# Patient Record
Sex: FEMALE | Race: WHITE | NOT HISPANIC OR LATINO | Employment: UNEMPLOYED | RURAL
[De-identification: names, ages, dates, MRNs, and addresses within clinical notes are randomized per-mention and may not be internally consistent; named-entity substitution may affect disease eponyms.]

---

## 2018-06-24 ENCOUNTER — HISTORICAL (OUTPATIENT)
Dept: ADMINISTRATIVE | Facility: HOSPITAL | Age: 58
End: 2018-06-24

## 2018-06-26 LAB
LAB AP CLINICAL INFORMATION: NORMAL
LAB AP DIAGNOSIS - HISTORICAL: NORMAL
LAB AP GROSS PATHOLOGY - HISTORICAL: NORMAL
LAB AP SPECIMEN SUBMITTED - HISTORICAL: NORMAL

## 2020-05-12 ENCOUNTER — HISTORICAL (OUTPATIENT)
Dept: ADMINISTRATIVE | Facility: HOSPITAL | Age: 60
End: 2020-05-12

## 2020-12-30 ENCOUNTER — HISTORICAL (OUTPATIENT)
Dept: ADMINISTRATIVE | Facility: HOSPITAL | Age: 60
End: 2020-12-30

## 2020-12-30 LAB
BILIRUB UR QL STRIP: NEGATIVE MG/DL
CLARITY UR: CLEAR
COLOR UR: ABNORMAL
GLUCOSE UR STRIP-MCNC: NEGATIVE MG/DL
KETONES UR STRIP-SCNC: NEGATIVE MG/DL
LEUKOCYTE ESTERASE UR QL STRIP: NEGATIVE LEU/UL
NITRITE UR QL STRIP: NEGATIVE
PH UR STRIP: 5.5 PH UNITS (ref 5–8)
PROT UR QL STRIP: NEGATIVE MG/DL
RBC # UR STRIP: ABNORMAL ERY/UL
SP GR UR STRIP: <=1.005 (ref 1–1.03)
UROBILINOGEN UR STRIP-ACNC: 0.2 EU/DL

## 2021-01-02 ENCOUNTER — HISTORICAL (OUTPATIENT)
Dept: ADMINISTRATIVE | Facility: HOSPITAL | Age: 61
End: 2021-01-02

## 2021-01-02 LAB
ALBUMIN SERPL BCP-MCNC: 3.5 G/DL (ref 3.5–5)
ALBUMIN/GLOB SERPL: 0.9 {RATIO}
ALP SERPL-CCNC: 93 U/L (ref 46–118)
ALT SERPL W P-5'-P-CCNC: 41 U/L (ref 13–56)
AMPHET UR QL SCN: NEGATIVE NG/ML
ANION GAP SERPL CALCULATED.3IONS-SCNC: 12 MMOL/L
ANISOCYTOSIS BLD QL SMEAR: ABNORMAL
AST SERPL W P-5'-P-CCNC: 135 U/L (ref 15–37)
BARBITURATES UR QL SCN: NEGATIVE NG/ML
BASOPHILS # BLD AUTO: 0.08 X10E3/UL (ref 0–0.2)
BASOPHILS NFR BLD AUTO: 0.8 % (ref 0–1)
BENZODIAZ METAB UR QL SCN: NEGATIVE NG/ML
BILIRUB SERPL-MCNC: 0.4 MG/DL (ref 0–1.2)
BILIRUB UR QL STRIP: NEGATIVE MG/DL
BUN SERPL-MCNC: 24 MG/DL (ref 7–18)
BUN/CREAT SERPL: 25
CALCIUM SERPL-MCNC: 8.9 MG/DL (ref 8.5–10.1)
CANNABINOIDS UR QL SCN: NEGATIVE NG/ML
CHLORIDE SERPL-SCNC: 97 MMOL/L (ref 98–107)
CLARITY UR: CLEAR
CO2 SERPL-SCNC: 30 MMOL/L (ref 21–32)
COCAINE UR QL SCN: NEGATIVE NG/ML
COLOR UR: ABNORMAL
CREAT SERPL-MCNC: 0.96 MG/DL (ref 0.55–1.02)
EOSINOPHIL # BLD AUTO: 0.16 X10E3/UL (ref 0–0.5)
EOSINOPHIL NFR BLD AUTO: 1.7 % (ref 1–4)
EOSINOPHIL NFR BLD MANUAL: 1 % (ref 1–4)
ERYTHROCYTE [DISTWIDTH] IN BLOOD BY AUTOMATED COUNT: 18.5 % (ref 11.5–14.5)
GLOBULIN SER-MCNC: 4.1 G/DL (ref 2–4)
GLUCOSE SERPL-MCNC: 99 MG/DL (ref 74–106)
GLUCOSE UR STRIP-MCNC: NEGATIVE MG/DL
HCT VFR BLD AUTO: 30.8 % (ref 38–47)
HGB BLD-MCNC: 9.7 G/DL (ref 12–16)
IMM GRANULOCYTES # BLD AUTO: 0.06 X10E3/UL (ref 0–0.04)
IMM GRANULOCYTES NFR BLD: 0.6 % (ref 0–0.4)
KETONES UR STRIP-SCNC: NEGATIVE MG/DL
LEUKOCYTE ESTERASE UR QL STRIP: NEGATIVE LEU/UL
LYMPHOCYTES # BLD AUTO: 0.69 X10E3/UL (ref 1–4.8)
LYMPHOCYTES NFR BLD AUTO: 7.3 % (ref 27–41)
LYMPHOCYTES NFR BLD MANUAL: 10 % (ref 27–41)
MCH RBC QN AUTO: 31.5 PG (ref 27–31)
MCHC RBC AUTO-ENTMCNC: 31.5 G/DL (ref 32–36)
MCV RBC AUTO: 100 FL (ref 80–96)
MONOCYTES # BLD AUTO: 1.06 X10E3/UL (ref 0–0.8)
MONOCYTES NFR BLD AUTO: 11.2 % (ref 2–6)
MONOCYTES NFR BLD MANUAL: 11 % (ref 2–6)
MPC BLD CALC-MCNC: 11 FL (ref 9.4–12.4)
NEUTROPHILS # BLD AUTO: 7.44 X10E3/UL (ref 1.8–7.7)
NEUTROPHILS NFR BLD AUTO: 78.4 % (ref 53–65)
NEUTS BAND NFR BLD MANUAL: 2 % (ref 1–5)
NEUTS SEG NFR BLD MANUAL: 76 % (ref 50–62)
NITRITE UR QL STRIP: NEGATIVE
NRBC # BLD AUTO: 0 X10E3/UL (ref 0–0)
NRBC, AUTO (.00): 0 /100 (ref 0–0)
OPIATES UR QL SCN: POSITIVE NG/ML
PCP UR QL SCN: NEGATIVE NG/ML
PH UR STRIP: 5 PH UNITS (ref 5–8)
PLATELET # BLD AUTO: 177 X10E3/UL (ref 150–400)
PLATELET MORPHOLOGY: NORMAL
POLYCHROMASIA BLD QL SMEAR: ABNORMAL
POTASSIUM SERPL-SCNC: 3 MMOL/L (ref 3.5–5.1)
PROT SERPL-MCNC: 7.6 G/DL (ref 6.4–8.2)
PROT UR QL STRIP: NEGATIVE MG/DL
RBC # BLD AUTO: 3.08 X10E6/UL (ref 4.2–5.4)
RBC # UR STRIP: ABNORMAL ERY/UL
SODIUM SERPL-SCNC: 136 MMOL/L (ref 136–145)
SP GR UR STRIP: 1.01 (ref 1–1.03)
UROBILINOGEN UR STRIP-ACNC: 0.2 EU/DL
WBC # BLD AUTO: 9.49 X10E3/UL (ref 4.5–11)

## 2021-03-16 DIAGNOSIS — R60.0 LOCALIZED EDEMA: Primary | ICD-10-CM

## 2021-03-16 RX ORDER — FLUTICASONE PROPIONATE 50 MCG
2 SPRAY, SUSPENSION (ML) NASAL DAILY
COMMUNITY
End: 2024-03-18

## 2021-03-16 RX ORDER — MONTELUKAST SODIUM 10 MG/1
1 TABLET ORAL NIGHTLY
COMMUNITY
Start: 2021-03-12 | End: 2021-03-24

## 2021-03-16 RX ORDER — ERGOCALCIFEROL 1.25 MG/1
50000 CAPSULE ORAL
COMMUNITY
End: 2021-03-24

## 2021-03-16 RX ORDER — PANTOPRAZOLE SODIUM 40 MG/1
1 TABLET, DELAYED RELEASE ORAL DAILY
COMMUNITY
Start: 2021-03-08 | End: 2021-03-24

## 2021-03-16 RX ORDER — GABAPENTIN 100 MG/1
1 CAPSULE ORAL NIGHTLY
COMMUNITY
Start: 2021-03-12 | End: 2021-03-24

## 2021-03-16 RX ORDER — FUROSEMIDE 20 MG/1
20 TABLET ORAL DAILY PRN
Qty: 30 TABLET | Refills: 1 | Status: SHIPPED | OUTPATIENT
Start: 2021-03-16 | End: 2021-03-24

## 2021-03-16 RX ORDER — FUROSEMIDE 20 MG/1
1 TABLET ORAL DAILY PRN
COMMUNITY
Start: 2021-03-15 | End: 2021-03-16 | Stop reason: SDUPTHER

## 2021-03-16 RX ORDER — ONDANSETRON 4 MG/1
1 TABLET, FILM COATED ORAL 3 TIMES DAILY PRN
COMMUNITY
Start: 2021-03-12 | End: 2021-03-24

## 2021-03-16 RX ORDER — ESCITALOPRAM OXALATE 10 MG/1
1 TABLET ORAL DAILY
COMMUNITY
Start: 2021-03-08 | End: 2021-03-24

## 2021-03-16 RX ORDER — CLONAZEPAM 2 MG/1
2 TABLET ORAL 2 TIMES DAILY
COMMUNITY
End: 2021-03-24

## 2021-03-16 RX ORDER — TIZANIDINE 4 MG/1
4 TABLET ORAL 3 TIMES DAILY PRN
COMMUNITY
End: 2021-03-24

## 2021-03-16 RX ORDER — SUCRALFATE 1 G/1
1 TABLET ORAL 3 TIMES DAILY PRN
COMMUNITY
End: 2021-03-24

## 2021-03-24 ENCOUNTER — HOSPITAL ENCOUNTER (INPATIENT)
Facility: HOSPITAL | Age: 61
LOS: 7 days | Discharge: HOSPICE | DRG: 177 | End: 2021-03-31
Attending: EMERGENCY MEDICINE | Admitting: INTERNAL MEDICINE
Payer: MEDICARE

## 2021-03-24 DIAGNOSIS — D64.9 NORMOCYTIC ANEMIA: ICD-10-CM

## 2021-03-24 DIAGNOSIS — F41.1 GAD (GENERALIZED ANXIETY DISORDER): ICD-10-CM

## 2021-03-24 DIAGNOSIS — S42.213A HUMERAL SURGICAL NECK FRACTURE: ICD-10-CM

## 2021-03-24 DIAGNOSIS — R79.89 ELEVATED TROPONIN I LEVEL: ICD-10-CM

## 2021-03-24 DIAGNOSIS — E87.6 HYPOKALEMIA: ICD-10-CM

## 2021-03-24 DIAGNOSIS — D50.0 IRON DEFICIENCY ANEMIA DUE TO CHRONIC BLOOD LOSS: ICD-10-CM

## 2021-03-24 DIAGNOSIS — K20.90 ESOPHAGITIS: ICD-10-CM

## 2021-03-24 DIAGNOSIS — J18.9 PNEUMONIA OF RIGHT LOWER LOBE DUE TO INFECTIOUS ORGANISM: Primary | ICD-10-CM

## 2021-03-24 DIAGNOSIS — F05 ACUTE CONFUSIONAL STATE: ICD-10-CM

## 2021-03-24 DIAGNOSIS — A41.9 SEPSIS, DUE TO UNSPECIFIED ORGANISM, UNSPECIFIED WHETHER ACUTE ORGAN DYSFUNCTION PRESENT: ICD-10-CM

## 2021-03-24 DIAGNOSIS — E87.1 HYPONATREMIA: ICD-10-CM

## 2021-03-24 DIAGNOSIS — J44.1 COPD EXACERBATION: ICD-10-CM

## 2021-03-24 DIAGNOSIS — R06.00 DYSPNEA: ICD-10-CM

## 2021-03-24 LAB
ALBUMIN SERPL BCP-MCNC: 3 G/DL (ref 3.5–5)
ALBUMIN/GLOB SERPL: 0.9 {RATIO}
ALP SERPL-CCNC: 103 U/L (ref 46–118)
ALT SERPL W P-5'-P-CCNC: 19 U/L (ref 13–56)
AMPHETAMINE: NEGATIVE
ANION GAP SERPL CALCULATED.3IONS-SCNC: 8.4 MMOL/L (ref 7–16)
ANISOCYTOSIS BLD QL SMEAR: ABNORMAL
APTT PPP: 34.7 SECONDS (ref 25.2–37.3)
AST SERPL W P-5'-P-CCNC: 33 U/L (ref 15–37)
BARBITURATES: NEGATIVE
BASOPHILS # BLD AUTO: 0.03 X10E3/UL (ref 0–0.2)
BASOPHILS NFR BLD AUTO: 0.5 % (ref 0–1)
BENZODIAZEPINES: NEGATIVE
BILIRUB SERPL-MCNC: 0.3 MG/DL (ref 0–1.2)
BILIRUB UR QL STRIP: NEGATIVE MG/DL
BUN SERPL-MCNC: 19 MG/DL (ref 7–18)
BUN/CREAT SERPL: 21
CALCIUM SERPL-MCNC: 8.2 MG/DL (ref 8.5–10.1)
CANNABINOID: NEGATIVE
CHLORIDE SERPL-SCNC: 102 MMOL/L (ref 98–107)
CK SERPL-CCNC: 605 U/L (ref 26–192)
CLARITY UR: CLEAR
CO2 SERPL-SCNC: 29 MMOL/L (ref 21–32)
COCAINE: NEGATIVE
COLOR UR: YELLOW
CREAT SERPL-MCNC: 0.91 MG/DL (ref 0.5–1.02)
EOSINOPHIL # BLD AUTO: 0 X10E3/UL (ref 0–0.5)
EOSINOPHIL NFR BLD AUTO: 0 % (ref 1–4)
ERYTHROCYTE [DISTWIDTH] IN BLOOD BY AUTOMATED COUNT: 14.4 % (ref 11.5–14.5)
FLUAV AG UPPER RESP QL IA.RAPID: NEGATIVE
FLUBV AG UPPER RESP QL IA.RAPID: NEGATIVE
GLOBULIN SER-MCNC: 3.5 G/DL (ref 2–4)
GLUCOSE SERPL-MCNC: 90 MG/DL (ref 74–106)
GLUCOSE UR STRIP-MCNC: NEGATIVE MG/DL
HCO3 UR-SCNC: 28.6 MMOL/L (ref 18–23)
HCT VFR BLD AUTO: 30.5 % (ref 38–47)
HCT VFR BLD CALC: 29 % (ref 35–51)
HGB BLD-MCNC: 9.5 G/DL (ref 12–16)
HYPOCHROMIA BLD QL SMEAR: ABNORMAL
IMM GRANULOCYTES # BLD AUTO: 0.02 X10E3/UL (ref 0–0.04)
IMM GRANULOCYTES NFR BLD: 0.3 % (ref 0–0.4)
INR BLD: 1.33 (ref 0–3.3)
KETONES UR STRIP-SCNC: NEGATIVE MG/DL
LACTATE SERPL-SCNC: 1.5 MMOL/L (ref 0.4–2)
LDH SERPL L TO P-CCNC: 0.6 MMOL/L
LEUKOCYTE ESTERASE UR QL STRIP: NEGATIVE LEU/UL
LYMPHOCYTES # BLD AUTO: 0.17 X10E3/UL (ref 1–4.8)
LYMPHOCYTES NFR BLD AUTO: 2.6 % (ref 27–41)
LYMPHOCYTES NFR BLD MANUAL: 6 % (ref 27–41)
MAGNESIUM SERPL-MCNC: 1.9 MG/DL (ref 1.7–2.3)
MCH RBC QN AUTO: 29.5 PG (ref 27–31)
MCHC RBC AUTO-ENTMCNC: 31.1 G/DL (ref 32–36)
MCV RBC AUTO: 94.7 FL (ref 80–96)
METAMYELOCYTES NFR BLD MANUAL: 2 %
MONOCYTES # BLD AUTO: 0.41 X10E3/UL (ref 0–0.8)
MONOCYTES NFR BLD AUTO: 6.2 % (ref 2–6)
MONOCYTES NFR BLD MANUAL: 2 % (ref 2–6)
MPC BLD CALC-MCNC: 9.1 FL (ref 9.4–12.4)
NEUTROPHILS # BLD AUTO: 5.99 X10E3/UL (ref 1.8–7.7)
NEUTROPHILS NFR BLD AUTO: 90.4 % (ref 53–65)
NEUTS BAND NFR BLD MANUAL: 28 % (ref 1–5)
NEUTS SEG NFR BLD MANUAL: 62 % (ref 50–62)
NITRITE UR QL STRIP: NEGATIVE
NRBC # BLD AUTO: 0 X10E3/UL (ref 0–0)
NRBC, AUTO (.00): 0 /100 (ref 0–0)
NT-PROBNP SERPL-MCNC: ABNORMAL PG/ML (ref 1–125)
OPIATES: NEGATIVE
PCO2 BLDA: 53 MMHG (ref 35–48)
PH SMN: 7.34 [PH] (ref 7.35–7.45)
PH UR STRIP: 5 PH UNITS (ref 5–8)
PHENCYCLIDINE: NEGATIVE
PLATELET # BLD AUTO: 198 X10E3/UL (ref 150–400)
PLATELET MORPHOLOGY: NORMAL
PO2 BLDA: 63 MMHG (ref 83–108)
POC BASE EXCESS: 2.2 MMOL/L
POC CO2: 30.2 MMOL/L
POC IONIZED CALCIUM: 1.14 MMOL/L (ref 1.15–1.35)
POC SATURATED O2: 90 % (ref 95–98)
POCT GLUCOSE: 83 MG/DL
POTASSIUM BLD-SCNC: 3.1 MMOL/L (ref 3.4–4.5)
POTASSIUM SERPL-SCNC: 3.4 MMOL/L (ref 3.5–5.1)
PROT SERPL-MCNC: 6.5 G/DL (ref 6.4–8.2)
PROT UR QL STRIP: ABNORMAL MG/DL
PROTHROMBIN TIME: 15.8 SECONDS (ref 11.7–14.7)
RBC # BLD AUTO: 3.22 X10E6/UL (ref 4.2–5.4)
RBC # UR STRIP: ABNORMAL ERY/UL
SARS-COV+SARS-COV-2 AG RESP QL IA.RAPID: NEGATIVE
SODIUM BLD-SCNC: 132 MMOL/L (ref 136–145)
SODIUM SERPL-SCNC: 136 MMOL/L (ref 136–145)
SP GR UR STRIP: 1.02 (ref 1–1.03)
TROPONIN I SERPL-MCNC: 0.11 NG/ML (ref 0–0.06)
UROBILINOGEN UR STRIP-ACNC: 0.2 EU/DL
WBC # BLD AUTO: 6.62 X10E3/UL (ref 4.5–11)

## 2021-03-24 PROCEDURE — 80061 LIPID PANEL: CPT

## 2021-03-24 PROCEDURE — 85025 COMPLETE CBC W/AUTO DIFF WBC: CPT

## 2021-03-24 PROCEDURE — 25000003 PHARM REV CODE 250: Performed by: EMERGENCY MEDICINE

## 2021-03-24 PROCEDURE — 20000000 HC ICU ROOM

## 2021-03-24 PROCEDURE — 84132 ASSAY OF SERUM POTASSIUM: CPT

## 2021-03-24 PROCEDURE — 82550 ASSAY OF CK (CPK): CPT

## 2021-03-24 PROCEDURE — 99223 1ST HOSP IP/OBS HIGH 75: CPT | Mod: GW,,, | Performed by: INTERNAL MEDICINE

## 2021-03-24 PROCEDURE — 93010 EKG 12-LEAD: ICD-10-PCS | Mod: GW,,, | Performed by: HOSPITALIST

## 2021-03-24 PROCEDURE — 99284 EMERGENCY DEPT VISIT MOD MDM: CPT | Mod: GW,CS,, | Performed by: EMERGENCY MEDICINE

## 2021-03-24 PROCEDURE — 84295 ASSAY OF SERUM SODIUM: CPT

## 2021-03-24 PROCEDURE — 80053 COMPREHEN METABOLIC PANEL: CPT

## 2021-03-24 PROCEDURE — 82803 BLOOD GASES ANY COMBINATION: CPT

## 2021-03-24 PROCEDURE — 99284 PR EMERGENCY DEPT VISIT,LEVEL IV: ICD-10-PCS | Mod: GW,CS,, | Performed by: EMERGENCY MEDICINE

## 2021-03-24 PROCEDURE — 36415 COLL VENOUS BLD VENIPUNCTURE: CPT

## 2021-03-24 PROCEDURE — 93005 ELECTROCARDIOGRAM TRACING: CPT

## 2021-03-24 PROCEDURE — 93010 ELECTROCARDIOGRAM REPORT: CPT | Mod: GW,,, | Performed by: HOSPITALIST

## 2021-03-24 PROCEDURE — 84484 ASSAY OF TROPONIN QUANT: CPT

## 2021-03-24 PROCEDURE — 83605 ASSAY OF LACTIC ACID: CPT

## 2021-03-24 PROCEDURE — 87428 SARSCOV & INF VIR A&B AG IA: CPT

## 2021-03-24 PROCEDURE — 96365 THER/PROPH/DIAG IV INF INIT: CPT

## 2021-03-24 PROCEDURE — 85014 HEMATOCRIT: CPT

## 2021-03-24 PROCEDURE — 82947 ASSAY GLUCOSE BLOOD QUANT: CPT

## 2021-03-24 PROCEDURE — 63600175 PHARM REV CODE 636 W HCPCS: Performed by: EMERGENCY MEDICINE

## 2021-03-24 PROCEDURE — 87040 BLOOD CULTURE FOR BACTERIA: CPT

## 2021-03-24 PROCEDURE — 99223 PR INITIAL HOSPITAL CARE,LEVL III: ICD-10-PCS | Mod: GW,,, | Performed by: INTERNAL MEDICINE

## 2021-03-24 PROCEDURE — 83735 ASSAY OF MAGNESIUM: CPT

## 2021-03-24 PROCEDURE — 99285 EMERGENCY DEPT VISIT HI MDM: CPT | Mod: 25

## 2021-03-24 PROCEDURE — 83880 ASSAY OF NATRIURETIC PEPTIDE: CPT

## 2021-03-24 PROCEDURE — 82330 ASSAY OF CALCIUM: CPT

## 2021-03-24 PROCEDURE — 36600 WITHDRAWAL OF ARTERIAL BLOOD: CPT

## 2021-03-24 PROCEDURE — 25000003 PHARM REV CODE 250

## 2021-03-24 PROCEDURE — 96372 THER/PROPH/DIAG INJ SC/IM: CPT

## 2021-03-24 PROCEDURE — 85730 THROMBOPLASTIN TIME PARTIAL: CPT

## 2021-03-24 PROCEDURE — 85610 PROTHROMBIN TIME: CPT

## 2021-03-24 RX ORDER — ACETAMINOPHEN 500 MG
TABLET ORAL
Status: COMPLETED
Start: 2021-03-24 | End: 2021-03-24

## 2021-03-24 RX ORDER — FLUTICASONE PROPIONATE 50 MCG
2 SPRAY, SUSPENSION (ML) NASAL DAILY
Status: DISCONTINUED | OUTPATIENT
Start: 2021-03-25 | End: 2021-03-31 | Stop reason: HOSPADM

## 2021-03-24 RX ORDER — IPRATROPIUM BROMIDE AND ALBUTEROL SULFATE 2.5; .5 MG/3ML; MG/3ML
3 SOLUTION RESPIRATORY (INHALATION) EVERY 4 HOURS
Status: DISCONTINUED | OUTPATIENT
Start: 2021-03-24 | End: 2021-03-31 | Stop reason: HOSPADM

## 2021-03-24 RX ORDER — ENOXAPARIN SODIUM 100 MG/ML
40 INJECTION SUBCUTANEOUS EVERY 24 HOURS
Status: DISCONTINUED | OUTPATIENT
Start: 2021-03-24 | End: 2021-03-28

## 2021-03-24 RX ORDER — IPRATROPIUM BROMIDE AND ALBUTEROL SULFATE 2.5; .5 MG/3ML; MG/3ML
SOLUTION RESPIRATORY (INHALATION)
Status: DISPENSED
Start: 2021-03-24 | End: 2021-03-25

## 2021-03-24 RX ORDER — TRAZODONE HYDROCHLORIDE 50 MG/1
TABLET ORAL
COMMUNITY
Start: 2021-03-12 | End: 2021-04-15 | Stop reason: SDUPTHER

## 2021-03-24 RX ORDER — BUDESONIDE 0.5 MG/2ML
0.5 INHALANT ORAL EVERY 12 HOURS
Status: DISCONTINUED | OUTPATIENT
Start: 2021-03-25 | End: 2021-03-31 | Stop reason: HOSPADM

## 2021-03-24 RX ORDER — SODIUM CHLORIDE, SODIUM LACTATE, POTASSIUM CHLORIDE, CALCIUM CHLORIDE 600; 310; 30; 20 MG/100ML; MG/100ML; MG/100ML; MG/100ML
1000 INJECTION, SOLUTION INTRAVENOUS
Status: COMPLETED | OUTPATIENT
Start: 2021-03-24 | End: 2021-03-24

## 2021-03-24 RX ORDER — ONDANSETRON 2 MG/ML
4 INJECTION INTRAMUSCULAR; INTRAVENOUS EVERY 8 HOURS PRN
Status: DISCONTINUED | OUTPATIENT
Start: 2021-03-24 | End: 2021-03-25

## 2021-03-24 RX ORDER — TALC
6 POWDER (GRAM) TOPICAL NIGHTLY PRN
Status: DISCONTINUED | OUTPATIENT
Start: 2021-03-24 | End: 2021-03-24

## 2021-03-24 RX ORDER — SODIUM CHLORIDE 0.9 % (FLUSH) 0.9 %
10 SYRINGE (ML) INJECTION
Status: DISCONTINUED | OUTPATIENT
Start: 2021-03-24 | End: 2021-03-31 | Stop reason: HOSPADM

## 2021-03-24 RX ORDER — SODIUM CHLORIDE 0.9 % (FLUSH) 0.9 %
10 SYRINGE (ML) INJECTION
Status: DISCONTINUED | OUTPATIENT
Start: 2021-03-24 | End: 2021-03-24

## 2021-03-24 RX ORDER — HYDROCODONE BITARTRATE AND ACETAMINOPHEN 10; 325 MG/1; MG/1
TABLET ORAL
COMMUNITY
Start: 2020-12-29 | End: 2024-03-18 | Stop reason: ALTCHOICE

## 2021-03-24 RX ADMIN — ENOXAPARIN SODIUM 40 MG: 40 INJECTION SUBCUTANEOUS at 08:03

## 2021-03-24 RX ADMIN — ACETAMINOPHEN 500 MG TABLET 500 MG: at 06:03

## 2021-03-24 RX ADMIN — SODIUM CHLORIDE, POTASSIUM CHLORIDE, SODIUM LACTATE AND CALCIUM CHLORIDE 1000 ML: 600; 310; 30; 20 INJECTION, SOLUTION INTRAVENOUS at 09:03

## 2021-03-24 RX ADMIN — PIPERACILLIN SODIUM AND TAZOBACTAM SODIUM 4.5 G: 4; .5 INJECTION, POWDER, LYOPHILIZED, FOR SOLUTION INTRAVENOUS at 08:03

## 2021-03-25 PROBLEM — E87.6 HYPOKALEMIA: Status: ACTIVE | Noted: 2021-03-25

## 2021-03-25 PROBLEM — F05 ACUTE CONFUSIONAL STATE: Status: ACTIVE | Noted: 2021-03-25

## 2021-03-25 LAB
ANION GAP SERPL CALCULATED.3IONS-SCNC: 8.4 MMOL/L (ref 7–16)
ANISOCYTOSIS BLD QL SMEAR: ABNORMAL
AORTIC ROOT ANNULUS: 2.6 CM
AORTIC VALVE CUSP SEPERATION: 1.93 CM
AV INDEX (PROSTH): 0.87
AV MEAN GRADIENT: 5 MMHG
AV PEAK GRADIENT: 9 MMHG
AV VALVE AREA: 2.21 CM2
BASOPHILS # BLD AUTO: 0.03 X10E3/UL (ref 0–0.2)
BASOPHILS NFR BLD AUTO: 0.2 % (ref 0–1)
BSA FOR ECHO PROCEDURE: 1.44 M2
BUN SERPL-MCNC: 20 MG/DL (ref 7–18)
CALCIUM SERPL-MCNC: 7.8 MG/DL (ref 8.5–10.1)
CHLORIDE SERPL-SCNC: 101 MMOL/L (ref 98–107)
CO2 SERPL-SCNC: 28 MMOL/L (ref 21–32)
CREAT SERPL-MCNC: 0.85 MG/DL (ref 0.5–1.02)
CV ECHO LV RWT: 0.42 CM
DOP CALC AO PEAK VEL: 1.5 M/S
DOP CALC AO VTI: 2.3 CM
DOP CALC LVOT AREA: 2.5 CM2
DOP CALC LVOT DIAMETER: 1.8 CM
DOP CALC LVOT STROKE VOLUME: 5.09 CM3
DOP CALCLVOT PEAK VEL VTI: 2 CM
E WAVE DECELERATION TIME: 233 MSEC
ECHO EF ESTIMATED: 50 %
ECHO LV POSTERIOR WALL: 0.95 CM (ref 0.6–1.1)
EOSINOPHIL # BLD AUTO: 0 X10E3/UL (ref 0–0.5)
EOSINOPHIL NFR BLD AUTO: 0 % (ref 1–4)
ERYTHROCYTE [DISTWIDTH] IN BLOOD BY AUTOMATED COUNT: 14.6 % (ref 11.5–14.5)
FRACTIONAL SHORTENING: 24 % (ref 28–44)
GLUCOSE SERPL-MCNC: 99 MG/DL (ref 74–106)
HCT VFR BLD AUTO: 26.6 % (ref 38–47)
HGB BLD-MCNC: 8.5 G/DL (ref 12–16)
HYPOCHROMIA BLD QL SMEAR: SLIGHT
IMM GRANULOCYTES # BLD AUTO: 0.11 X10E3/UL (ref 0–0.04)
IMM GRANULOCYTES NFR BLD: 0.8 % (ref 0–0.4)
INTERVENTRICULAR SEPTUM: 0.79 CM (ref 0.6–1.1)
LA MAJOR: 2.4 CM
LEFT INTERNAL DIMENSION IN SYSTOLE: 3.45 CM (ref 2.1–4)
LEFT VENTRICLE MASS INDEX: 88 G/M2
LEFT VENTRICULAR INTERNAL DIMENSION IN DIASTOLE: 4.52 CM (ref 3.5–6)
LEFT VENTRICULAR MASS: 127.87 G
LVOT MG: 3 MMHG
LYMPHOCYTES # BLD AUTO: 0.17 X10E3/UL (ref 1–4.8)
LYMPHOCYTES NFR BLD AUTO: 1.3 % (ref 27–41)
LYMPHOCYTES NFR BLD MANUAL: 2 % (ref 27–41)
MAGNESIUM SERPL-MCNC: 2.1 MG/DL (ref 1.7–2.3)
MCH RBC QN AUTO: 29.7 PG (ref 27–31)
MCHC RBC AUTO-ENTMCNC: 32 G/DL (ref 32–36)
MCV RBC AUTO: 93 FL (ref 80–96)
MONOCYTES # BLD AUTO: 0.45 X10E3/UL (ref 0–0.8)
MONOCYTES NFR BLD AUTO: 3.4 % (ref 2–6)
MONOCYTES NFR BLD MANUAL: 2 % (ref 2–6)
MPC BLD CALC-MCNC: 9.7 FL (ref 9.4–12.4)
MV PEAK E VEL: 0.8 M/S
NEUTROPHILS # BLD AUTO: 12.53 X10E3/UL (ref 1.8–7.7)
NEUTROPHILS NFR BLD AUTO: 94.3 % (ref 53–65)
NEUTS BAND NFR BLD MANUAL: 54 % (ref 1–5)
NEUTS SEG NFR BLD MANUAL: 42 % (ref 50–62)
NRBC # BLD AUTO: 0 X10E3/UL (ref 0–0)
NRBC, AUTO (.00): 0 /100 (ref 0–0)
PLATELET # BLD AUTO: 165 X10E3/UL (ref 150–400)
PLATELET MORPHOLOGY: ABNORMAL
POLYCHROMASIA BLD QL SMEAR: ABNORMAL
POTASSIUM SERPL-SCNC: 3.4 MMOL/L (ref 3.5–5.1)
RA MAJOR: 2.5 CM
RA PRESSURE: 3 MMHG
RBC # BLD AUTO: 2.86 X10E6/UL (ref 4.2–5.4)
RIGHT VENTRICULAR END-DIASTOLIC DIMENSION: 2.6 CM
SODIUM SERPL-SCNC: 134 MMOL/L (ref 136–145)
TRICUSPID ANNULAR PLANE SYSTOLIC EXCURSION: 1.8 CM
TROPONIN I SERPL-MCNC: 0.08 NG/ML (ref 0–0.06)
TROPONIN I SERPL-MCNC: 0.1 NG/ML (ref 0–0.06)
TROPONIN I SERPL-MCNC: 0.16 NG/ML (ref 0–0.06)
WBC # BLD AUTO: 13.29 X10E3/UL (ref 4.5–11)

## 2021-03-25 PROCEDURE — 27000221 HC OXYGEN, UP TO 24 HOURS

## 2021-03-25 PROCEDURE — 80048 BASIC METABOLIC PNL TOTAL CA: CPT

## 2021-03-25 PROCEDURE — 99233 SBSQ HOSP IP/OBS HIGH 50: CPT | Mod: ,,, | Performed by: INTERNAL MEDICINE

## 2021-03-25 PROCEDURE — 84484 ASSAY OF TROPONIN QUANT: CPT

## 2021-03-25 PROCEDURE — 25000003 PHARM REV CODE 250: Performed by: INTERNAL MEDICINE

## 2021-03-25 PROCEDURE — 94640 AIRWAY INHALATION TREATMENT: CPT

## 2021-03-25 PROCEDURE — 25000003 PHARM REV CODE 250: Performed by: NURSE PRACTITIONER

## 2021-03-25 PROCEDURE — 85025 COMPLETE CBC W/AUTO DIFF WBC: CPT

## 2021-03-25 PROCEDURE — 36415 COLL VENOUS BLD VENIPUNCTURE: CPT

## 2021-03-25 PROCEDURE — 83735 ASSAY OF MAGNESIUM: CPT

## 2021-03-25 PROCEDURE — 99900031 HC PATIENT EDUCATION (STAT)

## 2021-03-25 PROCEDURE — 99233 PR SUBSEQUENT HOSPITAL CARE,LEVL III: ICD-10-PCS | Mod: ,,, | Performed by: INTERNAL MEDICINE

## 2021-03-25 PROCEDURE — 20000000 HC ICU ROOM

## 2021-03-25 PROCEDURE — 63600175 PHARM REV CODE 636 W HCPCS: Performed by: INTERNAL MEDICINE

## 2021-03-25 PROCEDURE — 94761 N-INVAS EAR/PLS OXIMETRY MLT: CPT

## 2021-03-25 PROCEDURE — 92610 EVALUATE SWALLOWING FUNCTION: CPT

## 2021-03-25 PROCEDURE — 25000242 PHARM REV CODE 250 ALT 637 W/ HCPCS: Performed by: INTERNAL MEDICINE

## 2021-03-25 RX ORDER — ACETAMINOPHEN 500 MG
1000 TABLET ORAL EVERY 6 HOURS PRN
Status: DISCONTINUED | OUTPATIENT
Start: 2021-03-25 | End: 2021-03-31 | Stop reason: HOSPADM

## 2021-03-25 RX ORDER — HYDROCODONE BITARTRATE AND ACETAMINOPHEN 5; 325 MG/1; MG/1
1 TABLET ORAL EVERY 6 HOURS PRN
Status: DISCONTINUED | OUTPATIENT
Start: 2021-03-25 | End: 2021-03-31 | Stop reason: HOSPADM

## 2021-03-25 RX ADMIN — PIPERACILLIN SODIUM AND TAZOBACTAM SODIUM 4.5 G: 4; .5 INJECTION, POWDER, LYOPHILIZED, FOR SOLUTION INTRAVENOUS at 09:03

## 2021-03-25 RX ADMIN — IPRATROPIUM BROMIDE AND ALBUTEROL SULFATE 3 ML: 2.5; .5 SOLUTION RESPIRATORY (INHALATION) at 09:03

## 2021-03-25 RX ADMIN — FLUTICASONE PROPIONATE 100 MCG: 50 SPRAY, METERED NASAL at 12:03

## 2021-03-25 RX ADMIN — BUDESONIDE 0.5 MG: 0.5 INHALANT ORAL at 09:03

## 2021-03-25 RX ADMIN — PIPERACILLIN SODIUM AND TAZOBACTAM SODIUM 4.5 G: 4; .5 INJECTION, POWDER, LYOPHILIZED, FOR SOLUTION INTRAVENOUS at 01:03

## 2021-03-25 RX ADMIN — IPRATROPIUM BROMIDE AND ALBUTEROL SULFATE 3 ML: 2.5; .5 SOLUTION RESPIRATORY (INHALATION) at 11:03

## 2021-03-25 RX ADMIN — HYDROCODONE BITARTRATE AND ACETAMINOPHEN 1 TABLET: 5; 325 TABLET ORAL at 06:03

## 2021-03-25 RX ADMIN — ENOXAPARIN SODIUM 40 MG: 40 INJECTION SUBCUTANEOUS at 04:03

## 2021-03-25 RX ADMIN — METHYLPREDNISOLONE SODIUM SUCCINATE 40 MG: 40 INJECTION, POWDER, FOR SOLUTION INTRAMUSCULAR; INTRAVENOUS at 06:03

## 2021-03-25 RX ADMIN — IPRATROPIUM BROMIDE AND ALBUTEROL SULFATE 3 ML: 2.5; .5 SOLUTION RESPIRATORY (INHALATION) at 08:03

## 2021-03-25 RX ADMIN — VANCOMYCIN HYDROCHLORIDE 1000 MG: 1 INJECTION, POWDER, LYOPHILIZED, FOR SOLUTION INTRAVENOUS at 12:03

## 2021-03-25 RX ADMIN — VANCOMYCIN HYDROCHLORIDE 1000 MG: 1 INJECTION, POWDER, LYOPHILIZED, FOR SOLUTION INTRAVENOUS at 05:03

## 2021-03-25 RX ADMIN — IPRATROPIUM BROMIDE AND ALBUTEROL SULFATE 3 ML: 2.5; .5 SOLUTION RESPIRATORY (INHALATION) at 03:03

## 2021-03-25 RX ADMIN — METHYLPREDNISOLONE SODIUM SUCCINATE 40 MG: 40 INJECTION, POWDER, FOR SOLUTION INTRAMUSCULAR; INTRAVENOUS at 12:03

## 2021-03-25 RX ADMIN — PIPERACILLIN SODIUM AND TAZOBACTAM SODIUM 4.5 G: 4; .5 INJECTION, POWDER, LYOPHILIZED, FOR SOLUTION INTRAVENOUS at 04:03

## 2021-03-26 PROBLEM — F05 ACUTE CONFUSIONAL STATE: Status: RESOLVED | Noted: 2021-03-25 | Resolved: 2021-03-26

## 2021-03-26 PROBLEM — S42.213A HUMERAL SURGICAL NECK FRACTURE: Status: ACTIVE | Noted: 2021-03-26

## 2021-03-26 LAB
ANION GAP SERPL CALCULATED.3IONS-SCNC: 9 MMOL/L (ref 7–16)
BASOPHILS # BLD AUTO: 0.02 X10E3/UL (ref 0–0.2)
BASOPHILS NFR BLD AUTO: 0.1 % (ref 0–1)
BUN SERPL-MCNC: 21 MG/DL (ref 7–18)
CALCIUM SERPL-MCNC: 8.7 MG/DL (ref 8.5–10.1)
CHLORIDE SERPL-SCNC: 97 MMOL/L (ref 98–107)
CO2 SERPL-SCNC: 28 MMOL/L (ref 21–32)
CREAT SERPL-MCNC: 0.71 MG/DL (ref 0.5–1.02)
EOSINOPHIL # BLD AUTO: 0 X10E3/UL (ref 0–0.5)
EOSINOPHIL NFR BLD AUTO: 0 % (ref 1–4)
ERYTHROCYTE [DISTWIDTH] IN BLOOD BY AUTOMATED COUNT: 15 % (ref 11.5–14.5)
GLUCOSE SERPL-MCNC: 152 MG/DL (ref 74–106)
HCT VFR BLD AUTO: 23.7 % (ref 38–47)
HGB BLD-MCNC: 7.4 G/DL (ref 12–16)
HYPOCHROMIA BLD QL SMEAR: SLIGHT
IMM GRANULOCYTES # BLD AUTO: 1.21 X10E3/UL (ref 0–0.04)
IMM GRANULOCYTES NFR BLD: 7.7 % (ref 0–0.4)
LYMPHOCYTES # BLD AUTO: 0.21 X10E3/UL (ref 1–4.8)
LYMPHOCYTES NFR BLD AUTO: 1.3 % (ref 27–41)
LYMPHOCYTES NFR BLD MANUAL: 3 % (ref 27–41)
MAGNESIUM SERPL-MCNC: 2.1 MG/DL (ref 1.7–2.3)
MCH RBC QN AUTO: 28.9 PG (ref 27–31)
MCHC RBC AUTO-ENTMCNC: 31.2 G/DL (ref 32–36)
MCV RBC AUTO: 92.6 FL (ref 80–96)
MONOCYTES # BLD AUTO: 0.56 X10E3/UL (ref 0–0.8)
MONOCYTES NFR BLD AUTO: 3.6 % (ref 2–6)
MONOCYTES NFR BLD MANUAL: 4 % (ref 2–6)
MPC BLD CALC-MCNC: 9.8 FL (ref 9.4–12.4)
NEUTROPHILS # BLD AUTO: 13.68 X10E3/UL (ref 1.8–7.7)
NEUTROPHILS NFR BLD AUTO: 87.3 % (ref 53–65)
NEUTS BAND NFR BLD MANUAL: 37 % (ref 1–5)
NEUTS SEG NFR BLD MANUAL: 56 % (ref 50–62)
NRBC # BLD AUTO: 0 X10E3/UL (ref 0–0)
NRBC, AUTO (.00): 0 /100 (ref 0–0)
PLATELET # BLD AUTO: 122 X10E3/UL (ref 150–400)
PLATELET MORPHOLOGY: ABNORMAL
POLYCHROMASIA BLD QL SMEAR: ABNORMAL
POTASSIUM SERPL-SCNC: 3 MMOL/L (ref 3.5–5.1)
RBC # BLD AUTO: 2.56 X10E6/UL (ref 4.2–5.4)
SODIUM SERPL-SCNC: 131 MMOL/L (ref 136–145)
WBC # BLD AUTO: 15.68 X10E3/UL (ref 4.5–11)

## 2021-03-26 PROCEDURE — 36415 COLL VENOUS BLD VENIPUNCTURE: CPT

## 2021-03-26 PROCEDURE — 25000003 PHARM REV CODE 250: Performed by: INTERNAL MEDICINE

## 2021-03-26 PROCEDURE — 99232 SBSQ HOSP IP/OBS MODERATE 35: CPT | Mod: GW,,, | Performed by: NURSE PRACTITIONER

## 2021-03-26 PROCEDURE — 94761 N-INVAS EAR/PLS OXIMETRY MLT: CPT

## 2021-03-26 PROCEDURE — 99232 PR SUBSEQUENT HOSPITAL CARE,LEVL II: ICD-10-PCS | Mod: GW,,, | Performed by: NURSE PRACTITIONER

## 2021-03-26 PROCEDURE — 80048 BASIC METABOLIC PNL TOTAL CA: CPT

## 2021-03-26 PROCEDURE — 51702 INSERT TEMP BLADDER CATH: CPT

## 2021-03-26 PROCEDURE — 63600175 PHARM REV CODE 636 W HCPCS: Performed by: INTERNAL MEDICINE

## 2021-03-26 PROCEDURE — 85025 COMPLETE CBC W/AUTO DIFF WBC: CPT

## 2021-03-26 PROCEDURE — 25000242 PHARM REV CODE 250 ALT 637 W/ HCPCS: Performed by: INTERNAL MEDICINE

## 2021-03-26 PROCEDURE — 27000221 HC OXYGEN, UP TO 24 HOURS

## 2021-03-26 PROCEDURE — 25000003 PHARM REV CODE 250: Performed by: NURSE PRACTITIONER

## 2021-03-26 PROCEDURE — 94640 AIRWAY INHALATION TREATMENT: CPT

## 2021-03-26 PROCEDURE — 11000001 HC ACUTE MED/SURG PRIVATE ROOM

## 2021-03-26 PROCEDURE — 83735 ASSAY OF MAGNESIUM: CPT

## 2021-03-26 RX ORDER — POTASSIUM CHLORIDE 20 MEQ/1
40 TABLET, EXTENDED RELEASE ORAL 2 TIMES DAILY
Status: COMPLETED | OUTPATIENT
Start: 2021-03-26 | End: 2021-03-26

## 2021-03-26 RX ADMIN — BUDESONIDE 0.5 MG: 0.5 INHALANT ORAL at 07:03

## 2021-03-26 RX ADMIN — HYDROCODONE BITARTRATE AND ACETAMINOPHEN 1 TABLET: 5; 325 TABLET ORAL at 08:03

## 2021-03-26 RX ADMIN — METHYLPREDNISOLONE SODIUM SUCCINATE 40 MG: 40 INJECTION, POWDER, FOR SOLUTION INTRAMUSCULAR; INTRAVENOUS at 06:03

## 2021-03-26 RX ADMIN — PIPERACILLIN SODIUM AND TAZOBACTAM SODIUM 4.5 G: 4; .5 INJECTION, POWDER, LYOPHILIZED, FOR SOLUTION INTRAVENOUS at 08:03

## 2021-03-26 RX ADMIN — PIPERACILLIN SODIUM AND TAZOBACTAM SODIUM 4.5 G: 4; .5 INJECTION, POWDER, LYOPHILIZED, FOR SOLUTION INTRAVENOUS at 05:03

## 2021-03-26 RX ADMIN — HYDROCODONE BITARTRATE AND ACETAMINOPHEN 1 TABLET: 5; 325 TABLET ORAL at 12:03

## 2021-03-26 RX ADMIN — IPRATROPIUM BROMIDE AND ALBUTEROL SULFATE 3 ML: 2.5; .5 SOLUTION RESPIRATORY (INHALATION) at 11:03

## 2021-03-26 RX ADMIN — POTASSIUM CHLORIDE 40 MEQ: 20 TABLET, EXTENDED RELEASE ORAL at 08:03

## 2021-03-26 RX ADMIN — PIPERACILLIN SODIUM AND TAZOBACTAM SODIUM 4.5 G: 4; .5 INJECTION, POWDER, LYOPHILIZED, FOR SOLUTION INTRAVENOUS at 12:03

## 2021-03-26 RX ADMIN — FLUTICASONE PROPIONATE 100 MCG: 50 SPRAY, METERED NASAL at 08:03

## 2021-03-26 RX ADMIN — IPRATROPIUM BROMIDE AND ALBUTEROL SULFATE 3 ML: 2.5; .5 SOLUTION RESPIRATORY (INHALATION) at 03:03

## 2021-03-26 RX ADMIN — VANCOMYCIN HYDROCHLORIDE 1000 MG: 1 INJECTION, POWDER, LYOPHILIZED, FOR SOLUTION INTRAVENOUS at 12:03

## 2021-03-26 RX ADMIN — METHYLPREDNISOLONE SODIUM SUCCINATE 40 MG: 40 INJECTION, POWDER, FOR SOLUTION INTRAMUSCULAR; INTRAVENOUS at 12:03

## 2021-03-26 RX ADMIN — HYDROCODONE BITARTRATE AND ACETAMINOPHEN 1 TABLET: 5; 325 TABLET ORAL at 01:03

## 2021-03-26 RX ADMIN — ENOXAPARIN SODIUM 40 MG: 40 INJECTION SUBCUTANEOUS at 05:03

## 2021-03-26 RX ADMIN — IPRATROPIUM BROMIDE AND ALBUTEROL SULFATE 3 ML: 2.5; .5 SOLUTION RESPIRATORY (INHALATION) at 07:03

## 2021-03-26 RX ADMIN — METHYLPREDNISOLONE SODIUM SUCCINATE 40 MG: 40 INJECTION, POWDER, FOR SOLUTION INTRAMUSCULAR; INTRAVENOUS at 11:03

## 2021-03-26 RX ADMIN — POTASSIUM CHLORIDE 40 MEQ: 20 TABLET, EXTENDED RELEASE ORAL at 12:03

## 2021-03-26 RX ADMIN — IPRATROPIUM BROMIDE AND ALBUTEROL SULFATE 3 ML: 2.5; .5 SOLUTION RESPIRATORY (INHALATION) at 08:03

## 2021-03-26 RX ADMIN — METHYLPREDNISOLONE SODIUM SUCCINATE 40 MG: 40 INJECTION, POWDER, FOR SOLUTION INTRAMUSCULAR; INTRAVENOUS at 05:03

## 2021-03-27 PROBLEM — D64.9 NORMOCYTIC ANEMIA: Status: ACTIVE | Noted: 2021-03-27

## 2021-03-27 PROBLEM — E87.1 HYPONATREMIA: Status: ACTIVE | Noted: 2021-03-27

## 2021-03-27 LAB
ANION GAP SERPL CALCULATED.3IONS-SCNC: 9 MMOL/L
ANISOCYTOSIS BLD QL SMEAR: ABNORMAL
BASOPHILS # BLD AUTO: 0.03 X10E3/UL (ref 0–0.2)
BASOPHILS NFR BLD AUTO: 0.2 % (ref 0–1)
BUN SERPL-MCNC: 16 MG/DL (ref 7–18)
CALCIUM SERPL-MCNC: 8.8 MG/DL (ref 8.5–10.1)
CHLORIDE SERPL-SCNC: 95 MMOL/L (ref 98–107)
CO2 SERPL-SCNC: 29 MMOL/L (ref 21–32)
CREAT SERPL-MCNC: 0.66 MG/DL (ref 0.55–1.02)
EOSINOPHIL # BLD AUTO: 0 X10E3/UL (ref 0–0.5)
EOSINOPHIL NFR BLD AUTO: 0 % (ref 1–4)
ERYTHROCYTE [DISTWIDTH] IN BLOOD BY AUTOMATED COUNT: 15 % (ref 11.5–14.5)
GLUCOSE SERPL-MCNC: 119 MG/DL (ref 74–106)
HCT VFR BLD AUTO: 22.5 % (ref 38–47)
HGB BLD-MCNC: 7.2 G/DL (ref 12–16)
HYPOCHROMIA BLD QL SMEAR: SLIGHT
IMM GRANULOCYTES # BLD AUTO: 0.83 X10E3/UL (ref 0–0.04)
IMM GRANULOCYTES NFR BLD: 5 % (ref 0–0.4)
LYMPHOCYTES # BLD AUTO: 0.27 X10E3/UL (ref 1–4.8)
LYMPHOCYTES NFR BLD AUTO: 1.6 % (ref 27–41)
LYMPHOCYTES NFR BLD MANUAL: 2 % (ref 27–41)
MAGNESIUM SERPL-MCNC: 2 MG/DL (ref 1.7–2.3)
MCH RBC QN AUTO: 29.6 PG (ref 27–31)
MCHC RBC AUTO-ENTMCNC: 32 G/DL (ref 32–36)
MCV RBC AUTO: 92.6 FL (ref 80–96)
MONOCYTES # BLD AUTO: 0.6 X10E3/UL (ref 0–0.8)
MONOCYTES NFR BLD AUTO: 3.6 % (ref 2–6)
MONOCYTES NFR BLD MANUAL: 2 % (ref 2–6)
MPC BLD CALC-MCNC: 10.2 FL (ref 9.4–12.4)
NEUTROPHILS # BLD AUTO: 14.86 X10E3/UL (ref 1.8–7.7)
NEUTROPHILS NFR BLD AUTO: 89.6 % (ref 53–65)
NEUTS BAND NFR BLD MANUAL: 5 % (ref 1–5)
NEUTS SEG NFR BLD MANUAL: 91 % (ref 50–62)
NRBC # BLD AUTO: 0 X10E3/UL (ref 0–0)
NRBC, AUTO (.00): 0.2 /100 (ref 0–0)
OVALOCYTES BLD QL SMEAR: ABNORMAL
PLATELET # BLD AUTO: 123 X10E3/UL (ref 150–400)
PLATELET MORPHOLOGY: ABNORMAL
POLYCHROMASIA BLD QL SMEAR: ABNORMAL
POTASSIUM SERPL-SCNC: 4.6 MMOL/L (ref 3.5–5.1)
RBC # BLD AUTO: 2.43 X10E6/UL (ref 4.2–5.4)
SODIUM SERPL-SCNC: 128 MMOL/L (ref 136–145)
VANCOMYCIN TROUGH SERPL-MCNC: 13 UG/ML (ref 10–20)
WBC # BLD AUTO: 16.59 X10E3/UL (ref 4.5–11)

## 2021-03-27 PROCEDURE — 80048 BASIC METABOLIC PNL TOTAL CA: CPT

## 2021-03-27 PROCEDURE — 63600175 PHARM REV CODE 636 W HCPCS: Performed by: NURSE PRACTITIONER

## 2021-03-27 PROCEDURE — 27000221 HC OXYGEN, UP TO 24 HOURS

## 2021-03-27 PROCEDURE — 83735 ASSAY OF MAGNESIUM: CPT

## 2021-03-27 PROCEDURE — 94761 N-INVAS EAR/PLS OXIMETRY MLT: CPT

## 2021-03-27 PROCEDURE — 63600175 PHARM REV CODE 636 W HCPCS: Performed by: INTERNAL MEDICINE

## 2021-03-27 PROCEDURE — 63600175 PHARM REV CODE 636 W HCPCS: Performed by: HOSPITALIST

## 2021-03-27 PROCEDURE — 25000242 PHARM REV CODE 250 ALT 637 W/ HCPCS: Performed by: NURSE PRACTITIONER

## 2021-03-27 PROCEDURE — 25000003 PHARM REV CODE 250: Performed by: INTERNAL MEDICINE

## 2021-03-27 PROCEDURE — 99233 PR SUBSEQUENT HOSPITAL CARE,LEVL III: ICD-10-PCS | Mod: GW,,, | Performed by: HOSPITALIST

## 2021-03-27 PROCEDURE — 25000003 PHARM REV CODE 250: Performed by: NURSE PRACTITIONER

## 2021-03-27 PROCEDURE — 25000003 PHARM REV CODE 250: Performed by: HOSPITALIST

## 2021-03-27 PROCEDURE — 99233 SBSQ HOSP IP/OBS HIGH 50: CPT | Mod: GW,,, | Performed by: HOSPITALIST

## 2021-03-27 PROCEDURE — 36415 COLL VENOUS BLD VENIPUNCTURE: CPT

## 2021-03-27 PROCEDURE — 11000001 HC ACUTE MED/SURG PRIVATE ROOM

## 2021-03-27 PROCEDURE — 94640 AIRWAY INHALATION TREATMENT: CPT

## 2021-03-27 PROCEDURE — 85025 COMPLETE CBC W/AUTO DIFF WBC: CPT

## 2021-03-27 PROCEDURE — 25000242 PHARM REV CODE 250 ALT 637 W/ HCPCS: Performed by: INTERNAL MEDICINE

## 2021-03-27 PROCEDURE — 80202 ASSAY OF VANCOMYCIN: CPT

## 2021-03-27 RX ADMIN — VANCOMYCIN HYDROCHLORIDE 1000 MG: 1 INJECTION, POWDER, LYOPHILIZED, FOR SOLUTION INTRAVENOUS at 09:03

## 2021-03-27 RX ADMIN — PIPERACILLIN SODIUM AND TAZOBACTAM SODIUM 4.5 G: 4; .5 INJECTION, POWDER, LYOPHILIZED, FOR SOLUTION INTRAVENOUS at 12:03

## 2021-03-27 RX ADMIN — BUDESONIDE 0.5 MG: 0.5 INHALANT ORAL at 07:03

## 2021-03-27 RX ADMIN — HYDROCODONE BITARTRATE AND ACETAMINOPHEN 1 TABLET: 5; 325 TABLET ORAL at 10:03

## 2021-03-27 RX ADMIN — FLUTICASONE PROPIONATE 100 MCG: 50 SPRAY, METERED NASAL at 09:03

## 2021-03-27 RX ADMIN — ENOXAPARIN SODIUM 40 MG: 40 INJECTION SUBCUTANEOUS at 04:03

## 2021-03-27 RX ADMIN — IPRATROPIUM BROMIDE AND ALBUTEROL SULFATE 3 ML: 2.5; .5 SOLUTION RESPIRATORY (INHALATION) at 12:03

## 2021-03-27 RX ADMIN — METHYLPREDNISOLONE SODIUM SUCCINATE 40 MG: 40 INJECTION, POWDER, FOR SOLUTION INTRAMUSCULAR; INTRAVENOUS at 09:03

## 2021-03-27 RX ADMIN — ACETAMINOPHEN 1000 MG: 500 TABLET ORAL at 02:03

## 2021-03-27 RX ADMIN — IPRATROPIUM BROMIDE AND ALBUTEROL SULFATE 3 ML: 2.5; .5 SOLUTION RESPIRATORY (INHALATION) at 11:03

## 2021-03-27 RX ADMIN — IPRATROPIUM BROMIDE AND ALBUTEROL SULFATE 3 ML: 2.5; .5 SOLUTION RESPIRATORY (INHALATION) at 07:03

## 2021-03-27 RX ADMIN — PIPERACILLIN SODIUM AND TAZOBACTAM SODIUM 4.5 G: 4; .5 INJECTION, POWDER, LYOPHILIZED, FOR SOLUTION INTRAVENOUS at 04:03

## 2021-03-27 RX ADMIN — PIPERACILLIN SODIUM AND TAZOBACTAM SODIUM 4.5 G: 4; .5 INJECTION, POWDER, LYOPHILIZED, FOR SOLUTION INTRAVENOUS at 09:03

## 2021-03-27 RX ADMIN — HYDROCODONE BITARTRATE AND ACETAMINOPHEN 1 TABLET: 5; 325 TABLET ORAL at 02:03

## 2021-03-27 RX ADMIN — HYDROCODONE BITARTRATE AND ACETAMINOPHEN 1 TABLET: 5; 325 TABLET ORAL at 04:03

## 2021-03-27 RX ADMIN — HYDROCODONE BITARTRATE AND ACETAMINOPHEN 1 TABLET: 5; 325 TABLET ORAL at 09:03

## 2021-03-27 RX ADMIN — IPRATROPIUM BROMIDE AND ALBUTEROL SULFATE 3 ML: 2.5; .5 SOLUTION RESPIRATORY (INHALATION) at 03:03

## 2021-03-27 RX ADMIN — BUDESONIDE 0.5 MG: 0.5 INHALANT ORAL at 08:03

## 2021-03-28 PROBLEM — E87.6 HYPOKALEMIA: Status: RESOLVED | Noted: 2021-03-25 | Resolved: 2021-03-28

## 2021-03-28 LAB
ABO: NORMAL
ALBUMIN SERPL BCP-MCNC: 2.4 G/DL (ref 3.5–5)
ALP SERPL-CCNC: 80 U/L (ref 46–118)
ALT SERPL W P-5'-P-CCNC: 19 U/L (ref 13–56)
ANION GAP SERPL CALCULATED.3IONS-SCNC: 8 MMOL/L
ANTIBODY SCREEN: NORMAL
APTT PPP: 31.8 SECONDS (ref 25.2–37.3)
AST SERPL W P-5'-P-CCNC: 17 U/L (ref 15–37)
BILIRUB DIRECT SERPL-MCNC: 0.1 MG/DL (ref 0–0.2)
BILIRUB SERPL-MCNC: 0.3 MG/DL (ref 0–1.2)
BLD PROD TYP BPU: NORMAL
BUN SERPL-MCNC: 13 MG/DL (ref 7–18)
CALCIUM SERPL-MCNC: 8.6 MG/DL (ref 8.5–10.1)
CHLORIDE SERPL-SCNC: 98 MMOL/L (ref 98–107)
CHOLEST SERPL-MCNC: 158 MG/DL
CO2 SERPL-SCNC: 30 MMOL/L (ref 21–32)
CREAT SERPL-MCNC: 0.72 MG/DL (ref 0.55–1.02)
CRP SERPL-MCNC: 9.4 UG/ML (ref 0–0.8)
ERYTHROCYTE [SEDIMENTATION RATE] IN BLOOD BY WESTERGREN METHOD: 80 MM/HR (ref 0–30)
FOLATE SERPL-MCNC: 14.9 NG/ML (ref 3.1–17.5)
GLUCOSE SERPL-MCNC: 104 MG/DL (ref 74–106)
HCT VFR BLD AUTO: 21.6 % (ref 38–47)
HCT VFR BLD AUTO: 25.8 % (ref 38–47)
HDLC SERPL-MCNC: 53 MG/DL
HGB BLD-MCNC: 6.8 G/DL (ref 12–16)
HGB BLD-MCNC: 8.3 G/DL (ref 12–16)
INR BLD: 0.98 (ref 0–3.3)
IRON SATN MFR SERPL: 4 % (ref 14–50)
IRON SERPL-MCNC: 13 UG/DL (ref 50–170)
LDLC SERPL CALC-MCNC: 83 MG/DL
NONHDLC SERPL-MCNC: 105 MG/DL
POTASSIUM SERPL-SCNC: 4.1 MMOL/L (ref 3.5–5.1)
PROT SERPL-MCNC: 6 G/DL (ref 6.4–8.2)
PROTHROMBIN TIME: 12.5 SECONDS (ref 11.7–14.7)
RH TYPE: NORMAL
SODIUM SERPL-SCNC: 132 MMOL/L (ref 136–145)
T4 SERPL-MCNC: 4.4 UG/DL (ref 4.8–13.9)
TIBC SERPL-MCNC: 357 UG/DL (ref 250–450)
TRIGL SERPL-MCNC: 109 MG/DL
TSH SERPL DL<=0.005 MIU/L-ACNC: 0.66 UIU/ML (ref 0.36–3.74)
UNIT ABO: NORMAL
UNIT NUMBER: NORMAL
UNIT RH: NORMAL
UNIT STATUS: NORMAL
VANCOMYCIN TROUGH SERPL-MCNC: 22 UG/ML (ref 10–20)
VIT B12 SERPL-MCNC: 546 PG/ML (ref 193–986)
VLDLC SERPL-MCNC: 22 MG/DL

## 2021-03-28 PROCEDURE — 86900 BLOOD TYPING SEROLOGIC ABO: CPT

## 2021-03-28 PROCEDURE — 86140 C-REACTIVE PROTEIN: CPT

## 2021-03-28 PROCEDURE — P9016 RBC LEUKOCYTES REDUCED: HCPCS

## 2021-03-28 PROCEDURE — C9113 INJ PANTOPRAZOLE SODIUM, VIA: HCPCS | Performed by: STUDENT IN AN ORGANIZED HEALTH CARE EDUCATION/TRAINING PROGRAM

## 2021-03-28 PROCEDURE — 86850 RBC ANTIBODY SCREEN: CPT

## 2021-03-28 PROCEDURE — 25000003 PHARM REV CODE 250: Performed by: NURSE PRACTITIONER

## 2021-03-28 PROCEDURE — 63600175 PHARM REV CODE 636 W HCPCS: Performed by: HOSPITALIST

## 2021-03-28 PROCEDURE — 82746 ASSAY OF FOLIC ACID SERUM: CPT

## 2021-03-28 PROCEDURE — 80048 BASIC METABOLIC PNL TOTAL CA: CPT

## 2021-03-28 PROCEDURE — 82607 VITAMIN B-12: CPT

## 2021-03-28 PROCEDURE — 25000242 PHARM REV CODE 250 ALT 637 W/ HCPCS: Performed by: NURSE PRACTITIONER

## 2021-03-28 PROCEDURE — 85651 RBC SED RATE NONAUTOMATED: CPT

## 2021-03-28 PROCEDURE — 94761 N-INVAS EAR/PLS OXIMETRY MLT: CPT

## 2021-03-28 PROCEDURE — 83550 IRON BINDING TEST: CPT

## 2021-03-28 PROCEDURE — 85018 HEMOGLOBIN: CPT

## 2021-03-28 PROCEDURE — 94640 AIRWAY INHALATION TREATMENT: CPT

## 2021-03-28 PROCEDURE — 63600175 PHARM REV CODE 636 W HCPCS: Performed by: STUDENT IN AN ORGANIZED HEALTH CARE EDUCATION/TRAINING PROGRAM

## 2021-03-28 PROCEDURE — 25000003 PHARM REV CODE 250: Performed by: HOSPITALIST

## 2021-03-28 PROCEDURE — 63600175 PHARM REV CODE 636 W HCPCS: Performed by: NURSE PRACTITIONER

## 2021-03-28 PROCEDURE — 87493 C DIFF AMPLIFIED PROBE: CPT

## 2021-03-28 PROCEDURE — 84436 ASSAY OF TOTAL THYROXINE: CPT

## 2021-03-28 PROCEDURE — 85014 HEMATOCRIT: CPT

## 2021-03-28 PROCEDURE — 36430 TRANSFUSION BLD/BLD COMPNT: CPT

## 2021-03-28 PROCEDURE — 85610 PROTHROMBIN TIME: CPT

## 2021-03-28 PROCEDURE — 85730 THROMBOPLASTIN TIME PARTIAL: CPT

## 2021-03-28 PROCEDURE — 80076 HEPATIC FUNCTION PANEL: CPT

## 2021-03-28 PROCEDURE — 80061 LIPID PANEL: CPT

## 2021-03-28 PROCEDURE — 27000221 HC OXYGEN, UP TO 24 HOURS

## 2021-03-28 PROCEDURE — 86901 BLOOD TYPING SEROLOGIC RH(D): CPT

## 2021-03-28 PROCEDURE — 84443 ASSAY THYROID STIM HORMONE: CPT

## 2021-03-28 PROCEDURE — 25000003 PHARM REV CODE 250: Performed by: STUDENT IN AN ORGANIZED HEALTH CARE EDUCATION/TRAINING PROGRAM

## 2021-03-28 PROCEDURE — 11000001 HC ACUTE MED/SURG PRIVATE ROOM

## 2021-03-28 PROCEDURE — 86923 COMPATIBILITY TEST ELECTRIC: CPT

## 2021-03-28 PROCEDURE — 84165 PROTEIN E-PHORESIS SERUM: CPT

## 2021-03-28 PROCEDURE — 36415 COLL VENOUS BLD VENIPUNCTURE: CPT

## 2021-03-28 PROCEDURE — 80202 ASSAY OF VANCOMYCIN: CPT

## 2021-03-28 RX ORDER — POTASSIUM CHLORIDE 20 MEQ/1
20 TABLET, EXTENDED RELEASE ORAL 2 TIMES DAILY
Status: COMPLETED | OUTPATIENT
Start: 2021-03-28 | End: 2021-03-29

## 2021-03-28 RX ORDER — HYDROCODONE BITARTRATE AND ACETAMINOPHEN 500; 5 MG/1; MG/1
TABLET ORAL
Status: DISCONTINUED | OUTPATIENT
Start: 2021-03-28 | End: 2021-03-31 | Stop reason: HOSPADM

## 2021-03-28 RX ORDER — ZINC SULFATE 50(220)MG
220 CAPSULE ORAL DAILY
Status: DISCONTINUED | OUTPATIENT
Start: 2021-03-28 | End: 2021-03-31 | Stop reason: HOSPADM

## 2021-03-28 RX ORDER — PANTOPRAZOLE SODIUM 40 MG/1
40 TABLET, DELAYED RELEASE ORAL DAILY
Status: DISCONTINUED | OUTPATIENT
Start: 2021-03-28 | End: 2021-03-28

## 2021-03-28 RX ORDER — PANTOPRAZOLE SODIUM 40 MG/10ML
40 INJECTION, POWDER, LYOPHILIZED, FOR SOLUTION INTRAVENOUS 2 TIMES DAILY
Status: DISCONTINUED | OUTPATIENT
Start: 2021-03-28 | End: 2021-03-31 | Stop reason: HOSPADM

## 2021-03-28 RX ORDER — CHOLECALCIFEROL (VITAMIN D3) 25 MCG
1000 TABLET ORAL DAILY
Status: DISCONTINUED | OUTPATIENT
Start: 2021-03-28 | End: 2021-03-31 | Stop reason: HOSPADM

## 2021-03-28 RX ORDER — GUAIFENESIN 600 MG/1
600 TABLET, EXTENDED RELEASE ORAL 2 TIMES DAILY
Status: DISCONTINUED | OUTPATIENT
Start: 2021-03-28 | End: 2021-03-31 | Stop reason: HOSPADM

## 2021-03-28 RX ADMIN — SODIUM CHLORIDE: 9 INJECTION, SOLUTION INTRAVENOUS at 11:03

## 2021-03-28 RX ADMIN — IPRATROPIUM BROMIDE AND ALBUTEROL SULFATE 3 ML: 2.5; .5 SOLUTION RESPIRATORY (INHALATION) at 11:03

## 2021-03-28 RX ADMIN — GUAIFENESIN 600 MG: 600 TABLET, EXTENDED RELEASE ORAL at 08:03

## 2021-03-28 RX ADMIN — IPRATROPIUM BROMIDE AND ALBUTEROL SULFATE 3 ML: 2.5; .5 SOLUTION RESPIRATORY (INHALATION) at 03:03

## 2021-03-28 RX ADMIN — IPRATROPIUM BROMIDE AND ALBUTEROL SULFATE 3 ML: 2.5; .5 SOLUTION RESPIRATORY (INHALATION) at 12:03

## 2021-03-28 RX ADMIN — GUAIFENESIN 600 MG: 600 TABLET, EXTENDED RELEASE ORAL at 12:03

## 2021-03-28 RX ADMIN — IPRATROPIUM BROMIDE AND ALBUTEROL SULFATE 3 ML: 2.5; .5 SOLUTION RESPIRATORY (INHALATION) at 07:03

## 2021-03-28 RX ADMIN — IPRATROPIUM BROMIDE AND ALBUTEROL SULFATE 3 ML: 2.5; .5 SOLUTION RESPIRATORY (INHALATION) at 04:03

## 2021-03-28 RX ADMIN — HYDROCODONE BITARTRATE AND ACETAMINOPHEN 1 TABLET: 5; 325 TABLET ORAL at 03:03

## 2021-03-28 RX ADMIN — ZINC SULFATE 220 MG (50 MG) CAPSULE 220 MG: CAPSULE at 08:03

## 2021-03-28 RX ADMIN — PIPERACILLIN SODIUM AND TAZOBACTAM SODIUM 4.5 G: 4; .5 INJECTION, POWDER, LYOPHILIZED, FOR SOLUTION INTRAVENOUS at 05:03

## 2021-03-28 RX ADMIN — Medication 1 TABLET: at 08:03

## 2021-03-28 RX ADMIN — PANTOPRAZOLE SODIUM 40 MG: 40 INJECTION, POWDER, FOR SOLUTION INTRAVENOUS at 08:03

## 2021-03-28 RX ADMIN — HYDROCODONE BITARTRATE AND ACETAMINOPHEN 1 TABLET: 5; 325 TABLET ORAL at 09:03

## 2021-03-28 RX ADMIN — VANCOMYCIN HYDROCHLORIDE 1000 MG: 1 INJECTION, POWDER, LYOPHILIZED, FOR SOLUTION INTRAVENOUS at 08:03

## 2021-03-28 RX ADMIN — POTASSIUM CHLORIDE 20 MEQ: 1500 TABLET, EXTENDED RELEASE ORAL at 08:03

## 2021-03-28 RX ADMIN — Medication 1000 UNITS: at 08:03

## 2021-03-28 RX ADMIN — BUDESONIDE 0.5 MG: 0.5 INHALANT ORAL at 07:03

## 2021-03-28 RX ADMIN — POTASSIUM CHLORIDE 20 MEQ: 1500 TABLET, EXTENDED RELEASE ORAL at 12:03

## 2021-03-28 RX ADMIN — POTASSIUM CHLORIDE 20 MEQ: 1500 TABLET, EXTENDED RELEASE ORAL at 09:03

## 2021-03-28 RX ADMIN — FLUTICASONE PROPIONATE 100 MCG: 50 SPRAY, METERED NASAL at 08:03

## 2021-03-28 RX ADMIN — PIPERACILLIN SODIUM AND TAZOBACTAM SODIUM 4.5 G: 4; .5 INJECTION, POWDER, LYOPHILIZED, FOR SOLUTION INTRAVENOUS at 08:03

## 2021-03-28 RX ADMIN — METHYLPREDNISOLONE SODIUM SUCCINATE 40 MG: 40 INJECTION, POWDER, FOR SOLUTION INTRAMUSCULAR; INTRAVENOUS at 08:03

## 2021-03-28 RX ADMIN — HYDROCODONE BITARTRATE AND ACETAMINOPHEN 1 TABLET: 5; 325 TABLET ORAL at 08:03

## 2021-03-28 RX ADMIN — PANTOPRAZOLE SODIUM 40 MG: 40 TABLET, DELAYED RELEASE ORAL at 08:03

## 2021-03-28 RX ADMIN — PIPERACILLIN SODIUM AND TAZOBACTAM SODIUM 4.5 G: 4; .5 INJECTION, POWDER, LYOPHILIZED, FOR SOLUTION INTRAVENOUS at 12:03

## 2021-03-29 LAB
ALBUMIN %, URINE ELECTROPHORESIS: 3.2 G/DL (ref 3.5–5.2)
ALBUMIN/GLOB SERPL: 1 {RATIO}
ALPHA1 GLOB SERPL ELPH-MCNC: 0.3 G/DL (ref 0.1–0.4)
ALPHA2 GLOB SERPL ELPH-MCNC: 0.9 G/DL (ref 0.4–1.3)
ANION GAP SERPL CALCULATED.3IONS-SCNC: 8 MMOL/L
B-GLOBULIN SERPL ELPH-MCNC: 0.7 G/DL (ref 0.5–1.5)
BASOPHILS # BLD AUTO: 0.02 X10E3/UL (ref 0–0.2)
BASOPHILS NFR BLD AUTO: 0.2 % (ref 0–1)
BUN SERPL-MCNC: 13 MG/DL (ref 7–18)
CALCIUM SERPL-MCNC: 8.8 MG/DL (ref 8.5–10.1)
CHLORIDE SERPL-SCNC: 100 MMOL/L (ref 98–107)
CO2 SERPL-SCNC: 30 MMOL/L (ref 21–32)
CREAT SERPL-MCNC: 0.71 MG/DL (ref 0.55–1.02)
EOSINOPHIL # BLD AUTO: 0 X10E3/UL (ref 0–0.5)
EOSINOPHIL NFR BLD AUTO: 0 % (ref 1–4)
ERYTHROCYTE [DISTWIDTH] IN BLOOD BY AUTOMATED COUNT: 16 % (ref 11.5–14.5)
GAMMA GLOB SERPL ELPH-MCNC: 0.4 G/DL (ref 0.5–1.8)
GLUCOSE SERPL-MCNC: 96 MG/DL (ref 74–106)
HCT VFR BLD AUTO: 26.1 % (ref 38–47)
HGB BLD-MCNC: 8.4 G/DL (ref 12–16)
IMM GRANULOCYTES # BLD AUTO: 0.23 X10E3/UL (ref 0–0.04)
IMM GRANULOCYTES NFR BLD: 2.1 % (ref 0–0.4)
LYMPHOCYTES # BLD AUTO: 0.8 X10E3/UL (ref 1–4.8)
LYMPHOCYTES NFR BLD AUTO: 7.2 % (ref 27–41)
MCH RBC QN AUTO: 29.6 PG (ref 27–31)
MCHC RBC AUTO-ENTMCNC: 32.2 G/DL (ref 32–36)
MCV RBC AUTO: 91.9 FL (ref 80–96)
MONOCYTES # BLD AUTO: 0.86 X10E3/UL (ref 0–0.8)
MONOCYTES NFR BLD AUTO: 7.8 % (ref 2–6)
MPC BLD CALC-MCNC: 11 FL (ref 9.4–12.4)
NEUTROPHILS # BLD AUTO: 9.16 X10E3/UL (ref 1.8–7.7)
NEUTROPHILS NFR BLD AUTO: 82.7 % (ref 53–65)
NRBC # BLD AUTO: 0 X10E3/UL (ref 0–0)
NRBC, AUTO (.00): 0.2 /100 (ref 0–0)
PATH INTERP BLD-IMP: ABNORMAL
PATH INTERP BLD-IMP: ABNORMAL
PATH INTERP CSF-IMP: ABNORMAL
PLATELET # BLD AUTO: 128 X10E3/UL (ref 150–400)
POTASSIUM SERPL-SCNC: 4.2 MMOL/L (ref 3.5–5.1)
PROT SERPL-MCNC: 5.5 G/DL (ref 6.4–8.2)
RBC # BLD AUTO: 2.84 X10E6/UL (ref 4.2–5.4)
SODIUM SERPL-SCNC: 134 MMOL/L (ref 136–145)
WBC # BLD AUTO: 11.07 X10E3/UL (ref 4.5–11)

## 2021-03-29 PROCEDURE — 94640 AIRWAY INHALATION TREATMENT: CPT

## 2021-03-29 PROCEDURE — 27000221 HC OXYGEN, UP TO 24 HOURS

## 2021-03-29 PROCEDURE — 63600175 PHARM REV CODE 636 W HCPCS: Performed by: STUDENT IN AN ORGANIZED HEALTH CARE EDUCATION/TRAINING PROGRAM

## 2021-03-29 PROCEDURE — 36415 COLL VENOUS BLD VENIPUNCTURE: CPT

## 2021-03-29 PROCEDURE — 85025 COMPLETE CBC W/AUTO DIFF WBC: CPT

## 2021-03-29 PROCEDURE — 80048 BASIC METABOLIC PNL TOTAL CA: CPT

## 2021-03-29 PROCEDURE — 99233 SBSQ HOSP IP/OBS HIGH 50: CPT | Mod: GW,,, | Performed by: HOSPITALIST

## 2021-03-29 PROCEDURE — 94761 N-INVAS EAR/PLS OXIMETRY MLT: CPT

## 2021-03-29 PROCEDURE — 25000003 PHARM REV CODE 250: Performed by: NURSE PRACTITIONER

## 2021-03-29 PROCEDURE — C9113 INJ PANTOPRAZOLE SODIUM, VIA: HCPCS | Performed by: STUDENT IN AN ORGANIZED HEALTH CARE EDUCATION/TRAINING PROGRAM

## 2021-03-29 PROCEDURE — 99233 PR SUBSEQUENT HOSPITAL CARE,LEVL III: ICD-10-PCS | Mod: GW,,, | Performed by: HOSPITALIST

## 2021-03-29 PROCEDURE — 11000001 HC ACUTE MED/SURG PRIVATE ROOM

## 2021-03-29 PROCEDURE — 25000003 PHARM REV CODE 250: Performed by: HOSPITALIST

## 2021-03-29 PROCEDURE — 25000242 PHARM REV CODE 250 ALT 637 W/ HCPCS: Performed by: NURSE PRACTITIONER

## 2021-03-29 PROCEDURE — 63600175 PHARM REV CODE 636 W HCPCS: Performed by: NURSE PRACTITIONER

## 2021-03-29 RX ADMIN — FLUTICASONE PROPIONATE 100 MCG: 50 SPRAY, METERED NASAL at 09:03

## 2021-03-29 RX ADMIN — IPRATROPIUM BROMIDE AND ALBUTEROL SULFATE 3 ML: 2.5; .5 SOLUTION RESPIRATORY (INHALATION) at 04:03

## 2021-03-29 RX ADMIN — POTASSIUM CHLORIDE 20 MEQ: 1500 TABLET, EXTENDED RELEASE ORAL at 08:03

## 2021-03-29 RX ADMIN — GUAIFENESIN 600 MG: 600 TABLET, EXTENDED RELEASE ORAL at 09:03

## 2021-03-29 RX ADMIN — GUAIFENESIN 600 MG: 600 TABLET, EXTENDED RELEASE ORAL at 08:03

## 2021-03-29 RX ADMIN — IPRATROPIUM BROMIDE AND ALBUTEROL SULFATE 3 ML: 2.5; .5 SOLUTION RESPIRATORY (INHALATION) at 11:03

## 2021-03-29 RX ADMIN — HYDROCODONE BITARTRATE AND ACETAMINOPHEN 1 TABLET: 5; 325 TABLET ORAL at 10:03

## 2021-03-29 RX ADMIN — BUDESONIDE 0.5 MG: 0.5 INHALANT ORAL at 07:03

## 2021-03-29 RX ADMIN — PANTOPRAZOLE SODIUM 40 MG: 40 INJECTION, POWDER, FOR SOLUTION INTRAVENOUS at 08:03

## 2021-03-29 RX ADMIN — BUDESONIDE 0.5 MG: 0.5 INHALANT ORAL at 08:03

## 2021-03-29 RX ADMIN — PIPERACILLIN SODIUM AND TAZOBACTAM SODIUM 4.5 G: 4; .5 INJECTION, POWDER, LYOPHILIZED, FOR SOLUTION INTRAVENOUS at 08:03

## 2021-03-29 RX ADMIN — IPRATROPIUM BROMIDE AND ALBUTEROL SULFATE 3 ML: 2.5; .5 SOLUTION RESPIRATORY (INHALATION) at 08:03

## 2021-03-29 RX ADMIN — IPRATROPIUM BROMIDE AND ALBUTEROL SULFATE 3 ML: 2.5; .5 SOLUTION RESPIRATORY (INHALATION) at 12:03

## 2021-03-29 RX ADMIN — ZINC SULFATE 220 MG (50 MG) CAPSULE 220 MG: CAPSULE at 08:03

## 2021-03-29 RX ADMIN — POTASSIUM CHLORIDE 20 MEQ: 1500 TABLET, EXTENDED RELEASE ORAL at 09:03

## 2021-03-29 RX ADMIN — Medication 1 TABLET: at 08:03

## 2021-03-29 RX ADMIN — HYDROCODONE BITARTRATE AND ACETAMINOPHEN 1 TABLET: 5; 325 TABLET ORAL at 05:03

## 2021-03-29 RX ADMIN — PIPERACILLIN SODIUM AND TAZOBACTAM SODIUM 4.5 G: 4; .5 INJECTION, POWDER, LYOPHILIZED, FOR SOLUTION INTRAVENOUS at 01:03

## 2021-03-29 RX ADMIN — HYDROCODONE BITARTRATE AND ACETAMINOPHEN 1 TABLET: 5; 325 TABLET ORAL at 04:03

## 2021-03-29 RX ADMIN — PIPERACILLIN SODIUM AND TAZOBACTAM SODIUM 4.5 G: 4; .5 INJECTION, POWDER, LYOPHILIZED, FOR SOLUTION INTRAVENOUS at 05:03

## 2021-03-29 RX ADMIN — Medication 1000 UNITS: at 08:03

## 2021-03-29 RX ADMIN — PANTOPRAZOLE SODIUM 40 MG: 40 INJECTION, POWDER, FOR SOLUTION INTRAVENOUS at 09:03

## 2021-03-29 RX ADMIN — HYDROCODONE BITARTRATE AND ACETAMINOPHEN 1 TABLET: 5; 325 TABLET ORAL at 11:03

## 2021-03-29 RX ADMIN — IPRATROPIUM BROMIDE AND ALBUTEROL SULFATE 3 ML: 2.5; .5 SOLUTION RESPIRATORY (INHALATION) at 07:03

## 2021-03-30 ENCOUNTER — HISTORICAL (OUTPATIENT)
Dept: ADMINISTRATIVE | Facility: HOSPITAL | Age: 61
End: 2021-03-30

## 2021-03-30 ENCOUNTER — ANESTHESIA (OUTPATIENT)
Dept: GASTROENTEROLOGY | Facility: HOSPITAL | Age: 61
DRG: 177 | End: 2021-03-30
Payer: MEDICARE

## 2021-03-30 ENCOUNTER — ANESTHESIA EVENT (OUTPATIENT)
Dept: GASTROENTEROLOGY | Facility: HOSPITAL | Age: 61
DRG: 177 | End: 2021-03-30
Payer: MEDICARE

## 2021-03-30 PROBLEM — E87.1 HYPONATREMIA: Status: RESOLVED | Noted: 2021-03-27 | Resolved: 2021-03-30

## 2021-03-30 LAB
BASOPHILS # BLD AUTO: 0.02 X10E3/UL (ref 0–0.2)
BASOPHILS NFR BLD AUTO: 0.3 % (ref 0–1)
C DIFF TOX A+B STL IA-ACNC: NEGATIVE
EOSINOPHIL # BLD AUTO: 0.21 X10E3/UL (ref 0–0.5)
EOSINOPHIL NFR BLD AUTO: 2.8 % (ref 1–4)
ERYTHROCYTE [DISTWIDTH] IN BLOOD BY AUTOMATED COUNT: 15.4 % (ref 11.5–14.5)
HCT VFR BLD AUTO: 29.3 % (ref 38–47)
HGB BLD-MCNC: 9.4 G/DL (ref 12–16)
IMM GRANULOCYTES # BLD AUTO: 0.28 X10E3/UL (ref 0–0.04)
IMM GRANULOCYTES NFR BLD: 3.7 % (ref 0–0.4)
LYMPHOCYTES # BLD AUTO: 0.87 X10E3/UL (ref 1–4.8)
LYMPHOCYTES NFR BLD AUTO: 11.4 % (ref 27–41)
MCH RBC QN AUTO: 28.9 PG (ref 27–31)
MCHC RBC AUTO-ENTMCNC: 32.1 G/DL (ref 32–36)
MCV RBC AUTO: 90.2 FL (ref 80–96)
MONOCYTES # BLD AUTO: 0.78 X10E3/UL (ref 0–0.8)
MONOCYTES NFR BLD AUTO: 10.2 % (ref 2–6)
MPC BLD CALC-MCNC: 11.1 FL (ref 9.4–12.4)
NEUTROPHILS # BLD AUTO: 5.45 X10E3/UL (ref 1.8–7.7)
NEUTROPHILS NFR BLD AUTO: 71.6 % (ref 53–65)
NRBC # BLD AUTO: 0 X10E3/UL (ref 0–0)
NRBC, AUTO (.00): 0.3 /100 (ref 0–0)
PLATELET # BLD AUTO: 165 X10E3/UL (ref 150–400)
RBC # BLD AUTO: 3.25 X10E6/UL (ref 4.2–5.4)
REPORT: NORMAL
REPORT: NORMAL
VANCOMYCIN TROUGH SERPL-MCNC: 11.5 UG/ML (ref 10–20)
WBC # BLD AUTO: 7.61 X10E3/UL (ref 4.5–11)

## 2021-03-30 PROCEDURE — 63600175 PHARM REV CODE 636 W HCPCS: Performed by: STUDENT IN AN ORGANIZED HEALTH CARE EDUCATION/TRAINING PROGRAM

## 2021-03-30 PROCEDURE — 94640 AIRWAY INHALATION TREATMENT: CPT

## 2021-03-30 PROCEDURE — 63700000 PHARM REV CODE 250 ALT 637 W/O HCPCS: Performed by: INTERNAL MEDICINE

## 2021-03-30 PROCEDURE — 99239 HOSP IP/OBS DSCHRG MGMT >30: CPT | Mod: GW,,, | Performed by: HOSPITALIST

## 2021-03-30 PROCEDURE — 25000242 PHARM REV CODE 250 ALT 637 W/ HCPCS: Performed by: NURSE PRACTITIONER

## 2021-03-30 PROCEDURE — 88305 TISSUE EXAM BY PATHOLOGIST: CPT | Mod: 26,,, | Performed by: PATHOLOGY

## 2021-03-30 PROCEDURE — 85025 COMPLETE CBC W/AUTO DIFF WBC: CPT

## 2021-03-30 PROCEDURE — D9220A PRA ANESTHESIA: Mod: GW,,, | Performed by: NURSE ANESTHETIST, CERTIFIED REGISTERED

## 2021-03-30 PROCEDURE — 25000003 PHARM REV CODE 250: Performed by: NURSE PRACTITIONER

## 2021-03-30 PROCEDURE — 88312 PR  SPECIAL STAINS,GROUP I: ICD-10-PCS | Mod: 26,,, | Performed by: PATHOLOGY

## 2021-03-30 PROCEDURE — 88312 SPECIAL STAINS GROUP 1: CPT | Mod: 26,,, | Performed by: PATHOLOGY

## 2021-03-30 PROCEDURE — 88342 CHG IMMUNOCYTOCHEMISTRY: ICD-10-PCS | Mod: 26,,, | Performed by: PATHOLOGY

## 2021-03-30 PROCEDURE — 36415 COLL VENOUS BLD VENIPUNCTURE: CPT

## 2021-03-30 PROCEDURE — 80202 ASSAY OF VANCOMYCIN: CPT

## 2021-03-30 PROCEDURE — 88305 TISSUE EXAM BY PATHOLOGIST: ICD-10-PCS | Mod: 26,,, | Performed by: PATHOLOGY

## 2021-03-30 PROCEDURE — 27000221 HC OXYGEN, UP TO 24 HOURS

## 2021-03-30 PROCEDURE — 88305 TISSUE EXAM BY PATHOLOGIST: CPT | Performed by: PATHOLOGY

## 2021-03-30 PROCEDURE — 94761 N-INVAS EAR/PLS OXIMETRY MLT: CPT

## 2021-03-30 PROCEDURE — 88342 IMHCHEM/IMCYTCHM 1ST ANTB: CPT | Performed by: PATHOLOGY

## 2021-03-30 PROCEDURE — 99239 PR HOSPITAL DISCHARGE DAY,>30 MIN: ICD-10-PCS | Mod: GW,,, | Performed by: HOSPITALIST

## 2021-03-30 PROCEDURE — C9113 INJ PANTOPRAZOLE SODIUM, VIA: HCPCS | Performed by: STUDENT IN AN ORGANIZED HEALTH CARE EDUCATION/TRAINING PROGRAM

## 2021-03-30 PROCEDURE — 63600175 PHARM REV CODE 636 W HCPCS: Performed by: NURSE PRACTITIONER

## 2021-03-30 PROCEDURE — 43239 EGD BIOPSY SINGLE/MULTIPLE: CPT

## 2021-03-30 PROCEDURE — D9220A PRA ANESTHESIA: ICD-10-PCS | Mod: GW,,, | Performed by: NURSE ANESTHETIST, CERTIFIED REGISTERED

## 2021-03-30 PROCEDURE — 11000001 HC ACUTE MED/SURG PRIVATE ROOM

## 2021-03-30 PROCEDURE — 25000003 PHARM REV CODE 250: Performed by: HOSPITALIST

## 2021-03-30 PROCEDURE — 88342 IMHCHEM/IMCYTCHM 1ST ANTB: CPT | Mod: 26,,, | Performed by: PATHOLOGY

## 2021-03-30 PROCEDURE — 63600175 PHARM REV CODE 636 W HCPCS: Performed by: NURSE ANESTHETIST, CERTIFIED REGISTERED

## 2021-03-30 PROCEDURE — 63600175 PHARM REV CODE 636 W HCPCS: Performed by: HOSPITALIST

## 2021-03-30 PROCEDURE — 88312 SPECIAL STAINS GROUP 1: CPT | Performed by: PATHOLOGY

## 2021-03-30 RX ORDER — MONTELUKAST SODIUM 10 MG/1
10 TABLET ORAL NIGHTLY
Qty: 30 TABLET | Refills: 1 | Status: SHIPPED | OUTPATIENT
Start: 2021-03-30 | End: 2021-04-15 | Stop reason: SDUPTHER

## 2021-03-30 RX ORDER — SODIUM CHLORIDE 9 MG/ML
INJECTION, SOLUTION INTRAVENOUS CONTINUOUS
Status: DISPENSED | OUTPATIENT
Start: 2021-03-30 | End: 2021-03-30

## 2021-03-30 RX ORDER — ESCITALOPRAM OXALATE 10 MG/1
10 TABLET ORAL DAILY
Qty: 30 TABLET | Refills: 1 | Status: SHIPPED | OUTPATIENT
Start: 2021-03-30 | End: 2024-03-18 | Stop reason: DRUGHIGH

## 2021-03-30 RX ORDER — GUAIFENESIN 600 MG/1
600 TABLET, EXTENDED RELEASE ORAL 2 TIMES DAILY
Qty: 20 TABLET | Refills: 0 | Status: SHIPPED | OUTPATIENT
Start: 2021-03-30 | End: 2021-04-09

## 2021-03-30 RX ORDER — GABAPENTIN 100 MG/1
100 CAPSULE ORAL NIGHTLY
Qty: 30 CAPSULE | Refills: 1 | Status: SHIPPED | OUTPATIENT
Start: 2021-03-30 | End: 2021-04-15 | Stop reason: SDUPTHER

## 2021-03-30 RX ORDER — CEFUROXIME AXETIL 250 MG/1
250 TABLET ORAL EVERY 12 HOURS
Qty: 14 TABLET | Refills: 0 | Status: SHIPPED | OUTPATIENT
Start: 2021-03-30 | End: 2021-04-06

## 2021-03-30 RX ORDER — FERROUS SULFATE 325(65) MG
325 TABLET ORAL 2 TIMES DAILY
Qty: 60 TABLET | Refills: 4 | Status: SHIPPED | OUTPATIENT
Start: 2021-03-30 | End: 2024-03-18

## 2021-03-30 RX ORDER — FLUCONAZOLE 100 MG/1
100 TABLET ORAL DAILY
Status: DISCONTINUED | OUTPATIENT
Start: 2021-03-30 | End: 2021-03-31 | Stop reason: HOSPADM

## 2021-03-30 RX ORDER — PROPOFOL 10 MG/ML
INJECTION, EMULSION INTRAVENOUS
Status: DISCONTINUED | OUTPATIENT
Start: 2021-03-30 | End: 2021-03-30

## 2021-03-30 RX ORDER — LIDOCAINE HCL/PF 100 MG/5ML
SYRINGE (ML) INTRAVENOUS
Status: DISCONTINUED | OUTPATIENT
Start: 2021-03-30 | End: 2021-03-30

## 2021-03-30 RX ORDER — PANTOPRAZOLE SODIUM 40 MG/1
40 TABLET, DELAYED RELEASE ORAL DAILY
Qty: 30 TABLET | Refills: 1 | Status: SHIPPED | OUTPATIENT
Start: 2021-03-30 | End: 2021-04-15 | Stop reason: SDUPTHER

## 2021-03-30 RX ADMIN — GUAIFENESIN 600 MG: 600 TABLET, EXTENDED RELEASE ORAL at 09:03

## 2021-03-30 RX ADMIN — IPRATROPIUM BROMIDE AND ALBUTEROL SULFATE 3 ML: 2.5; .5 SOLUTION RESPIRATORY (INHALATION) at 07:03

## 2021-03-30 RX ADMIN — SODIUM CHLORIDE: 9 INJECTION, SOLUTION INTRAVENOUS at 01:03

## 2021-03-30 RX ADMIN — PROPOFOL 40 MG: 10 INJECTION, EMULSION INTRAVENOUS at 02:03

## 2021-03-30 RX ADMIN — PANTOPRAZOLE SODIUM 40 MG: 40 INJECTION, POWDER, FOR SOLUTION INTRAVENOUS at 09:03

## 2021-03-30 RX ADMIN — PANTOPRAZOLE SODIUM 40 MG: 40 INJECTION, POWDER, FOR SOLUTION INTRAVENOUS at 10:03

## 2021-03-30 RX ADMIN — IPRATROPIUM BROMIDE AND ALBUTEROL SULFATE 3 ML: 2.5; .5 SOLUTION RESPIRATORY (INHALATION) at 11:03

## 2021-03-30 RX ADMIN — Medication 1000 UNITS: at 09:03

## 2021-03-30 RX ADMIN — HYDROCODONE BITARTRATE AND ACETAMINOPHEN 1 TABLET: 5; 325 TABLET ORAL at 05:03

## 2021-03-30 RX ADMIN — FLUTICASONE PROPIONATE 100 MCG: 50 SPRAY, METERED NASAL at 09:03

## 2021-03-30 RX ADMIN — FLUCONAZOLE 100 MG: 100 TABLET ORAL at 05:03

## 2021-03-30 RX ADMIN — ZINC SULFATE 220 MG (50 MG) CAPSULE 220 MG: CAPSULE at 09:03

## 2021-03-30 RX ADMIN — IPRATROPIUM BROMIDE AND ALBUTEROL SULFATE 3 ML: 2.5; .5 SOLUTION RESPIRATORY (INHALATION) at 03:03

## 2021-03-30 RX ADMIN — BUDESONIDE 0.5 MG: 0.5 INHALANT ORAL at 07:03

## 2021-03-30 RX ADMIN — Medication 40 MG: at 02:03

## 2021-03-30 RX ADMIN — IPRATROPIUM BROMIDE AND ALBUTEROL SULFATE 3 ML: 2.5; .5 SOLUTION RESPIRATORY (INHALATION) at 08:03

## 2021-03-30 RX ADMIN — VANCOMYCIN HYDROCHLORIDE 1000 MG: 1 INJECTION, POWDER, LYOPHILIZED, FOR SOLUTION INTRAVENOUS at 03:03

## 2021-03-30 RX ADMIN — VANCOMYCIN HYDROCHLORIDE 1000 MG: 1 INJECTION, POWDER, LYOPHILIZED, FOR SOLUTION INTRAVENOUS at 09:03

## 2021-03-30 RX ADMIN — HYDROCODONE BITARTRATE AND ACETAMINOPHEN 1 TABLET: 5; 325 TABLET ORAL at 09:03

## 2021-03-30 RX ADMIN — PIPERACILLIN SODIUM AND TAZOBACTAM SODIUM 4.5 G: 4; .5 INJECTION, POWDER, LYOPHILIZED, FOR SOLUTION INTRAVENOUS at 01:03

## 2021-03-30 RX ADMIN — BUDESONIDE 0.5 MG: 0.5 INHALANT ORAL at 08:03

## 2021-03-30 RX ADMIN — PIPERACILLIN SODIUM AND TAZOBACTAM SODIUM 4.5 G: 4; .5 INJECTION, POWDER, LYOPHILIZED, FOR SOLUTION INTRAVENOUS at 09:03

## 2021-03-30 RX ADMIN — IPRATROPIUM BROMIDE AND ALBUTEROL SULFATE 3 ML: 2.5; .5 SOLUTION RESPIRATORY (INHALATION) at 12:03

## 2021-03-30 RX ADMIN — PROPOFOL 30 MG: 10 INJECTION, EMULSION INTRAVENOUS at 02:03

## 2021-03-30 RX ADMIN — Medication 1 TABLET: at 09:03

## 2021-03-31 VITALS
DIASTOLIC BLOOD PRESSURE: 86 MMHG | SYSTOLIC BLOOD PRESSURE: 147 MMHG | TEMPERATURE: 99 F | OXYGEN SATURATION: 92 % | BODY MASS INDEX: 17.61 KG/M2 | WEIGHT: 99.38 LBS | HEIGHT: 63 IN | RESPIRATION RATE: 20 BRPM | HEART RATE: 79 BPM

## 2021-03-31 LAB
BASOPHILS # BLD AUTO: 0.02 X10E3/UL (ref 0–0.2)
BASOPHILS NFR BLD AUTO: 0.2 % (ref 0–1)
EOSINOPHIL # BLD AUTO: 0.22 X10E3/UL (ref 0–0.5)
EOSINOPHIL NFR BLD AUTO: 2.4 % (ref 1–4)
ERYTHROCYTE [DISTWIDTH] IN BLOOD BY AUTOMATED COUNT: 15.3 % (ref 11.5–14.5)
HCT VFR BLD AUTO: 29.8 % (ref 38–47)
HGB BLD-MCNC: 9.7 G/DL (ref 12–16)
IMM GRANULOCYTES # BLD AUTO: 0.31 X10E3/UL (ref 0–0.04)
IMM GRANULOCYTES NFR BLD: 3.4 % (ref 0–0.4)
INSULIN SERPL-ACNC: NORMAL U[IU]/ML
LAB AP CLINICAL INFORMATION: NORMAL
LAB AP DIAGNOSIS - HISTORICAL: NORMAL
LAB AP GROSS PATHOLOGY - HISTORICAL: NORMAL
LAB AP SPECIMEN SUBMITTED - HISTORICAL: NORMAL
LYMPHOCYTES # BLD AUTO: 0.74 X10E3/UL (ref 1–4.8)
LYMPHOCYTES NFR BLD AUTO: 8.2 % (ref 27–41)
MCH RBC QN AUTO: 29 PG (ref 27–31)
MCHC RBC AUTO-ENTMCNC: 32.6 G/DL (ref 32–36)
MCV RBC AUTO: 89.2 FL (ref 80–96)
MONOCYTES # BLD AUTO: 0.68 X10E3/UL (ref 0–0.8)
MONOCYTES NFR BLD AUTO: 7.5 % (ref 2–6)
MPC BLD CALC-MCNC: 10.4 FL (ref 9.4–12.4)
NEUTROPHILS # BLD AUTO: 7.09 X10E3/UL (ref 1.8–7.7)
NEUTROPHILS NFR BLD AUTO: 78.3 % (ref 53–65)
NRBC # BLD AUTO: 0 X10E3/UL (ref 0–0)
NRBC, AUTO (.00): 0 /100 (ref 0–0)
PLATELET # BLD AUTO: 182 X10E3/UL (ref 150–400)
RBC # BLD AUTO: 3.34 X10E6/UL (ref 4.2–5.4)
WBC # BLD AUTO: 9.06 X10E3/UL (ref 4.5–11)

## 2021-03-31 PROCEDURE — 27000221 HC OXYGEN, UP TO 24 HOURS

## 2021-03-31 PROCEDURE — 25000003 PHARM REV CODE 250: Performed by: NURSE PRACTITIONER

## 2021-03-31 PROCEDURE — 94640 AIRWAY INHALATION TREATMENT: CPT

## 2021-03-31 PROCEDURE — 85025 COMPLETE CBC W/AUTO DIFF WBC: CPT

## 2021-03-31 PROCEDURE — 36415 COLL VENOUS BLD VENIPUNCTURE: CPT

## 2021-03-31 PROCEDURE — 94761 N-INVAS EAR/PLS OXIMETRY MLT: CPT

## 2021-03-31 PROCEDURE — 25000242 PHARM REV CODE 250 ALT 637 W/ HCPCS: Performed by: NURSE PRACTITIONER

## 2021-03-31 RX ADMIN — BUDESONIDE 0.5 MG: 0.5 INHALANT ORAL at 07:03

## 2021-03-31 RX ADMIN — IPRATROPIUM BROMIDE AND ALBUTEROL SULFATE 3 ML: 2.5; .5 SOLUTION RESPIRATORY (INHALATION) at 03:03

## 2021-03-31 RX ADMIN — HYDROCODONE BITARTRATE AND ACETAMINOPHEN 1 TABLET: 5; 325 TABLET ORAL at 12:03

## 2021-03-31 RX ADMIN — IPRATROPIUM BROMIDE AND ALBUTEROL SULFATE 3 ML: 2.5; .5 SOLUTION RESPIRATORY (INHALATION) at 07:03

## 2021-04-01 DIAGNOSIS — K20.80 FUNGAL ESOPHAGITIS: Primary | ICD-10-CM

## 2021-04-01 DIAGNOSIS — B49 FUNGAL ESOPHAGITIS: Primary | ICD-10-CM

## 2021-04-01 RX ORDER — TIZANIDINE 4 MG/1
4 TABLET ORAL 3 TIMES DAILY PRN
Qty: 30 TABLET | Refills: 1 | Status: SHIPPED | OUTPATIENT
Start: 2021-04-01

## 2021-04-01 RX ORDER — TIZANIDINE 4 MG/1
4 TABLET ORAL 3 TIMES DAILY PRN
COMMUNITY
End: 2021-04-01 | Stop reason: SDUPTHER

## 2021-04-01 RX ORDER — FLUCONAZOLE 100 MG/1
100 TABLET ORAL DAILY
Qty: 14 TABLET | Refills: 0 | Status: SHIPPED | OUTPATIENT
Start: 2021-04-01 | End: 2021-04-15

## 2021-04-02 ENCOUNTER — TELEPHONE (OUTPATIENT)
Dept: GASTROENTEROLOGY | Facility: CLINIC | Age: 61
End: 2021-04-02

## 2021-04-05 ENCOUNTER — TELEPHONE (OUTPATIENT)
Dept: GASTROENTEROLOGY | Facility: CLINIC | Age: 61
End: 2021-04-05

## 2021-04-15 ENCOUNTER — OFFICE VISIT (OUTPATIENT)
Dept: FAMILY MEDICINE | Facility: CLINIC | Age: 61
End: 2021-04-15
Payer: MEDICARE

## 2021-04-15 VITALS
WEIGHT: 99.63 LBS | HEIGHT: 64 IN | HEART RATE: 94 BPM | BODY MASS INDEX: 17.01 KG/M2 | RESPIRATION RATE: 20 BRPM | DIASTOLIC BLOOD PRESSURE: 80 MMHG | OXYGEN SATURATION: 95 % | SYSTOLIC BLOOD PRESSURE: 128 MMHG

## 2021-04-15 DIAGNOSIS — K21.9 GASTROESOPHAGEAL REFLUX DISEASE, UNSPECIFIED WHETHER ESOPHAGITIS PRESENT: ICD-10-CM

## 2021-04-15 DIAGNOSIS — J30.9 ALLERGIC RHINITIS, UNSPECIFIED SEASONALITY, UNSPECIFIED TRIGGER: ICD-10-CM

## 2021-04-15 DIAGNOSIS — F41.9 ANXIETY: ICD-10-CM

## 2021-04-15 DIAGNOSIS — J44.9 CHRONIC OBSTRUCTIVE PULMONARY DISEASE, UNSPECIFIED COPD TYPE: Primary | ICD-10-CM

## 2021-04-15 DIAGNOSIS — G47.00 INSOMNIA, UNSPECIFIED TYPE: ICD-10-CM

## 2021-04-15 PROCEDURE — 1111F DSCHRG MED/CURRENT MED MERGE: CPT | Mod: CPTII,,, | Performed by: FAMILY MEDICINE

## 2021-04-15 PROCEDURE — 1111F PR DISCHARGE MEDS RECONCILED W/ CURRENT OUTPATIENT MED LIST: ICD-10-PCS | Mod: CPTII,,, | Performed by: FAMILY MEDICINE

## 2021-04-15 PROCEDURE — 3008F BODY MASS INDEX DOCD: CPT | Mod: CPTII,,, | Performed by: FAMILY MEDICINE

## 2021-04-15 PROCEDURE — 99213 OFFICE O/P EST LOW 20 MIN: CPT | Mod: ,,, | Performed by: FAMILY MEDICINE

## 2021-04-15 PROCEDURE — 99213 PR OFFICE/OUTPT VISIT, EST, LEVL III, 20-29 MIN: ICD-10-PCS | Mod: ,,, | Performed by: FAMILY MEDICINE

## 2021-04-15 PROCEDURE — 3008F PR BODY MASS INDEX (BMI) DOCUMENTED: ICD-10-PCS | Mod: CPTII,,, | Performed by: FAMILY MEDICINE

## 2021-04-15 RX ORDER — GABAPENTIN 100 MG/1
100 CAPSULE ORAL NIGHTLY
Qty: 30 CAPSULE | Refills: 1 | Status: SHIPPED | OUTPATIENT
Start: 2021-04-15 | End: 2024-03-18 | Stop reason: DRUGHIGH

## 2021-04-15 RX ORDER — MONTELUKAST SODIUM 10 MG/1
10 TABLET ORAL NIGHTLY
Qty: 30 TABLET | Refills: 1 | Status: SHIPPED | OUTPATIENT
Start: 2021-04-15 | End: 2024-03-18

## 2021-04-15 RX ORDER — TIZANIDINE 4 MG/1
4 TABLET ORAL 3 TIMES DAILY PRN
Status: CANCELLED | OUTPATIENT
Start: 2021-04-15

## 2021-04-15 RX ORDER — PANTOPRAZOLE SODIUM 40 MG/1
40 TABLET, DELAYED RELEASE ORAL DAILY
Qty: 30 TABLET | Refills: 1 | Status: SHIPPED | OUTPATIENT
Start: 2021-04-15 | End: 2024-03-18

## 2021-04-15 RX ORDER — CLONAZEPAM 2 MG/1
2 TABLET ORAL 2 TIMES DAILY
Qty: 60 TABLET | Refills: 2 | Status: CANCELLED | OUTPATIENT
Start: 2021-04-15

## 2021-04-15 RX ORDER — TRAZODONE HYDROCHLORIDE 100 MG/1
100 TABLET ORAL NIGHTLY
Qty: 90 TABLET | Refills: 1 | Status: SHIPPED | OUTPATIENT
Start: 2021-04-15 | End: 2024-03-18 | Stop reason: DRUGHIGH

## 2021-04-15 RX ORDER — CLONAZEPAM 2 MG/1
2 TABLET ORAL 2 TIMES DAILY
Status: ON HOLD | COMMUNITY
Start: 2021-04-05 | End: 2024-04-02

## 2021-05-14 ENCOUNTER — TELEPHONE (OUTPATIENT)
Dept: FAMILY MEDICINE | Facility: CLINIC | Age: 61
End: 2021-05-14

## 2021-07-30 ENCOUNTER — HOSPITAL ENCOUNTER (EMERGENCY)
Facility: HOSPITAL | Age: 61
Discharge: HOME | End: 2021-07-30
Payer: MEDICARE

## 2021-07-30 VITALS
SYSTOLIC BLOOD PRESSURE: 80 MMHG | HEART RATE: 66 BPM | RESPIRATION RATE: 18 BRPM | DIASTOLIC BLOOD PRESSURE: 46 MMHG | WEIGHT: 96 LBS | OXYGEN SATURATION: 99 % | TEMPERATURE: 99 F | HEIGHT: 64 IN | BODY MASS INDEX: 16.39 KG/M2

## 2021-07-30 DIAGNOSIS — J44.9 CHRONIC OBSTRUCTIVE PULMONARY DISEASE: ICD-10-CM

## 2021-07-30 DIAGNOSIS — F41.1 GAD (GENERALIZED ANXIETY DISORDER): ICD-10-CM

## 2021-07-30 DIAGNOSIS — M25.579 ANKLE PAIN: ICD-10-CM

## 2021-07-30 DIAGNOSIS — K20.90 ESOPHAGITIS: ICD-10-CM

## 2021-07-30 DIAGNOSIS — M79.606 LEG PAIN: ICD-10-CM

## 2021-07-30 DIAGNOSIS — G47.00 INSOMNIA: ICD-10-CM

## 2021-07-30 DIAGNOSIS — F41.9 ANXIETY: ICD-10-CM

## 2021-07-30 DIAGNOSIS — D50.0 IRON DEFICIENCY ANEMIA DUE TO CHRONIC BLOOD LOSS: ICD-10-CM

## 2021-07-30 DIAGNOSIS — S42.213A HUMERAL SURGICAL NECK FRACTURE: ICD-10-CM

## 2021-07-30 DIAGNOSIS — M25.562 ACUTE PAIN OF LEFT KNEE: ICD-10-CM

## 2021-07-30 DIAGNOSIS — J30.9 ALLERGIC RHINITIS: ICD-10-CM
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
## 2021-07-30 DIAGNOSIS — M25.569 KNEE PAIN: ICD-10-CM

## 2021-07-30 DIAGNOSIS — J18.9 PNEUMONIA OF RIGHT LOWER LOBE DUE TO INFECTIOUS ORGANISM: ICD-10-CM

## 2021-07-30 DIAGNOSIS — D64.9 NORMOCYTIC ANEMIA: ICD-10-CM

## 2021-07-30 DIAGNOSIS — K21.9 GASTROESOPHAGEAL REFLUX DISEASE: ICD-10-CM

## 2021-07-30 PROCEDURE — 99284 EMERGENCY DEPT VISIT MOD MDM: CPT

## 2021-07-30 PROCEDURE — 99282 EMERGENCY DEPT VISIT SF MDM: CPT | Mod: ,,, | Performed by: NURSE PRACTITIONER

## 2021-07-30 PROCEDURE — 99282 PR EMERGENCY DEPT VISIT,LEVEL II: ICD-10-PCS | Mod: ,,, | Performed by: NURSE PRACTITIONER

## 2021-10-20 ENCOUNTER — HOSPITAL ENCOUNTER (EMERGENCY)
Facility: HOSPITAL | Age: 61
Discharge: HOME | End: 2021-10-20
Attending: FAMILY MEDICINE
Payer: MEDICARE

## 2021-10-20 VITALS
HEART RATE: 86 BPM | SYSTOLIC BLOOD PRESSURE: 184 MMHG | DIASTOLIC BLOOD PRESSURE: 113 MMHG | OXYGEN SATURATION: 97 % | RESPIRATION RATE: 16 BRPM

## 2021-10-20 DIAGNOSIS — J18.9 PNEUMONIA OF RIGHT LOWER LOBE DUE TO INFECTIOUS ORGANISM: ICD-10-CM

## 2021-10-20 DIAGNOSIS — K20.90 ESOPHAGITIS: ICD-10-CM

## 2021-10-20 DIAGNOSIS — J44.9 CHRONIC OBSTRUCTIVE PULMONARY DISEASE: ICD-10-CM

## 2021-10-20 DIAGNOSIS — I10 HYPERTENSION: ICD-10-CM

## 2021-10-20 DIAGNOSIS — D50.0 IRON DEFICIENCY ANEMIA DUE TO CHRONIC BLOOD LOSS: ICD-10-CM

## 2021-10-20 DIAGNOSIS — F41.1 GAD (GENERALIZED ANXIETY DISORDER): ICD-10-CM

## 2021-10-20 DIAGNOSIS — F13.930 BENZODIAZEPINE WITHDRAWAL WITHOUT COMPLICATION: Primary | ICD-10-CM

## 2021-10-20 DIAGNOSIS — S42.213A HUMERAL SURGICAL NECK FRACTURE: ICD-10-CM

## 2021-10-20 DIAGNOSIS — K21.9 GASTROESOPHAGEAL REFLUX DISEASE: ICD-10-CM

## 2021-10-20 DIAGNOSIS — D64.9 NORMOCYTIC ANEMIA: ICD-10-CM

## 2021-10-20 DIAGNOSIS — F41.9 ANXIETY: ICD-10-CM

## 2021-10-20 DIAGNOSIS — G47.00 INSOMNIA: ICD-10-CM

## 2021-10-20 DIAGNOSIS — J30.9 ALLERGIC RHINITIS: ICD-10-CM

## 2021-10-20 LAB
ALBUMIN SERPL BCP-MCNC: 4.1 G/DL (ref 3.5–5)
ALBUMIN/GLOB SERPL: 1.1 {RATIO}
ALP SERPL-CCNC: 104 U/L (ref 46–118)
ALT SERPL W P-5'-P-CCNC: 19 U/L (ref 13–56)
AMPHET UR QL SCN: NEGATIVE
ANION GAP SERPL CALCULATED.3IONS-SCNC: 15 MMOL/L (ref 7–16)
AST SERPL W P-5'-P-CCNC: 21 U/L (ref 15–37)
BARBITURATES UR QL SCN: NEGATIVE
BASOPHILS # BLD AUTO: 0.07 K/UL (ref 0–0.2)
BASOPHILS NFR BLD AUTO: 1.2 % (ref 0–1)
BENZODIAZ METAB UR QL SCN: NEGATIVE
BILIRUB SERPL-MCNC: 0.4 MG/DL (ref 0–1.2)
BUN SERPL-MCNC: 7 MG/DL (ref 7–18)
BUN/CREAT SERPL: 11 (ref 6–20)
CALCIUM SERPL-MCNC: 9.1 MG/DL (ref 8.5–10.1)
CANNABINOIDS UR QL SCN: POSITIVE
CHLORIDE SERPL-SCNC: 95 MMOL/L (ref 98–107)
CO2 SERPL-SCNC: 27 MMOL/L (ref 21–32)
COCAINE UR QL SCN: NEGATIVE
CREAT SERPL-MCNC: 0.64 MG/DL (ref 0.55–1.02)
DIFFERENTIAL METHOD BLD: ABNORMAL
EOSINOPHIL # BLD AUTO: 0.08 K/UL (ref 0–0.5)
EOSINOPHIL NFR BLD AUTO: 1.3 % (ref 1–4)
ERYTHROCYTE [DISTWIDTH] IN BLOOD BY AUTOMATED COUNT: 15.6 % (ref 11.5–14.5)
GLOBULIN SER-MCNC: 3.6 G/DL (ref 2–4)
GLUCOSE SERPL-MCNC: 119 MG/DL (ref 74–106)
HCT VFR BLD AUTO: 39.6 % (ref 38–47)
HGB BLD-MCNC: 13.6 G/DL (ref 12–16)
IMM GRANULOCYTES # BLD AUTO: 0.01 K/UL (ref 0–0.04)
IMM GRANULOCYTES NFR BLD: 0.2 % (ref 0–0.4)
LYMPHOCYTES # BLD AUTO: 0.69 K/UL (ref 1–4.8)
LYMPHOCYTES NFR BLD AUTO: 11.6 % (ref 27–41)
LYMPHOCYTES NFR BLD MANUAL: 19 % (ref 27–41)
MCH RBC QN AUTO: 31.9 PG (ref 27–31)
MCHC RBC AUTO-ENTMCNC: 34.3 G/DL (ref 32–36)
MCV RBC AUTO: 93 FL (ref 80–96)
MONOCYTES # BLD AUTO: 0.53 K/UL (ref 0–0.8)
MONOCYTES NFR BLD AUTO: 8.9 % (ref 2–6)
MONOCYTES NFR BLD MANUAL: 6 % (ref 2–6)
MPC BLD CALC-MCNC: 10.1 FL (ref 9.4–12.4)
NEUTROPHILS # BLD AUTO: 4.57 K/UL (ref 1.8–7.7)
NEUTROPHILS NFR BLD AUTO: 76.8 % (ref 53–65)
NEUTS BAND NFR BLD MANUAL: 2 % (ref 1–5)
NEUTS SEG NFR BLD MANUAL: 73 % (ref 50–62)
NRBC # BLD AUTO: 0 X10E3/UL
NRBC, AUTO (.00): 0 %
OPIATES UR QL SCN: POSITIVE
PCP UR QL SCN: NEGATIVE
PLATELET # BLD AUTO: 212 K/UL (ref 150–400)
PLATELET MORPHOLOGY: ABNORMAL
POTASSIUM SERPL-SCNC: 3.6 MMOL/L (ref 3.5–5.1)
PROT SERPL-MCNC: 7.7 G/DL (ref 6.4–8.2)
RBC # BLD AUTO: 4.26 M/UL (ref 4.2–5.4)
RBC MORPH BLD: NORMAL
SODIUM SERPL-SCNC: 133 MMOL/L (ref 136–145)
WBC # BLD AUTO: 5.95 K/UL (ref 4.5–11)

## 2021-10-20 PROCEDURE — 96374 THER/PROPH/DIAG INJ IV PUSH: CPT

## 2021-10-20 PROCEDURE — 80053 COMPREHEN METABOLIC PANEL: CPT | Performed by: FAMILY MEDICINE

## 2021-10-20 PROCEDURE — 99283 EMERGENCY DEPT VISIT LOW MDM: CPT | Mod: ,,, | Performed by: FAMILY MEDICINE

## 2021-10-20 PROCEDURE — 99284 EMERGENCY DEPT VISIT MOD MDM: CPT | Mod: 25

## 2021-10-20 PROCEDURE — 63600175 PHARM REV CODE 636 W HCPCS: Performed by: FAMILY MEDICINE

## 2021-10-20 PROCEDURE — 36415 COLL VENOUS BLD VENIPUNCTURE: CPT | Performed by: FAMILY MEDICINE

## 2021-10-20 PROCEDURE — 80307 DRUG TEST PRSMV CHEM ANLYZR: CPT | Performed by: FAMILY MEDICINE

## 2021-10-20 PROCEDURE — 93010 ELECTROCARDIOGRAM REPORT: CPT | Mod: ,,, | Performed by: HOSPITALIST

## 2021-10-20 PROCEDURE — 93010 EKG 12-LEAD: ICD-10-PCS | Mod: ,,, | Performed by: HOSPITALIST

## 2021-10-20 PROCEDURE — 99283 PR EMERGENCY DEPT VISIT,LEVEL III: ICD-10-PCS | Mod: ,,, | Performed by: FAMILY MEDICINE

## 2021-10-20 PROCEDURE — 85025 COMPLETE CBC W/AUTO DIFF WBC: CPT | Performed by: FAMILY MEDICINE

## 2021-10-20 PROCEDURE — 93005 ELECTROCARDIOGRAM TRACING: CPT

## 2021-10-20 RX ORDER — LORAZEPAM 2 MG/ML
1 INJECTION INTRAMUSCULAR
Status: DISCONTINUED | OUTPATIENT
Start: 2021-10-20 | End: 2021-10-20

## 2021-10-20 RX ORDER — LORAZEPAM 2 MG/ML
1 INJECTION INTRAMUSCULAR
Status: COMPLETED | OUTPATIENT
Start: 2021-10-20 | End: 2021-10-20

## 2021-10-20 RX ADMIN — LORAZEPAM 1 MG: 2 INJECTION, SOLUTION INTRAMUSCULAR; INTRAVENOUS at 12:10

## 2022-04-27 ENCOUNTER — HOSPITAL ENCOUNTER (EMERGENCY)
Facility: HOSPITAL | Age: 62
Discharge: HOME | End: 2022-04-27
Payer: MEDICAID

## 2022-04-27 VITALS
RESPIRATION RATE: 16 BRPM | DIASTOLIC BLOOD PRESSURE: 65 MMHG | SYSTOLIC BLOOD PRESSURE: 129 MMHG | WEIGHT: 101 LBS | TEMPERATURE: 98 F | HEIGHT: 64 IN | HEART RATE: 61 BPM | BODY MASS INDEX: 17.24 KG/M2 | OXYGEN SATURATION: 95 %

## 2022-04-27 DIAGNOSIS — D50.0 IRON DEFICIENCY ANEMIA DUE TO CHRONIC BLOOD LOSS: ICD-10-CM

## 2022-04-27 DIAGNOSIS — S62.397A CLOSED NONDISPLACED FRACTURE OF OTHER PART OF FIFTH METACARPAL BONE OF LEFT HAND, INITIAL ENCOUNTER: Primary | ICD-10-CM

## 2022-04-27 DIAGNOSIS — D64.9 NORMOCYTIC ANEMIA: ICD-10-CM

## 2022-04-27 DIAGNOSIS — L03.90 CELLULITIS, UNSPECIFIED CELLULITIS SITE: ICD-10-CM

## 2022-04-27 DIAGNOSIS — S16.1XXA STRAIN OF NECK MUSCLE, INITIAL ENCOUNTER: ICD-10-CM

## 2022-04-27 DIAGNOSIS — I83.009 VENOUS ULCER: ICD-10-CM

## 2022-04-27 DIAGNOSIS — J18.9 PNEUMONIA OF RIGHT LOWER LOBE DUE TO INFECTIOUS ORGANISM: ICD-10-CM

## 2022-04-27 DIAGNOSIS — F41.9 ANXIETY: ICD-10-CM

## 2022-04-27 DIAGNOSIS — J30.9 ALLERGIC RHINITIS: ICD-10-CM

## 2022-04-27 DIAGNOSIS — L97.909 VENOUS ULCER: ICD-10-CM

## 2022-04-27 DIAGNOSIS — M79.89 LEG SWELLING: ICD-10-CM

## 2022-04-27 DIAGNOSIS — S39.012A STRAIN OF LUMBAR REGION, INITIAL ENCOUNTER: ICD-10-CM

## 2022-04-27 DIAGNOSIS — S42.213A HUMERAL SURGICAL NECK FRACTURE: ICD-10-CM

## 2022-04-27 DIAGNOSIS — S00.03XA HEMATOMA OF SCALP, INITIAL ENCOUNTER: ICD-10-CM

## 2022-04-27 DIAGNOSIS — K21.9 GASTROESOPHAGEAL REFLUX DISEASE: ICD-10-CM

## 2022-04-27 DIAGNOSIS — G47.00 INSOMNIA: ICD-10-CM

## 2022-04-27 DIAGNOSIS — F41.1 GAD (GENERALIZED ANXIETY DISORDER): ICD-10-CM

## 2022-04-27 DIAGNOSIS — J44.9 CHRONIC OBSTRUCTIVE PULMONARY DISEASE: ICD-10-CM

## 2022-04-27 DIAGNOSIS — K20.90 ESOPHAGITIS: ICD-10-CM
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
## 2022-04-27 LAB
ANION GAP SERPL CALCULATED.3IONS-SCNC: 11 MMOL/L (ref 7–16)
ANISOCYTOSIS BLD QL SMEAR: ABNORMAL
BASOPHILS # BLD AUTO: 0.13 K/UL (ref 0–0.2)
BASOPHILS NFR BLD AUTO: 2.2 % (ref 0–1)
BASOPHILS NFR BLD MANUAL: 2 % (ref 0–1)
BUN SERPL-MCNC: 14 MG/DL (ref 7–18)
BUN/CREAT SERPL: 18 (ref 6–20)
CALCIUM SERPL-MCNC: 8.3 MG/DL (ref 8.5–10.1)
CHLORIDE SERPL-SCNC: 106 MMOL/L (ref 98–107)
CO2 SERPL-SCNC: 30 MMOL/L (ref 21–32)
CREAT SERPL-MCNC: 0.8 MG/DL (ref 0.55–1.02)
DIFFERENTIAL METHOD BLD: ABNORMAL
EOSINOPHIL # BLD AUTO: 0.44 K/UL (ref 0–0.5)
EOSINOPHIL NFR BLD AUTO: 7.4 % (ref 1–4)
EOSINOPHIL NFR BLD MANUAL: 4 % (ref 1–4)
ERYTHROCYTE [DISTWIDTH] IN BLOOD BY AUTOMATED COUNT: 18.9 % (ref 11.5–14.5)
GLUCOSE SERPL-MCNC: 99 MG/DL (ref 74–106)
HCT VFR BLD AUTO: 36 % (ref 38–47)
HGB BLD-MCNC: 11 G/DL (ref 12–16)
IMM GRANULOCYTES # BLD AUTO: 0.03 K/UL (ref 0–0.04)
IMM GRANULOCYTES NFR BLD: 0.5 % (ref 0–0.4)
LYMPHOCYTES # BLD AUTO: 1.8 K/UL (ref 1–4.8)
LYMPHOCYTES NFR BLD AUTO: 30.2 % (ref 27–41)
LYMPHOCYTES NFR BLD MANUAL: 36 % (ref 27–41)
MCH RBC QN AUTO: 30.8 PG (ref 27–31)
MCHC RBC AUTO-ENTMCNC: 30.6 G/DL (ref 32–36)
MCV RBC AUTO: 100.8 FL (ref 80–96)
MONOCYTES # BLD AUTO: 0.78 K/UL (ref 0–0.8)
MONOCYTES NFR BLD AUTO: 13.1 % (ref 2–6)
MONOCYTES NFR BLD MANUAL: 11 % (ref 2–6)
MPC BLD CALC-MCNC: 10 FL (ref 9.4–12.4)
NEUTROPHILS # BLD AUTO: 2.79 K/UL (ref 1.8–7.7)
NEUTROPHILS NFR BLD AUTO: 46.6 % (ref 53–65)
NEUTS BAND NFR BLD MANUAL: 1 % (ref 1–5)
NEUTS SEG NFR BLD MANUAL: 46 % (ref 50–62)
NRBC # BLD AUTO: 0 X10E3/UL
NRBC, AUTO (.00): 0 %
PLATELET # BLD AUTO: 349 K/UL (ref 150–400)
PLATELET MORPHOLOGY: ABNORMAL
POTASSIUM SERPL-SCNC: 5.1 MMOL/L (ref 3.5–5.1)
RBC # BLD AUTO: 3.57 M/UL (ref 4.2–5.4)
SODIUM SERPL-SCNC: 142 MMOL/L (ref 136–145)
WBC # BLD AUTO: 5.97 K/UL (ref 4.5–11)

## 2022-04-27 PROCEDURE — 99283 PR EMERGENCY DEPT VISIT,LEVEL III: ICD-10-PCS | Mod: ,,, | Performed by: NURSE PRACTITIONER

## 2022-04-27 PROCEDURE — 63600175 PHARM REV CODE 636 W HCPCS: Performed by: NURSE PRACTITIONER

## 2022-04-27 PROCEDURE — 82310 ASSAY OF CALCIUM: CPT | Performed by: NURSE PRACTITIONER

## 2022-04-27 PROCEDURE — 96372 THER/PROPH/DIAG INJ SC/IM: CPT

## 2022-04-27 PROCEDURE — 99283 EMERGENCY DEPT VISIT LOW MDM: CPT | Mod: ,,, | Performed by: NURSE PRACTITIONER

## 2022-04-27 PROCEDURE — 99285 EMERGENCY DEPT VISIT HI MDM: CPT | Mod: 25

## 2022-04-27 PROCEDURE — 85025 COMPLETE CBC W/AUTO DIFF WBC: CPT | Performed by: NURSE PRACTITIONER

## 2022-04-27 PROCEDURE — 80048 BASIC METABOLIC PNL TOTAL CA: CPT | Performed by: NURSE PRACTITIONER

## 2022-04-27 PROCEDURE — 36415 COLL VENOUS BLD VENIPUNCTURE: CPT | Performed by: NURSE PRACTITIONER

## 2022-04-27 PROCEDURE — 25000003 PHARM REV CODE 250: Performed by: NURSE PRACTITIONER

## 2022-04-27 RX ORDER — MORPHINE SULFATE 4 MG/ML
4 INJECTION, SOLUTION INTRAMUSCULAR; INTRAVENOUS
Status: DISCONTINUED | OUTPATIENT
Start: 2022-04-27 | End: 2022-04-27

## 2022-04-27 RX ORDER — CLINDAMYCIN PHOSPHATE 150 MG/ML
600 INJECTION, SOLUTION INTRAVENOUS
Status: COMPLETED | OUTPATIENT
Start: 2022-04-27 | End: 2022-04-27

## 2022-04-27 RX ORDER — MORPHINE SULFATE 4 MG/ML
4 INJECTION, SOLUTION INTRAMUSCULAR; INTRAVENOUS
Status: COMPLETED | OUTPATIENT
Start: 2022-04-27 | End: 2022-04-27

## 2022-04-27 RX ORDER — CLINDAMYCIN HYDROCHLORIDE 150 MG/1
300 CAPSULE ORAL EVERY 8 HOURS
Qty: 60 CAPSULE | Refills: 0 | Status: SHIPPED | OUTPATIENT
Start: 2022-04-27 | End: 2022-05-07

## 2022-04-27 RX ORDER — ONDANSETRON 2 MG/ML
4 INJECTION INTRAMUSCULAR; INTRAVENOUS
Status: COMPLETED | OUTPATIENT
Start: 2022-04-27 | End: 2022-04-27

## 2022-04-27 RX ADMIN — ONDANSETRON 4 MG: 2 INJECTION INTRAMUSCULAR; INTRAVENOUS at 04:04

## 2022-04-27 RX ADMIN — MORPHINE SULFATE 4 MG: 4 INJECTION INTRAVENOUS at 04:04

## 2022-04-27 RX ADMIN — CLINDAMYCIN PHOSPHATE 600 MG: 150 INJECTION, SOLUTION INTRAMUSCULAR; INTRAVENOUS at 04:04

## 2022-04-27 NOTE — PROVIDER PROGRESS NOTES - EMERGENCY DEPT.
Encounter Date: 4/27/2022    ED Physician Progress Notes        Anette Diaz RN notified to contact 's office for appointment

## 2022-04-27 NOTE — DISCHARGE INSTRUCTIONS
Wear splint as directed. Stop smoking. Take antibiotics as directed. Follow up with your orthopedist. Follow up with your primary care provider in 2 days. We are also referring to to the [facility]. Return to the emergency department for any increase in symptoms or for any other new or worrisome symptoms.

## 2022-05-02 ENCOUNTER — TELEPHONE (OUTPATIENT)
Dept: EMERGENCY MEDICINE | Facility: HOSPITAL | Age: 62
End: 2022-05-02
Payer: MEDICAID

## 2023-11-25 ENCOUNTER — HOSPITAL ENCOUNTER (INPATIENT)
Facility: HOSPITAL | Age: 63
LOS: 3 days | Discharge: HOME | DRG: 281 | End: 2023-11-28
Attending: EMERGENCY MEDICINE | Admitting: STUDENT IN AN ORGANIZED HEALTH CARE EDUCATION/TRAINING PROGRAM
Payer: MEDICARE

## 2023-11-25 DIAGNOSIS — R07.9 CHEST PAIN: ICD-10-CM

## 2023-11-25 DIAGNOSIS — T79.6XXD TRAUMATIC RHABDOMYOLYSIS, SUBSEQUENT ENCOUNTER: ICD-10-CM

## 2023-11-25 DIAGNOSIS — I21.A1 TYPE 2 MI (MYOCARDIAL INFARCTION): ICD-10-CM

## 2023-11-25 DIAGNOSIS — I21.4 NSTEMI (NON-ST ELEVATED MYOCARDIAL INFARCTION): ICD-10-CM

## 2023-11-25 DIAGNOSIS — M62.82 NON-TRAUMATIC RHABDOMYOLYSIS: ICD-10-CM

## 2023-11-25 DIAGNOSIS — R06.02 SOB (SHORTNESS OF BREATH): ICD-10-CM
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
## 2023-11-25 PROBLEM — K75.9 HEPATITIS: Status: ACTIVE | Noted: 2023-11-25

## 2023-11-25 PROBLEM — E46 PROTEIN CALORIE MALNUTRITION: Status: ACTIVE | Noted: 2023-11-25

## 2023-11-25 PROBLEM — R65.10 SIRS (SYSTEMIC INFLAMMATORY RESPONSE SYNDROME): Status: ACTIVE | Noted: 2023-11-25

## 2023-11-25 LAB
ALBUMIN SERPL BCP-MCNC: 2.7 G/DL (ref 3.5–5)
ALBUMIN/GLOB SERPL: 0.8 {RATIO}
ALP SERPL-CCNC: 103 U/L (ref 50–130)
ALT SERPL W P-5'-P-CCNC: 105 U/L (ref 13–56)
ANION GAP SERPL CALCULATED.3IONS-SCNC: 8 MMOL/L (ref 7–16)
APTT PPP: 32.8 SECONDS (ref 25.2–37.3)
AST SERPL W P-5'-P-CCNC: 508 U/L (ref 15–37)
BASOPHILS # BLD AUTO: 0.04 K/UL (ref 0–0.2)
BASOPHILS NFR BLD AUTO: 0.3 % (ref 0–1)
BILIRUB SERPL-MCNC: 0.5 MG/DL (ref ?–1.2)
BILIRUB UR QL STRIP: NEGATIVE
BUN SERPL-MCNC: 20 MG/DL (ref 7–18)
BUN/CREAT SERPL: 18 (ref 6–20)
CALCIUM SERPL-MCNC: 8.2 MG/DL (ref 8.5–10.1)
CHLORIDE SERPL-SCNC: 98 MMOL/L (ref 98–107)
CK SERPL-CCNC: ABNORMAL U/L (ref 26–192)
CLARITY UR: CLEAR
CO2 SERPL-SCNC: 31 MMOL/L (ref 21–32)
COLOR UR: ABNORMAL
CREAT SERPL-MCNC: 1.09 MG/DL (ref 0.55–1.02)
DIFFERENTIAL METHOD BLD: ABNORMAL
EGFR (NO RACE VARIABLE) (RUSH/TITUS): 58 ML/MIN/1.73M2
EOSINOPHIL # BLD AUTO: 0.01 K/UL (ref 0–0.5)
EOSINOPHIL NFR BLD AUTO: 0.1 % (ref 1–4)
ERYTHROCYTE [DISTWIDTH] IN BLOOD BY AUTOMATED COUNT: 15.2 % (ref 11.5–14.5)
GLOBULIN SER-MCNC: 3.3 G/DL (ref 2–4)
GLUCOSE SERPL-MCNC: 134 MG/DL (ref 74–106)
GLUCOSE UR STRIP-MCNC: NORMAL MG/DL
HAV IGM SER QL: NORMAL
HBV CORE IGM SER QL: NORMAL
HBV SURFACE AG SERPL QL IA: NORMAL
HCT VFR BLD AUTO: 35.7 % (ref 38–47)
HCV AB SER QL: NORMAL
HGB BLD-MCNC: 12.2 G/DL (ref 12–16)
IMM GRANULOCYTES # BLD AUTO: 0.09 K/UL (ref 0–0.04)
IMM GRANULOCYTES NFR BLD: 0.7 % (ref 0–0.4)
INR BLD: 1.22
KETONES UR STRIP-SCNC: NEGATIVE MG/DL
LEUKOCYTE ESTERASE UR QL STRIP: NEGATIVE
LIPASE SERPL-CCNC: <19 U/L (ref 16–77)
LYMPHOCYTES # BLD AUTO: 0.27 K/UL (ref 1–4.8)
LYMPHOCYTES NFR BLD AUTO: 2 % (ref 27–41)
MAGNESIUM SERPL-MCNC: 2 MG/DL (ref 1.7–2.3)
MCH RBC QN AUTO: 31.2 PG (ref 27–31)
MCHC RBC AUTO-ENTMCNC: 34.2 G/DL (ref 32–36)
MCV RBC AUTO: 91.3 FL (ref 80–96)
MONOCYTES # BLD AUTO: 1.09 K/UL (ref 0–0.8)
MONOCYTES NFR BLD AUTO: 7.9 % (ref 2–6)
MPC BLD CALC-MCNC: 10.5 FL (ref 9.4–12.4)
NEUTROPHILS # BLD AUTO: 12.22 K/UL (ref 1.8–7.7)
NEUTROPHILS NFR BLD AUTO: 89 % (ref 53–65)
NITRITE UR QL STRIP: NEGATIVE
NRBC # BLD AUTO: 0 X10E3/UL
NRBC, AUTO (.00): 0 %
NT-PROBNP SERPL-MCNC: 6196 PG/ML (ref 1–125)
PH UR STRIP: 5.5 PH UNITS
PLATELET # BLD AUTO: 201 K/UL (ref 150–400)
POTASSIUM SERPL-SCNC: 3.5 MMOL/L (ref 3.5–5.1)
PROT SERPL-MCNC: 6 G/DL (ref 6.4–8.2)
PROT UR QL STRIP: 20
PROTHROMBIN TIME: 15.3 SECONDS (ref 11.7–14.7)
RBC # BLD AUTO: 3.91 M/UL (ref 4.2–5.4)
RBC # UR STRIP: ABNORMAL /UL
SARS-COV-2 RDRP RESP QL NAA+PROBE: NEGATIVE
SODIUM SERPL-SCNC: 133 MMOL/L (ref 136–145)
SP GR UR STRIP: 1.01
TROPONIN I SERPL DL<=0.01 NG/ML-MCNC: 1277.3 PG/ML
TROPONIN I SERPL DL<=0.01 NG/ML-MCNC: 1478.1 PG/ML
TROPONIN I SERPL DL<=0.01 NG/ML-MCNC: 851.7 PG/ML
TSH SERPL DL<=0.005 MIU/L-ACNC: 0.71 UIU/ML (ref 0.36–3.74)
UROBILINOGEN UR STRIP-ACNC: NORMAL MG/DL
WBC # BLD AUTO: 13.72 K/UL (ref 4.5–11)
WBC #/AREA URNS HPF: 2 /HPF

## 2023-11-25 PROCEDURE — 93010 EKG 12-LEAD: ICD-10-PCS | Mod: 77,GW,HPC, | Performed by: INTERNAL MEDICINE

## 2023-11-25 PROCEDURE — 63600175 PHARM REV CODE 636 W HCPCS: Performed by: STUDENT IN AN ORGANIZED HEALTH CARE EDUCATION/TRAINING PROGRAM

## 2023-11-25 PROCEDURE — 84443 ASSAY THYROID STIM HORMONE: CPT | Performed by: STUDENT IN AN ORGANIZED HEALTH CARE EDUCATION/TRAINING PROGRAM

## 2023-11-25 PROCEDURE — 25000003 PHARM REV CODE 250: Performed by: STUDENT IN AN ORGANIZED HEALTH CARE EDUCATION/TRAINING PROGRAM

## 2023-11-25 PROCEDURE — G0378 HOSPITAL OBSERVATION PER HR: HCPCS

## 2023-11-25 PROCEDURE — 11000001 HC ACUTE MED/SURG PRIVATE ROOM

## 2023-11-25 PROCEDURE — 83880 ASSAY OF NATRIURETIC PEPTIDE: CPT | Performed by: EMERGENCY MEDICINE

## 2023-11-25 PROCEDURE — 25000242 PHARM REV CODE 250 ALT 637 W/ HCPCS: Performed by: STUDENT IN AN ORGANIZED HEALTH CARE EDUCATION/TRAINING PROGRAM

## 2023-11-25 PROCEDURE — 80053 COMPREHEN METABOLIC PANEL: CPT | Performed by: EMERGENCY MEDICINE

## 2023-11-25 PROCEDURE — 93005 ELECTROCARDIOGRAM TRACING: CPT

## 2023-11-25 PROCEDURE — 99223 1ST HOSP IP/OBS HIGH 75: CPT | Mod: GW,HPC,, | Performed by: STUDENT IN AN ORGANIZED HEALTH CARE EDUCATION/TRAINING PROGRAM

## 2023-11-25 PROCEDURE — 83735 ASSAY OF MAGNESIUM: CPT | Performed by: EMERGENCY MEDICINE

## 2023-11-25 PROCEDURE — 80074 ACUTE HEPATITIS PANEL: CPT | Performed by: STUDENT IN AN ORGANIZED HEALTH CARE EDUCATION/TRAINING PROGRAM

## 2023-11-25 PROCEDURE — 93010 EKG 12-LEAD: ICD-10-PCS | Mod: GW,HPC,, | Performed by: STUDENT IN AN ORGANIZED HEALTH CARE EDUCATION/TRAINING PROGRAM

## 2023-11-25 PROCEDURE — 99285 EMERGENCY DEPT VISIT HI MDM: CPT | Mod: GW,,, | Performed by: EMERGENCY MEDICINE

## 2023-11-25 PROCEDURE — 81001 URINALYSIS AUTO W/SCOPE: CPT | Performed by: EMERGENCY MEDICINE

## 2023-11-25 PROCEDURE — 93010 ELECTROCARDIOGRAM REPORT: CPT | Mod: 77,GW,HPC, | Performed by: INTERNAL MEDICINE

## 2023-11-25 PROCEDURE — 82550 ASSAY OF CK (CPK): CPT | Performed by: STUDENT IN AN ORGANIZED HEALTH CARE EDUCATION/TRAINING PROGRAM

## 2023-11-25 PROCEDURE — 93010 ELECTROCARDIOGRAM REPORT: CPT | Mod: GW,HPC,, | Performed by: STUDENT IN AN ORGANIZED HEALTH CARE EDUCATION/TRAINING PROGRAM

## 2023-11-25 PROCEDURE — 85610 PROTHROMBIN TIME: CPT | Performed by: EMERGENCY MEDICINE

## 2023-11-25 PROCEDURE — 63600175 PHARM REV CODE 636 W HCPCS: Mod: JZ | Performed by: STUDENT IN AN ORGANIZED HEALTH CARE EDUCATION/TRAINING PROGRAM

## 2023-11-25 PROCEDURE — 85730 THROMBOPLASTIN TIME PARTIAL: CPT | Performed by: EMERGENCY MEDICINE

## 2023-11-25 PROCEDURE — 99285 PR EMERGENCY DEPT VISIT,LEVEL V: ICD-10-PCS | Mod: GW,,, | Performed by: EMERGENCY MEDICINE

## 2023-11-25 PROCEDURE — 99223 PR INITIAL HOSPITAL CARE,LEVL III: ICD-10-PCS | Mod: GW,HPC,, | Performed by: STUDENT IN AN ORGANIZED HEALTH CARE EDUCATION/TRAINING PROGRAM

## 2023-11-25 PROCEDURE — 83690 ASSAY OF LIPASE: CPT | Performed by: EMERGENCY MEDICINE

## 2023-11-25 PROCEDURE — 85025 COMPLETE CBC W/AUTO DIFF WBC: CPT | Performed by: EMERGENCY MEDICINE

## 2023-11-25 PROCEDURE — 84484 ASSAY OF TROPONIN QUANT: CPT | Performed by: STUDENT IN AN ORGANIZED HEALTH CARE EDUCATION/TRAINING PROGRAM

## 2023-11-25 PROCEDURE — 99285 EMERGENCY DEPT VISIT HI MDM: CPT | Mod: 25

## 2023-11-25 PROCEDURE — 87635 SARS-COV-2 COVID-19 AMP PRB: CPT | Performed by: STUDENT IN AN ORGANIZED HEALTH CARE EDUCATION/TRAINING PROGRAM

## 2023-11-25 PROCEDURE — 84484 ASSAY OF TROPONIN QUANT: CPT | Performed by: EMERGENCY MEDICINE

## 2023-11-25 RX ORDER — ACETAMINOPHEN 500 MG
1000 TABLET ORAL EVERY 8 HOURS PRN
Status: DISCONTINUED | OUTPATIENT
Start: 2023-11-25 | End: 2023-11-25

## 2023-11-25 RX ORDER — IPRATROPIUM BROMIDE 0.5 MG/2.5ML
0.5 SOLUTION RESPIRATORY (INHALATION) EVERY 6 HOURS
Status: DISCONTINUED | OUTPATIENT
Start: 2023-11-25 | End: 2023-11-28 | Stop reason: HOSPADM

## 2023-11-25 RX ORDER — CLOPIDOGREL 300 MG/1
600 TABLET, FILM COATED ORAL ONCE
Status: COMPLETED | OUTPATIENT
Start: 2023-11-25 | End: 2023-11-25

## 2023-11-25 RX ORDER — SODIUM CHLORIDE 9 MG/ML
INJECTION, SOLUTION INTRAVENOUS CONTINUOUS
Status: DISPENSED | OUTPATIENT
Start: 2023-11-25 | End: 2023-11-26

## 2023-11-25 RX ORDER — IBUPROFEN 200 MG
16 TABLET ORAL
Status: DISCONTINUED | OUTPATIENT
Start: 2023-11-25 | End: 2023-11-28 | Stop reason: HOSPADM

## 2023-11-25 RX ORDER — LABETALOL HYDROCHLORIDE 5 MG/ML
20 INJECTION, SOLUTION INTRAVENOUS EVERY 6 HOURS PRN
Status: DISCONTINUED | OUTPATIENT
Start: 2023-11-25 | End: 2023-11-28 | Stop reason: HOSPADM

## 2023-11-25 RX ORDER — ASPIRIN 325 MG
325 TABLET ORAL ONCE
Status: COMPLETED | OUTPATIENT
Start: 2023-11-25 | End: 2023-11-25

## 2023-11-25 RX ORDER — TIZANIDINE 2 MG/1
2 TABLET ORAL 3 TIMES DAILY PRN
Status: DISCONTINUED | OUTPATIENT
Start: 2023-11-25 | End: 2023-11-27

## 2023-11-25 RX ORDER — PANTOPRAZOLE SODIUM 40 MG/1
40 TABLET, DELAYED RELEASE ORAL DAILY
Status: DISCONTINUED | OUTPATIENT
Start: 2023-11-26 | End: 2023-11-28 | Stop reason: HOSPADM

## 2023-11-25 RX ORDER — LEVALBUTEROL INHALATION SOLUTION 1.25 MG/3ML
1.25 SOLUTION RESPIRATORY (INHALATION) EVERY 8 HOURS
Status: DISCONTINUED | OUTPATIENT
Start: 2023-11-26 | End: 2023-11-28 | Stop reason: HOSPADM

## 2023-11-25 RX ORDER — ENOXAPARIN SODIUM 100 MG/ML
40 INJECTION SUBCUTANEOUS EVERY 12 HOURS
Status: DISCONTINUED | OUTPATIENT
Start: 2023-11-25 | End: 2023-11-28 | Stop reason: HOSPADM

## 2023-11-25 RX ORDER — MORPHINE SULFATE 4 MG/ML
4 INJECTION, SOLUTION INTRAMUSCULAR; INTRAVENOUS EVERY 4 HOURS PRN
Status: DISCONTINUED | OUTPATIENT
Start: 2023-11-25 | End: 2023-11-28 | Stop reason: HOSPADM

## 2023-11-25 RX ORDER — TRAZODONE HYDROCHLORIDE 50 MG/1
50 TABLET ORAL NIGHTLY PRN
Status: DISCONTINUED | OUTPATIENT
Start: 2023-11-25 | End: 2023-11-28 | Stop reason: HOSPADM

## 2023-11-25 RX ORDER — IBUPROFEN 200 MG
24 TABLET ORAL
Status: DISCONTINUED | OUTPATIENT
Start: 2023-11-25 | End: 2023-11-28 | Stop reason: HOSPADM

## 2023-11-25 RX ORDER — NALOXONE HCL 0.4 MG/ML
0.02 VIAL (ML) INJECTION
Status: DISCONTINUED | OUTPATIENT
Start: 2023-11-25 | End: 2023-11-28 | Stop reason: HOSPADM

## 2023-11-25 RX ORDER — METOPROLOL TARTRATE 25 MG/1
25 TABLET, FILM COATED ORAL 2 TIMES DAILY
Status: DISCONTINUED | OUTPATIENT
Start: 2023-11-25 | End: 2023-11-28 | Stop reason: HOSPADM

## 2023-11-25 RX ORDER — PREDNISONE 10 MG/1
10 TABLET ORAL DAILY
Status: DISCONTINUED | OUTPATIENT
Start: 2023-11-25 | End: 2023-11-28 | Stop reason: HOSPADM

## 2023-11-25 RX ORDER — NITROGLYCERIN 0.4 MG/1
0.4 TABLET SUBLINGUAL EVERY 5 MIN PRN
Status: DISCONTINUED | OUTPATIENT
Start: 2023-11-25 | End: 2023-11-28 | Stop reason: HOSPADM

## 2023-11-25 RX ORDER — GLUCAGON 1 MG
1 KIT INJECTION
Status: DISCONTINUED | OUTPATIENT
Start: 2023-11-25 | End: 2023-11-28 | Stop reason: HOSPADM

## 2023-11-25 RX ORDER — CLONAZEPAM 0.5 MG/1
1 TABLET ORAL DAILY PRN
Status: DISCONTINUED | OUTPATIENT
Start: 2023-11-25 | End: 2023-11-27

## 2023-11-25 RX ORDER — ESCITALOPRAM OXALATE 10 MG/1
10 TABLET ORAL DAILY
Status: DISCONTINUED | OUTPATIENT
Start: 2023-11-26 | End: 2023-11-28 | Stop reason: HOSPADM

## 2023-11-25 RX ORDER — BUDESONIDE 0.25 MG/2ML
0.5 INHALANT ORAL EVERY 12 HOURS
Status: DISCONTINUED | OUTPATIENT
Start: 2023-11-25 | End: 2023-11-28 | Stop reason: HOSPADM

## 2023-11-25 RX ORDER — GABAPENTIN 100 MG/1
100 CAPSULE ORAL NIGHTLY
Status: DISCONTINUED | OUTPATIENT
Start: 2023-11-25 | End: 2023-11-28 | Stop reason: HOSPADM

## 2023-11-25 RX ORDER — CLOPIDOGREL BISULFATE 75 MG/1
75 TABLET ORAL DAILY
Status: DISCONTINUED | OUTPATIENT
Start: 2023-11-26 | End: 2023-11-28 | Stop reason: HOSPADM

## 2023-11-25 RX ORDER — SODIUM CHLORIDE 0.9 % (FLUSH) 0.9 %
10 SYRINGE (ML) INJECTION EVERY 12 HOURS PRN
Status: DISCONTINUED | OUTPATIENT
Start: 2023-11-25 | End: 2023-11-28 | Stop reason: HOSPADM

## 2023-11-25 RX ORDER — NAPROXEN SODIUM 220 MG/1
81 TABLET, FILM COATED ORAL DAILY
Status: DISCONTINUED | OUTPATIENT
Start: 2023-11-26 | End: 2023-11-28 | Stop reason: HOSPADM

## 2023-11-25 RX ADMIN — PREDNISONE 10 MG: 10 TABLET ORAL at 05:11

## 2023-11-25 RX ADMIN — MORPHINE SULFATE 4 MG: 4 INJECTION, SOLUTION INTRAMUSCULAR; INTRAVENOUS at 06:11

## 2023-11-25 RX ADMIN — CLOPIDOGREL BISULFATE 600 MG: 300 TABLET, FILM COATED ORAL at 05:11

## 2023-11-25 RX ADMIN — SODIUM CHLORIDE 1000 ML: 9 INJECTION, SOLUTION INTRAVENOUS at 05:11

## 2023-11-25 RX ADMIN — GABAPENTIN 100 MG: 100 CAPSULE ORAL at 10:11

## 2023-11-25 RX ADMIN — ENOXAPARIN SODIUM 40 MG: 100 INJECTION SUBCUTANEOUS at 05:11

## 2023-11-25 RX ADMIN — SODIUM CHLORIDE: 9 INJECTION, SOLUTION INTRAVENOUS at 07:11

## 2023-11-25 RX ADMIN — BUDESONIDE 0.5 MG: 0.25 SUSPENSION RESPIRATORY (INHALATION) at 08:11

## 2023-11-25 RX ADMIN — ASPIRIN 325 MG ORAL TABLET 325 MG: 325 PILL ORAL at 05:11

## 2023-11-25 RX ADMIN — IPRATROPIUM BROMIDE 0.5 MG: 0.5 SOLUTION RESPIRATORY (INHALATION) at 06:11

## 2023-11-25 RX ADMIN — METOPROLOL TARTRATE 25 MG: 25 TABLET, FILM COATED ORAL at 10:11

## 2023-11-25 NOTE — Clinical Note
The closure device was deployed in the right femoral artery. Device failure. Manual pressure applied

## 2023-11-25 NOTE — Clinical Note
Diagnosis: Fall [137066]   Future Attending Provider: LARRY ARAIZA [[id number]]   Admitting Provider:: LARRY ARAIZA [[id number]]

## 2023-11-25 NOTE — SUBJECTIVE & OBJECTIVE
Past Medical History:   Diagnosis Date    CHF (congestive heart failure)     COPD (chronic obstructive pulmonary disease)        Past Surgical History:   Procedure Laterality Date    ABDOMINAL SURGERY         Review of patient's allergies indicates:   Allergen Reactions    Ondansetron hcl Swelling     Edema of tongue per patient    Celexa [citalopram]     Darvon [propoxyphene]     Flexeril [cyclobenzaprine]     Hydroxyzine     Percocet [oxycodone-acetaminophen]     Robaxin [methocarbamol]     Thorazine [chlorpromazine]     Toradol [ketorolac] Itching       No current facility-administered medications on file prior to encounter.     Current Outpatient Medications on File Prior to Encounter   Medication Sig    clonazePAM (KLONOPIN) 2 MG Tab     EScitalopram oxalate (LEXAPRO) 10 MG tablet Take 1 tablet (10 mg total) by mouth once daily.    ferrous sulfate (FEOSOL) 325 mg (65 mg iron) Tab tablet Take 1 tablet (325 mg total) by mouth 2 (two) times daily.    fluticasone propionate (FLONASE) 50 mcg/actuation nasal spray 2 sprays by Each Nostril route once daily.    gabapentin (NEURONTIN) 100 MG capsule Take 1 capsule (100 mg total) by mouth nightly.    glycopyrrolate-formoteroL (BEVESPI AEROSPHERE) 9-4.8 mcg HFAA Inhale 2 puffs into the lungs 2 (two) times daily. Controller    HYDROcodone-acetaminophen (NORCO)  mg per tablet     montelukast (SINGULAIR) 10 mg tablet Take 1 tablet (10 mg total) by mouth nightly.    multivitamin Tab Take 1 tablet by mouth once daily.    pantoprazole (PROTONIX) 40 MG tablet Take 1 tablet (40 mg total) by mouth once daily.    tiZANidine (ZANAFLEX) 4 MG tablet Take 1 tablet (4 mg total) by mouth 3 (three) times daily as needed.    traZODone (DESYREL) 100 MG tablet Take 1 tablet (100 mg total) by mouth every evening. (Patient taking differently: Take 50 mg by mouth every evening. )     Family History    None       Tobacco Use    Smoking status: Every Day     Types: Cigarettes    Smokeless  tobacco: Never   Substance and Sexual Activity    Alcohol use: Yes    Drug use: Never    Sexual activity: Not on file     Review of Systems   Constitutional:  Positive for fever. Negative for chills, fatigue and unexpected weight change.   HENT:  Negative for congestion, mouth sores and sore throat.    Eyes:  Negative for photophobia and visual disturbance.   Respiratory:  Positive for cough, chest tightness, shortness of breath and wheezing.    Cardiovascular:  Positive for palpitations. Negative for chest pain and leg swelling.   Gastrointestinal:  Positive for constipation. Negative for abdominal pain, diarrhea, nausea and vomiting.   Endocrine: Negative for cold intolerance and heat intolerance.   Genitourinary:  Negative for difficulty urinating, dysuria, frequency and urgency.   Musculoskeletal:  Positive for back pain. Negative for arthralgias and myalgias.   Skin:  Negative for pallor and rash.   Neurological:  Positive for weakness. Negative for tremors, seizures, syncope, numbness and headaches.   Hematological:  Does not bruise/bleed easily.   Psychiatric/Behavioral:  Negative for agitation, confusion, hallucinations and suicidal ideas.      Objective:     Vital Signs (Most Recent):  Temp: 98.6 °F (37 °C) (11/25/23 1218)  Pulse: 102 (11/25/23 1616)  Resp: 15 (11/25/23 1616)  BP: (!) 174/112 (11/25/23 1616)  SpO2: (!) 92 % (11/25/23 1616) Vital Signs (24h Range):  Temp:  [98.6 °F (37 °C)] 98.6 °F (37 °C)  Pulse:  [100-116] 102  Resp:  [11-20] 15  SpO2:  [92 %-96 %] 92 %  BP: (165-174)/(101-112) 174/112     Weight: 44.5 kg (98 lb)  Body mass index is 17.36 kg/m².     Physical Exam  Vitals reviewed.   Constitutional:       Appearance: Normal appearance.   HENT:      Head: Normocephalic and atraumatic.      Nose: Nose normal.   Eyes:      Extraocular Movements: Extraocular movements intact.      Conjunctiva/sclera: Conjunctivae normal.   Neck:      Trachea: Trachea normal.   Cardiovascular:      Rate and  Rhythm: Regular rhythm. Tachycardia present.      Pulses: Normal pulses.      Heart sounds: Normal heart sounds.   Pulmonary:      Effort: Pulmonary effort is normal.      Breath sounds: Normal air entry. Wheezing present.   Abdominal:      General: Bowel sounds are normal.      Palpations: Abdomen is soft.   Musculoskeletal:         General: Tenderness (chest wall tenderness) present. Normal range of motion.      Right lower leg: Edema present.      Left lower leg: Edema present.   Skin:     General: Skin is warm and dry.      Findings: Lesion present.   Neurological:      General: No focal deficit present.      Mental Status: She is alert.      Cranial Nerves: Cranial nerves 2-12 are intact.      Comments: Grossly normal motor and sensory function without focal deficit appreciated.   Psychiatric:         Mood and Affect: Mood and affect normal.         Behavior: Behavior is cooperative.                Significant Labs: All pertinent labs within the past 24 hours have been reviewed.    Significant Imaging: I have reviewed all pertinent imaging results/findings within the past 24 hours.

## 2023-11-25 NOTE — ASSESSMENT & PLAN NOTE
LEN 3  Dapt load then daily  Lovenox 1mg/kg BID  BB  Hold statin for hepatitis and rhabdo  Echo  Nitrates  Morphine  Cardiology consult

## 2023-11-25 NOTE — H&P
"  [facility] - Emergency Department  Hospital Medicine  History & Physical    Patient Name: Darya Low  MRN: [id number]  Patient Class: IP- Inpatient  Admission Date: 11/25/2023  Attending Physician: Joe Guillen DO   Primary Care Provider: Alondra Bishop FNP         Patient information was obtained from patient, past medical records, and ER records.     Subjective:     Principal Problem:NSTEMI (non-ST elevated myocardial infarction)    Chief Complaint:   Chief Complaint   Patient presents with    Fall    Chest Pain        HPI: 63yowf with pmh of COPD on home oxygen on oral steroids, anxiety, pathologic fracture, tobacco use, GERD who presents after fall.  "I took my night time medicine and the next thing I remember I was on the floor".  Takes klonopin, tizanidine and trazadone at home.  Denies any narcotic use.  She does not know how long she was down.  She reports low back pain since her fall along with substernal chest tightness.  Pain rated as 5/10, no alleviating factors, worse with coughing. Denies radiation, has been constant since fall.  Reports palpitations for some time.  Is chronically sob but believes this is worse recently. Denies any increased sputum production or purulence.  +subjective fevers, no chills. No nvd.  Constipation for 6 days. ROS otherwise negative.     Past Medical History:   Diagnosis Date    CHF (congestive heart failure)     COPD (chronic obstructive pulmonary disease)        Past Surgical History:   Procedure Laterality Date    ABDOMINAL SURGERY         Review of patient's allergies indicates:   Allergen Reactions    Ondansetron hcl Swelling     Edema of tongue per patient    Celexa [citalopram]     Darvon [propoxyphene]     Flexeril [cyclobenzaprine]     Hydroxyzine     Percocet [oxycodone-acetaminophen]     Robaxin [methocarbamol]     Thorazine [chlorpromazine]     Toradol [ketorolac] Itching       No current facility-administered medications on file prior to " encounter.     Current Outpatient Medications on File Prior to Encounter   Medication Sig    clonazePAM (KLONOPIN) 2 MG Tab     EScitalopram oxalate (LEXAPRO) 10 MG tablet Take 1 tablet (10 mg total) by mouth once daily.    ferrous sulfate (FEOSOL) 325 mg (65 mg iron) Tab tablet Take 1 tablet (325 mg total) by mouth 2 (two) times daily.    fluticasone propionate (FLONASE) 50 mcg/actuation nasal spray 2 sprays by Each Nostril route once daily.    gabapentin (NEURONTIN) 100 MG capsule Take 1 capsule (100 mg total) by mouth nightly.    glycopyrrolate-formoteroL (BEVESPI AEROSPHERE) 9-4.8 mcg HFAA Inhale 2 puffs into the lungs 2 (two) times daily. Controller    HYDROcodone-acetaminophen (NORCO)  mg per tablet     montelukast (SINGULAIR) 10 mg tablet Take 1 tablet (10 mg total) by mouth nightly.    multivitamin Tab Take 1 tablet by mouth once daily.    pantoprazole (PROTONIX) 40 MG tablet Take 1 tablet (40 mg total) by mouth once daily.    tiZANidine (ZANAFLEX) 4 MG tablet Take 1 tablet (4 mg total) by mouth 3 (three) times daily as needed.    traZODone (DESYREL) 100 MG tablet Take 1 tablet (100 mg total) by mouth every evening. (Patient taking differently: Take 50 mg by mouth every evening. )     Family History    None       Tobacco Use    Smoking status: Every Day     Types: Cigarettes    Smokeless tobacco: Never   Substance and Sexual Activity    Alcohol use: Yes    Drug use: Never    Sexual activity: Not on file     Review of Systems   Constitutional:  Positive for fever. Negative for chills, fatigue and unexpected weight change.   HENT:  Negative for congestion, mouth sores and sore throat.    Eyes:  Negative for photophobia and visual disturbance.   Respiratory:  Positive for cough, chest tightness, shortness of breath and wheezing.    Cardiovascular:  Positive for palpitations. Negative for chest pain and leg swelling.   Gastrointestinal:  Positive for constipation. Negative for abdominal pain, diarrhea,  nausea and vomiting.   Endocrine: Negative for cold intolerance and heat intolerance.   Genitourinary:  Negative for difficulty urinating, dysuria, frequency and urgency.   Musculoskeletal:  Positive for back pain. Negative for arthralgias and myalgias.   Skin:  Negative for pallor and rash.   Neurological:  Positive for weakness. Negative for tremors, seizures, syncope, numbness and headaches.   Hematological:  Does not bruise/bleed easily.   Psychiatric/Behavioral:  Negative for agitation, confusion, hallucinations and suicidal ideas.      Objective:     Vital Signs (Most Recent):  Temp: 98.6 °F (37 °C) (11/25/23 1218)  Pulse: 102 (11/25/23 1616)  Resp: 15 (11/25/23 1616)  BP: (!) 174/112 (11/25/23 1616)  SpO2: (!) 92 % (11/25/23 1616) Vital Signs (24h Range):  Temp:  [98.6 °F (37 °C)] 98.6 °F (37 °C)  Pulse:  [100-116] 102  Resp:  [11-20] 15  SpO2:  [92 %-96 %] 92 %  BP: (165-174)/(101-112) 174/112     Weight: 44.5 kg (98 lb)  Body mass index is 17.36 kg/m².     Physical Exam  Vitals reviewed.   Constitutional:       Appearance: Normal appearance.   HENT:      Head: Normocephalic and atraumatic.      Nose: Nose normal.   Eyes:      Extraocular Movements: Extraocular movements intact.      Conjunctiva/sclera: Conjunctivae normal.   Neck:      Trachea: Trachea normal.   Cardiovascular:      Rate and Rhythm: Regular rhythm. Tachycardia present.      Pulses: Normal pulses.      Heart sounds: Normal heart sounds.   Pulmonary:      Effort: Pulmonary effort is normal.      Breath sounds: Normal air entry. Wheezing present.   Abdominal:      General: Bowel sounds are normal.      Palpations: Abdomen is soft.   Musculoskeletal:         General: Tenderness (chest wall tenderness) present. Normal range of motion.      Right lower leg: Edema present.      Left lower leg: Edema present.   Skin:     General: Skin is warm and dry.      Findings: Lesion present.   Neurological:      General: No focal deficit present.      Mental  Status: She is alert.      Cranial Nerves: Cranial nerves 2-12 are intact.      Comments: Grossly normal motor and sensory function without focal deficit appreciated.   Psychiatric:         Mood and Affect: Mood and affect normal.         Behavior: Behavior is cooperative.                Significant Labs: All pertinent labs within the past 24 hours have been reviewed.    Significant Imaging: I have reviewed all pertinent imaging results/findings within the past 24 hours.  Assessment/Plan:     * NSTEMI (non-ST elevated myocardial infarction)  LEN 3  Dapt load then daily  Lovenox 1mg/kg BID  BB  Hold statin for hepatitis and rhabdo  Echo  Nitrates  Morphine  Cardiology consult      Rhabdomyolysis  IVF  Renal function ok  Daily cr      Hepatitis  US normal  Hep panel pending  May be due to rhabdo      SIRS (systemic inflammatory response syndrome)  2/2 rhabo v NSTEMI      SOB (shortness of breath)  Chronic, worsening may be due to NSTEMI  See above  Continue maintenance inhalers      Fall  PT/OT      Gastroesophageal reflux disease  PPI      Insomnia  trazadone      DONALD (generalized anxiety disorder)  Continue home medications      Chronic obstructive pulmonary disease  Patient's COPD is controlled currently.  Patient is currently off COPD Pathway. Continue scheduled inhalers Steroids and Supplemental oxygen and monitor respiratory status closely.       VTE Risk Mitigation (From admission, onward)           Ordered     enoxaparin injection 40 mg  Every 12 hours         11/25/23 1636     IP VTE LOW RISK PATIENT  Once         11/25/23 1525     Place sequential compression device  Until discontinued         11/25/23 1525                                    Joe Guillen DO  Department of Hospital Medicine  [facility] - Emergency Department

## 2023-11-25 NOTE — HPI
"63yowf with pmh of COPD on home oxygen on oral steroids, anxiety, pathologic fracture, tobacco use, GERD who presents after fall.  "I took my night time medicine and the next thing I remember I was on the floor".  Takes klonopin, tizanidine and trazadone at home.  Denies any narcotic use.  She does not know how long she was down.  She reports low back pain since her fall along with substernal chest tightness.  Pain rated as 5/10, no alleviating factors, worse with coughing. Denies radiation, has been constant since fall.  Reports palpitations for some time.  Is chronically sob but believes this is worse recently. Denies any increased sputum production or purulence.  +subjective fevers, no chills. No nvd.  Constipation for 6 days. ROS otherwise negative.   "

## 2023-11-25 NOTE — ED PROVIDER NOTES
Encounter Date: 11/25/2023    SCRIBE #1 NOTE: I, Nara Cooney, am scribing for, and in the presence of,  Anoop Hamilton MD. I have scribed the entire note.       History     Chief Complaint   Patient presents with    Fall    Chest Pain     63 y.o. female presents to the ED via EMS with c/o fall, SOB. EMS states they found a puddle of blood on the floor when they arrived. They noticed bruising across the patient's abdomen. No other symptoms were reported.     The history is provided by the patient and the EMS personnel. No  was used.     Review of patient's allergies indicates:   Allergen Reactions    Ondansetron hcl Swelling     Edema of tongue per patient    Celexa [citalopram]     Darvon [propoxyphene]     Flexeril [cyclobenzaprine]     Hydroxyzine     Percocet [oxycodone-acetaminophen]     Robaxin [methocarbamol]     Thorazine [chlorpromazine]     Toradol [ketorolac] Itching     Past Medical History:   Diagnosis Date    COPD (chronic obstructive pulmonary disease)     DONALD (generalized anxiety disorder) 03/24/2021    GERD with esophagitis     Iron deficiency anemia due to chronic blood loss     Supplemental oxygen dependent 11/28/2023     Past Surgical History:   Procedure Laterality Date    ABDOMINAL SURGERY      LEFT HEART CATHETERIZATION N/A 11/27/2023    Procedure: Left heart cath;  Surgeon: Ulises Crespo DO;  Location: [facility] CATH LAB;  Service: Cardiology;  Laterality: N/A;     History reviewed. No pertinent family history.  Social History     Tobacco Use    Smoking status: Every Day     Types: Cigarettes    Smokeless tobacco: Never   Substance Use Topics    Alcohol use: Yes    Drug use: Never     Review of Systems   Constitutional: Negative.    HENT: Negative.     Eyes: Negative.    Respiratory:  Positive for shortness of breath.    Cardiovascular: Negative.    Gastrointestinal: Negative.    Endocrine: Negative.    Genitourinary: Negative.    Musculoskeletal: Negative.     Skin: Negative.    Allergic/Immunologic: Negative.    Neurological: Negative.    Hematological: Negative.    Psychiatric/Behavioral: Negative.     All other systems reviewed and are negative.      Physical Exam     Initial Vitals [11/25/23 1218]   BP Pulse Resp Temp SpO2   (!) 165/111 (!) 116 20 98.6 °F (37 °C) 96 %      MAP       --         Physical Exam    Vitals reviewed.  Constitutional: She appears well-developed and well-nourished. No distress.   HENT:   Head: Normocephalic.   Decreased hearing to conversational speech. Decreased speech.    Eyes: Conjunctivae are normal. Pupils are equal, round, and reactive to light.   Cardiovascular:  Normal rate, regular rhythm, normal heart sounds and intact distal pulses.           Pulmonary/Chest: Breath sounds normal.   Appears dyspneic. Subxiphoid hematoma bruising.    Abdominal: Abdomen is soft. Bowel sounds are normal.     Neurological: She is alert and oriented to person, place, and time.   Skin: Skin is warm and dry.   Psychiatric: She has a normal mood and affect.         ED Course   Procedures  Labs Reviewed   COMPREHENSIVE METABOLIC PANEL - Abnormal; Notable for the following components:       Result Value    Sodium 133 (*)     Glucose 134 (*)     BUN 20 (*)     Creatinine 1.09 (*)     Calcium 8.2 (*)     Total Protein 6.0 (*)     Albumin 2.7 (*)      (*)      (*)     eGFR 58 (*)     All other components within normal limits   NT-PRO NATRIURETIC PEPTIDE - Abnormal; Notable for the following components:    ProBNP 6,196 (*)     All other components within normal limits   TROPONIN I - Abnormal; Notable for the following components:    Troponin I High Sensitivity 1,478.1 (*)     All other components within normal limits   PROTIME-INR - Abnormal; Notable for the following components:    PT 15.3 (*)     All other components within normal limits   URINALYSIS, REFLEX TO URINE CULTURE - Abnormal; Notable for the following components:    Protein, UA 20 (*)      Blood, UA Large (*)     All other components within normal limits   CBC WITH DIFFERENTIAL - Abnormal; Notable for the following components:    WBC 13.72 (*)     RBC 3.91 (*)     Hematocrit 35.7 (*)     MCH 31.2 (*)     RDW 15.2 (*)     Neutrophils % 89.0 (*)     Lymphocytes % 2.0 (*)     Monocytes % 7.9 (*)     Eosinophils % 0.1 (*)     Immature Granulocytes % 0.7 (*)     Neutrophils, Abs 12.22 (*)     Lymphocytes, Absolute 0.27 (*)     Monocytes, Absolute 1.09 (*)     Immature Granulocytes, Absolute 0.09 (*)     All other components within normal limits   CK - Abnormal; Notable for the following components:    CK 10,451 (*)     All other components within normal limits   TROPONIN I - Abnormal; Notable for the following components:    Troponin I High Sensitivity 1,277.3 (*)     All other components within normal limits   TROPONIN I - Abnormal; Notable for the following components:    Troponin I High Sensitivity 851.7 (*)     All other components within normal limits   BASIC METABOLIC PANEL - Abnormal; Notable for the following components:    Anion Gap 5 (*)     Glucose 113 (*)     Calcium 8.0 (*)     All other components within normal limits   CK - Abnormal; Notable for the following components:    CK 6,290 (*)     All other components within normal limits   CBC WITH DIFFERENTIAL - Abnormal; Notable for the following components:    RBC 3.28 (*)     Hemoglobin 10.2 (*)     Hematocrit 31.5 (*)     MCH 31.1 (*)     RDW 15.6 (*)     Neutrophils % 78.0 (*)     Lymphocytes % 9.0 (*)     Monocytes % 12.2 (*)     Eosinophils % 0.1 (*)     Immature Granulocytes % 0.5 (*)     Lymphocytes, Absolute 0.73 (*)     Monocytes, Absolute 0.99 (*)     All other components within normal limits   HEPATIC FUNCTION PANEL - Abnormal; Notable for the following components:    Total Protein 4.9 (*)     Albumin 2.3 (*)      (*)     ALT 89 (*)     All other components within normal limits   D DIMER, QUANTITATIVE - Abnormal; Notable  for the following components:    D-Dimer 2.80 (*)     All other components within normal limits   LIPASE - Normal   MAGNESIUM - Normal   APTT - Normal   URINALYSIS, MICROSCOPIC - Normal   TSH - Normal   SARS-COV-2 RNA AMPLIFICATION, QUAL - Normal    Narrative:     Negative SARS-CoV results should not be used as the sole basis for treatment or patient management decisions; negative results should be considered in the context of a patient's recent exposures, history and the presene of clinical signs and symptoms consistent with COVID-19.  Negative results should be treated as presumptive and confirmed by molecular assay, if necessary for patient management.   HEPATITIS PANEL, ACUTE - Normal   MAGNESIUM - Normal   PHOSPHORUS - Normal   CBC W/ AUTO DIFFERENTIAL    Narrative:     The following orders were created for panel order CBC auto differential.  Procedure                               Abnormality         Status                     ---------                               -----------         ------                     CBC with Differential[[id number]]        Abnormal            Final result                 Please view results for these tests on the individual orders.   CBC W/ AUTO DIFFERENTIAL    Narrative:     The following orders were created for panel order CBC with Automated Differential.  Procedure                               Abnormality         Status                     ---------                               -----------         ------                     CBC with Differential[[id number]]       Abnormal            Final result                 Please view results for these tests on the individual orders.        ECG Results              EKG 12-lead (Final result)  Result time 12/08/23 14:49:33      Final result by Interface, Lab In [facility] (12/08/23 14:49:33)                   Narrative:    Test Reason : I21.4,    Vent. Rate : 102 BPM     Atrial Rate : 000 BPM     P-R Int : 138 ms          QRS Dur : 070 ms       QT Int : 324 ms       P-R-T Axes : 076 076 080 degrees     QTc Int : 398 ms    Sinus tachycardia  Q in V1/V2 may be due to lead placement error though septal infarct cannot  be excluded  Borderline ECG    Confirmed by Lucio Farris MD ([id number]) on 12/8/2023 2:49:26 PM    Referred By: NALINI   SELF           Confirmed By:Lucio Farris MD                                     EKG 12-lead (Shortness of Breath) Age > 50 (Final result)  Result time 12/05/23 22:28:36      Final result by Rola, Lab In [facility] (12/05/23 22:28:36)                   Narrative:    Test Reason : R06.02,    Vent. Rate : 112 BPM     Atrial Rate : 000 BPM     P-R Int : 134 ms          QRS Dur : 074 ms      QT Int : 302 ms       P-R-T Axes : 059 074 076 degrees     QTc Int : 393 ms    Sinus tachycardia  Q in V1/V2 may be due to lead placement error though septal infarct cannot  be excluded  Lateral T wave abnormality is nonspecific  Borderline ECG    Confirmed by Dc Cannon MD ([id number]) on 12/5/2023 10:28:31 PM    Referred By: NALINI   SELF           Confirmed By:Dc Cannon MD                                  Imaging Results              CTA Chest Non-Coronary (PE Studies) (Final result)  Result time 11/26/23 15:29:30      Final result by Luan Corley DO (11/26/23 15:29:30)                   Impression:      No convincing segmental or larger pulmonary embolism. Mild to moderate atelectasis/infiltrate of the dorsal bilateral lower lobes. Small bilateral pleural effusions. Emphysematous change of the lungs present.  Age indeterminate but likely chronic moderate compression deformity of L1.  Other/detailed findings as above.    The CT exam was performed using one or more of the following dose    reduction techniques- Automated exposure control, adjustment of the mA    and/or kV according to patient size, and/or use of iterative    reconstructed technique.    Point of Service: [facility]      Electronically signed by: Luan  Jory  Date:    11/26/2023  Time:    15:29               Narrative:    EXAMINATION:  CTA CHEST NON CORONARY (PE STUDIES)    CLINICAL HISTORY:  Pulmonary embolism (PE) suspected, unknown D-dimer;    COMPARISON:  None    TECHNIQUE:  Multiple axial tomographic images of the chest were obtained after the administration of 100 cc Isovue 370 intravenous contrast. PE protocol was followed. Coronal and sagittal maximum intensity projection images provided.    FINDINGS:  Heart size appears within normal limits.  Atherosclerotic calcifications of the coronary arteries and great vessels noted.  Aberrant right subclavian artery.  No convincing segmental or larger pulmonary embolism.  Mild to moderate atelectasis/infiltrate of the dorsal bilateral lower lobes.  Small bilateral pleural effusions.  Emphysematous change of the lungs present.  Scarring of the bilateral lung apices.  Visualized upper abdomen demonstrates no acute abnormality.  Age indeterminate but likely chronic moderate compression deformity of L1.Scattered skeletal degenerative change.  Old lateral left rib 10 fracture deformity.                                       CT Head Without Contrast (Final result)  Result time 11/25/23 17:15:54      Final result by Arya Morales MD (11/25/23 17:15:54)                   Impression:      No acute intracranial abnormality.      Electronically signed by: Arya Morales  Date:    11/25/2023  Time:    17:15               Narrative:    EXAMINATION:  CT HEAD WITHOUT CONTRAST    CLINICAL HISTORY:  Head trauma, moderate-severe;    TECHNIQUE:  CT of the head performed without the use of intravenous contrast.  The CT examination was performed using one or more of the following dose reduction techniques: Automated exposure control, adjustment of the mA and kV according to patient's size, use of acute or iterative reconstruction techniques.    COMPARISON:  04/27/2022    FINDINGS:  No acute intracranial hemorrhage.  No acute large vessel  infarct.  Mild chronic microvascular ischemic changes are again seen.  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  No midline shift or herniation.  Vascular calcifications.  Calvarium intact.  Mucosal thickening of the maxillary sinuses with air-fluid levels noted.  Degenerative changes of the temporomandibular joint similar..                                       X-Ray Lumbar Spine Ap And Lateral (Final result)  Result time 11/25/23 16:57:21      Final result by Arya Morales MD (11/25/23 16:57:21)                   Impression:      Multiple compression deformities in the lumbar spine as detailed new since 04/27/2022.  Correlate with patient symptoms.  CT could be of value.      Electronically signed by: Arya Morales  Date:    11/25/2023  Time:    16:57               Narrative:    EXAMINATION:  XR LUMBAR SPINE AP AND LATERAL    CLINICAL HISTORY:  history of pathologic fracture, fall. Concern for fracture;    TECHNIQUE:  AP, lateral and spot images were performed of the lumbar spine.    COMPARISON:  CT of the lumbar spine 04/27/2022    FINDINGS:  There are age indeterminate superior endplate compression deformities of L1 with minimal height loss, L3 with about 30% superior endplate height loss, L4 10-15% height loss, and L5 10% height loss.  No distinct acute fracture line is confidently seen by this radiograph.  Degenerative endplate and facet changes throughout.  Osteopenia.                                       US Abdomen Limited (Final result)  Result time 11/25/23 16:24:05      Final result by Arya Morales MD (11/25/23 16:24:05)                   Impression:      No significant sonographic abnormality.      Electronically signed by: Arya Morales  Date:    11/25/2023  Time:    16:24               Narrative:    EXAMINATION:  US ABDOMEN LIMITED    CLINICAL HISTORY:  Elevated AST/ALT;    TECHNIQUE:  Limited ultrasound of the right upper quadrant of the abdomen  was performed.  Ultrasound images captured  and stored.    COMPARISON:  None.    FINDINGS:  Liver: Normal in size, measuring 12.1 cm. Homogeneous echotexture. No focal hepatic lesions.    Gallbladder: No calculi, wall thickening, or pericholecystic fluid.  No sonographic Tran's sign.    Biliary system: The common duct is not dilated, measuring 6 mm.  No intrahepatic ductal dilatation.    Right kidney: Normal in size and echotexture, measuring 8 cm.    Miscellaneous: No upper abdominal ascites.  Pancreas obscured by bowel gas.  Hepatic and portal veins patent with normal direction of flow.                                       X-Ray Chest AP Portable (Final result)  Result time 11/25/23 12:35:19      Final result by Arya Morales MD (11/25/23 12:35:19)                   Impression:      No acute findings.      Electronically signed by: Arya Morales  Date:    11/25/2023  Time:    12:35               Narrative:    EXAMINATION:  XR CHEST AP PORTABLE    CLINICAL HISTORY:  Unspecified fall, initial encounter    TECHNIQUE:  Single frontal view of the chest was performed.    COMPARISON:  03/26/2021    FINDINGS:  Heart size normal.  Chronic interstitial markings the lungs.  No pneumothorax.  No pleural effusion.                                       X-Ray Pelvis Routine AP (Final result)  Result time 11/25/23 12:31:29      Final result by Arya Morales MD (11/25/23 12:31:29)                   Impression:      No acute findings      Electronically signed by: Arya Morales  Date:    11/25/2023  Time:    12:31               Narrative:    EXAMINATION:  XR PELVIS ROUTINE AP    CLINICAL HISTORY:  FALL;    TECHNIQUE:  AP view of the pelvis was performed.    COMPARISON:  None.    FINDINGS:  No fracture detected.  Degenerative changes seen of the right hip.  Previous left hip prosthetic.                                       Medications   0.9%  NaCl infusion (0 mL/hr Intravenous Stopped 11/26/23 2045)   0.45% NaCl infusion ( Intravenous Not Given 11/27/23 1600)   clopidogreL  tablet 600 mg (600 mg Oral Given 11/25/23 1724)   aspirin tablet 325 mg (325 mg Oral Given 11/25/23 1725)   sodium chloride 0.9% bolus 1,000 mL 1,000 mL (0 mLs Intravenous Stopped 11/25/23 1900)   iopamidoL (ISOVUE-370) injection 100 mL (100 mLs Intravenous Given 11/26/23 1510)   potassium chloride SA CR tablet 40 mEq (40 mEq Oral Given 11/28/23 0919)     Medical Decision Making  DYSPNEA, FALL, POSSIBLE GI BLEEDING    DDX:  METABOLIC VS INFECTIOUS VS OTHER    ADMIT    Amount and/or Complexity of Data Reviewed  Labs: ordered. Decision-making details documented in ED Course.  Radiology: ordered. Decision-making details documented in ED Course.  Discussion of management or test interpretation with external provider(s): DISCUSSED WITH ADMITTING SERVICE    Risk  Decision regarding hospitalization.              Attending Attestation:           Physician Attestation for Scribe:  Physician Attestation Statement for Scribe #1: I, Anoop Hamilton MD, reviewed documentation, as scribed by Nara Cooney in my presence, and it is both accurate and complete.                                 Clinical Impression:  Final diagnoses:  [W19.XXXA] Fall  [R06.02] SOB (shortness of breath)          ED Disposition Condition    Admit                 Anoop Hamilton MD  12/29/23 [id number]

## 2023-11-25 NOTE — ASSESSMENT & PLAN NOTE
Patient's COPD is controlled currently.  Patient is currently off COPD Pathway. Continue scheduled inhalers Steroids and Supplemental oxygen and monitor respiratory status closely.

## 2023-11-26 PROBLEM — N17.9 ARF (ACUTE RENAL FAILURE): Status: ACTIVE | Noted: 2023-11-26

## 2023-11-26 LAB
ALBUMIN SERPL BCP-MCNC: 2.3 G/DL (ref 3.5–5)
ALP SERPL-CCNC: 80 U/L (ref 50–130)
ALT SERPL W P-5'-P-CCNC: 89 U/L (ref 13–56)
ANION GAP SERPL CALCULATED.3IONS-SCNC: 5 MMOL/L (ref 7–16)
AORTIC ROOT ANNULUS: 2.95 CM
AORTIC VALVE CUSP SEPERATION: 2 CM
AST SERPL W P-5'-P-CCNC: 309 U/L (ref 15–37)
AV INDEX (PROSTH): 0.92
AV MEAN GRADIENT: 5 MMHG
AV PEAK GRADIENT: 9 MMHG
AV VALVE AREA BY VELOCITY RATIO: 3.02 CM²
AV VALVE AREA: 2.98 CM²
AV VELOCITY RATIO: 0.93
BASOPHILS # BLD AUTO: 0.02 K/UL (ref 0–0.2)
BASOPHILS NFR BLD AUTO: 0.2 % (ref 0–1)
BILIRUB DIRECT SERPL-MCNC: 0.1 MG/DL (ref 0–0.2)
BILIRUB SERPL-MCNC: 0.3 MG/DL (ref ?–1.2)
BSA FOR ECHO PROCEDURE: 1.41 M2
BUN SERPL-MCNC: 12 MG/DL (ref 7–18)
BUN/CREAT SERPL: 18 (ref 6–20)
CALCIUM SERPL-MCNC: 8 MG/DL (ref 8.5–10.1)
CHLORIDE SERPL-SCNC: 106 MMOL/L (ref 98–107)
CK SERPL-CCNC: 6290 U/L (ref 26–192)
CO2 SERPL-SCNC: 31 MMOL/L (ref 21–32)
CREAT SERPL-MCNC: 0.65 MG/DL (ref 0.55–1.02)
CV ECHO LV RWT: 0.59 CM
D DIMER PPP FEU-MCNC: 2.8 ΜG/ML (ref 0–0.47)
DIFFERENTIAL METHOD BLD: ABNORMAL
DOP CALC AO PEAK VEL: 1.49 M/S
DOP CALC AO VTI: 26.7 CM
DOP CALC LVOT AREA: 3.2 CM2
DOP CALC LVOT DIAMETER: 2.03 CM
DOP CALC LVOT PEAK VEL: 1.39 M/S
DOP CALC LVOT STROKE VOLUME: 79.58 CM3
DOP CALCLVOT PEAK VEL VTI: 24.6 CM
E WAVE DECELERATION TIME: 137.14 MSEC
E/A RATIO: 0.84
E/E' RATIO: 11.37 M/S
ECHO LV POSTERIOR WALL: 1 CM (ref 0.6–1.1)
EGFR (NO RACE VARIABLE) (RUSH/TITUS): 100 ML/MIN/1.73M2
EOSINOPHIL # BLD AUTO: 0.01 K/UL (ref 0–0.5)
EOSINOPHIL NFR BLD AUTO: 0.1 % (ref 1–4)
ERYTHROCYTE [DISTWIDTH] IN BLOOD BY AUTOMATED COUNT: 15.6 % (ref 11.5–14.5)
FRACTIONAL SHORTENING: 24 % (ref 28–44)
GLUCOSE SERPL-MCNC: 113 MG/DL (ref 74–106)
HCT VFR BLD AUTO: 31.5 % (ref 38–47)
HGB BLD-MCNC: 10.2 G/DL (ref 12–16)
IMM GRANULOCYTES # BLD AUTO: 0.04 K/UL (ref 0–0.04)
IMM GRANULOCYTES NFR BLD: 0.5 % (ref 0–0.4)
INTERVENTRICULAR SEPTUM: 1.41 CM (ref 0.6–1.1)
IVC DIAMETER: 1.42 CM
LEFT ATRIUM VOLUME INDEX MOD: 27.1 ML/M2
LEFT ATRIUM VOLUME MOD: 38.69 CM3
LEFT INTERNAL DIMENSION IN SYSTOLE: 2.59 CM (ref 2.1–4)
LEFT VENTRICLE DIASTOLIC VOLUME INDEX: 32.99 ML/M2
LEFT VENTRICLE DIASTOLIC VOLUME: 47.17 ML
LEFT VENTRICLE MASS INDEX: 91 G/M2
LEFT VENTRICLE SYSTOLIC VOLUME INDEX: 17 ML/M2
LEFT VENTRICLE SYSTOLIC VOLUME: 24.26 ML
LEFT VENTRICULAR INTERNAL DIMENSION IN DIASTOLE: 3.39 CM (ref 3.5–6)
LEFT VENTRICULAR MASS: 130.52 G
LV LATERAL E/E' RATIO: 9.82 M/S
LV SEPTAL E/E' RATIO: 13.5 M/S
LVOT MG: 3.49 MMHG
LVOT MV: 0.86 CM/S
LYMPHOCYTES # BLD AUTO: 0.73 K/UL (ref 1–4.8)
LYMPHOCYTES NFR BLD AUTO: 9 % (ref 27–41)
MAGNESIUM SERPL-MCNC: 2 MG/DL (ref 1.7–2.3)
MCH RBC QN AUTO: 31.1 PG (ref 27–31)
MCHC RBC AUTO-ENTMCNC: 32.4 G/DL (ref 32–36)
MCV RBC AUTO: 96 FL (ref 80–96)
MONOCYTES # BLD AUTO: 0.99 K/UL (ref 0–0.8)
MONOCYTES NFR BLD AUTO: 12.2 % (ref 2–6)
MPC BLD CALC-MCNC: 10.4 FL (ref 9.4–12.4)
MV PEAK A VEL: 1.29 M/S
MV PEAK E VEL: 1.08 M/S
MV STENOSIS PRESSURE HALF TIME: 39.77 MS
MV VALVE AREA P 1/2 METHOD: 5.53 CM2
NEUTROPHILS # BLD AUTO: 6.33 K/UL (ref 1.8–7.7)
NEUTROPHILS NFR BLD AUTO: 78 % (ref 53–65)
NRBC # BLD AUTO: 0 X10E3/UL
NRBC, AUTO (.00): 0 %
OHS LV EJECTION FRACTION SIMPSONS BIPLANE MOD: 60 %
PHOSPHATE SERPL-MCNC: 2.7 MG/DL (ref 2.5–4.5)
PISA MRMAX VEL: 3.92 M/S
PISA TR MAX VEL: 2.77 M/S
PLATELET # BLD AUTO: 172 K/UL (ref 150–400)
POTASSIUM SERPL-SCNC: 4 MMOL/L (ref 3.5–5.1)
PROT SERPL-MCNC: 4.9 G/DL (ref 6.4–8.2)
PV PEAK GRADIENT: 4 MMHG
PV PEAK VELOCITY: 0.94 M/S
RA PRESSURE ESTIMATED: 3 MMHG
RA VOL SYS: 23.07 ML
RBC # BLD AUTO: 3.28 M/UL (ref 4.2–5.4)
RIGHT ATRIAL AREA: 10.5 CM2
RIGHT VENTRICULAR LENGTH IN DIASTOLE (APICAL 4-CHAMBER VIEW): 5.75 CM
RV MID DIAMA: 2.52 CM
RV TB RVSP: 6 MMHG
SODIUM SERPL-SCNC: 138 MMOL/L (ref 136–145)
TDI LATERAL: 0.11 M/S
TDI SEPTAL: 0.08 M/S
TDI: 0.1 M/S
TR MAX PG: 31 MMHG
TRICUSPID ANNULAR PLANE SYSTOLIC EXCURSION: 2.68 CM
TV REST PULMONARY ARTERY PRESSURE: 34 MMHG
WBC # BLD AUTO: 8.12 K/UL (ref 4.5–11)
Z-SCORE OF LEFT VENTRICULAR DIMENSION IN END DIASTOLE: -2.54
Z-SCORE OF LEFT VENTRICULAR DIMENSION IN END SYSTOLE: -0.37

## 2023-11-26 PROCEDURE — 99232 PR SUBSEQUENT HOSPITAL CARE,LEVL II: ICD-10-PCS | Mod: GW,HPC,, | Performed by: STUDENT IN AN ORGANIZED HEALTH CARE EDUCATION/TRAINING PROGRAM

## 2023-11-26 PROCEDURE — 83735 ASSAY OF MAGNESIUM: CPT | Performed by: STUDENT IN AN ORGANIZED HEALTH CARE EDUCATION/TRAINING PROGRAM

## 2023-11-26 PROCEDURE — 99900035 HC TECH TIME PER 15 MIN (STAT)

## 2023-11-26 PROCEDURE — 25000242 PHARM REV CODE 250 ALT 637 W/ HCPCS: Performed by: STUDENT IN AN ORGANIZED HEALTH CARE EDUCATION/TRAINING PROGRAM

## 2023-11-26 PROCEDURE — 63600175 PHARM REV CODE 636 W HCPCS: Performed by: STUDENT IN AN ORGANIZED HEALTH CARE EDUCATION/TRAINING PROGRAM

## 2023-11-26 PROCEDURE — 63600175 PHARM REV CODE 636 W HCPCS: Mod: JZ | Performed by: STUDENT IN AN ORGANIZED HEALTH CARE EDUCATION/TRAINING PROGRAM

## 2023-11-26 PROCEDURE — 85025 COMPLETE CBC W/AUTO DIFF WBC: CPT | Performed by: STUDENT IN AN ORGANIZED HEALTH CARE EDUCATION/TRAINING PROGRAM

## 2023-11-26 PROCEDURE — 94640 AIRWAY INHALATION TREATMENT: CPT

## 2023-11-26 PROCEDURE — 84100 ASSAY OF PHOSPHORUS: CPT | Performed by: STUDENT IN AN ORGANIZED HEALTH CARE EDUCATION/TRAINING PROGRAM

## 2023-11-26 PROCEDURE — 25000003 PHARM REV CODE 250: Performed by: STUDENT IN AN ORGANIZED HEALTH CARE EDUCATION/TRAINING PROGRAM

## 2023-11-26 PROCEDURE — 11000001 HC ACUTE MED/SURG PRIVATE ROOM

## 2023-11-26 PROCEDURE — 85379 FIBRIN DEGRADATION QUANT: CPT | Performed by: STUDENT IN AN ORGANIZED HEALTH CARE EDUCATION/TRAINING PROGRAM

## 2023-11-26 PROCEDURE — 27000221 HC OXYGEN, UP TO 24 HOURS

## 2023-11-26 PROCEDURE — 25500020 PHARM REV CODE 255: Performed by: STUDENT IN AN ORGANIZED HEALTH CARE EDUCATION/TRAINING PROGRAM

## 2023-11-26 PROCEDURE — 94761 N-INVAS EAR/PLS OXIMETRY MLT: CPT

## 2023-11-26 PROCEDURE — 99223 1ST HOSP IP/OBS HIGH 75: CPT | Mod: GW,HPC,, | Performed by: STUDENT IN AN ORGANIZED HEALTH CARE EDUCATION/TRAINING PROGRAM

## 2023-11-26 PROCEDURE — 82550 ASSAY OF CK (CPK): CPT | Performed by: STUDENT IN AN ORGANIZED HEALTH CARE EDUCATION/TRAINING PROGRAM

## 2023-11-26 PROCEDURE — 99232 SBSQ HOSP IP/OBS MODERATE 35: CPT | Mod: GW,HPC,, | Performed by: STUDENT IN AN ORGANIZED HEALTH CARE EDUCATION/TRAINING PROGRAM

## 2023-11-26 PROCEDURE — 80076 HEPATIC FUNCTION PANEL: CPT | Performed by: STUDENT IN AN ORGANIZED HEALTH CARE EDUCATION/TRAINING PROGRAM

## 2023-11-26 PROCEDURE — 99223 PR INITIAL HOSPITAL CARE,LEVL III: ICD-10-PCS | Mod: GW,HPC,, | Performed by: STUDENT IN AN ORGANIZED HEALTH CARE EDUCATION/TRAINING PROGRAM

## 2023-11-26 PROCEDURE — 80048 BASIC METABOLIC PNL TOTAL CA: CPT | Performed by: STUDENT IN AN ORGANIZED HEALTH CARE EDUCATION/TRAINING PROGRAM

## 2023-11-26 RX ORDER — OXYCODONE AND ACETAMINOPHEN 10; 325 MG/1; MG/1
1 TABLET ORAL EVERY 8 HOURS PRN
Status: DISCONTINUED | OUTPATIENT
Start: 2023-11-26 | End: 2023-11-27

## 2023-11-26 RX ORDER — SODIUM CHLORIDE 0.9 % (FLUSH) 0.9 %
10 SYRINGE (ML) INJECTION
Status: DISCONTINUED | OUTPATIENT
Start: 2023-11-27 | End: 2023-11-28 | Stop reason: HOSPADM

## 2023-11-26 RX ADMIN — MORPHINE SULFATE 4 MG: 4 INJECTION, SOLUTION INTRAMUSCULAR; INTRAVENOUS at 12:11

## 2023-11-26 RX ADMIN — ESCITALOPRAM OXALATE 10 MG: 10 TABLET ORAL at 09:11

## 2023-11-26 RX ADMIN — METOPROLOL TARTRATE 25 MG: 25 TABLET, FILM COATED ORAL at 08:11

## 2023-11-26 RX ADMIN — PANTOPRAZOLE SODIUM 40 MG: 40 TABLET, DELAYED RELEASE ORAL at 09:11

## 2023-11-26 RX ADMIN — LEVALBUTEROL HYDROCHLORIDE 1.25 MG: 1.25 SOLUTION RESPIRATORY (INHALATION) at 05:11

## 2023-11-26 RX ADMIN — ENOXAPARIN SODIUM 40 MG: 100 INJECTION SUBCUTANEOUS at 04:11

## 2023-11-26 RX ADMIN — LEVALBUTEROL HYDROCHLORIDE 1.25 MG: 1.25 SOLUTION RESPIRATORY (INHALATION) at 11:11

## 2023-11-26 RX ADMIN — ENOXAPARIN SODIUM 40 MG: 100 INJECTION SUBCUTANEOUS at 05:11

## 2023-11-26 RX ADMIN — IOPAMIDOL 100 ML: 755 INJECTION, SOLUTION INTRAVENOUS at 03:11

## 2023-11-26 RX ADMIN — CLOPIDOGREL BISULFATE 75 MG: 75 TABLET ORAL at 09:11

## 2023-11-26 RX ADMIN — GABAPENTIN 100 MG: 100 CAPSULE ORAL at 08:11

## 2023-11-26 RX ADMIN — SODIUM CHLORIDE: 9 INJECTION, SOLUTION INTRAVENOUS at 04:11

## 2023-11-26 RX ADMIN — METOPROLOL TARTRATE 25 MG: 25 TABLET, FILM COATED ORAL at 09:11

## 2023-11-26 RX ADMIN — IPRATROPIUM BROMIDE 0.5 MG: 0.5 SOLUTION RESPIRATORY (INHALATION) at 01:11

## 2023-11-26 RX ADMIN — MORPHINE SULFATE 4 MG: 4 INJECTION, SOLUTION INTRAMUSCULAR; INTRAVENOUS at 04:11

## 2023-11-26 RX ADMIN — PREDNISONE 10 MG: 10 TABLET ORAL at 09:11

## 2023-11-26 RX ADMIN — ASPIRIN 81 MG CHEWABLE TABLET 81 MG: 81 TABLET CHEWABLE at 09:11

## 2023-11-26 RX ADMIN — MORPHINE SULFATE 4 MG: 4 INJECTION, SOLUTION INTRAMUSCULAR; INTRAVENOUS at 08:11

## 2023-11-26 RX ADMIN — LEVALBUTEROL HYDROCHLORIDE 1.25 MG: 1.25 SOLUTION RESPIRATORY (INHALATION) at 08:11

## 2023-11-26 RX ADMIN — IPRATROPIUM BROMIDE 0.5 MG: 0.5 SOLUTION RESPIRATORY (INHALATION) at 07:11

## 2023-11-26 RX ADMIN — TRAZODONE HYDROCHLORIDE 50 MG: 50 TABLET ORAL at 12:11

## 2023-11-26 RX ADMIN — BUDESONIDE 0.5 MG: 0.25 SUSPENSION RESPIRATORY (INHALATION) at 07:11

## 2023-11-26 RX ADMIN — BUDESONIDE 0.5 MG: 0.25 SUSPENSION RESPIRATORY (INHALATION) at 08:11

## 2023-11-26 RX ADMIN — IPRATROPIUM BROMIDE 0.5 MG: 0.5 SOLUTION RESPIRATORY (INHALATION) at 08:11

## 2023-11-26 RX ADMIN — SODIUM CHLORIDE: 9 INJECTION, SOLUTION INTRAVENOUS at 01:11

## 2023-11-26 RX ADMIN — MORPHINE SULFATE 4 MG: 4 INJECTION, SOLUTION INTRAMUSCULAR; INTRAVENOUS at 10:11

## 2023-11-26 RX ADMIN — IPRATROPIUM BROMIDE 0.5 MG: 0.5 SOLUTION RESPIRATORY (INHALATION) at 12:11

## 2023-11-26 RX ADMIN — LEVALBUTEROL HYDROCHLORIDE 1.25 MG: 1.25 SOLUTION RESPIRATORY (INHALATION) at 12:11

## 2023-11-26 NOTE — PT/OT/SLP PROGRESS
Physical Therapy      Patient Name:  Darya Low   MRN:  [id number]    Patient not seen today secondary to MD hold (Comment) (Dr. Cannon, cardiologist present and stated pt not appropriate today d/t preparing for heart cath.). Will follow-up 11/26/23.

## 2023-11-26 NOTE — PROGRESS NOTES
"[facility] - Emergency Department  [facility] Medicine  Progress Note    Patient Name: Darya Low  MRN: [id number]  Patient Class: IP- Inpatient   Admission Date: 11/25/2023  Length of Stay: 1 days  Attending Physician: Joe Guillen DO  Primary Care Provider: Alondra Bishop FNP        Subjective:     Principal Problem:NSTEMI (non-ST elevated myocardial infarction)        HPI:  63yowf with pmh of COPD on home oxygen on oral steroids, anxiety, pathologic fracture, tobacco use, GERD who presents after fall.  "I took my night time medicine and the next thing I remember I was on the floor".  Takes klonopin, tizanidine and trazadone at home.  Denies any narcotic use.  She does not know how long she was down.  She reports low back pain since her fall along with substernal chest tightness.  Pain rated as 5/10, no alleviating factors, worse with coughing. Denies radiation, has been constant since fall.  Reports palpitations for some time.  Is chronically sob but believes this is worse recently. Denies any increased sputum production or purulence.  +subjective fevers, no chills. No nvd.  Constipation for 6 days. ROS otherwise negative.     Overview/Hospital Course:  11/26-plan for [facility] tomorrow    Interval History: naeo    Review of Systems   Constitutional:  Positive for fever. Negative for chills, fatigue and unexpected weight change.   HENT:  Negative for congestion, mouth sores and sore throat.    Eyes:  Negative for photophobia and visual disturbance.   Respiratory:  Positive for cough, chest tightness, shortness of breath and wheezing.    Cardiovascular:  Positive for palpitations. Negative for chest pain and leg swelling.   Gastrointestinal:  Positive for constipation. Negative for abdominal pain, diarrhea, nausea and vomiting.   Endocrine: Negative for cold intolerance and heat intolerance.   Genitourinary:  Negative for difficulty urinating, dysuria, frequency and urgency.   Musculoskeletal:  Positive " for back pain. Negative for arthralgias and myalgias.   Skin:  Negative for pallor and rash.   Neurological:  Positive for weakness. Negative for tremors, seizures, syncope, numbness and headaches.   Hematological:  Does not bruise/bleed easily.   Psychiatric/Behavioral:  Negative for agitation, confusion, hallucinations and suicidal ideas.      Objective:     Vital Signs (Most Recent):  Temp: 98.4 °F (36.9 °C) (11/26/23 0111)  Pulse: 69 (11/26/23 1316)  Resp: 15 (11/26/23 1316)  BP: (!) 165/84 (11/26/23 1246)  SpO2: 98 % (11/26/23 1316) Vital Signs (24h Range):  Temp:  [98.4 °F (36.9 °C)] 98.4 °F (36.9 °C)  Pulse:  [] 69  Resp:  [9-20] 15  SpO2:  [92 %-99 %] 98 %  BP: (107-181)/() 165/84     Weight: 44.5 kg (98 lb)  Body mass index is 17.36 kg/m².    Intake/Output Summary (Last 24 hours) at 11/26/2023 1411  Last data filed at 11/25/2023 1900  Gross per 24 hour   Intake 1000 ml   Output --   Net 1000 ml         Physical Exam  Vitals reviewed.   Constitutional:       Appearance: Normal appearance.   HENT:      Head: Normocephalic and atraumatic.      Nose: Nose normal.   Eyes:      Extraocular Movements: Extraocular movements intact.      Conjunctiva/sclera: Conjunctivae normal.   Neck:      Trachea: Trachea normal.   Cardiovascular:      Rate and Rhythm: Regular rhythm. Tachycardia present.      Pulses: Normal pulses.      Heart sounds: Normal heart sounds.   Pulmonary:      Effort: Pulmonary effort is normal.      Breath sounds: Normal air entry. Wheezing present.   Abdominal:      General: Bowel sounds are normal.      Palpations: Abdomen is soft.   Musculoskeletal:         General: Tenderness (chest wall tenderness) present. Normal range of motion.      Right lower leg: Edema present.      Left lower leg: Edema present.   Skin:     General: Skin is warm and dry.      Findings: Lesion present.   Neurological:      General: No focal deficit present.      Mental Status: She is alert.      Cranial Nerves:  Cranial nerves 2-12 are intact.      Comments: Grossly normal motor and sensory function without focal deficit appreciated.   Psychiatric:         Mood and Affect: Mood and affect normal.         Behavior: Behavior is cooperative.             Significant Labs: All pertinent labs within the past 24 hours have been reviewed.    Significant Imaging: I have reviewed all pertinent imaging results/findings within the past 24 hours.    Assessment/Plan:      * NSTEMI (non-ST elevated myocardial infarction)  LEN 3  Dapt load then daily  Lovenox 1mg/kg BID  BB  Hold statin for hepatitis and rhabdo  Echo  Nitrates  Morphine  Cardiology consult      ARF (acute renal failure)  Patient with acute kidney injury/acute renal failure likely due to  rhabdo  KRISTIN is currently improving. Baseline creatinine  0.6  - Labs reviewed- Renal function/electrolytes with Estimated Creatinine Clearance: 63 mL/min (based on SCr of 0.65 mg/dL). according to latest data. Monitor urine output and serial BMP and adjust therapy as needed. Avoid nephrotoxins and renally dose meds for GFR listed above.    Rhabdomyolysis  IVF  Renal function ok  Daily cr      Hepatitis  US normal  Hep panel pending  May be due to rhabdo      SIRS (systemic inflammatory response syndrome)  2/2 rhabo v NSTEMI      SOB (shortness of breath)  Chronic, worsening may be due to NSTEMI  See above  Continue maintenance inhalers      Fall  PT/OT      Gastroesophageal reflux disease  PPI      Insomnia  trazadone      DONALD (generalized anxiety disorder)  Continue home medications      Chronic obstructive pulmonary disease  Patient's COPD is controlled currently.  Patient is currently off COPD Pathway. Continue scheduled inhalers Steroids and Supplemental oxygen and monitor respiratory status closely.       VTE Risk Mitigation (From admission, onward)           Ordered     enoxaparin injection 40 mg  Every 12 hours         11/25/23 1636     IP VTE LOW RISK PATIENT  Once          11/25/23 1525     Place sequential compression device  Until discontinued         11/25/23 1525                    Discharge Planning   VALENTIN:      Code Status: Full Code   Is the patient medically ready for discharge?:     Reason for patient still in hospital (select all that apply): Treatment                     Joe Guillen DO  Department of Hospital Medicine   [facility] - Emergency Department

## 2023-11-26 NOTE — PT/OT/SLP PROGRESS
Occupational Therapy      Patient Name:  Darya Low   MRN:  [id number]    Patient not seen today secondary to Therapist assessment and MD (Cardiologist) approved hold. Will follow-up 11/26/23.    11/26/2023

## 2023-11-26 NOTE — SUBJECTIVE & OBJECTIVE
Interval History: naeo    Review of Systems   Constitutional:  Positive for fever. Negative for chills, fatigue and unexpected weight change.   HENT:  Negative for congestion, mouth sores and sore throat.    Eyes:  Negative for photophobia and visual disturbance.   Respiratory:  Positive for cough, chest tightness, shortness of breath and wheezing.    Cardiovascular:  Positive for palpitations. Negative for chest pain and leg swelling.   Gastrointestinal:  Positive for constipation. Negative for abdominal pain, diarrhea, nausea and vomiting.   Endocrine: Negative for cold intolerance and heat intolerance.   Genitourinary:  Negative for difficulty urinating, dysuria, frequency and urgency.   Musculoskeletal:  Positive for back pain. Negative for arthralgias and myalgias.   Skin:  Negative for pallor and rash.   Neurological:  Positive for weakness. Negative for tremors, seizures, syncope, numbness and headaches.   Hematological:  Does not bruise/bleed easily.   Psychiatric/Behavioral:  Negative for agitation, confusion, hallucinations and suicidal ideas.      Objective:     Vital Signs (Most Recent):  Temp: 98.4 °F (36.9 °C) (11/26/23 0111)  Pulse: 69 (11/26/23 1316)  Resp: 15 (11/26/23 1316)  BP: (!) 165/84 (11/26/23 1246)  SpO2: 98 % (11/26/23 1316) Vital Signs (24h Range):  Temp:  [98.4 °F (36.9 °C)] 98.4 °F (36.9 °C)  Pulse:  [] 69  Resp:  [9-20] 15  SpO2:  [92 %-99 %] 98 %  BP: (107-181)/() 165/84     Weight: 44.5 kg (98 lb)  Body mass index is 17.36 kg/m².    Intake/Output Summary (Last 24 hours) at 11/26/2023 1411  Last data filed at 11/25/2023 1900  Gross per 24 hour   Intake 1000 ml   Output --   Net 1000 ml         Physical Exam  Vitals reviewed.   Constitutional:       Appearance: Normal appearance.   HENT:      Head: Normocephalic and atraumatic.      Nose: Nose normal.   Eyes:      Extraocular Movements: Extraocular movements intact.      Conjunctiva/sclera: Conjunctivae normal.   Neck:       Trachea: Trachea normal.   Cardiovascular:      Rate and Rhythm: Regular rhythm. Tachycardia present.      Pulses: Normal pulses.      Heart sounds: Normal heart sounds.   Pulmonary:      Effort: Pulmonary effort is normal.      Breath sounds: Normal air entry. Wheezing present.   Abdominal:      General: Bowel sounds are normal.      Palpations: Abdomen is soft.   Musculoskeletal:         General: Tenderness (chest wall tenderness) present. Normal range of motion.      Right lower leg: Edema present.      Left lower leg: Edema present.   Skin:     General: Skin is warm and dry.      Findings: Lesion present.   Neurological:      General: No focal deficit present.      Mental Status: She is alert.      Cranial Nerves: Cranial nerves 2-12 are intact.      Comments: Grossly normal motor and sensory function without focal deficit appreciated.   Psychiatric:         Mood and Affect: Mood and affect normal.         Behavior: Behavior is cooperative.             Significant Labs: All pertinent labs within the past 24 hours have been reviewed.    Significant Imaging: I have reviewed all pertinent imaging results/findings within the past 24 hours.

## 2023-11-26 NOTE — ASSESSMENT & PLAN NOTE
Patient with acute kidney injury/acute renal failure likely due to  rhabdo  KRISTIN is currently improving. Baseline creatinine  0.6  - Labs reviewed- Renal function/electrolytes with Estimated Creatinine Clearance: 63 mL/min (based on SCr of 0.65 mg/dL). according to latest data. Monitor urine output and serial BMP and adjust therapy as needed. Avoid nephrotoxins and renally dose meds for GFR listed above.

## 2023-11-26 NOTE — HOSPITAL COURSE
11/26-plan for [facility] tomorrow  11/27-[facility] today. Feels ok  11/28- [facility] with non-obs CAD, stable now. Discharge home today.

## 2023-11-26 NOTE — ED NOTES
Pt was unable to urinate.  Straight cath done 1,000ml urine drained from bladder.  Pt states she feels much better.

## 2023-11-27 LAB
ANION GAP SERPL CALCULATED.3IONS-SCNC: 5 MMOL/L (ref 7–16)
BASOPHILS # BLD AUTO: 0.03 K/UL (ref 0–0.2)
BASOPHILS NFR BLD AUTO: 0.4 % (ref 0–1)
BUN SERPL-MCNC: 5 MG/DL (ref 7–18)
BUN/CREAT SERPL: 10 (ref 6–20)
CALCIUM SERPL-MCNC: 8.2 MG/DL (ref 8.5–10.1)
CHLORIDE SERPL-SCNC: 109 MMOL/L (ref 98–107)
CO2 SERPL-SCNC: 30 MMOL/L (ref 21–32)
CREAT SERPL-MCNC: 0.49 MG/DL (ref 0.55–1.02)
DIFFERENTIAL METHOD BLD: ABNORMAL
EGFR (NO RACE VARIABLE) (RUSH/TITUS): 107 ML/MIN/1.73M2
EOSINOPHIL # BLD AUTO: 0.03 K/UL (ref 0–0.5)
EOSINOPHIL NFR BLD AUTO: 0.4 % (ref 1–4)
ERYTHROCYTE [DISTWIDTH] IN BLOOD BY AUTOMATED COUNT: 15.9 % (ref 11.5–14.5)
GLUCOSE SERPL-MCNC: 82 MG/DL (ref 74–106)
HCT VFR BLD AUTO: 30.7 % (ref 38–47)
HGB BLD-MCNC: 9.9 G/DL (ref 12–16)
IMM GRANULOCYTES # BLD AUTO: 0.04 K/UL (ref 0–0.04)
IMM GRANULOCYTES NFR BLD: 0.6 % (ref 0–0.4)
LYMPHOCYTES # BLD AUTO: 0.86 K/UL (ref 1–4.8)
LYMPHOCYTES NFR BLD AUTO: 12.3 % (ref 27–41)
MAGNESIUM SERPL-MCNC: 1.8 MG/DL (ref 1.7–2.3)
MCH RBC QN AUTO: 31 PG (ref 27–31)
MCHC RBC AUTO-ENTMCNC: 32.2 G/DL (ref 32–36)
MCV RBC AUTO: 96.2 FL (ref 80–96)
MONOCYTES # BLD AUTO: 0.81 K/UL (ref 0–0.8)
MONOCYTES NFR BLD AUTO: 11.6 % (ref 2–6)
MPC BLD CALC-MCNC: 10.7 FL (ref 9.4–12.4)
NEUTROPHILS # BLD AUTO: 5.22 K/UL (ref 1.8–7.7)
NEUTROPHILS NFR BLD AUTO: 74.7 % (ref 53–65)
NRBC # BLD AUTO: 0 X10E3/UL
NRBC, AUTO (.00): 0 %
PHOSPHATE SERPL-MCNC: 1.9 MG/DL (ref 2.5–4.5)
PLATELET # BLD AUTO: 164 K/UL (ref 150–400)
POTASSIUM SERPL-SCNC: 3.3 MMOL/L (ref 3.5–5.1)
RBC # BLD AUTO: 3.19 M/UL (ref 4.2–5.4)
SODIUM SERPL-SCNC: 141 MMOL/L (ref 136–145)
WBC # BLD AUTO: 6.99 K/UL (ref 4.5–11)

## 2023-11-27 PROCEDURE — 27000221 HC OXYGEN, UP TO 24 HOURS

## 2023-11-27 PROCEDURE — C1760 CLOSURE DEV, VASC: HCPCS | Performed by: INTERNAL MEDICINE

## 2023-11-27 PROCEDURE — 99900035 HC TECH TIME PER 15 MIN (STAT)

## 2023-11-27 PROCEDURE — 93458 L HRT ARTERY/VENTRICLE ANGIO: CPT | Mod: 26,GW,, | Performed by: INTERNAL MEDICINE

## 2023-11-27 PROCEDURE — C1894 INTRO/SHEATH, NON-LASER: HCPCS | Performed by: INTERNAL MEDICINE

## 2023-11-27 PROCEDURE — 25000242 PHARM REV CODE 250 ALT 637 W/ HCPCS: Performed by: STUDENT IN AN ORGANIZED HEALTH CARE EDUCATION/TRAINING PROGRAM

## 2023-11-27 PROCEDURE — 25500020 PHARM REV CODE 255: Performed by: INTERNAL MEDICINE

## 2023-11-27 PROCEDURE — C1769 GUIDE WIRE: HCPCS | Performed by: INTERNAL MEDICINE

## 2023-11-27 PROCEDURE — 94761 N-INVAS EAR/PLS OXIMETRY MLT: CPT

## 2023-11-27 PROCEDURE — 99153 MOD SED SAME PHYS/QHP EA: CPT | Performed by: INTERNAL MEDICINE

## 2023-11-27 PROCEDURE — 99152 MOD SED SAME PHYS/QHP 5/>YRS: CPT | Performed by: INTERNAL MEDICINE

## 2023-11-27 PROCEDURE — 83735 ASSAY OF MAGNESIUM: CPT | Performed by: STUDENT IN AN ORGANIZED HEALTH CARE EDUCATION/TRAINING PROGRAM

## 2023-11-27 PROCEDURE — 94640 AIRWAY INHALATION TREATMENT: CPT

## 2023-11-27 PROCEDURE — 85025 COMPLETE CBC W/AUTO DIFF WBC: CPT | Performed by: STUDENT IN AN ORGANIZED HEALTH CARE EDUCATION/TRAINING PROGRAM

## 2023-11-27 PROCEDURE — 25000003 PHARM REV CODE 250: Performed by: INTERNAL MEDICINE

## 2023-11-27 PROCEDURE — 99232 SBSQ HOSP IP/OBS MODERATE 35: CPT | Mod: GW,HPC,, | Performed by: STUDENT IN AN ORGANIZED HEALTH CARE EDUCATION/TRAINING PROGRAM

## 2023-11-27 PROCEDURE — 93458 PR CATH PLACE/CORON ANGIO, IMG SUPER/INTERP,W LEFT HEART VENTRICULOGRAPHY: ICD-10-PCS | Mod: 26,GW,, | Performed by: INTERNAL MEDICINE

## 2023-11-27 PROCEDURE — 63600175 PHARM REV CODE 636 W HCPCS: Performed by: INTERNAL MEDICINE

## 2023-11-27 PROCEDURE — 93458 L HRT ARTERY/VENTRICLE ANGIO: CPT | Performed by: INTERNAL MEDICINE

## 2023-11-27 PROCEDURE — 99232 PR SUBSEQUENT HOSPITAL CARE,LEVL II: ICD-10-PCS | Mod: GW,HPC,, | Performed by: STUDENT IN AN ORGANIZED HEALTH CARE EDUCATION/TRAINING PROGRAM

## 2023-11-27 PROCEDURE — 11000001 HC ACUTE MED/SURG PRIVATE ROOM

## 2023-11-27 PROCEDURE — 99152 PR MOD CONSCIOUS SEDATION, SAME PHYS, 5+ YRS, FIRST 15 MIN: ICD-10-PCS | Mod: GW,,, | Performed by: INTERNAL MEDICINE

## 2023-11-27 PROCEDURE — 99152 MOD SED SAME PHYS/QHP 5/>YRS: CPT | Mod: GW,,, | Performed by: INTERNAL MEDICINE

## 2023-11-27 PROCEDURE — 25000003 PHARM REV CODE 250: Performed by: STUDENT IN AN ORGANIZED HEALTH CARE EDUCATION/TRAINING PROGRAM

## 2023-11-27 PROCEDURE — 84100 ASSAY OF PHOSPHORUS: CPT | Performed by: STUDENT IN AN ORGANIZED HEALTH CARE EDUCATION/TRAINING PROGRAM

## 2023-11-27 PROCEDURE — 63600175 PHARM REV CODE 636 W HCPCS: Performed by: STUDENT IN AN ORGANIZED HEALTH CARE EDUCATION/TRAINING PROGRAM

## 2023-11-27 PROCEDURE — 80048 BASIC METABOLIC PNL TOTAL CA: CPT | Performed by: STUDENT IN AN ORGANIZED HEALTH CARE EDUCATION/TRAINING PROGRAM

## 2023-11-27 PROCEDURE — 27201423 OPTIME MED/SURG SUP & DEVICES STERILE SUPPLY: Performed by: INTERNAL MEDICINE

## 2023-11-27 DEVICE — ANGIO-SEAL VIP VASCULAR CLOSURE DEVICE
Type: IMPLANTABLE DEVICE | Site: GROIN | Status: FUNCTIONAL
Brand: ANGIO-SEAL

## 2023-11-27 RX ORDER — SODIUM CHLORIDE 9 MG/ML
INJECTION, SOLUTION INTRAVENOUS
Status: DISCONTINUED | OUTPATIENT
Start: 2023-11-27 | End: 2023-11-28 | Stop reason: HOSPADM

## 2023-11-27 RX ORDER — CLONAZEPAM 0.5 MG/1
0.5 TABLET ORAL DAILY PRN
Status: DISCONTINUED | OUTPATIENT
Start: 2023-11-27 | End: 2023-11-28 | Stop reason: HOSPADM

## 2023-11-27 RX ORDER — MIDAZOLAM HYDROCHLORIDE 1 MG/ML
INJECTION INTRAMUSCULAR; INTRAVENOUS
Status: DISCONTINUED | OUTPATIENT
Start: 2023-11-27 | End: 2023-11-27 | Stop reason: HOSPADM

## 2023-11-27 RX ORDER — METOPROLOL TARTRATE 1 MG/ML
INJECTION, SOLUTION INTRAVENOUS
Status: DISCONTINUED | OUTPATIENT
Start: 2023-11-27 | End: 2023-11-27 | Stop reason: HOSPADM

## 2023-11-27 RX ORDER — OXYCODONE AND ACETAMINOPHEN 7.5; 325 MG/1; MG/1
1 TABLET ORAL EVERY 4 HOURS PRN
Status: DISCONTINUED | OUTPATIENT
Start: 2023-11-27 | End: 2023-11-28 | Stop reason: HOSPADM

## 2023-11-27 RX ORDER — PROMETHAZINE HYDROCHLORIDE 25 MG/ML
INJECTION, SOLUTION INTRAMUSCULAR; INTRAVENOUS
Status: DISCONTINUED | OUTPATIENT
Start: 2023-11-27 | End: 2023-11-27 | Stop reason: HOSPADM

## 2023-11-27 RX ORDER — SODIUM CHLORIDE 450 MG/100ML
INJECTION, SOLUTION INTRAVENOUS CONTINUOUS
Status: ACTIVE | OUTPATIENT
Start: 2023-11-27 | End: 2023-11-27

## 2023-11-27 RX ORDER — FENTANYL CITRATE 50 UG/ML
INJECTION, SOLUTION INTRAMUSCULAR; INTRAVENOUS
Status: DISCONTINUED | OUTPATIENT
Start: 2023-11-27 | End: 2023-11-27 | Stop reason: HOSPADM

## 2023-11-27 RX ORDER — NIFEDIPINE 30 MG/1
60 TABLET, EXTENDED RELEASE ORAL DAILY
Status: DISCONTINUED | OUTPATIENT
Start: 2023-11-27 | End: 2023-11-28 | Stop reason: HOSPADM

## 2023-11-27 RX ORDER — LIDOCAINE HYDROCHLORIDE 10 MG/ML
INJECTION INFILTRATION; PERINEURAL
Status: DISCONTINUED | OUTPATIENT
Start: 2023-11-27 | End: 2023-11-27 | Stop reason: HOSPADM

## 2023-11-27 RX ADMIN — ASPIRIN 81 MG CHEWABLE TABLET 81 MG: 81 TABLET CHEWABLE at 08:11

## 2023-11-27 RX ADMIN — METOPROLOL TARTRATE 25 MG: 25 TABLET, FILM COATED ORAL at 10:11

## 2023-11-27 RX ADMIN — PREDNISONE 10 MG: 10 TABLET ORAL at 08:11

## 2023-11-27 RX ADMIN — ESCITALOPRAM OXALATE 10 MG: 10 TABLET ORAL at 08:11

## 2023-11-27 RX ADMIN — LABETALOL HYDROCHLORIDE 20 MG: 5 INJECTION INTRAVENOUS at 07:11

## 2023-11-27 RX ADMIN — GABAPENTIN 100 MG: 100 CAPSULE ORAL at 10:11

## 2023-11-27 RX ADMIN — PANTOPRAZOLE SODIUM 40 MG: 40 TABLET, DELAYED RELEASE ORAL at 08:11

## 2023-11-27 RX ADMIN — OXYCODONE HYDROCHLORIDE AND ACETAMINOPHEN 1 TABLET: 10; 325 TABLET ORAL at 07:11

## 2023-11-27 RX ADMIN — NIFEDIPINE 60 MG: 30 TABLET, FILM COATED, EXTENDED RELEASE ORAL at 08:11

## 2023-11-27 RX ADMIN — IPRATROPIUM BROMIDE 0.5 MG: 0.5 SOLUTION RESPIRATORY (INHALATION) at 08:11

## 2023-11-27 RX ADMIN — IPRATROPIUM BROMIDE 0.5 MG: 0.5 SOLUTION RESPIRATORY (INHALATION) at 04:11

## 2023-11-27 RX ADMIN — OXYCODONE AND ACETAMINOPHEN 1 TABLET: 7.5; 325 TABLET ORAL at 06:11

## 2023-11-27 RX ADMIN — BUDESONIDE 0.5 MG: 0.25 SUSPENSION RESPIRATORY (INHALATION) at 08:11

## 2023-11-27 RX ADMIN — TRAZODONE HYDROCHLORIDE 50 MG: 50 TABLET ORAL at 10:11

## 2023-11-27 RX ADMIN — LEVALBUTEROL HYDROCHLORIDE 1.25 MG: 1.25 SOLUTION RESPIRATORY (INHALATION) at 04:11

## 2023-11-27 RX ADMIN — LEVALBUTEROL HYDROCHLORIDE 1.25 MG: 1.25 SOLUTION RESPIRATORY (INHALATION) at 08:11

## 2023-11-27 RX ADMIN — IPRATROPIUM BROMIDE 0.5 MG: 0.5 SOLUTION RESPIRATORY (INHALATION) at 12:11

## 2023-11-27 RX ADMIN — METOPROLOL TARTRATE 25 MG: 25 TABLET, FILM COATED ORAL at 08:11

## 2023-11-27 RX ADMIN — CLOPIDOGREL BISULFATE 75 MG: 75 TABLET ORAL at 08:11

## 2023-11-27 NOTE — CONSULTS
[facility] - [address] [place] [address]  Adult Nutrition  Consult Note         Reason for Assessment  Reason For Assessment: consult (malnutrition)   Nutrition Risk Screen: no indicators present    Assessment and Plan  Consult received and appreciated. Consult for malnutrition. Patient was admitted 11/25 for NSTEMI.    Visited with patient this morning. She has visible wasting to her temple, buccal and orbital areas. She is 98 pounds with a BMI of 17.36 and is underweight. Per ASPEN guidelines, patient meets criteria for moderate protein-calorie malnutrition based on NFPE findings.     She is currently NPO pending possible LHC today. Recommend advance diet as medically appropriate. Patient is at risk for refeeding syndrome. Recommend Regular diet. Also recommend addition of Boost Breeze TID to improve kcal/protein intakes to better meet nutritional needs. If unable to advance diet x 24-48 hours, recommend consider alternate route of nutrition.     Last BM 11/25 per flowsheet.    Medications/labs reviewed. RD following.           Malnutrition  Is Patient Malnourished: Yes Malnutrition Assessment  Malnutrition Context: chronic illness  Malnutrition Level: moderate  Skin (Micronutrient): yellow  Hair/Scalp (Micronutrient): dry, dull       Energy Intake (Malnutrition): less than or equal to 50% for greater than or equal to 1 month   Orbital Region (Subcutaneous Fat Loss): moderate depletion   [place] Region (Muscle Loss): moderate depletion                 Skin Integrity  Jessee Risk Assessment  Sensory Perception: 4-->no impairment  Moisture: 4-->rarely moist  Activity: 3-->walks occasionally  Mobility: 3-->slightly limited  Nutrition: 3-->adequate  Friction and Shear: 3-->no apparent problem  Jessee Score: 20      Nutrition Diagnosis  Malnutrition (Moderate) related to Chronic illness as evidenced by COPD dx, NFPE findings      Nutrition Risk  Level of Risk/Frequency of Follow-up: moderate      Recent Labs    Lab 11/27/23  0308   GLU 82         Nutrition Prescription / Recommendations  Recommendation/Intervention: Recommend advance diet as medically appropriate. Recommend Regular diet. Also recommend addition of Boost Breeze TID to improve kcal/protein intakes to better meet nutritional needs. If unable to advance diet x 24-48 hours, recommend consider alternate route of nutrition.  Goals: Diet advancement, weight maintenance  Nutrition Goal Status: new  Current Diet Order: NPO  Chewing or Swallowing Difficulty?: No Chewing or swallowing difficulty  Recommended Diet: Regular  Recommended Oral Supplement: Boost Breeze [250kcals, 9g Protein, 54g Carbs] 3 times a day  Is Nutrition Support Recommended: [facility] Support: No  Is Nutrition Education Recommended: No    Monitor and Evaluation  % current Intake: NPO  % intake to meet estimated needs: P.O. + Supplements  Food and Nutrient Intake: food and beverage intake  Food and Nutrient Adminstration: diet order  Anthropometric Measurements: weight, weight change  Biochemical Data, Medical Tests and Procedures: electrolyte and renal panel, gastrointestinal profile, glucose/endocrine profile, inflammatory profile, lipid profile       Current Medical Diagnosis and Past Medical History  Diagnosis: cardiac disease  Past Medical History:   Diagnosis Date    CHF (congestive heart failure)     COPD (chronic obstructive pulmonary disease)        Nutrition/Diet History       Lab/Procedures/Meds  Recent Labs   Lab 11/26/23  1002 11/27/23  0308   NA  --  141   K  --  3.3*   BUN  --  5*   CREATININE  --  0.49*   CALCIUM  --  8.2*   ALBUMIN 2.3*  --    CL  --  109*   ALT 89*  --    *  --    PHOS  --  1.9*   Note: K+, Ph low. Patient at risk for refeeding syndrome. Recommend consider replete to WNL as appropriate. BUN/Cr low. Ca++ low. AST/ALT elevated, PMH hepatitis. Alb low, likely r/t protein-calorie malnutrition + hepatitis.     Last A1c:   Lab Results   Component  "Value Date    HGBA1C 5.2 04/23/2021     Lab Results   Component Value Date    RBC 3.19 (L) 11/27/2023    HGB 9.9 (L) 11/27/2023    HCT 30.7 (L) 11/27/2023    MCV 96.2 (H) 11/27/2023    MCH 31.0 11/27/2023    MCHC 32.2 11/27/2023    TIBC 357 03/28/2021   Note: H&H low    Pertinent Labs Reviewed: reviewed  Pertinent Medications Reviewed: reviewed  Scheduled Meds:   aspirin  81 mg Oral Daily    budesonide  0.5 mg Nebulization Q12H    clopidogreL  75 mg Oral Daily    enoxparin  40 mg Subcutaneous Q12H (treatment, non-standard time)    EScitalopram oxalate  10 mg Oral Daily    gabapentin  100 mg Oral Nightly    ipratropium  0.5 mg Nebulization Q6H    levalbuterol  1.25 mg Nebulization Q8H    metoprolol tartrate  25 mg Oral BID    NIFEdipine  60 mg Oral Daily    pantoprazole  40 mg Oral Daily    predniSONE  10 mg Oral Daily     Continuous Infusions:  PRN Meds:.clonazePAM, dextrose 10%, dextrose 10%, glucagon (human recombinant), glucose, glucose, labetalol, morphine, naloxone, nitroGLYCERIN, oxyCODONE-acetaminophen, sodium chloride 0.9%, sodium chloride 0.9%, tiZANidine, traZODone      Anthropometrics  Temp: 98.9 °F (37.2 °C)  Height Method: Stated  Height: 5' 3" (160 cm)  Height (inches): 63 in  Weight Method: Bed Scale  Weight: 44.5 kg (98 lb)  Weight (lb): 98 lb  Ideal Body Weight (IBW), Female: 115 lb  % Ideal Body Weight, Female (lb): 85.22 %  BMI (Calculated): 17.4       Estimated/Assessed Needs  RMR ([place]-St. Jeor Equation): 973.66     Temp: 98.9 °F (37.2 °C)Oral  Weight Used For Calorie Calculations: 44.5 kg (98 lb)     Energy Calorie Requirements (kcal): 1334-1556kcal (30-35kcal/kg)  Weight Used For Protein Calculations: 44.5 kg (98 lb)  Protein Requirements: 53-67g (1.2-1.5g/kg)       RDA Method (mL): 1334       Nutrition by Nursing              Last Bowel Movement: 11/25/23                Nutrition Follow-Up  RD Follow-up?: Yes      Ashley Espinosa MS, RD, LD  Available via Secure Chat  "

## 2023-11-27 NOTE — PLAN OF CARE
Problem: Adult Inpatient Plan of Care  Goal: Plan of Care Review  Outcome: Ongoing, Progressing  Flowsheets (Taken 11/27/2023 0050)  Plan of Care Reviewed With: patient  Goal: Optimal Comfort and Wellbeing  Outcome: Ongoing, Progressing  Intervention: Monitor Pain and Promote Comfort  Flowsheets (Taken 11/27/2023 0050)  Pain Management Interventions:   pillow support provided   position adjusted   quiet environment facilitated   relaxation techniques promoted  Intervention: Provide Person-Centered Care  Flowsheets (Taken 11/27/2023 0050)  Trust Relationship/Rapport:   care explained   choices provided   emotional support provided   empathic listening provided   questions answered   questions encouraged   thoughts/feelings acknowledged   reassurance provided     Problem: Gas Exchange Impaired  Goal: Optimal Gas Exchange  Outcome: Ongoing, Progressing  Intervention: Optimize Oxygenation and Ventilation  Flowsheets (Taken 11/27/2023 0050)  Airway/Ventilation Management:   airway patency maintained   calming measures promoted   humidification applied   pulmonary hygiene promoted  Head of Bed (HOB) Positioning: HOB elevated     Problem: Skin Injury Risk Increased  Goal: Skin Health and Integrity  Outcome: Ongoing, Progressing  Intervention: Optimize Skin Protection  Flowsheets (Taken 11/27/2023 0050)  Pressure Reduction Techniques:   frequent weight shift encouraged   heels elevated off bed   positioned off wounds   pressure points protected   rest period provided between sit times   weight shift assistance provided  Pressure Reduction Devices: positioning supports utilized  Head of Bed (HOB) Positioning: HOB elevated

## 2023-11-27 NOTE — CONSULTS
"[facility] - Emergency Department  Cardiology  Consult Note    Patient Name: Darya Low  MRN: [id number]  Admission Date: 11/25/2023  Hospital Length of Stay: 1 days  Code Status: Full Code   Attending Provider: Joe Guillen DO   Consulting Provider: DC CANNON MD  Primary Care Physician: Alondra Bishop FNP  Principal Problem:NSTEMI (non-ST elevated myocardial infarction)    Patient information was obtained from patient, relative(s), past medical records, ER records, and primary team.     Inpatient consult to Cardiology  Consult performed by: Dc Cannon MD  Consult ordered by: Joe Guillen DO        Subjective:     Chief Complaint:  Fall     HPI:   63-year-old female with a hx of COPD on home oxygen on oral steroids, anxiety, pathologic fracture, tobacco use, GERD who presented after an unwitnessed fall.  "I took my night time medicine and the next thing I remember I was on the floor".  Takes klonopin, tizanidine and trazadone at home.  Denies any narcotic use. She was found down and did not know how long she was down. Complains of chest soreness since her fall. She is chronically short of breath, notes no worsening. Labs significant for elevated total CK levels, elevated proBNP and elevated hs-trop 1478 (downtrending since). EKG with q waves in V1 and V2 (possible septal infarct), nonspecific ST changes. Cardiology consulted due to elevated troponin levels.     Past Medical History:   Diagnosis Date    CHF (congestive heart failure)     COPD (chronic obstructive pulmonary disease)        Past Surgical History:   Procedure Laterality Date    ABDOMINAL SURGERY         Review of patient's allergies indicates:   Allergen Reactions    Ondansetron hcl Swelling     Edema of tongue per patient    Celexa [citalopram]     Darvon [propoxyphene]     Flexeril [cyclobenzaprine]     Hydroxyzine     Percocet [oxycodone-acetaminophen]     Robaxin [methocarbamol]     Thorazine [chlorpromazine]     Toradol " [ketorolac] Itching       No current facility-administered medications on file prior to encounter.     Current Outpatient Medications on File Prior to Encounter   Medication Sig    clonazePAM (KLONOPIN) 2 MG Tab     EScitalopram oxalate (LEXAPRO) 10 MG tablet Take 1 tablet (10 mg total) by mouth once daily.    ferrous sulfate (FEOSOL) 325 mg (65 mg iron) Tab tablet Take 1 tablet (325 mg total) by mouth 2 (two) times daily.    fluticasone propionate (FLONASE) 50 mcg/actuation nasal spray 2 sprays by Each Nostril route once daily.    gabapentin (NEURONTIN) 100 MG capsule Take 1 capsule (100 mg total) by mouth nightly.    glycopyrrolate-formoteroL (BEVESPI AEROSPHERE) 9-4.8 mcg HFAA Inhale 2 puffs into the lungs 2 (two) times daily. Controller    HYDROcodone-acetaminophen (NORCO)  mg per tablet     montelukast (SINGULAIR) 10 mg tablet Take 1 tablet (10 mg total) by mouth nightly.    multivitamin Tab Take 1 tablet by mouth once daily.    pantoprazole (PROTONIX) 40 MG tablet Take 1 tablet (40 mg total) by mouth once daily.    tiZANidine (ZANAFLEX) 4 MG tablet Take 1 tablet (4 mg total) by mouth 3 (three) times daily as needed.    traZODone (DESYREL) 100 MG tablet Take 1 tablet (100 mg total) by mouth every evening. (Patient taking differently: Take 50 mg by mouth every evening. )     Family History    None       Tobacco Use    Smoking status: Every Day     Types: Cigarettes    Smokeless tobacco: Never   Substance and Sexual Activity    Alcohol use: Yes    Drug use: Never    Sexual activity: Not on file     Review of Systems   Constitutional: Negative for chills, fever, weight gain and weight loss.   Cardiovascular:  Positive for chest pain and dyspnea on exertion. Negative for irregular heartbeat, leg swelling, near-syncope, orthopnea, palpitations, paroxysmal nocturnal dyspnea and syncope.   Respiratory:  Negative for cough, shortness of breath and wheezing.    Gastrointestinal:  Negative for abdominal pain and  diarrhea.     Objective:     Vital Signs (Most Recent):  Temp: 98.4 °F (36.9 °C) (11/26/23 0111)  Pulse: 66 (11/26/23 2245)  Resp: 15 (11/26/23 2245)  BP: (!) 159/88 (11/26/23 2245)  SpO2: (!) 93 % (11/26/23 2245) Vital Signs (24h Range):  Temp:  [98.4 °F (36.9 °C)] 98.4 °F (36.9 °C)  Pulse:  [] 66  Resp:  [9-19] 15  SpO2:  [66 %-99 %] 93 %  BP: (107-170)/() 159/88     Weight: 44.5 kg (98 lb)  Body mass index is 17.36 kg/m².    SpO2: (!) 93 %       No intake or output data in the 24 hours ending 11/26/23 2303    Lines/Drains/Airways       Peripheral Intravenous Line  Duration                  Peripheral IV - Single Lumen 11/25/23 1239 20 G Anterior;Proximal;Right Forearm 1 day                     Physical Exam  Constitutional:       Appearance: She is ill-appearing.      Comments: Thin, appears older than stated age   HENT:      Head: Normocephalic and atraumatic.   Neck:      Comments: No JVD  Cardiovascular:      Rate and Rhythm: Normal rate and regular rhythm.      Pulses: Normal pulses.      Heart sounds: Normal heart sounds.   Pulmonary:      Breath sounds: Wheezing present. No rhonchi or rales.   Musculoskeletal:      Right lower leg: No edema.      Left lower leg: No edema.   Skin:     General: Skin is warm.   Neurological:      Mental Status: She is alert and oriented to person, place, and time.          Significant Labs: All pertinent lab results from the last 24 hours have been reviewed.    Significant Imaging: Echocardiogram: Transthoracic echo (TTE) complete (Cupid Only):   Results for orders placed during the hospital encounter of 11/25/23    Echo    Interpretation Summary    Left Ventricle: The left ventricle is normal in size. Septal thickening. There is concentric remodeling. Normal wall motion. There is normal systolic function. Biplane (2D) method of discs ejection fraction is 60%. There is normal diastolic function.    Right Ventricle: Normal right ventricular cavity size. Systolic  function is normal.    Right Atrium: Normal right atrial size. There is a prominent Eustachian valve. Differential includes RA thrombus.    Aortic Valve: The aortic valve is a trileaflet valve.    Mitral Valve: There is mild regurgitation.    Tricuspid Valve: There is moderate regurgitation.    Pulmonary Artery: The estimated pulmonary artery systolic pressure is 34 mmHg.    IVC/SVC: Normal venous pressure at 3 mmHg.    Assessment and Plan:     Troponin elevation  Noted after fall and patient being found down. She complains of atypical chest pain.  ACS vs rhabdomyolysis   Echo w/ normal LVEF and no RWMAs noted  CAD risk factors include active smoking  CTA negative for PE    Plan:  Continue ASA, plavix, lovenox 1 mg/kg BID   No statin due to elevated transaminases   Risks vs benefits of LHC discussed with patient and son (over the phone). Consent signed and placed in chart. Plan for LHC tomorrow    VTE Risk Mitigation (From admission, onward)           Ordered     enoxaparin injection 40 mg  Every 12 hours         11/25/23 1636     IP VTE LOW RISK PATIENT  Once         11/25/23 1525     Place sequential compression device  Until discontinued         11/25/23 1525                    Thank you for your consult. I will follow-up with patient. Please contact us if you have any additional questions.    JEAN RONDON MD  Cardiology   [facility] - Emergency Department

## 2023-11-27 NOTE — HPI
"63-year-old female with a hx of COPD on home oxygen on oral steroids, anxiety, pathologic fracture, tobacco use, GERD who presented after an unwitnessed fall.  "I took my night time medicine and the next thing I remember I was on the floor".  Takes klonopin, tizanidine and trazadone at home.  Denies any narcotic use. She was found down and did not know how long she was down. Complains of chest soreness since her fall. She is chronically short of breath, notes no worsening. Labs significant for elevated total CK levels, elevated proBNP and elevated hs-trop 1478 (downtrending since). EKG with q waves in V1 and V2 (possible septal infarct), nonspecific ST changes. Cardiology consulted due to elevated troponin levels.   "

## 2023-11-27 NOTE — SUBJECTIVE & OBJECTIVE
Interval History: naeo    Review of Systems   Constitutional:  Positive for fever. Negative for chills, fatigue and unexpected weight change.   HENT:  Negative for congestion, mouth sores and sore throat.    Eyes:  Negative for photophobia and visual disturbance.   Respiratory:  Positive for cough, chest tightness, shortness of breath and wheezing.    Cardiovascular:  Positive for palpitations. Negative for chest pain and leg swelling.   Gastrointestinal:  Positive for constipation. Negative for abdominal pain, diarrhea, nausea and vomiting.   Endocrine: Negative for cold intolerance and heat intolerance.   Genitourinary:  Negative for difficulty urinating, dysuria, frequency and urgency.   Musculoskeletal:  Positive for back pain. Negative for arthralgias and myalgias.   Skin:  Negative for pallor and rash.   Neurological:  Positive for weakness. Negative for tremors, seizures, syncope, numbness and headaches.   Hematological:  Does not bruise/bleed easily.   Psychiatric/Behavioral:  Negative for agitation, confusion, hallucinations and suicidal ideas.      Objective:     Vital Signs (Most Recent):  Temp: 98 °F (36.7 °C) (11/27/23 1000)  Pulse: 67 (11/27/23 1000)  Resp: 18 (11/27/23 1000)  BP: (!) 167/90 (11/27/23 1000)  SpO2: 97 % (11/27/23 1000) Vital Signs (24h Range):  Temp:  [97.3 °F (36.3 °C)-98.9 °F (37.2 °C)] 98 °F (36.7 °C)  Pulse:  [] 67  Resp:  [9-20] 18  SpO2:  [66 %-99 %] 97 %  BP: (136-217)/() 167/90     Weight: 44.5 kg (98 lb)  Body mass index is 17.36 kg/m².  No intake or output data in the 24 hours ending 11/27/23 1121        Physical Exam  Vitals reviewed.   Constitutional:       Appearance: Normal appearance.   HENT:      Head: Normocephalic and atraumatic.      Nose: Nose normal.   Eyes:      Extraocular Movements: Extraocular movements intact.      Conjunctiva/sclera: Conjunctivae normal.   Neck:      Trachea: Trachea normal.   Cardiovascular:      Rate and Rhythm: Regular rhythm.  Tachycardia present.      Pulses: Normal pulses.      Heart sounds: Normal heart sounds.   Pulmonary:      Effort: Pulmonary effort is normal.      Breath sounds: Normal air entry. Wheezing present.   Abdominal:      General: Bowel sounds are normal.      Palpations: Abdomen is soft.   Musculoskeletal:         General: Tenderness (chest wall tenderness) present. Normal range of motion.      Right lower leg: Edema present.      Left lower leg: Edema present.   Skin:     General: Skin is warm and dry.      Findings: Lesion present.   Neurological:      General: No focal deficit present.      Mental Status: She is alert.      Cranial Nerves: Cranial nerves 2-12 are intact.      Comments: Grossly normal motor and sensory function without focal deficit appreciated.   Psychiatric:         Mood and Affect: Mood and affect normal.         Behavior: Behavior is cooperative.             Significant Labs: All pertinent labs within the past 24 hours have been reviewed.    Significant Imaging: I have reviewed all pertinent imaging results/findings within the past 24 hours.

## 2023-11-27 NOTE — PT/OT/SLP PROGRESS
Physical Therapy      Patient Name:  Darya Low   MRN:  [id number]    Patient not seen today secondary to Testing/imaging (xray/CT/MRI) (gone for heart cath). Will follow-up 11/28/23.

## 2023-11-27 NOTE — PROGRESS NOTES
"[facility] - [address] [place] [address] Medicine  Progress Note    Patient Name: Darya Low  MRN: [id number]  Patient Class: IP- Inpatient   Admission Date: 11/25/2023  Length of Stay: 2 days  Attending Physician: Joe Guillen DO  Primary Care Provider: Alondra Bishop FNP        Subjective:     Principal Problem:NSTEMI (non-ST elevated myocardial infarction)        HPI:  63yowf with pmh of COPD on home oxygen on oral steroids, anxiety, pathologic fracture, tobacco use, GERD who presents after fall.  "I took my night time medicine and the next thing I remember I was on the floor".  Takes klonopin, tizanidine and trazadone at home.  Denies any narcotic use.  She does not know how long she was down.  She reports low back pain since her fall along with substernal chest tightness.  Pain rated as 5/10, no alleviating factors, worse with coughing. Denies radiation, has been constant since fall.  Reports palpitations for some time.  Is chronically sob but believes this is worse recently. Denies any increased sputum production or purulence.  +subjective fevers, no chills. No nvd.  Constipation for 6 days. ROS otherwise negative.     Overview/Hospital Course:  11/26-plan for [facility] tomorrow  11/27-[facility] today. Feels ok    Interval History: naeo    Review of Systems   Constitutional:  Positive for fever. Negative for chills, fatigue and unexpected weight change.   HENT:  Negative for congestion, mouth sores and sore throat.    Eyes:  Negative for photophobia and visual disturbance.   Respiratory:  Positive for cough, chest tightness, shortness of breath and wheezing.    Cardiovascular:  Positive for palpitations. Negative for chest pain and leg swelling.   Gastrointestinal:  Positive for constipation. Negative for abdominal pain, diarrhea, nausea and vomiting.   Endocrine: Negative for cold intolerance and heat intolerance.   Genitourinary:  Negative for difficulty urinating, dysuria, frequency and " urgency.   Musculoskeletal:  Positive for back pain. Negative for arthralgias and myalgias.   Skin:  Negative for pallor and rash.   Neurological:  Positive for weakness. Negative for tremors, seizures, syncope, numbness and headaches.   Hematological:  Does not bruise/bleed easily.   Psychiatric/Behavioral:  Negative for agitation, confusion, hallucinations and suicidal ideas.      Objective:     Vital Signs (Most Recent):  Temp: 98 °F (36.7 °C) (11/27/23 1000)  Pulse: 67 (11/27/23 1000)  Resp: 18 (11/27/23 1000)  BP: (!) 167/90 (11/27/23 1000)  SpO2: 97 % (11/27/23 1000) Vital Signs (24h Range):  Temp:  [97.3 °F (36.3 °C)-98.9 °F (37.2 °C)] 98 °F (36.7 °C)  Pulse:  [] 67  Resp:  [9-20] 18  SpO2:  [66 %-99 %] 97 %  BP: (136-217)/() 167/90     Weight: 44.5 kg (98 lb)  Body mass index is 17.36 kg/m².  No intake or output data in the 24 hours ending 11/27/23 1121        Physical Exam  Vitals reviewed.   Constitutional:       Appearance: Normal appearance.   HENT:      Head: Normocephalic and atraumatic.      Nose: Nose normal.   Eyes:      Extraocular Movements: Extraocular movements intact.      Conjunctiva/sclera: Conjunctivae normal.   Neck:      Trachea: Trachea normal.   Cardiovascular:      Rate and Rhythm: Regular rhythm. Tachycardia present.      Pulses: Normal pulses.      Heart sounds: Normal heart sounds.   Pulmonary:      Effort: Pulmonary effort is normal.      Breath sounds: Normal air entry. Wheezing present.   Abdominal:      General: Bowel sounds are normal.      Palpations: Abdomen is soft.   Musculoskeletal:         General: Tenderness (chest wall tenderness) present. Normal range of motion.      Right lower leg: Edema present.      Left lower leg: Edema present.   Skin:     General: Skin is warm and dry.      Findings: Lesion present.   Neurological:      General: No focal deficit present.      Mental Status: She is alert.      Cranial Nerves: Cranial nerves 2-12 are intact.       Comments: Grossly normal motor and sensory function without focal deficit appreciated.   Psychiatric:         Mood and Affect: Mood and affect normal.         Behavior: Behavior is cooperative.             Significant Labs: All pertinent labs within the past 24 hours have been reviewed.    Significant Imaging: I have reviewed all pertinent imaging results/findings within the past 24 hours.    Assessment/Plan:      * NSTEMI (non-ST elevated myocardial infarction)  LEN 3  Dapt load then daily  Lovenox 1mg/kg BID  BB  Hold statin for hepatitis and rhabdo  Echo  Nitrates  Morphine  Cardiology consult    [facility] today  Echo looks good. Will ask Cardiology to comment on prominent Eustachian valve given differential     ARF (acute renal failure)  Patient with acute kidney injury/acute renal failure likely due to  rhabdo  KRISTIN is currently improving. Baseline creatinine  0.6  - Labs reviewed- Renal function/electrolytes with Estimated Creatinine Clearance: 63 mL/min (based on SCr of 0.65 mg/dL). according to latest data. Monitor urine output and serial BMP and adjust therapy as needed. Avoid nephrotoxins and renally dose meds for GFR listed above.    Rhabdomyolysis  IVF  Renal function ok  Daily cr      Hepatitis  US normal  Hep panel pending  May be due to rhabdo      SIRS (systemic inflammatory response syndrome)  2/2 rhabo v NSTEMI      SOB (shortness of breath)  Chronic, worsening may be due to NSTEMI  See above  Continue maintenance inhalers      Fall  PT/OT      Gastroesophageal reflux disease  PPI      Insomnia  trazadone      DONALD (generalized anxiety disorder)  Continue home medications      Chronic obstructive pulmonary disease  Patient's COPD is controlled currently.  Patient is currently off COPD Pathway. Continue scheduled inhalers Steroids and Supplemental oxygen and monitor respiratory status closely.       VTE Risk Mitigation (From admission, onward)           Ordered     enoxaparin injection 40 mg  Every 12  hours         11/25/23 1636     IP VTE LOW RISK PATIENT  Once         11/25/23 1525     Place sequential compression device  Until discontinued         11/25/23 1525                    Discharge Planning   VALENTIN:      Code Status: Full Code   Is the patient medically ready for discharge?:     Reason for patient still in hospital (select all that apply): Treatment                     Joe Guillen DO  Department of Hospital Medicine   [facility] Telemetry

## 2023-11-27 NOTE — NURSING
Notified HCP of pt bp 217/112 (147) recheck 205/107 (135) . Labetalol 20mg given by RN per protocol. Bp rechecked in 30min 185/98 (126) HR 94 notified HCP and new order for Nifedipine was obtained. Will continue to monitor for change.

## 2023-11-27 NOTE — PLAN OF CARE
[facility] - Cath Lab  Initial Discharge Assessment       Primary Care Provider: Alondra Bishop FNP    Admission Diagnosis: SOB (shortness of breath) [R06.02]  Fall [W19.XXXA]  NSTEMI (non-ST elevated myocardial infarction) [I21.4]  Chest pain [R07.9]  Fall, subsequent encounter [W19.XXXD]  Traumatic rhabdomyolysis, subsequent encounter [T79.6XXD]    Admission Date: 11/25/2023  Expected Discharge Date:     Transition of Care Barriers: None    Payor: [facility] MCARE / Plan: [facility] DUAL COMPLETE HMO SNP / Product Type: Medicare Advantage /     Extended Emergency Contact Information  Primary Emergency Contact: Damir Low  Home Phone: [phone]  Relation: Other  Preferred language: English   needed? No  Secondary Emergency Contact: Francesco Low  Mobile Phone: [phone]  Relation: Son    Discharge Plan A: Home, Home Health  Discharge Plan B: Home      [facility] [address] [address]  [address] [address]  Phone: [phone] Fax: [phone]    [facility] [place] - [address]  [address] [place] [address]  Phone: [phone] Fax: [phone]      Initial Assessment (most recent)       Adult Discharge Assessment - 11/27/23 1429          Discharge Assessment    Assessment Type Discharge Planning Assessment     Source of Information patient     People in Home alone     Do you expect to return to your current living situation? Yes     Do you have help at home or someone to help you manage your care at home? Yes     Who are your caregiver(s) and their phone number(s)? Francesco Low- Son [phone]     Prior to hospitilization cognitive status: Unable to Assess     Current cognitive status: Alert/Oriented     Walking or Climbing Stairs ambulation difficulty, requires equipment     Mobility Management rollator     Home Accessibility stairs to enter home     Number of Stairs, Main Entrance none     Equipment  Currently Used at Home rollator;oxygen;shower chair     Readmission within 30 days? No     Patient currently being followed by outpatient case management? No     Do you currently have service(s) that help you manage your care at home? Yes     Name and Contact number of agency Pt cannot remember the name of the hh     Is the pt/caregiver preference to resume services with current agency Yes     Do you take prescription medications? Yes     Do you have prescription coverage? Yes     Coverage [facility] Managed Medicare     Do you have any problems affording any of your prescribed medications? No     Is the patient taking medications as prescribed? yes     Who is going to help you get home at discharge? Son     How do you get to doctors appointments? family or friend will provide     Are you on dialysis? No     Do you take coumadin? No     DME Needed Upon Discharge  none     Discharge Plan discussed with: Patient     Transition of Care Barriers None     Discharge Plan A Home;Home Health     Discharge Plan B Home        Physical Activity    On average, how many days per week do you engage in moderate to strenuous exercise (like a brisk walk)? 0 days     On average, how many minutes do you engage in exercise at this level? 0 min        Financial Resource Strain    How hard is it for you to pay for the very basics like food, housing, medical care, and heating? Not hard at all        Housing Stability    In the last 12 months, was there a time when you were not able to pay the mortgage or rent on time? No     In the last 12 months, was there a time when you did not have a steady place to sleep or slept in a shelter (including now)? No        Transportation Needs    In the past 12 months, has lack of transportation kept you from medical appointments or from getting medications? No     In the past 12 months, has lack of transportation kept you from meetings, work, or from getting things needed for daily living? No        Food  Insecurity    Within the past 12 months, you worried that your food would run out before you got the money to buy more. Never true     Within the past 12 months, the food you bought just didn't last and you didn't have money to get more. Never true        Stress    Do you feel stress - tense, restless, nervous, or anxious, or unable to sleep at night because your mind is troubled all the time - these days? Only a little        Social Connections    In a typical week, how many times do you talk on the phone with family, friends, or neighbors? More than three times a week     How often do you get together with friends or relatives? More than three times a week     How often do you attend Evangelical or Sikh services? 1 to 4 times per year     Do you belong to any clubs or organizations such as Evangelical groups, unions, fraternal or athletic groups, or school groups? No     How often do you attend meetings of the clubs or organizations you belong to? Never     Are you , , , , never , or living with a partner?         Alcohol Use    Q1: How often do you have a drink containing alcohol? Never     Q2: How many drinks containing alcohol do you have on a typical day when you are drinking? Patient does not drink     Q3: How often do you have six or more drinks on one occasion? Never                   Pt lives at home alone. Pt states she has hh. FELIPA spoke with [employer], [employer], [employer] and [employer] and pt was not current with any hh agency. Pt uses oxygen, rollator and shower chair. Pt plans to dc back home when medically ready for dc. I.M. obtained and SDOH questions completed. SW will cont to follow for dc needs.

## 2023-11-27 NOTE — PT/OT/SLP PROGRESS
Occupational Therapy      Patient Name:  Darya Low   MRN:  [id number]    Patient not seen today secondary to Off the floor for procedure/surgery. Patient off the floor for heart cath. Will follow-up 11/28/23.    11/27/2023

## 2023-11-27 NOTE — SUBJECTIVE & OBJECTIVE
Past Medical History:   Diagnosis Date    CHF (congestive heart failure)     COPD (chronic obstructive pulmonary disease)        Past Surgical History:   Procedure Laterality Date    ABDOMINAL SURGERY         Review of patient's allergies indicates:   Allergen Reactions    Ondansetron hcl Swelling     Edema of tongue per patient    Celexa [citalopram]     Darvon [propoxyphene]     Flexeril [cyclobenzaprine]     Hydroxyzine     Percocet [oxycodone-acetaminophen]     Robaxin [methocarbamol]     Thorazine [chlorpromazine]     Toradol [ketorolac] Itching       No current facility-administered medications on file prior to encounter.     Current Outpatient Medications on File Prior to Encounter   Medication Sig    clonazePAM (KLONOPIN) 2 MG Tab     EScitalopram oxalate (LEXAPRO) 10 MG tablet Take 1 tablet (10 mg total) by mouth once daily.    ferrous sulfate (FEOSOL) 325 mg (65 mg iron) Tab tablet Take 1 tablet (325 mg total) by mouth 2 (two) times daily.    fluticasone propionate (FLONASE) 50 mcg/actuation nasal spray 2 sprays by Each Nostril route once daily.    gabapentin (NEURONTIN) 100 MG capsule Take 1 capsule (100 mg total) by mouth nightly.    glycopyrrolate-formoteroL (BEVESPI AEROSPHERE) 9-4.8 mcg HFAA Inhale 2 puffs into the lungs 2 (two) times daily. Controller    HYDROcodone-acetaminophen (NORCO)  mg per tablet     montelukast (SINGULAIR) 10 mg tablet Take 1 tablet (10 mg total) by mouth nightly.    multivitamin Tab Take 1 tablet by mouth once daily.    pantoprazole (PROTONIX) 40 MG tablet Take 1 tablet (40 mg total) by mouth once daily.    tiZANidine (ZANAFLEX) 4 MG tablet Take 1 tablet (4 mg total) by mouth 3 (three) times daily as needed.    traZODone (DESYREL) 100 MG tablet Take 1 tablet (100 mg total) by mouth every evening. (Patient taking differently: Take 50 mg by mouth every evening. )     Family History    None       Tobacco Use    Smoking status: Every Day     Types: Cigarettes    Smokeless  tobacco: Never   Substance and Sexual Activity    Alcohol use: Yes    Drug use: Never    Sexual activity: Not on file     Review of Systems   Constitutional: Negative for chills, fever, weight gain and weight loss.   Cardiovascular:  Positive for chest pain and dyspnea on exertion. Negative for irregular heartbeat, leg swelling, near-syncope, orthopnea, palpitations, paroxysmal nocturnal dyspnea and syncope.   Respiratory:  Negative for cough, shortness of breath and wheezing.    Gastrointestinal:  Negative for abdominal pain and diarrhea.     Objective:     Vital Signs (Most Recent):  Temp: 98.4 °F (36.9 °C) (11/26/23 0111)  Pulse: 66 (11/26/23 2245)  Resp: 15 (11/26/23 2245)  BP: (!) 159/88 (11/26/23 2245)  SpO2: (!) 93 % (11/26/23 2245) Vital Signs (24h Range):  Temp:  [98.4 °F (36.9 °C)] 98.4 °F (36.9 °C)  Pulse:  [] 66  Resp:  [9-19] 15  SpO2:  [66 %-99 %] 93 %  BP: (107-170)/() 159/88     Weight: 44.5 kg (98 lb)  Body mass index is 17.36 kg/m².    SpO2: (!) 93 %       No intake or output data in the 24 hours ending 11/26/23 2303    Lines/Drains/Airways       Peripheral Intravenous Line  Duration                  Peripheral IV - Single Lumen 11/25/23 1239 20 G Anterior;Proximal;Right Forearm 1 day                     Physical Exam  Constitutional:       Appearance: She is ill-appearing.      Comments: Thin, appears older than stated age   HENT:      Head: Normocephalic and atraumatic.   Neck:      Comments: No JVD  Cardiovascular:      Rate and Rhythm: Normal rate and regular rhythm.      Pulses: Normal pulses.      Heart sounds: Normal heart sounds.   Pulmonary:      Breath sounds: Wheezing present. No rhonchi or rales.   Musculoskeletal:      Right lower leg: No edema.      Left lower leg: No edema.   Skin:     General: Skin is warm.   Neurological:      Mental Status: She is alert and oriented to person, place, and time.          Significant Labs: All pertinent lab results from the last 24 hours  have been reviewed.    Significant Imaging: Echocardiogram: Transthoracic echo (TTE) complete (Cupid Only):   Results for orders placed during the hospital encounter of 11/25/23    Echo    Interpretation Summary    Left Ventricle: The left ventricle is normal in size. Septal thickening. There is concentric remodeling. Normal wall motion. There is normal systolic function. Biplane (2D) method of discs ejection fraction is 60%. There is normal diastolic function.    Right Ventricle: Normal right ventricular cavity size. Systolic function is normal.    Right Atrium: Normal right atrial size. There is a prominent Eustachian valve. Differential includes RA thrombus.    Aortic Valve: The aortic valve is a trileaflet valve.    Mitral Valve: There is mild regurgitation.    Tricuspid Valve: There is moderate regurgitation.    Pulmonary Artery: The estimated pulmonary artery systolic pressure is 34 mmHg.    IVC/SVC: Normal venous pressure at 3 mmHg.

## 2023-11-27 NOTE — ASSESSMENT & PLAN NOTE
LEN 3  Dapt load then daily  Lovenox 1mg/kg BID  BB  Hold statin for hepatitis and rhabdo  Echo  Nitrates  Morphine  Cardiology consult    C today  Echo looks good. Will ask Cardiology to comment on prominent Eustachian valve given differential

## 2023-11-28 VITALS
WEIGHT: 101.63 LBS | RESPIRATION RATE: 18 BRPM | OXYGEN SATURATION: 92 % | TEMPERATURE: 99 F | HEIGHT: 63 IN | BODY MASS INDEX: 18.01 KG/M2 | DIASTOLIC BLOOD PRESSURE: 47 MMHG | HEART RATE: 88 BPM | SYSTOLIC BLOOD PRESSURE: 105 MMHG

## 2023-11-28 DIAGNOSIS — S32.050A COMPRESSION FRACTURE OF L5 VERTEBRA, INITIAL ENCOUNTER: Primary | ICD-10-CM

## 2023-11-28 PROBLEM — I21.A1 TYPE 2 MI (MYOCARDIAL INFARCTION): Status: ACTIVE | Noted: 2023-11-25

## 2023-11-28 PROBLEM — Z99.81 SUPPLEMENTAL OXYGEN DEPENDENT: Status: ACTIVE | Noted: 2023-11-28

## 2023-11-28 LAB
ALBUMIN SERPL BCP-MCNC: 2.2 G/DL (ref 3.5–5)
ALBUMIN/GLOB SERPL: 0.9 {RATIO}
ALP SERPL-CCNC: 71 U/L (ref 50–130)
ALT SERPL W P-5'-P-CCNC: 76 U/L (ref 13–56)
ANION GAP SERPL CALCULATED.3IONS-SCNC: 6 MMOL/L (ref 7–16)
AST SERPL W P-5'-P-CCNC: 131 U/L (ref 15–37)
BASOPHILS # BLD AUTO: 0.05 K/UL (ref 0–0.2)
BASOPHILS NFR BLD AUTO: 0.7 % (ref 0–1)
BILIRUB SERPL-MCNC: 0.4 MG/DL (ref ?–1.2)
BUN SERPL-MCNC: 4 MG/DL (ref 7–18)
BUN/CREAT SERPL: 9 (ref 6–20)
CALCIUM SERPL-MCNC: 8 MG/DL (ref 8.5–10.1)
CATH EF QUANTITATIVE: 65 %
CHLORIDE SERPL-SCNC: 106 MMOL/L (ref 98–107)
CO2 SERPL-SCNC: 33 MMOL/L (ref 21–32)
CREAT SERPL-MCNC: 0.43 MG/DL (ref 0.55–1.02)
DIFFERENTIAL METHOD BLD: ABNORMAL
EGFR (NO RACE VARIABLE) (RUSH/TITUS): 110 ML/MIN/1.73M2
EOSINOPHIL # BLD AUTO: 0.09 K/UL (ref 0–0.5)
EOSINOPHIL NFR BLD AUTO: 1.3 % (ref 1–4)
ERYTHROCYTE [DISTWIDTH] IN BLOOD BY AUTOMATED COUNT: 15.8 % (ref 11.5–14.5)
GLOBULIN SER-MCNC: 2.5 G/DL (ref 2–4)
GLUCOSE SERPL-MCNC: 74 MG/DL (ref 74–106)
HCT VFR BLD AUTO: 32.2 % (ref 38–47)
HGB BLD-MCNC: 10.6 G/DL (ref 12–16)
IMM GRANULOCYTES # BLD AUTO: 0.03 K/UL (ref 0–0.04)
IMM GRANULOCYTES NFR BLD: 0.4 % (ref 0–0.4)
LYMPHOCYTES # BLD AUTO: 1.2 K/UL (ref 1–4.8)
LYMPHOCYTES NFR BLD AUTO: 17.1 % (ref 27–41)
MAGNESIUM SERPL-MCNC: 2 MG/DL (ref 1.7–2.3)
MCH RBC QN AUTO: 31 PG (ref 27–31)
MCHC RBC AUTO-ENTMCNC: 32.9 G/DL (ref 32–36)
MCV RBC AUTO: 94.2 FL (ref 80–96)
MONOCYTES # BLD AUTO: 0.81 K/UL (ref 0–0.8)
MONOCYTES NFR BLD AUTO: 11.6 % (ref 2–6)
MPC BLD CALC-MCNC: 10.5 FL (ref 9.4–12.4)
NEUTROPHILS # BLD AUTO: 4.82 K/UL (ref 1.8–7.7)
NEUTROPHILS NFR BLD AUTO: 68.9 % (ref 53–65)
NRBC # BLD AUTO: 0 X10E3/UL
NRBC, AUTO (.00): 0 %
PHOSPHATE SERPL-MCNC: 2.3 MG/DL (ref 2.5–4.5)
PLATELET # BLD AUTO: 202 K/UL (ref 150–400)
POTASSIUM SERPL-SCNC: 3.2 MMOL/L (ref 3.5–5.1)
PROT SERPL-MCNC: 4.7 G/DL (ref 6.4–8.2)
RBC # BLD AUTO: 3.42 M/UL (ref 4.2–5.4)
SODIUM SERPL-SCNC: 142 MMOL/L (ref 136–145)
WBC # BLD AUTO: 7 K/UL (ref 4.5–11)

## 2023-11-28 PROCEDURE — 27000221 HC OXYGEN, UP TO 24 HOURS

## 2023-11-28 PROCEDURE — 80053 COMPREHEN METABOLIC PANEL: CPT | Performed by: INTERNAL MEDICINE

## 2023-11-28 PROCEDURE — 85025 COMPLETE CBC W/AUTO DIFF WBC: CPT | Performed by: INTERNAL MEDICINE

## 2023-11-28 PROCEDURE — 25000003 PHARM REV CODE 250: Performed by: STUDENT IN AN ORGANIZED HEALTH CARE EDUCATION/TRAINING PROGRAM

## 2023-11-28 PROCEDURE — 25000242 PHARM REV CODE 250 ALT 637 W/ HCPCS: Performed by: STUDENT IN AN ORGANIZED HEALTH CARE EDUCATION/TRAINING PROGRAM

## 2023-11-28 PROCEDURE — 97165 OT EVAL LOW COMPLEX 30 MIN: CPT

## 2023-11-28 PROCEDURE — 25000003 PHARM REV CODE 250: Performed by: INTERNAL MEDICINE

## 2023-11-28 PROCEDURE — 99239 HOSP IP/OBS DSCHRG MGMT >30: CPT | Mod: GW,HPC,, | Performed by: INTERNAL MEDICINE

## 2023-11-28 PROCEDURE — 97162 PT EVAL MOD COMPLEX 30 MIN: CPT

## 2023-11-28 PROCEDURE — 99239 PR HOSPITAL DISCHARGE DAY,>30 MIN: ICD-10-PCS | Mod: GW,HPC,, | Performed by: INTERNAL MEDICINE

## 2023-11-28 PROCEDURE — 63600175 PHARM REV CODE 636 W HCPCS: Performed by: STUDENT IN AN ORGANIZED HEALTH CARE EDUCATION/TRAINING PROGRAM

## 2023-11-28 PROCEDURE — 99900035 HC TECH TIME PER 15 MIN (STAT)

## 2023-11-28 PROCEDURE — 83735 ASSAY OF MAGNESIUM: CPT | Performed by: STUDENT IN AN ORGANIZED HEALTH CARE EDUCATION/TRAINING PROGRAM

## 2023-11-28 PROCEDURE — 94761 N-INVAS EAR/PLS OXIMETRY MLT: CPT

## 2023-11-28 PROCEDURE — 94640 AIRWAY INHALATION TREATMENT: CPT

## 2023-11-28 PROCEDURE — 84100 ASSAY OF PHOSPHORUS: CPT | Performed by: STUDENT IN AN ORGANIZED HEALTH CARE EDUCATION/TRAINING PROGRAM

## 2023-11-28 RX ORDER — NAPROXEN SODIUM 220 MG/1
81 TABLET, FILM COATED ORAL DAILY
Qty: 360 TABLET | Refills: 0 | Status: SHIPPED | OUTPATIENT
Start: 2023-11-29 | End: 2024-03-18

## 2023-11-28 RX ORDER — NIFEDIPINE 60 MG/1
60 TABLET, EXTENDED RELEASE ORAL DAILY
Qty: 30 TABLET | Refills: 11 | Status: ON HOLD | OUTPATIENT
Start: 2023-11-29 | End: 2024-04-02 | Stop reason: HOSPADM

## 2023-11-28 RX ORDER — POLYETHYLENE GLYCOL 3350 17 G/17G
17 POWDER, FOR SOLUTION ORAL DAILY
Status: DISCONTINUED | OUTPATIENT
Start: 2023-11-28 | End: 2023-11-28 | Stop reason: HOSPADM

## 2023-11-28 RX ORDER — PREDNISONE 10 MG/1
10 TABLET ORAL DAILY
Qty: 5 TABLET | Refills: 0 | Status: SHIPPED | OUTPATIENT
Start: 2023-11-29 | End: 2023-12-04

## 2023-11-28 RX ORDER — POTASSIUM CHLORIDE 20 MEQ/1
40 TABLET, EXTENDED RELEASE ORAL ONCE
Status: COMPLETED | OUTPATIENT
Start: 2023-11-28 | End: 2023-11-28

## 2023-11-28 RX ADMIN — PANTOPRAZOLE SODIUM 40 MG: 40 TABLET, DELAYED RELEASE ORAL at 09:11

## 2023-11-28 RX ADMIN — PREDNISONE 10 MG: 10 TABLET ORAL at 09:11

## 2023-11-28 RX ADMIN — NIFEDIPINE 60 MG: 30 TABLET, FILM COATED, EXTENDED RELEASE ORAL at 09:11

## 2023-11-28 RX ADMIN — ESCITALOPRAM OXALATE 10 MG: 10 TABLET ORAL at 09:11

## 2023-11-28 RX ADMIN — LEVALBUTEROL HYDROCHLORIDE 1.25 MG: 1.25 SOLUTION RESPIRATORY (INHALATION) at 12:11

## 2023-11-28 RX ADMIN — OXYCODONE AND ACETAMINOPHEN 1 TABLET: 7.5; 325 TABLET ORAL at 06:11

## 2023-11-28 RX ADMIN — BUDESONIDE 0.5 MG: 0.25 SUSPENSION RESPIRATORY (INHALATION) at 07:11

## 2023-11-28 RX ADMIN — POTASSIUM CHLORIDE 40 MEQ: 1500 TABLET, EXTENDED RELEASE ORAL at 09:11

## 2023-11-28 RX ADMIN — IPRATROPIUM BROMIDE 0.5 MG: 0.5 SOLUTION RESPIRATORY (INHALATION) at 07:11

## 2023-11-28 RX ADMIN — ENOXAPARIN SODIUM 40 MG: 100 INJECTION SUBCUTANEOUS at 06:11

## 2023-11-28 RX ADMIN — LEVALBUTEROL HYDROCHLORIDE 1.25 MG: 1.25 SOLUTION RESPIRATORY (INHALATION) at 07:11

## 2023-11-28 RX ADMIN — IPRATROPIUM BROMIDE 0.5 MG: 0.5 SOLUTION RESPIRATORY (INHALATION) at 12:11

## 2023-11-28 RX ADMIN — METOPROLOL TARTRATE 25 MG: 25 TABLET, FILM COATED ORAL at 09:11

## 2023-11-28 RX ADMIN — ASPIRIN 81 MG CHEWABLE TABLET 81 MG: 81 TABLET CHEWABLE at 09:11

## 2023-11-28 RX ADMIN — CLOPIDOGREL BISULFATE 75 MG: 75 TABLET ORAL at 09:11

## 2023-11-28 NOTE — CONSULTS
Office: [phone]    Orthopedic Spine Surgery Consult/H&P    ASSESSMENT:  63 y.o. female with multiple compression fractures (L1, L3, L4 and L5)    PLAN:  TLSO brace   No heavy Lifting over 10 lb or strenuous activity  Nicotine dependence discussed smoking cessation  Would have liked to have gotten a CT of her spine the patient is refusing anymore testing wants to go home - plans for discharge today  Follow-up with ortho spine 2 weeks    HPI:  63 y.o. female with lower back pain.  Recent admission 11/25 after a fall.  Patient reports she was taking her medications and then was on the floor.  Reports a long history of lower back pain and has some radiculopathy into the right leg but has never had a fracture to her knowledge in the past.  Upon this admission patient dx with NSTEMI and chest pain.  Hx of COPD and on home oxygen and oral Steroids.      Past Medical History:   Diagnosis Date    CHF (congestive heart failure)     COPD (chronic obstructive pulmonary disease)       Past Surgical History:   Procedure Laterality Date    ABDOMINAL SURGERY      LEFT HEART CATHETERIZATION N/A 11/27/2023    Procedure: Left heart cath;  Surgeon: Uliess Crespo DO;  Location: [facility] CATH LAB;  Service: Cardiology;  Laterality: N/A;      Review of patient's allergies indicates:   Allergen Reactions    Ondansetron hcl Swelling     Edema of tongue per patient    Celexa [citalopram]     Darvon [propoxyphene]     Flexeril [cyclobenzaprine]     Hydroxyzine     Percocet [oxycodone-acetaminophen]     Robaxin [methocarbamol]     Thorazine [chlorpromazine]     Toradol [ketorolac] Itching        Current Facility-Administered Medications:     0.9%  NaCl infusion, , , Continuous PRN, Ulises Crespo DO, Stopped at 11/27/23 1450    aspirin chewable tablet 81 mg, 81 mg, Oral, Daily, Joe Guillen DO, 81 mg at 11/28/23 0919    budesonide nebulizer solution 0.5 mg, 0.5 mg, Nebulization, Q12H, Joe Guillen DO, 0.5 mg at 11/28/23  0743    clonazePAM tablet 0.5 mg, 0.5 mg, Oral, Daily PRN, Joe Guillen DO    clopidogreL tablet 75 mg, 75 mg, Oral, Daily, Joe Guillen DO, 75 mg at 11/28/23 0919    dextrose 10% bolus 125 mL 125 mL, 12.5 g, Intravenous, PRN, Joe Guillen DO    dextrose 10% bolus 250 mL 250 mL, 25 g, Intravenous, PRN, Joe Guillen DO    enoxaparin injection 40 mg, 40 mg, Subcutaneous, Q12H (treatment, non-standard time), Joe Guillen DO, 40 mg at 11/28/23 0600    EScitalopram oxalate tablet 10 mg, 10 mg, Oral, Daily, Joe Guillen DO, 10 mg at 11/28/23 0919    gabapentin capsule 100 mg, 100 mg, Oral, Nightly, Joe Guillen DO, 100 mg at 11/27/23 2200    glucagon (human recombinant) injection 1 mg, 1 mg, Intramuscular, PRN, Joe Guillen DO    glucose chewable tablet 16 g, 16 g, Oral, PRNWilmer Thomas, DO    glucose chewable tablet 24 g, 24 g, Oral, PRN, Joe Guillen DO    ipratropium 0.02 % nebulizer solution 0.5 mg, 0.5 mg, Nebulization, Q6H, Joe Guillen DO, 0.5 mg at 11/28/23 0730    labetaloL injection 20 mg, 20 mg, Intravenous, Q6H PRNWilmer Thomas, DO, 20 mg at 11/27/23 0728    levalbuterol nebulizer solution 1.25 mg, 1.25 mg, Nebulization, Q8H, Joe Guillen DO, 1.25 mg at 11/28/23 0730    metoprolol tartrate (LOPRESSOR) tablet 25 mg, 25 mg, Oral, BID, Joe Guillen DO, 25 mg at 11/28/23 0919    morphine injection 4 mg, 4 mg, Intravenous, Q4H PRN, Joe Guillen DO, 4 mg at 11/26/23 2044    naloxone 0.4 mg/mL injection 0.02 mg, 0.02 mg, Intravenous, PRNWilmer Thomas, DO    NIFEdipine 24 hr tablet 60 mg, 60 mg, Oral, Daily, Joe Guillen DO, 60 mg at 11/28/23 0919    nitroGLYCERIN SL tablet 0.4 mg, 0.4 mg, Sublingual, Q5 Min PRN, Joe Guillen DO    oxyCODONE-acetaminophen 7.5-325 mg per tablet 1 tablet, 1 tablet, Oral, Q4H PRN, Joe Guillen DO, 1 tablet at 11/28/23 0606    pantoprazole EC tablet 40 mg, 40 mg, Oral, Daily, Joe Guillen DO, 40 mg at 11/28/23 0919    predniSONE  tablet 10 mg, 10 mg, Oral, Daily, Joe Guillen DO, 10 mg at 11/28/23 0919    sodium chloride 0.9% flush 10 mL, 10 mL, Intravenous, Q12H PRN, Joe Guillen DO    sodium chloride 0.9% flush 10 mL, 10 mL, Intravenous, PRN, Dc Cannon MD    traZODone tablet 50 mg, 50 mg, Oral, Nightly PRN, Joe Guillen DO, 50 mg at 11/27/23 2200     Review of systems:  Denies chest pain, nausea,vomiting, abdominal pain, cough, runny nose, eye pain, ear pain, fevers, chills, weight loss, weight gain, dysuria, hematuria, changes in mood    IMAGING:  EXAMINATION:  XR LUMBAR SPINE AP AND LATERAL     CLINICAL HISTORY:  history of pathologic fracture, fall. Concern for fracture;     TECHNIQUE:  AP, lateral and spot images were performed of the lumbar spine.     COMPARISON:  CT of the lumbar spine 04/27/2022     FINDINGS:  There are age indeterminate superior endplate compression deformities of L1 with minimal height loss, L3 with about 30% superior endplate height loss, L4 10-15% height loss, and L5 10% height loss.  No distinct acute fracture line is confidently seen by this radiograph.  Degenerative endplate and facet changes throughout.  Osteopenia.     Impression:     Multiple compression deformities in the lumbar spine as detailed new since 04/27/2022.  Correlate with patient symptoms.  CT could be of value.        Electronically signed by: Arya Morales  Date:                                            11/25/2023  Time:                                           16:57           Exam Ended: 11/25/23 16:54 Last Resulted: 11/25/23 16:57       Order Details        View Encounter        Lab and Collection Details        Routing        Result History     View All Conversations on this Encounter           Result Care Coordination      Patient Communication     Add Comments   Not seen Back to Top             X-Ray Lumbar Spine Ap And Lateral: Patient Communication     Add Comments   Not seen     External Result Report    External Result  Report     Narrative & Impression    EXAMINATION:  XR LUMBAR SPINE AP AND LATERAL     CLINICAL HISTORY:  history of pathologic fracture, fall. Concern for fracture;     TECHNIQUE:  AP, lateral and spot images were performed of the lumbar spine.     COMPARISON:  CT of the lumbar spine 04/27/2022     FINDINGS:  There are age indeterminate superior endplate compression deformities of L1 with minimal height loss, L3 with about 30% superior endplate height loss, L4 10-15% height loss, and L5 10% height loss.  No distinct acute fracture line is confidently seen by this radiograph.  Degenerative endplate and facet changes throughout.  Osteopenia.     Impression:     Multiple compression deformities in the lumbar spine as detailed new since 04/27/2022.  Correlate with patient symptoms.  CT could be of value.        Electronically signed by: Arya Morales  Date:                                            11/25/2023  Time:                                           16:57    Encounter    View Encounter        Cardiac catheterization    The ejection fraction was calculated to be 65%.    The left ventricular end diastolic pressure was normal.    The pre-procedure left ventricular end diastolic pressure was 6.    The estimated blood loss was none.    There was non-obstructive coronary artery disease..    The procedure log was documented by Documenter: Velia Vazquez RN and   verified by Ulises Crespo DO.    Date: 11/27/2023  Time: 2:55 PM    Normal LV systolic function, EF 65%  Non-Obstructive CAD       Vitals:    11/28/23 1004   BP: (!) 105/47   Pulse: 88   Resp: 18   Temp: 98.8 °F (37.1 °C)        EXAM:  Constitutional  General Appearance:  chronically ill/frail  NAD  Psychiatric   Orientation: Oriented to time, oriented to place, oriented to person  Mood and Affect: Active and alert, anxious  mood, normal affect  Gait and Station  LUMBAR  Musculoskeletal System   Hips: Normal appearance, no leg length discrepancy, decreased  motion; left, decreased motion; right    Lumbar Spine                   Inspection:  decreased alignment, normal sagittal balance                  Range of motion: decreased flexion, extension, lateral bending, rotation. Pain with range of motion                  Bony Palpation of the Lumbar Spine:  No tenderness of the spinous process, no tenderness of the sacrum, no tenderness of the coccyx                  Bony Palpation of the Right Hip:  No tenderness of the iliac crest, no tenderness of the sciatic notch, no tenderness of the SI joint                  Bony Palpation of the Left Hip:  No tenderness of the iliac crest, no tenderness of the sciatic notch, no tenderness of the SI joint                  Soft Tissue Palpation on the Right:  No tenderness of the paraspinal region, no tenderness of the iliolumbar region                  Soft Tissue Palpation on the Left:  No tenderness of the paraspinal region, no tenderness of the iliolumbar region    Motor Strength   L1 Right:  Hip flexion iliopsoas 4/5    L1 Left:  Hip flexion iliopsoas 4/5              L2-L4 Right:  Knee extension quadriceps 5/5, tibialis anterior 5/5              L2-L4 Left:  Knee extension quadriceps 5/5, tibialis anterior 5/5   L5 Right:  Extensor hallucis llongus 5/5,    L5 Left:  Extensor hallucis longus 5/5,    S1 Right:  Plantar flexion gastrocnemius 5/5   S1 Left:  Plantar flexion gastrocnemius 5/5    Neurological System   Ankle Reflex Right:  normal   Ankle Reflex Left: normal   Knee Reflex Right:  normal   Knee Reflex Left:  normal   Sensation on the Right:  L2 normal, L3 normal, L4 normal, L5 normal, S1 normal   Sensation on the Left:  L2 normal, L3 normal, L4 normal, L5 normal, S1 normal              Special Test on the Right:  Seated straight leg raising test negative, no clonus of the ankle              Special Test on the Left:  Seated straight leg raising test negative, no clonus of the ankle    Skin   Lumbosacral Spine:  Normal  skin    Cardiovascular System   Arterial Pulses Right:  Posterior tibialis normal, dorsalis pedis normal   Arterial Pulses Left:  Posterior tibialis normal, dorsalis pedis normal   Edema Right: None   Edema Left:  None

## 2023-11-28 NOTE — ASSESSMENT & PLAN NOTE
Nutrition consulted. Most recent weight and BMI monitored-     Measurements:  Wt Readings from Last 1 Encounters:   11/27/23 46.1 kg (101 lb 10.1 oz)   Body mass index is 18 kg/m².    Patient has been screened and assessed by RD.    Malnutrition Type:  Context: chronic illness  Level: moderate    Malnutrition Characteristic Summary:  Energy Intake (Malnutrition): less than or equal to 50% for greater than or equal to 1 month    Interventions/Recommendations (treatment strategy):  Recommend advance diet as medically appropriate. Recommend Regular diet. Also recommend addition of Boost Breeze TID to improve kcal/protein intakes to better meet nutritional needs. If unable to advance diet x 24-48 hours, recommend consider alternate route of nutrition.

## 2023-11-28 NOTE — NURSING
Pt refuses to have CT of spine that was ordered by the doctor due to a complain that she made of pain and now she just wants to go home. Notified HCP of pt wishes

## 2023-11-28 NOTE — ASSESSMENT & PLAN NOTE
Ortho-spine consulted.   X-ray L-spine showed compression fractures, CT recommended and ordered per ortho-spine team but patient refusing.   PT/OT consulted.

## 2023-11-28 NOTE — ASSESSMENT & PLAN NOTE
Patient with acute kidney injury/acute renal failure likely due to  rhabdo  KRISTIN is currently improving. Baseline creatinine  0.6  - Labs reviewed- Renal function/electrolytes with Estimated Creatinine Clearance: 98.7 mL/min (A) (based on SCr of 0.43 mg/dL (L)). according to latest data. Monitor urine output and serial BMP and adjust therapy as needed. Avoid nephrotoxins and renally dose meds for GFR listed above.    Resolved.

## 2023-11-28 NOTE — PT/OT/SLP EVAL
Physical Therapy Evaluation     Patient Name: Dayra Low   MRN: [id number]  Recent Surgery: Procedure(s) (LRB):  Left heart cath (N/A) 1 Day Post-Op    Recommendations:     Discharge Recommendations: Low Intensity Therapy   Discharge Equipment Recommendations: none   Barriers to discharge: Increased level of assist, Decreased caregiver support, and Ongoing medical treatment    Assessment:     Darya Low is a 63 y.o. female admitted with a medical diagnosis of Type 2 MI (myocardial infarction). She presents with the following impairments/functional limitations: weakness, impaired endurance, impaired functional mobility, gait instability, decreased safety awareness, pain. Pt lives alone and currently req increased assistance with mobility and would benefit from some level of assistance and supervision at d/c.    Rehab Prognosis: Good; patient would benefit from acute PT services to address these deficits and reach maximum level of function.    Plan:     During this hospitalization, patient to be seen 5 x/week to address the above listed problems via gait training, therapeutic activities, therapeutic exercises, neuromuscular re-education    Plan of Care Expires: 12/28/23    Subjective     Chief Complaint: NSTEMI  Patient Comments/Goals: agreeable  Pain/Comfort:  Pain Rating 1: 5/10  Location 1: back  Pain Addressed 1: Reposition, Distraction  Pain Rating Post-Intervention 1: 5/10    Social History:  Living Environment: Patient lives alone in a single story home with ramped  Prior Level of Function: Prior to admission, patient  Mod I with use of rollator inside and SPC outside  Equipment Used at Home: rollator, cane, straight, oxygen  DME owned (not currently used): none  Assistance Upon Discharge: facility staff    Objective:     Communicated with SHELLIE Martínez LPN prior to session. Patient found HOB elevated with oxygen, peripheral IV, PureWick, telemetry upon PT entry to room.    General Precautions: Standard, fall    Orthopedic Precautions: N/A   Braces: N/A    Respiratory Status: Nasal cannula, flow 3 L/min    Exams:  Cognition: Patient is oriented to Person, Place, Time, Situation  RLE ROM: WFL  RLE Strength: WFL  LLE ROM: WFL  LLE Strength: WFL  Gross Motor Coordination: WFL    Functional Mobility:  Gait belt applied - Yes  Bed Mobility  Supine to Sit: minimum assistance for trunk management  Transfers  Sit to Stand: contact guard assistance and minimum assistance with rolling walker and with cues for hand placement and foot placement  Gait  Patient ambulated 40ft with rolling walker and contact guard assistance. Patient demonstrates occasional unsteady gait, decreased step length, wide base of support, flexed posture, and decreased mirlande. . All lines remained intact throughout ambulation trail.  Balance  Sitting: stand by assistance  Standing: contact guard assistance      Therapeutic Activities and Exercises:   Patient educated on role of acute care PT and PT POC, safety while in hospital including calling nurse for mobility, and call light usage  Patient educated about importance of OOB mobility and remaining up in chair most of the day.  Patient educated about pursed lip breathing technique and cued for use with mobility.      AM-PAC 6 CLICK MOBILITY  Total Score:18    Patient left up in chair with all lines intact, call button in reach, RN notified, and OTR present.    GOALS:   Multidisciplinary Problems       Physical Therapy Goals          Problem: Physical Therapy    Goal Priority Disciplines Outcome Goal Variances Interventions   Physical Therapy Goal     PT, PT/OT Ongoing, Progressing     Description: Short Term Goals to be met by: 23    Patient will increase functional independence with mobility by performin. Supine to sit with Stand by assist  2. Sit to stand transfer with Stand by assist using Rolling walker  3. Bed to chair transfer with Stand by assist using Rolling walker  4. Gait  x 100 feet  with Stand by assist using Rolling walker  5. Lower extremity exercise program x30 reps per handout, with assistance as needed    Long Term Goals to be met by: 12/28/23    Pt will regain full independent functional mobility with Rolling walker to return to home situation and prior activities of daily living.                        History:     Past Medical History:   Diagnosis Date    CHF (congestive heart failure)     COPD (chronic obstructive pulmonary disease)        Past Surgical History:   Procedure Laterality Date    ABDOMINAL SURGERY      LEFT HEART CATHETERIZATION N/A 11/27/2023    Procedure: Left heart cath;  Surgeon: Ulises Crespo DO;  Location: [facility] CATH LAB;  Service: Cardiology;  Laterality: N/A;       Time Tracking:     PT Received On: 11/28/23  PT Start Time: 1106  PT Stop Time: 1120  PT Total Time (min): 14 min     Billable Minutes: Evaluation moderate complexity    11/28/2023

## 2023-11-28 NOTE — ASSESSMENT & PLAN NOTE
No chest pain but significantly up trending troponin on presentation without concerning EKG findings.   Echo showed normal wall motion, EF normal as well.   Cardiology consulted; s/p LHC which showed mild non-obs CAD.  Started on aspirin but will discontinue Lovenox, Plavix and Metoprolol (given advanced COPD), not on statin due to elevated LFT's which is likely from rhabdo.  Recommend close outpatient follow up with PCP.

## 2023-11-28 NOTE — PLAN OF CARE
Problem: Physical Therapy  Goal: Physical Therapy Goal  Description: Short Term Goals to be met by: 23    Patient will increase functional independence with mobility by performin. Supine to sit with Stand by assist  2. Sit to stand transfer with Stand by assist using Rolling walker  3. Bed to chair transfer with Stand by assist using Rolling walker  4. Gait  x 100 feet with Stand by assist using Rolling walker  5. Lower extremity exercise program x30 reps per handout, with assistance as needed    Long Term Goals to be met by: 23    Pt will regain full independent functional mobility with Rolling walker to return to home situation and prior activities of daily living.   Outcome: Ongoing, Progressing     PT eval completed. Please see eval note for details.

## 2023-11-28 NOTE — PLAN OF CARE
[facility] Telemetry  Discharge Final Note    Primary Care Provider: Alondra Bishop FNP    Expected Discharge Date: 11/28/2023    Final Discharge Note (most recent)       Final Note - 11/28/23 1022          Final Note    Assessment Type Final Discharge Note     Anticipated Discharge Disposition Home or Self Care     What phone number can be called within the next 1-3 days to see how you are doing after discharge? [phone]        Post-Acute Status    Discharge Delays None known at this time                     Important Message from Medicare  Important Message from Medicare regarding Discharge Appeal Rights: Explained to patient/caregiver, Signed/date by patient/caregiver     Date IMM was signed: 11/27/23  Time IMM was signed: 1342    Contact Info       Alondra Bishop FNP   Specialty: Family Medicine   Relationship: PCP - General    [address] [place] [address]/[facility] [place] [address]   Phone: [phone]       Next Steps: Schedule an appointment as soon as possible for a visit in 1 week(s)          Pt to dc home today, no needs.

## 2023-11-28 NOTE — PLAN OF CARE
Problem: Adult Inpatient Plan of Care  Goal: Plan of Care Review  Outcome: Ongoing, Progressing  Goal: Patient-Specific Goal (Individualized)  Outcome: Ongoing, Progressing  Goal: Absence of Hospital-Acquired Illness or Injury  Outcome: Ongoing, Progressing  Goal: Optimal Comfort and Wellbeing  Outcome: Ongoing, Progressing  Goal: Readiness for Transition of Care  Outcome: Ongoing, Progressing     Problem: Gas Exchange Impaired  Goal: Optimal Gas Exchange  Outcome: Ongoing, Progressing     Problem: Skin Injury Risk Increased  Goal: Skin Health and Integrity  Outcome: Ongoing, Progressing     Problem: Fluid and Electrolyte Imbalance (Acute Kidney Injury/Impairment)  Goal: Fluid and Electrolyte Balance  Outcome: Ongoing, Progressing     Problem: Oral Intake Inadequate (Acute Kidney Injury/Impairment)  Goal: Optimal Nutrition Intake  Outcome: Ongoing, Progressing     Problem: Renal Function Impairment (Acute Kidney Injury/Impairment)  Goal: Effective Renal Function  Outcome: Ongoing, Progressing     Problem: Fluid Imbalance (Pneumonia)  Goal: Fluid Balance  Outcome: Ongoing, Progressing     Problem: Infection (Pneumonia)  Goal: Resolution of Infection Signs and Symptoms  Outcome: Ongoing, Progressing

## 2023-11-28 NOTE — DISCHARGE SUMMARY
"[facility] - [facility] Medicine  Discharge Summary      Patient Name: Darya Low  MRN: [id number]  RACHELE: [id number]  Patient Class: IP- Inpatient  Admission Date: 11/25/2023  Hospital Length of Stay: 3 days  Discharge Date and Time:  11/28/2023 9:43 AM  Attending Physician: Marcelino Otero MD   Discharging Provider: MARCELINO OTERO MD  Primary Care Provider: Alondra Bishop FNP    Primary Care Team: Networked reference to record PCT     HPI:   63yowf with pmh of COPD on home oxygen on oral steroids, anxiety, pathologic fracture, tobacco use, GERD who presents after fall.  "I took my night time medicine and the next thing I remember I was on the floor".  Takes klonopin, tizanidine and trazadone at home.  Denies any narcotic use.  She does not know how long she was down.  She reports low back pain since her fall along with substernal chest tightness.  Pain rated as 5/10, no alleviating factors, worse with coughing. Denies radiation, has been constant since fall.  Reports palpitations for some time.  Is chronically sob but believes this is worse recently. Denies any increased sputum production or purulence.  +subjective fevers, no chills. No nvd.  Constipation for 6 days. ROS otherwise negative.     Procedure(s) (LRB):  Left heart cath (N/A)      Hospital Course:   11/26-plan for [facility] tomorrow  11/27-[facility] today. Feels ok  11/28- [facility] with non-obs CAD, stable now. Discharge home today.      Goals of Care Treatment Preferences:  Code Status: Full Code      Consults:   Consults (From admission, onward)          Status Ordering Provider     Inpatient consult to Orthopedic Surgery  Once        Provider:  Logan Conway MD    Completed LARRY ARAIZA     Inpatient consult to Cardiology  Once        Provider:  (Not yet assigned)    Completed LARRY ARAIZA     Inpatient consult to Registered Dietitian/Nutritionist  Once        Provider:  (Not yet assigned)    Completed TEODORA, " LARRY            Orthopedic  Fall  Ortho-spine consulted.   X-ray L-spine showed compression fractures, CT recommended and ordered per ortho-spine team but patient refusing.   PT/OT consulted.          Final Active Diagnoses:    Diagnosis Date Noted POA    PRINCIPAL PROBLEM:  Type 2 MI (myocardial infarction) [I21.A1] 11/25/2023 Yes    Supplemental oxygen dependent [Z99.81] 11/28/2023 Not Applicable    ARF (acute renal failure) [N17.9] 11/26/2023 Yes    Fall [W19.XXXA] 11/25/2023 Yes    SOB (shortness of breath) [R06.02] 11/25/2023 Yes    SIRS (systemic inflammatory response syndrome) [R65.10] 11/25/2023 Yes    Hepatitis [K75.9] 11/25/2023 Yes    Non-traumatic rhabdomyolysis [M62.82] 11/25/2023 Yes    Protein calorie malnutrition [E46] 11/25/2023 Yes    Insomnia [G47.00] 04/15/2021 Yes    Gastroesophageal reflux disease [K21.9] 04/15/2021 Yes    Chronic obstructive pulmonary disease [J44.9] 03/24/2021 Yes    DONALD (generalized anxiety disorder) [F41.1] 03/24/2021 Yes      Problems Resolved During this Admission:       Discharged Condition: fair    Disposition: Home or Self Care    Follow Up:   Follow-up Information       Alondra Bishop FNP. Schedule an appointment as soon as possible for a visit in 1 week(s).    Specialty: Family Medicine  Contact information:  [address] [place] [address]/[facility] [id number] [phone]                           Patient Instructions:   No discharge procedures on file.    Significant Diagnostic Studies: N/A    Pending Diagnostic Studies:       Procedure Component Value Units Date/Time    CT Cervical Spine Without Contrast [[id number]]     Order Status: Sent Lab Status: No result     CT Lumbar Spine Without Contrast [[id number]]     Order Status: Sent Lab Status: No result     CT Thoracic Spine Without Contrast [[id number]]     Order Status: Sent Lab Status: No result            Medications:  Reconciled Home Medications:      Medication List         START taking these medications      aspirin 81 MG Chew  Take 1 tablet (81 mg total) by mouth once daily.  Start taking on: November 29, 2023     NIFEdipine 60 MG (OSM) 24 hr tablet  Commonly known as: PROCARDIA-XL  Take 1 tablet (60 mg total) by mouth once daily.  Start taking on: November 29, 2023     predniSONE 10 MG tablet  Commonly known as: DELTASONE  Take 1 tablet (10 mg total) by mouth once daily. for 5 days  Start taking on: November 29, 2023            CONTINUE taking these medications      clonazePAM 2 MG Tab  Commonly known as: KlonoPIN     EScitalopram oxalate 10 MG tablet  Commonly known as: LEXAPRO  Take 1 tablet (10 mg total) by mouth once daily.     ferrous sulfate 325 mg (65 mg iron) Tab tablet  Commonly known as: FEOSOL  Take 1 tablet (325 mg total) by mouth 2 (two) times daily.     fluticasone propionate 50 mcg/actuation nasal spray  Commonly known as: FLONASE  2 sprays by Each Nostril route once daily.     gabapentin 100 MG capsule  Commonly known as: NEURONTIN  Take 1 capsule (100 mg total) by mouth nightly.     glycopyrrolate-formoteroL 9-4.8 mcg Hfaa  Commonly known as: BEVESPI AEROSPHERE  Inhale 2 puffs into the lungs 2 (two) times daily. Controller     HYDROcodone-acetaminophen  mg per tablet  Commonly known as: NORCO     montelukast 10 mg tablet  Commonly known as: SINGULAIR  Take 1 tablet (10 mg total) by mouth nightly.     multivitamin Tab  Take 1 tablet by mouth once daily.     pantoprazole 40 MG tablet  Commonly known as: PROTONIX  Take 1 tablet (40 mg total) by mouth once daily.     tiZANidine 4 MG tablet  Commonly known as: ZANAFLEX  Take 1 tablet (4 mg total) by mouth 3 (three) times daily as needed.     traZODone 100 MG tablet  Commonly known as: DESYREL  Take 1 tablet (100 mg total) by mouth every evening.              Indwelling Lines/Drains at time of discharge:   Lines/Drains/Airways       None                   Time spent on the discharge of patient: 35 minutes          RENETTA DIOR MD  Department of Hospital Medicine  [facility] - [address] [place] [address]

## 2023-11-29 ENCOUNTER — PATIENT OUTREACH (OUTPATIENT)
Dept: ADMINISTRATIVE | Facility: CLINIC | Age: 63
End: 2023-11-29

## 2023-11-29 NOTE — PLAN OF CARE
Problem: Occupational Therapy  Goal: Occupational Therapy Goal  Description: STG:  Pt will perform grooming with setup  Pt will bathe with min a  Pt will perform UE dressing with (I)  Pt will perform LE dressing with (I)  Pt will transfer bed/chair/bsc with Mod I  Pt will perform standing task x 2 min with Mod I  Pt will tolerate 15 minutes of tx without fatigue      LT.Restore to max I with self care and mobility.     Outcome: Ongoing, Progressing

## 2023-11-29 NOTE — PT/OT/SLP EVAL
Occupational Therapy   Evaluation    Name: Darya Low  MRN: [id number]  Admitting Diagnosis: Type 2 MI (myocardial infarction)  Recent Surgery: Procedure(s) (LRB):  Left heart cath (N/A) 2 Days Post-Op    Recommendations:     Discharge Recommendations: Low Intensity Therapy  Discharge Equipment Recommendations:  none  Barriers to discharge:  None    Assessment:     Darya Low is a 63 y.o. female with a medical diagnosis of Type 2 MI (myocardial infarction).  She presents with complaint of back pain.Pt agreed to OT evaluation. Performance deficits affecting function: weakness, impaired endurance, impaired self care skills, impaired functional mobility, pain.      Rehab Prognosis: Good; patient would benefit from acute skilled OT services to address these deficits and reach maximum level of function.       Plan:     Patient to be seen 5 x/week to address the above listed problems via self-care/home management, therapeutic exercises, therapeutic activities  Plan of Care Expires:    Plan of Care Reviewed with: patient    Subjective     Chief Complaint: Type 2 MI  Patient/Family Comments/goals: To return home    Occupational Profile:  Living Environment: Pt lives alone in 1 story home with a ramp  Previous level of function: Pt reports being (I) with self care prior  Roles and Routines: I with daily activities  Equipment Used at Home: cane, straight, rollator, oxygen  Assistance upon Discharge: Friends and neighbors    Pain/Comfort:       Patients cultural, spiritual, Quaker conflicts given the current situation: no    Objective:     Communicated with: PT Eladia Lennon who consulted with nurse prior to session.  Patient found up in chair with oxygen, peripheral IV, telemetry upon OT entry to room.    General Precautions: Standard, fall  Orthopedic Precautions: N/A  Braces:  (pt had a back brace delivered)  Respiratory Status: Nasal cannula, flow 3 L/min    Occupational Performance:    Bed Mobility:         Functional Mobility/Transfers:  Patient completed Sit <> Stand Transfer with contact guard assistance  with  gait belt to stand to RW   Functional Mobility: NT    Activities of Daily Living:  Grooming: independence combing hair  Bathing: I with UE anterior bathing, min a bathing back crossing large towel over shoulders, Pt stood with CGA for perineum/buttock bathing, I with bathing legs and min a with feet .  Upper Body Dressing: independence donning pullover shirt  Lower Body Dressing: (I) donning under and pants over feet. Pt stood with CGA to gary over hips.      Cognitive/Visual Perceptual:  Cognitive/Psychosocial Skills:     -       Oriented to: Person, Place, and Situation   -       Follows Commands/attention:Follows one-step commands  -       Communication: clear/fluent  Visual/Perceptual:      -WFL      Physical Exam:  Balance:    -       CGA for standing balance  Skin integrity: Visible skin intact  Edema:  None noted  Sensation:    -       Intact  Motor Planning:    -       WFL  Dominant hand:    -       Right  Upper Extremity Range of Motion:     -       Right Upper Extremity: WFL  -       Left Upper Extremity: WFL  Upper Extremity Strength:    -       Right Upper Extremity: WFL  -       Left Upper Extremity: WFL   Strength:    -       Right Upper Extremity: WFL  -       Left Upper Extremity: WFL    AMPAC 6 Click ADL:  AMPAC Total Score:      Treatment & Education:  OT evaluation completed. See eval for details. Tx plan to focus on increasing (I) with self care and mobility. Pt back brace delivered. Pt declined to wear after bath.  Pt educated on OT role/POC.   Importance of OOB activity   Importance of sitting up in the chair   Safety during functional t/f and mobility with use of RW  Importance of assisting with self-care activities   All questions/concerns answered within OT scope of practice     Patient left up in chair with all lines intact and call button in reach    GOALS:    Multidisciplinary Problems       Occupational Therapy Goals          Problem: Occupational Therapy    Goal Priority Disciplines Outcome Interventions   Occupational Therapy Goal     OT, PT/OT Ongoing, Progressing    Description: STG:  Pt will perform grooming with setup  Pt will bathe with min a  Pt will perform UE dressing with (I)  Pt will perform LE dressing with (I)  Pt will transfer bed/chair/bsc with Mod I  Pt will perform standing task x 2 min with Mod I  Pt will tolerate 15 minutes of tx without fatigue      LT.Restore to max I with self care and mobility.                          History:     Past Medical History:   Diagnosis Date    CHF (congestive heart failure)     COPD (chronic obstructive pulmonary disease)          Past Surgical History:   Procedure Laterality Date    ABDOMINAL SURGERY      LEFT HEART CATHETERIZATION N/A 2023    Procedure: Left heart cath;  Surgeon: Ulises Crespo DO;  Location[facility] CATH LAB;  Service: Cardiology;  Laterality: N/A;       Time Tracking:     OT Date of Treatment: 23  OT Start Time: 1116  OT Stop Time: 1201  OT Total Time (min): 45 min    Billable Minutes:Evaluation 45    2023

## 2023-11-29 NOTE — PROGRESS NOTES
C3 nurse attempted to contact Darya Low  for a TCC post hospital discharge follow up call. No answer. The patient does not have a scheduled HOSFU appointment, and the pt does not have an [facility] PCP.

## 2023-11-30 NOTE — PHYSICIAN QUERY
PT Name: Darya Low  MR #: [id number]    DOCUMENTATION CLARIFICATION     CDS/: Angeli Gutiérrez RN CDIS          Contact information:  [email or web address]      This form is a permanent document in the medical record.     Query Date: November 30, 2023    By submitting this query, we are merely seeking further clarification of documentation.. Please utilize your independent clinical judgment when addressing the question(s) below.    The medical record contains the following:   Indicators  Supporting Clinical Findings Location in Medical Record   x Registered Dietician Diagnosis Malnutrition Context: chronic illness  Malnutrition Level: moderate RD consult 11/27   x Energy Intake Energy Intake (Malnutrition): less than or equal to 50% for greater than or equal to 1 month   RD consult 11/27    Weight Loss     x Fat Loss Orbital Region (Subcutaneous Fat Loss): moderate depletion   RD consult 11/27   x Muscle Loss [place] Region (Muscle Loss): moderate depletion   RD consult 11/27    Edema/Fluid Accumulation      Reduced  Strength (by dynamometer)     x Weight, BMI, Usual Body Weight Weight Method: Bed Scale  Weight: 44.5 kg (98 lb)  BMI (Calculated): 17.4  RD consult 11/27    Delayed Wound Healing     x Acute or Chronic Illness CHF COPD  RD consult 11/27    Social or Environmental Circumstances     x Treatment . Recommend Regular diet. Also recommend addition of Boost Breeze TID to improve kcal/protein intakes to better meet nutritional needs.  RD consult 11/27   x Other Protein calorie malnutrition  Discharge summary      Academy of Nutrition and Dietetics (Academy) and the American Society for Parenteral and Enteral Nutrition (A.S.P.E.N.) Clinical Characteristics to support Malnutrition      Criteria for mild malnutrition is defined as 1 characteristic outlined above within the established moderate or severe parameters.  A minimum of 2 out of the 6 characteristics noted above are recommended for a  diagnosis of moderate or severe malnutrition.  Chronic illness/injury is a disease/condition lasting 3 months or longer.    The noted clinical guidelines are only system guidelines and do not replace the providers clinical judgment.    Provider, please specify the degree of malnutrition     [ x ] Moderate Malnutrition - a minimum of 2 of the 6 moderate malnutrition characteristics noted above    [  ] Malnutrition, Unspecified degree   [  ] Other Nutritional Diagnosis (please specify): _______       Please document in your progress notes daily for the duration of treatment until resolved and  include in your discharge summary.      References:    REJI Wheeler, & JOSE Rankin (2022, April). Assessment and management of anorexia and cachexia in palliative care. Retrieved May 23, 2022, from https://www.[employer]/contents/assessment-and-management-of-anorexia-and-cachexia-in-palliative-care?shqkeUeh=8537&source=see_link     KIMBERLI Sherman, PhD, RD, Misael ZHOU P., PhD, RN, YAHIR Koehler MD, PhD, Shade BELLA A., MS, RD, [facility], TITA Brooke, MS, RD, The Academy Malnutrition Work Group, The A.S.P.E.N. Board of Directors. (2012). Consensus Statement: Academy of Nutrition and Dietetics and American Society for Parenteral and Enteral Nutrition: Characteristics Recommended for the Identification and Documentation of Adult Malnutrition (Undernutrition). Journal of Parenteral and Enteral Nutrition, 36(3), 275-283. doi:10.1177/0396355375917677     Form No. [id number]

## 2023-11-30 NOTE — PHYSICIAN QUERY
PT Name: Darya Low  MR #: [id number]     DOCUMENTATION CLARIFICATION     CDS/: Angeli Gutiérrez RN CDIS            Contact information: [email or web address]    This form is a permanent document in the medical record.     Query Date: November 30, 2023    By submitting this query, we are merely seeking further clarification of documentation.  Please utilize your independent clinical judgment when addressing the question(s) below.    The Medical Record contains the following   Indicators Supporting Clinical Findings Location in Medical Record   x Heart Failure documented CHF (congestive heart failure)  H&P - past medical history    x BNP  Latest Reference Range & Units 11/25/23 12:59   NT-proBNP 1 - 125 pg/mL 6,196 (H)    Labs    x EF/Echo   Left Ventricle: The left ventricle is normal in size. Septal thickening. There is concentric remodeling. Normal wall motion. There is normal systolic function. Biplane (2D) method of discs ejection fraction is 60%. There is normal diastolic function.    Right Ventricle: Normal right ventricular cavity size. Systolic function is normal.    Right Atrium: Normal right atrial size. There is a prominent Eustachian valve. Differential includes RA thrombus.    Aortic Valve: The aortic valve is a trileaflet valve.    Mitral Valve: There is mild regurgitation.    Tricuspid Valve: There is moderate regurgitation.    Pulmonary Artery: The estimated pulmonary artery systolic pressure is 34 mmHg.    IVC/SVC: Normal venous pressure at 3 mmHg. ECHO 11/26    x Radiology findings No acute findings.  CXR 11/25    x Subjective/Objective Respiratory Conditions   Effort: Pulmonary effort is normal.      Breath sounds: Normal air entry. Wheezing present.   H&P     Recent/Current MI      Heart Transplant, LVAD     x Edema, JVD    Right lower leg: Edema present.      Left lower leg: Edema present   H&P     Ascites      Diuretics/Meds      Other Treatment      Other       Heart failure is a  clinical diagnosis which includes symptomatic fluid retention, elevated intracardiac pressures, and/or the inability of the heart to deliver adequate blood flow.    Heart Failure with reduced Ejection Fraction (HFrEF) or Systolic Heart Failure (loses ability to contract normally, EF is <40%)    Heart Failure with preserved Ejection Fraction (HFpEF) or Diastolic Heart Failure (stiff ventricles, does not relax properly, EF is >50%)     Heart Failure with Combined Systolic and Diastolic Failure (stiff ventricles, does not relax properly and EF is <50%)    Mid-range or mildly reduced ejection fraction (HFmrEF) is classified as systolic heart failure.  Congestive heart failure with a recovered EF is classified as Diastolic Heart Failure.  Common clues to acute exacerbation:  Rapidly progressive symptoms (w/in 2 weeks of presentation), using IV diuretics, using supplemental O2, pulmonary edema on Xray, new or worsening pleural effusion, +JVD or other signs of volume overload, MI w/in 4 weeks, and/or BNP >500  The clinical guidelines noted are only system guidelines, and do not replace the providers clinical judgment.    Provider, please specify the TYPE and ACUITY of the heart failure .    [ x  ]  Chronic Diastolic Heart Failure (HFpEF) - preexisting and stable   [   ]  Other (please specify): ___________________________________           Please document in your progress notes daily for the duration of treatment until resolved and include in your discharge summary.    References:  American Heart Association editorial staff. (2017, May). Ejection Fraction Heart Failure Measurement. American Heart Association. https://www.heart.org/en/health-topics/heart-failure/diagnosing-heart-failure/ejection-fraction-heart-failure-measurement#:~:text=Ejection%20fraction%20(EF)%20is%20a,pushed%20out%20with%20each%20heartbeat  SATNAM Murphy (2020, December 15). Heart failure with preserved ejection fraction: Clinical manifestations and  diagnosis. UpToDate. https://www.[employer].com/contents/heart-failure-with-preserved-ejection-fraction-clinical-manifestations-and-diagnosis.  ICD-10-CM/PCS Coding Clinic Third Quarter ICD-10, Effective with discharges: September 8, 2020 [facility] § Heart failure with mid-range or mildly reduced ejection fraction (2020).  ICD-10-CM/PCS Coding Clinic Third Quarter ICD-10, Effective with discharges: September 8, 2020 [facility] § Heart failure with recovered ejection fraction (2020).  Form No. [id number]

## 2023-11-30 NOTE — PHYSICIAN QUERY
PT Name: Darya Low  MR #: [id number]     DOCUMENTATION CLARIFICATION      CDS/: Angeli Gutiérrez RN CDIS          Contact information: [email or web address]    This form is a permanent document in the medical record.    Query Date: November 30, 2023    By submitting this query, we are merely seeking further clarification of documentation to reflect the severity of illness of your patient. Please utilize your independent clinical judgment when addressing the question(s) below.     The Medical Record contains the following:   Indicators   Supporting Clinical Findings Location in Medical Record   x Chest Pain, Angina Respiratory:  Positive for cough, chest tightness, shortness of breath and wheezing HM note 11/27     Coronary Artery Disease     x EKG . EKG with q waves in V1 and V2 (possible septal infarct), nonspecific ST changes  Cards consult 11/26    x Troponin  Latest Reference Range & Units 11/25/23 12:59 11/25/23 17:14 11/25/23 22:05   Troponin I High Sensitivity <=60.4 pg/mL 1,478.1 () 1,277.3 () 851.7 ()    Labs    x Echo Results   Left Ventricle: The left ventricle is normal in size. Septal thickening. There is concentric remodeling. Normal wall motion. There is normal systolic function. Biplane (2D) method of discs ejection fraction is 60%. There is normal diastolic function.    Right Ventricle: Normal right ventricular cavity size. Systolic function is normal.    Right Atrium: Normal right atrial size. There is a prominent Eustachian valve. Differential includes RA thrombus.    Aortic Valve: The aortic valve is a trileaflet valve.    Mitral Valve: There is mild regurgitation.    Tricuspid Valve: There is moderate regurgitation.    Pulmonary Artery: The estimated pulmonary artery systolic pressure is 34 mmHg.    IVC/SVC: Normal venous pressure at 3 mmHg ECHO 11/26    x Angiography   The ejection fraction was calculated to be 65%.    The left ventricular end diastolic pressure was normal.     The pre-procedure left ventricular end diastolic pressure was 6.    The estimated blood loss was none.    There was non-obstructive coronary artery disease [facility] summary 11/27   x Documentation of acute cardiac condition Type 2 MI (myocardial infarction     NSTEMI (non-ST elevated myocardial infarction) Discharge summary      note 11/27     Medication/Treatment      Other        Provider, please clarify the diagnosis related to the above documentation:  [ x  ] NSTEMI   [   ] NSTEMI/Myocardial Infarction Type 2 due to   (please specify): __________     [   ] Other Cardiac Diagnosis (please specify): _______________         Please document in your progress notes daily for the duration of treatment until resolved, and include in your discharge summary.    Form No. [id number]

## 2023-12-13 ENCOUNTER — HOSPITAL ENCOUNTER (EMERGENCY)
Facility: HOSPITAL | Age: 63
Discharge: HOME | End: 2023-12-14
Attending: EMERGENCY MEDICINE
Payer: MEDICARE

## 2023-12-13 DIAGNOSIS — R41.82 AMS (ALTERED MENTAL STATUS): ICD-10-CM

## 2023-12-13 DIAGNOSIS — M48.56XA COMPRESSION FRACTURE OF LUMBAR SPINE, NON-TRAUMATIC, INITIAL ENCOUNTER: Primary | ICD-10-CM

## 2023-12-13 DIAGNOSIS — M54.9 UPPER BACK PAIN: ICD-10-CM

## 2023-12-13 LAB
ALBUMIN SERPL BCP-MCNC: 2.8 G/DL (ref 3.5–5)
ALBUMIN/GLOB SERPL: 1 {RATIO}
ALP SERPL-CCNC: 126 U/L (ref 50–130)
ALT SERPL W P-5'-P-CCNC: 12 U/L (ref 13–56)
AMPHET UR QL SCN: NEGATIVE
ANION GAP SERPL CALCULATED.3IONS-SCNC: 10 MMOL/L (ref 7–16)
AST SERPL W P-5'-P-CCNC: 6 U/L (ref 15–37)
BARBITURATES UR QL SCN: NEGATIVE
BASOPHILS # BLD AUTO: 0.08 K/UL (ref 0–0.2)
BASOPHILS NFR BLD AUTO: 1.2 % (ref 0–1)
BENZODIAZ METAB UR QL SCN: POSITIVE
BILIRUB SERPL-MCNC: 0.5 MG/DL (ref ?–1.2)
BILIRUB UR QL STRIP: NEGATIVE
BUN SERPL-MCNC: 11 MG/DL (ref 7–18)
BUN/CREAT SERPL: 18 (ref 6–20)
CALCIUM SERPL-MCNC: 8.3 MG/DL (ref 8.5–10.1)
CANNABINOIDS UR QL SCN: POSITIVE
CHLORIDE SERPL-SCNC: 99 MMOL/L (ref 98–107)
CLARITY UR: ABNORMAL
CO2 SERPL-SCNC: 29 MMOL/L (ref 21–32)
COCAINE UR QL SCN: NEGATIVE
COLOR UR: YELLOW
CREAT SERPL-MCNC: 0.6 MG/DL (ref 0.55–1.02)
DIFFERENTIAL METHOD BLD: ABNORMAL
EGFR (NO RACE VARIABLE) (RUSH/TITUS): 102 ML/MIN/1.73M2
EOSINOPHIL # BLD AUTO: 0.17 K/UL (ref 0–0.5)
EOSINOPHIL NFR BLD AUTO: 2.6 % (ref 1–4)
ERYTHROCYTE [DISTWIDTH] IN BLOOD BY AUTOMATED COUNT: 14.3 % (ref 11.5–14.5)
ETHANOL, BLOOD (CATEGORY): NOT DETECTED
GLOBULIN SER-MCNC: 2.8 G/DL (ref 2–4)
GLUCOSE SERPL-MCNC: 70 MG/DL (ref 74–106)
GLUCOSE UR STRIP-MCNC: NORMAL MG/DL
HCT VFR BLD AUTO: 32.9 % (ref 38–47)
HGB BLD-MCNC: 10.7 G/DL (ref 12–16)
IMM GRANULOCYTES # BLD AUTO: 0.06 K/UL (ref 0–0.04)
IMM GRANULOCYTES NFR BLD: 0.9 % (ref 0–0.4)
KETONES UR STRIP-SCNC: 20 MG/DL
LEUKOCYTE ESTERASE UR QL STRIP: NEGATIVE
LYMPHOCYTES # BLD AUTO: 1.52 K/UL (ref 1–4.8)
LYMPHOCYTES NFR BLD AUTO: 23.5 % (ref 27–41)
MCH RBC QN AUTO: 29.8 PG (ref 27–31)
MCHC RBC AUTO-ENTMCNC: 32.5 G/DL (ref 32–36)
MCV RBC AUTO: 91.6 FL (ref 80–96)
MONOCYTES # BLD AUTO: 0.77 K/UL (ref 0–0.8)
MONOCYTES NFR BLD AUTO: 11.9 % (ref 2–6)
MPC BLD CALC-MCNC: 10 FL (ref 9.4–12.4)
NEUTROPHILS # BLD AUTO: 3.88 K/UL (ref 1.8–7.7)
NEUTROPHILS NFR BLD AUTO: 59.9 % (ref 53–65)
NITRITE UR QL STRIP: NEGATIVE
NRBC # BLD AUTO: 0 X10E3/UL
NRBC, AUTO (.00): 0 %
OPIATES UR QL SCN: NEGATIVE
PCP UR QL SCN: NEGATIVE
PH UR STRIP: 6 PH UNITS
PLATELET # BLD AUTO: 310 K/UL (ref 150–400)
POTASSIUM SERPL-SCNC: 2.9 MMOL/L (ref 3.5–5.1)
PROT SERPL-MCNC: 5.6 G/DL (ref 6.4–8.2)
PROT UR QL STRIP: 20
RBC # BLD AUTO: 3.59 M/UL (ref 4.2–5.4)
RBC # UR STRIP: NEGATIVE /UL
SODIUM SERPL-SCNC: 135 MMOL/L (ref 136–145)
SP GR UR STRIP: 1.02
TROPONIN I SERPL DL<=0.01 NG/ML-MCNC: 21.5 PG/ML
UROBILINOGEN UR STRIP-ACNC: 2 MG/DL
WBC # BLD AUTO: 6.48 K/UL (ref 4.5–11)

## 2023-12-13 PROCEDURE — 84484 ASSAY OF TROPONIN QUANT: CPT | Performed by: EMERGENCY MEDICINE

## 2023-12-13 PROCEDURE — 80307 DRUG TEST PRSMV CHEM ANLYZR: CPT | Performed by: EMERGENCY MEDICINE

## 2023-12-13 PROCEDURE — 25000003 PHARM REV CODE 250: Performed by: EMERGENCY MEDICINE

## 2023-12-13 PROCEDURE — 99284 EMERGENCY DEPT VISIT MOD MDM: CPT | Mod: ,,, | Performed by: EMERGENCY MEDICINE

## 2023-12-13 PROCEDURE — 93010 ELECTROCARDIOGRAM REPORT: CPT | Mod: ,,, | Performed by: HOSPITALIST

## 2023-12-13 PROCEDURE — 85025 COMPLETE CBC W/AUTO DIFF WBC: CPT | Performed by: EMERGENCY MEDICINE

## 2023-12-13 PROCEDURE — 96361 HYDRATE IV INFUSION ADD-ON: CPT

## 2023-12-13 PROCEDURE — 93005 ELECTROCARDIOGRAM TRACING: CPT

## 2023-12-13 PROCEDURE — 81003 URINALYSIS AUTO W/O SCOPE: CPT | Mod: 59 | Performed by: EMERGENCY MEDICINE

## 2023-12-13 PROCEDURE — 63600175 PHARM REV CODE 636 W HCPCS: Performed by: EMERGENCY MEDICINE

## 2023-12-13 PROCEDURE — 80048 BASIC METABOLIC PNL TOTAL CA: CPT | Mod: XB | Performed by: EMERGENCY MEDICINE

## 2023-12-13 PROCEDURE — 99284 PR EMERGENCY DEPT VISIT,LEVEL IV: ICD-10-PCS | Mod: ,,, | Performed by: EMERGENCY MEDICINE

## 2023-12-13 PROCEDURE — 93010 EKG 12-LEAD: ICD-10-PCS | Mod: ,,, | Performed by: HOSPITALIST

## 2023-12-13 PROCEDURE — 80053 COMPREHEN METABOLIC PANEL: CPT | Performed by: EMERGENCY MEDICINE

## 2023-12-13 PROCEDURE — 99285 EMERGENCY DEPT VISIT HI MDM: CPT | Mod: 25

## 2023-12-13 PROCEDURE — 82077 ASSAY SPEC XCP UR&BREATH IA: CPT | Performed by: EMERGENCY MEDICINE

## 2023-12-13 RX ORDER — POTASSIUM CHLORIDE 20 MEQ/1
40 TABLET, EXTENDED RELEASE ORAL
Status: DISCONTINUED | OUTPATIENT
Start: 2023-12-13 | End: 2023-12-13

## 2023-12-13 RX ADMIN — SODIUM CHLORIDE, POTASSIUM CHLORIDE, SODIUM LACTATE AND CALCIUM CHLORIDE 1000 ML: 600; 310; 30; 20 INJECTION, SOLUTION INTRAVENOUS at 08:12

## 2023-12-13 RX ADMIN — POTASSIUM BICARBONATE 20 MEQ: 782 TABLET, EFFERVESCENT ORAL at 10:12

## 2023-12-13 RX ADMIN — POTASSIUM BICARBONATE 40 MEQ: 782 TABLET, EFFERVESCENT ORAL at 08:12

## 2023-12-13 RX ADMIN — SODIUM CHLORIDE 1000 ML: 9 INJECTION, SOLUTION INTRAVENOUS at 05:12

## 2023-12-13 NOTE — ED PROVIDER NOTES
Encounter Date: 12/13/2023    SCRIBE #1 NOTE: I, Celina Estrada, am scribing for, and in the presence of,  Travis De Santiago MD. I have scribed the entire note.       History     Chief Complaint   Patient presents with    Altered Mental Status     A 64 y/o female patient presents to the ED via EMS with complaint of AMS and Fear. Patient states she has been coughing, vomiting and burning when she urinates. Patients had  Mhx of CHF and COPD. There are no other issues to report at this time.    The history is provided by the patient and the EMS personnel. No  was used.     Review of patient's allergies indicates:   Allergen Reactions    Ondansetron hcl Swelling     Edema of tongue per patient    Celexa [citalopram]     Darvon [propoxyphene]     Flexeril [cyclobenzaprine]     Hydroxyzine     Percocet [oxycodone-acetaminophen]     Robaxin [methocarbamol]     Thorazine [chlorpromazine]     Toradol [ketorolac] Itching     Past Medical History:   Diagnosis Date    COPD (chronic obstructive pulmonary disease)     DONALD (generalized anxiety disorder) 03/24/2021    GERD with esophagitis     Iron deficiency anemia due to chronic blood loss     Supplemental oxygen dependent 11/28/2023     Past Surgical History:   Procedure Laterality Date    ABDOMINAL SURGERY      LEFT HEART CATHETERIZATION N/A 11/27/2023    Procedure: Left heart cath;  Surgeon: Ulises Crespo DO;  Location: [facility] CATH LAB;  Service: Cardiology;  Laterality: N/A;     History reviewed. No pertinent family history.  Social History     Tobacco Use    Smoking status: Every Day     Types: Cigarettes    Smokeless tobacco: Never   Substance Use Topics    Alcohol use: Yes    Drug use: Never     Review of Systems   Respiratory:  Positive for cough.    Gastrointestinal:  Positive for vomiting. Negative for diarrhea.   Genitourinary:         Burns when urinating    Musculoskeletal:  Negative for back pain.   Allergic/Immunologic:        Darvon  [propoxyphene],Ondansetron Hcl,  Darvon [propoxyphene],Flexeril [cyclobenzaprine]     Hydroxyzine, Percocet [oxycodone-acetaminophen],   Robaxin [methocarbamol], Thorazine [chlorpromazine], Toradol [ketorolac    Psychiatric/Behavioral:  Negative for confusion.    All other systems reviewed and are negative.      Physical Exam     Initial Vitals   BP Pulse Resp Temp SpO2   12/13/23 1636 12/13/23 1636 12/13/23 1636 12/13/23 1634 12/13/23 1636   (!) 144/94 78 (!) 26 98.2 °F (36.8 °C) (!) 92 %      MAP       --                Physical Exam    Nursing note and vitals reviewed.  Constitutional:   Very thin and frail    HENT:   Head: Normocephalic and atraumatic.   Eyes: EOM are normal. Pupils are equal, round, and reactive to light.   Neck:   Normal range of motion.  Cardiovascular:  Normal rate and regular rhythm.           Pulmonary/Chest: Breath sounds normal.   Abdominal: Abdomen is soft. Bowel sounds are normal.   Musculoskeletal:      Cervical back: Normal range of motion.     Neurological: She is alert.   Skin: Skin is warm.   Psychiatric: She has a normal mood and affect.         ED Course   Procedures  Labs Reviewed   COMPREHENSIVE METABOLIC PANEL - Abnormal; Notable for the following components:       Result Value    Sodium 135 (*)     Potassium 2.9 (*)     Glucose 70 (*)     Calcium 8.3 (*)     Total Protein 5.6 (*)     Albumin 2.8 (*)     ALT 12 (*)     AST 6 (*)     All other components within normal limits   URINALYSIS, REFLEX TO URINE CULTURE - Abnormal; Notable for the following components:    Protein, UA 20 (*)     Ketones, UA 20 (*)     Urobilinogen, UA 2 (*)     All other components within normal limits   CBC WITH DIFFERENTIAL - Abnormal; Notable for the following components:    RBC 3.59 (*)     Hemoglobin 10.7 (*)     Hematocrit 32.9 (*)     Lymphocytes % 23.5 (*)     Monocytes % 11.9 (*)     Basophils % 1.2 (*)     Immature Granulocytes % 0.9 (*)     Immature Granulocytes, Absolute 0.06 (*)     All  other components within normal limits   DRUG SCREEN, URINE (BEAKER) - Abnormal; Notable for the following components:    Benzodiazepine, Urine Positive (*)     Cannabinoid, Urine Positive (*)     All other components within normal limits   BASIC METABOLIC PANEL - Abnormal; Notable for the following components:    Creatinine 0.52 (*)     Calcium 8.0 (*)     All other components within normal limits   TROPONIN I - Normal   CBC W/ AUTO DIFFERENTIAL    Narrative:     The following orders were created for panel order CBC auto differential.  Procedure                               Abnormality         Status                     ---------                               -----------         ------                     CBC with Differential[[id number]]       Abnormal            Final result                 Please view results for these tests on the individual orders.   ALCOHOL,MEDICAL (ETHANOL)          Imaging Results              X-Ray Lumbar Spine Ap And Lateral (Final result)  Result time 12/13/23 20:32:32      Final result by Arya Morales MD (12/13/23 20:32:32)                   Impression:      No thoracic spine fracture or acute abnormality.    Continued height loss of the L1 compression fracture favoring a subacute process.  Chronic L3 through L5 compression fractures.    Diffuse degenerative changes of the thoracic and lumbar spine.      Electronically signed by: Arya Morales  Date:    12/13/2023  Time:    20:32               Narrative:    EXAMINATION:  XR THORACIC SPINE AP LATERAL; XR LUMBAR SPINE AP AND LATERAL    CLINICAL HISTORY:  Back pain;  Dorsalgia, unspecified    TECHNIQUE:  AP and lateral views of the thoracic spine were performed.  AP, lateral, spot lumbar spine radiographs.    COMPARISON:  CT 11/26/2023    FINDINGS:  Thoracic spine: Vertebral body heights and alignment of the thoracic spine maintained.  Mild multilevel degenerative changes throughout the thoracic spine.    Lumbar spine: There is again seen a  compression fracture of the L1 vertebral body with about 30-40% height loss.  Additional 40% height loss of L3, 25% height loss of L4 and L5.  The L1 compression fracture has progressed since 11/25/2023 and may represent a subacute fracture.  The L3 through L5 compression fractures are similar and favor chronic process.  Multilevel degenerative endplate and facet changes as before.                                       X-Ray Thoracic Spine AP And Lateral (Final result)  Result time 12/13/23 20:32:32      Final result by Arya Morales MD (12/13/23 20:32:32)                   Impression:      No thoracic spine fracture or acute abnormality.    Continued height loss of the L1 compression fracture favoring a subacute process.  Chronic L3 through L5 compression fractures.    Diffuse degenerative changes of the thoracic and lumbar spine.      Electronically signed by: Arya Morales  Date:    12/13/2023  Time:    20:32               Narrative:    EXAMINATION:  XR THORACIC SPINE AP LATERAL; XR LUMBAR SPINE AP AND LATERAL    CLINICAL HISTORY:  Back pain;  Dorsalgia, unspecified    TECHNIQUE:  AP and lateral views of the thoracic spine were performed.  AP, lateral, spot lumbar spine radiographs.    COMPARISON:  CT 11/26/2023    FINDINGS:  Thoracic spine: Vertebral body heights and alignment of the thoracic spine maintained.  Mild multilevel degenerative changes throughout the thoracic spine.    Lumbar spine: There is again seen a compression fracture of the L1 vertebral body with about 30-40% height loss.  Additional 40% height loss of L3, 25% height loss of L4 and L5.  The L1 compression fracture has progressed since 11/25/2023 and may represent a subacute fracture.  The L3 through L5 compression fractures are similar and favor chronic process.  Multilevel degenerative endplate and facet changes as before.                                       X-Ray Chest AP Portable (Final result)  Result time 12/13/23 16:54:59      Final  result by Arya Morales MD (12/13/23 16:54:59)                   Impression:      Patchy opacity in the left mid lung is similar to prior exam and nonspecific.  No pneumothorax or new infiltrate in the lungs.      Electronically signed by: Arya Morales  Date:    12/13/2023  Time:    16:54               Narrative:    EXAMINATION:  XR CHEST AP PORTABLE    CLINICAL HISTORY:  Weakness;    TECHNIQUE:  Single frontal view of the chest was performed.    COMPARISON:  11/25/2023    FINDINGS:  Heart size normal.  There is a patchy opacity in the left mid lung that overlies ribs and is indeterminate.  Lungs otherwise are clear.  Pleuroparenchymal scarring of the apices again seen.  No pneumothorax or pleural effusion.                                    X-Rays:   Independently Interpreted Readings:   Other Readings:  EXAMINATION:  XR CHEST AP PORTABLE     CLINICAL HISTORY:  Weakness;     TECHNIQUE:  Single frontal view of the chest was performed.     COMPARISON:  11/25/2023     FINDINGS:  Heart size normal.  There is a patchy opacity in the left mid lung that overlies ribs and is indeterminate.  Lungs otherwise are clear.  Pleuroparenchymal scarring of the apices again seen.  No pneumothorax or pleural effusion.     Impression:     Patchy opacity in the left mid lung is similar to prior exam and nonspecific.  No pneumothorax or new infiltrate in the lungs.        Electronically signed by: Arya Morales  Date:                                            12/13/2023  Time:                                           16:54        Exam Ended: 12/13/23 16:50 CST Last Resulted: 12/13/23 16:54 CST               Medications   sodium chloride 0.9% bolus 1,000 mL 1,000 mL (0 mLs Intravenous Stopped 12/13/23 1832)   lactated ringers bolus 1,000 mL (0 mLs Intravenous Stopped 12/13/23 2108)   potassium bicarbonate disintegrating tablet 40 mEq (40 mEq Oral Given 12/13/23 2016)   potassium bicarbonate disintegrating tablet 20 mEq (20 mEq Oral Given  12/13/23 2208)   potassium bicarbonate disintegrating tablet 20 mEq (20 mEq Oral Given 12/13/23 2248)   orphenadrine injection 60 mg (60 mg Intravenous Given 12/14/23 0242)     Medical Decision Making  Amount and/or Complexity of Data Reviewed  Labs: ordered.  Radiology: ordered. Decision-making details documented in ED Course.    Risk  Prescription drug management.              Attending Attestation:           Physician Attestation for Scribe:  Physician Attestation Statement for Scribe #1: I, Travis De Santiago MD, reviewed documentation, as scribed by Celina Estrada in my presence, and it is both accurate and complete.             ED Course as of 12/14/23 2322   Wed Dec 13, 2023   1640 Medical decision-making:  Differential diagnosis includes fever, altered mental status, pneumonia, UTI, dehydration.  All labs and imaging ordered and interpreted by me. [BB]   1642 EKG by my interpretation shows sinus rhythm rate of 77, no acute ST segment changes. [BB]   1658 X-Ray Chest AP Portable  EXAMINATION:  XR CHEST AP PORTABLE     CLINICAL HISTORY:  Weakness;     TECHNIQUE:  Single frontal view of the chest was performed.     COMPARISON:  11/25/2023     FINDINGS:  Heart size normal.  There is a patchy opacity in the left mid lung that overlies ribs and is indeterminate.  Lungs otherwise are clear.  Pleuroparenchymal scarring of the apices again seen.  No pneumothorax or pleural effusion.     Impression:     Patchy opacity in the left mid lung is similar to prior exam and nonspecific.  No pneumothorax or new infiltrate in the lungs.        Electronically signed by: Arya Morales  Date:                                            12/13/2023  Time:                                           16:54        Exam Ended: 12/13/23 16:50 CST Last Resulted: 12/13/23 16:54 CST            [CM]   1728 Chest x-ray by my interpretation shows no acute disease. [BB]   u Dec 14, 2023   0207 X-ray of the thoracic spine and lumbar spine showed compression  fractures.  The patient also has hypokalemia.  I replaced her potassium.  I am planning to put her in back brace and make her follow-up with Dr. Logan Conway (spine specialist). [HK]   0212 X-Ray Lumbar Spine Ap And Lateral [HK]      ED Course User Index  [BB] Travis De Santiago MD  [CM] Celina Estrada  [HK] Sonido Peng MD                           Clinical Impression:  Final diagnoses:  [M54.9] Upper back pain  [M48.56XA] Compression fracture of lumbar spine, non-traumatic, initial encounter (Primary)  [R41.82] AMS (altered mental status)          ED Disposition Condition    Discharge Stable          ED Prescriptions    None       Follow-up Information    None          Sonido Peng MD  12/14/23 [id number]

## 2023-12-14 ENCOUNTER — TELEPHONE (OUTPATIENT)
Dept: EMERGENCY MEDICINE | Facility: HOSPITAL | Age: 63
End: 2023-12-14
Payer: MEDICARE

## 2023-12-14 VITALS
RESPIRATION RATE: 15 BRPM | HEIGHT: 63 IN | TEMPERATURE: 98 F | WEIGHT: 101.63 LBS | OXYGEN SATURATION: 95 % | DIASTOLIC BLOOD PRESSURE: 94 MMHG | BODY MASS INDEX: 18.01 KG/M2 | SYSTOLIC BLOOD PRESSURE: 150 MMHG | HEART RATE: 62 BPM

## 2023-12-14 LAB
ANION GAP SERPL CALCULATED.3IONS-SCNC: 7 MMOL/L (ref 7–16)
BUN SERPL-MCNC: 9 MG/DL (ref 7–18)
BUN/CREAT SERPL: 17 (ref 6–20)
CALCIUM SERPL-MCNC: 8 MG/DL (ref 8.5–10.1)
CHLORIDE SERPL-SCNC: 102 MMOL/L (ref 98–107)
CO2 SERPL-SCNC: 31 MMOL/L (ref 21–32)
CREAT SERPL-MCNC: 0.52 MG/DL (ref 0.55–1.02)
EGFR (NO RACE VARIABLE) (RUSH/TITUS): 105 ML/MIN/1.73M2
GLUCOSE SERPL-MCNC: 86 MG/DL (ref 74–106)
POTASSIUM SERPL-SCNC: 4.4 MMOL/L (ref 3.5–5.1)
SODIUM SERPL-SCNC: 136 MMOL/L (ref 136–145)

## 2023-12-14 PROCEDURE — 63600175 PHARM REV CODE 636 W HCPCS: Performed by: EMERGENCY MEDICINE

## 2023-12-14 PROCEDURE — 25000242 PHARM REV CODE 250 ALT 637 W/ HCPCS: Performed by: EMERGENCY MEDICINE

## 2023-12-14 PROCEDURE — 96374 THER/PROPH/DIAG INJ IV PUSH: CPT

## 2023-12-14 RX ORDER — IPRATROPIUM BROMIDE AND ALBUTEROL SULFATE 2.5; .5 MG/3ML; MG/3ML
3 SOLUTION RESPIRATORY (INHALATION)
Status: DISCONTINUED | OUTPATIENT
Start: 2023-12-14 | End: 2023-12-14 | Stop reason: HOSPADM

## 2023-12-14 RX ORDER — ORPHENADRINE CITRATE 30 MG/ML
60 INJECTION INTRAMUSCULAR; INTRAVENOUS
Status: COMPLETED | OUTPATIENT
Start: 2023-12-14 | End: 2023-12-14

## 2023-12-14 RX ADMIN — ORPHENADRINE CITRATE 60 MG: 60 INJECTION INTRAMUSCULAR; INTRAVENOUS at 02:12

## 2024-02-13 ENCOUNTER — HOSPITAL ENCOUNTER (EMERGENCY)
Facility: HOSPITAL | Age: 64
Discharge: NURSING, REHAB OR LONG-TERM CARE | End: 2024-02-13
Attending: EMERGENCY MEDICINE
Payer: MEDICARE

## 2024-02-13 VITALS
OXYGEN SATURATION: 98 % | SYSTOLIC BLOOD PRESSURE: 112 MMHG | WEIGHT: 102 LBS | HEIGHT: 63 IN | TEMPERATURE: 99 F | RESPIRATION RATE: 10 BRPM | DIASTOLIC BLOOD PRESSURE: 66 MMHG | HEART RATE: 64 BPM | BODY MASS INDEX: 18.07 KG/M2

## 2024-02-13 DIAGNOSIS — R60.0 PERIPHERAL EDEMA: ICD-10-CM

## 2024-02-13 DIAGNOSIS — R10.84 GENERALIZED ABDOMINAL PAIN: Primary | ICD-10-CM

## 2024-02-13 LAB
ALBUMIN SERPL BCP-MCNC: 3.6 G/DL (ref 3.5–5)
ALBUMIN/GLOB SERPL: 1 {RATIO}
ALP SERPL-CCNC: 90 U/L (ref 50–130)
ALT SERPL W P-5'-P-CCNC: 20 U/L (ref 13–56)
ANION GAP SERPL CALCULATED.3IONS-SCNC: 4 MMOL/L (ref 7–16)
AST SERPL W P-5'-P-CCNC: 25 U/L (ref 15–37)
BASOPHILS # BLD AUTO: 0.08 K/UL (ref 0–0.2)
BASOPHILS NFR BLD AUTO: 0.7 % (ref 0–1)
BILIRUB SERPL-MCNC: 0.3 MG/DL (ref ?–1.2)
BILIRUB UR QL STRIP: NEGATIVE
BUN SERPL-MCNC: 40 MG/DL (ref 7–18)
BUN/CREAT SERPL: 29 (ref 6–20)
CALCIUM SERPL-MCNC: 8.8 MG/DL (ref 8.5–10.1)
CHLORIDE SERPL-SCNC: 94 MMOL/L (ref 98–107)
CLARITY UR: CLEAR
CO2 SERPL-SCNC: 39 MMOL/L (ref 21–32)
COLOR UR: NORMAL
CREAT SERPL-MCNC: 1.39 MG/DL (ref 0.55–1.02)
DIFFERENTIAL METHOD BLD: ABNORMAL
EGFR (NO RACE VARIABLE) (RUSH/TITUS): 43 ML/MIN/1.73M2
EOSINOPHIL # BLD AUTO: 0.03 K/UL (ref 0–0.5)
EOSINOPHIL NFR BLD AUTO: 0.3 % (ref 1–4)
ERYTHROCYTE [DISTWIDTH] IN BLOOD BY AUTOMATED COUNT: 15.5 % (ref 11.5–14.5)
GLOBULIN SER-MCNC: 3.6 G/DL (ref 2–4)
GLUCOSE SERPL-MCNC: 114 MG/DL (ref 74–106)
GLUCOSE UR STRIP-MCNC: NORMAL MG/DL
HCT VFR BLD AUTO: 30.7 % (ref 38–47)
HGB BLD-MCNC: 9.6 G/DL (ref 12–16)
IMM GRANULOCYTES # BLD AUTO: 0.04 K/UL (ref 0–0.04)
IMM GRANULOCYTES NFR BLD: 0.4 % (ref 0–0.4)
KETONES UR STRIP-SCNC: NEGATIVE MG/DL
LEUKOCYTE ESTERASE UR QL STRIP: NEGATIVE
LYMPHOCYTES # BLD AUTO: 0.64 K/UL (ref 1–4.8)
LYMPHOCYTES NFR BLD AUTO: 5.6 % (ref 27–41)
MCH RBC QN AUTO: 28.5 PG (ref 27–31)
MCHC RBC AUTO-ENTMCNC: 31.3 G/DL (ref 32–36)
MCV RBC AUTO: 91.1 FL (ref 80–96)
MONOCYTES # BLD AUTO: 0.55 K/UL (ref 0–0.8)
MONOCYTES NFR BLD AUTO: 4.8 % (ref 2–6)
MPC BLD CALC-MCNC: 10.2 FL (ref 9.4–12.4)
NEUTROPHILS # BLD AUTO: 10.05 K/UL (ref 1.8–7.7)
NEUTROPHILS NFR BLD AUTO: 88.2 % (ref 53–65)
NITRITE UR QL STRIP: NEGATIVE
NRBC # BLD AUTO: 0 X10E3/UL
NRBC, AUTO (.00): 0 %
NT-PROBNP SERPL-MCNC: 137 PG/ML (ref 1–125)
OHS QRS DURATION: 78 MS
OHS QTC CALCULATION: 381 MS
PH UR STRIP: 5 PH UNITS
PLATELET # BLD AUTO: 243 K/UL (ref 150–400)
POTASSIUM SERPL-SCNC: 3.2 MMOL/L (ref 3.5–5.1)
PROT SERPL-MCNC: 7.2 G/DL (ref 6.4–8.2)
PROT UR QL STRIP: NEGATIVE
RBC # BLD AUTO: 3.37 M/UL (ref 4.2–5.4)
RBC # UR STRIP: NEGATIVE /UL
SODIUM SERPL-SCNC: 134 MMOL/L (ref 136–145)
SP GR UR STRIP: 1.01
TROPONIN I SERPL DL<=0.01 NG/ML-MCNC: 9.7 PG/ML
UROBILINOGEN UR STRIP-ACNC: NORMAL MG/DL
WBC # BLD AUTO: 11.39 K/UL (ref 4.5–11)

## 2024-02-13 PROCEDURE — 96374 THER/PROPH/DIAG INJ IV PUSH: CPT

## 2024-02-13 PROCEDURE — 99284 EMERGENCY DEPT VISIT MOD MDM: CPT | Mod: ,,,

## 2024-02-13 PROCEDURE — 93005 ELECTROCARDIOGRAM TRACING: CPT

## 2024-02-13 PROCEDURE — 80053 COMPREHEN METABOLIC PANEL: CPT

## 2024-02-13 PROCEDURE — 99285 EMERGENCY DEPT VISIT HI MDM: CPT | Mod: 25

## 2024-02-13 PROCEDURE — 85025 COMPLETE CBC W/AUTO DIFF WBC: CPT

## 2024-02-13 PROCEDURE — 63600175 PHARM REV CODE 636 W HCPCS

## 2024-02-13 PROCEDURE — 83880 ASSAY OF NATRIURETIC PEPTIDE: CPT

## 2024-02-13 PROCEDURE — 84484 ASSAY OF TROPONIN QUANT: CPT

## 2024-02-13 PROCEDURE — 96375 TX/PRO/DX INJ NEW DRUG ADDON: CPT

## 2024-02-13 PROCEDURE — 93010 ELECTROCARDIOGRAM REPORT: CPT | Mod: ,,, | Performed by: INTERNAL MEDICINE

## 2024-02-13 PROCEDURE — 81003 URINALYSIS AUTO W/O SCOPE: CPT

## 2024-02-13 RX ORDER — METOCLOPRAMIDE HYDROCHLORIDE 5 MG/ML
10 INJECTION INTRAMUSCULAR; INTRAVENOUS
Status: COMPLETED | OUTPATIENT
Start: 2024-02-13 | End: 2024-02-13

## 2024-02-13 RX ORDER — MORPHINE SULFATE 4 MG/ML
4 INJECTION, SOLUTION INTRAMUSCULAR; INTRAVENOUS
Status: COMPLETED | OUTPATIENT
Start: 2024-02-13 | End: 2024-02-13

## 2024-02-13 RX ADMIN — METOCLOPRAMIDE 10 MG: 5 INJECTION, SOLUTION INTRAMUSCULAR; INTRAVENOUS at 03:02

## 2024-02-13 RX ADMIN — MORPHINE SULFATE 4 MG: 4 INJECTION, SOLUTION INTRAMUSCULAR; INTRAVENOUS at 03:02

## 2024-02-13 NOTE — ED PROVIDER NOTES
"Encounter Date: 2/13/2024       History     Chief Complaint   Patient presents with    Abdominal Pain    Leg Swelling     63 year old female patient presents to ED complaining of generalized abdominal pain and swelling with daily vomiting and bilat lower ext pain and swelling x 1-2 weeks. Denies chest or SOB with HX of COPD oxygen dependent.  Reports having "part of my liver taken out in [place]" with HX cirrhosis.     The history is provided by the patient.     Review of patient's allergies indicates:   Allergen Reactions    Ondansetron hcl Swelling     Edema of tongue per patient    Celexa [citalopram]     Darvon [propoxyphene]     Flexeril [cyclobenzaprine]     Hydroxyzine     Percocet [oxycodone-acetaminophen]     Robaxin [methocarbamol]     Thorazine [chlorpromazine]     Toradol [ketorolac] Itching     Past Medical History:   Diagnosis Date    COPD (chronic obstructive pulmonary disease)     DONALD (generalized anxiety disorder) 03/24/2021    GERD with esophagitis     Iron deficiency anemia due to chronic blood loss     Supplemental oxygen dependent 11/28/2023     Past Surgical History:   Procedure Laterality Date    ABDOMINAL SURGERY      LEFT HEART CATHETERIZATION N/A 11/27/2023    Procedure: Left heart cath;  Surgeon: Ulises Crespo DO;  Location: [facility] CATH LAB;  Service: Cardiology;  Laterality: N/A;     No family history on file.  Social History     Tobacco Use    Smoking status: Every Day     Types: Cigarettes    Smokeless tobacco: Never   Substance Use Topics    Alcohol use: Yes    Drug use: Never     Review of Systems   Constitutional:  Negative for chills and fever.   HENT:  Negative for congestion and sore throat.    Eyes:  Negative for visual disturbance.   Respiratory:  Negative for cough, shortness of breath and wheezing.    Cardiovascular:  Positive for leg swelling. Negative for chest pain and palpitations.   Gastrointestinal:  Positive for abdominal distention, abdominal pain, nausea and " vomiting. Negative for blood in stool, constipation and diarrhea.   Genitourinary:  Negative for dysuria and flank pain.   Musculoskeletal:  Negative for neck pain and neck stiffness.   Skin:  Negative for rash.   Neurological:  Negative for dizziness and headaches.   Psychiatric/Behavioral:  Negative for suicidal ideas.        Physical Exam     Initial Vitals [02/13/24 1156]   BP Pulse Resp Temp SpO2   (!) 110/56 80 16 -- 99 %      MAP       --         Physical Exam    Constitutional: She is not diaphoretic. No distress.   Chronically ill appearing, frail   HENT:   Head: Normocephalic.   Eyes: Pupils are equal, round, and reactive to light.   Neck:   Normal range of motion.  Cardiovascular:  Normal rate and normal heart sounds.           Pulmonary/Chest: Breath sounds normal. No respiratory distress. She has no wheezes. She has no rhonchi.   Abdominal: She exhibits distension. She exhibits no mass. There is abdominal tenderness.   Distension noted to gen abdomen with diffuse tenderness, midline abd scar   Musculoskeletal:         General: Edema present.      Cervical back: Normal range of motion.     Neurological: She is alert and oriented to person, place, and time.   Skin: Skin is warm. Capillary refill takes less than 2 seconds.   +2-3 bilat lower edema   Psychiatric: She has a normal mood and affect.         Medical Screening Exam   See Full Note    ED Course   Procedures  Labs Reviewed   COMPREHENSIVE METABOLIC PANEL - Abnormal; Notable for the following components:       Result Value    Sodium 134 (*)     Potassium 3.2 (*)     Chloride 94 (*)     CO2 39 (*)     Anion Gap 4 (*)     Glucose 114 (*)     BUN 40 (*)     Creatinine 1.39 (*)     BUN/Creatinine Ratio 29 (*)     eGFR 43 (*)     All other components within normal limits   NT-PRO NATRIURETIC PEPTIDE - Abnormal; Notable for the following components:    ProBNP 137 (*)     All other components within normal limits   CBC WITH DIFFERENTIAL - Abnormal;  Notable for the following components:    WBC 11.39 (*)     RBC 3.37 (*)     Hemoglobin 9.6 (*)     Hematocrit 30.7 (*)     MCHC 31.3 (*)     RDW 15.5 (*)     Neutrophils % 88.2 (*)     Lymphocytes % 5.6 (*)     Eosinophils % 0.3 (*)     Neutrophils, Abs 10.05 (*)     Lymphocytes, Absolute 0.64 (*)     All other components within normal limits   TROPONIN I - Normal   CBC W/ AUTO DIFFERENTIAL    Narrative:     The following orders were created for panel order CBC auto differential.  Procedure                               Abnormality         Status                     ---------                               -----------         ------                     CBC with Differential[[id number]]       Abnormal            Final result                 Please view results for these tests on the individual orders.   URINALYSIS, REFLEX TO URINE CULTURE          Imaging Results              X-Ray Abdomen Flat And Erect (Final result)  Result time 02/13/24 13:37:11      Final result by Luan Corley DO (02/13/24 13:37:11)                   Impression:      Nonspecific nonobstructive bowel gas pattern.    Point of Service: [facility]      Electronically signed by: Luan Corley  Date:    02/13/2024  Time:    13:37               Narrative:    EXAMINATION:  XR ABDOMEN FLAT AND ERECT    CLINICAL HISTORY:  Unspecified abdominal pain    COMPARISON:  [facility] June 24, 2018    TECHNIQUE:  Frontal views of the abdomen in the supine and lateral decubitus position.    FINDINGS:  Nonspecific nonobstructive bowel gas pattern.  No free intraperitoneal air demonstrated.  Scattered skeletal degenerative change.  Partially visualized left total hip arthroplasty.                                       X-Ray Chest AP Portable (Final result)  Result time 02/13/24 12:32:06      Final result by Ernst Hermosillo II, MD (02/13/24 12:32:06)                   Impression:      Findings suggest increased cardiac decompensation and / or  pneumonitis.      Electronically signed by: Ernst Hermosillo  Date:    02/13/2024  Time:    12:32               Narrative:    EXAMINATION:  XR CHEST AP PORTABLE    CLINICAL HISTORY:  CHF;    COMPARISON:  13 December 2023    TECHNIQUE:  XR CHEST AP PORTABLE    FINDINGS:  The heart and mediastinum are stable in size and configuration.  The pulmonary vascularity is slightly increased with bilateral increased interstitial lung density.  There is increased right lower lung density and left upper lung density.  No other lung infiltrates, effusions, pneumothorax or other abnormality is demonstrated.                                       Medications   morphine injection 4 mg (has no administration in time range)   metoclopramide injection 10 mg (has no administration in time range)     Medical Decision Making  Amount and/or Complexity of Data Reviewed  Labs: ordered.  Radiology: ordered.               ED Course as of 02/13/24 1555   Tue Feb 13, 2024   1155 EKG Sinus with 1st degree A-V block, no ST elevation [KT]   1319 Patient presents to Ed c/o abd swelling, pain and vomitting and bilat ext swelling and pain.  Differential diagnosis includes small bowel obstruction, CHF, cirrhosis, and malnutrition.  [KT]   1343 CXR findings suggest increased cardiac decompensation. No definite acute process. [KT]   1348 XR abd findings nonspecific nonobstructive bowel gas pattern. No acute process. [KT]   1512 Trop negative [KT]   1523 UA results negative [KT]   1552 Pt asking for coffee and pain meds.  Pain meds ordered.  Labs and radiology results reviewed.  Pt stable with findings consistent with generalized nonspecific abd pain.  Will be discharged with detailed return instructions. [KT]      ED Course User Index  [KT] Velia Rodriguez NP                           Clinical Impression:   Final diagnoses:  [R10.84] Generalized abdominal pain (Primary)  [R60.0] Peripheral edema               Velia Rodriguez NP  02/13/24 [id number]

## 2024-02-13 NOTE — DISCHARGE INSTRUCTIONS
Return for any new or worsening symptoms.  Your labs and Xrays did not show any new problem. Please follow up with your PCP for chronic health issues.

## 2024-02-13 NOTE — ED TRIAGE NOTES
Pt presents to ed with c/o having bilateral leg swelling and abdominal pain with distention for approx. 1 week

## 2024-02-14 ENCOUNTER — TELEPHONE (OUTPATIENT)
Dept: EMERGENCY MEDICINE | Facility: HOSPITAL | Age: 64
End: 2024-02-14
Payer: MEDICAID

## 2024-03-18 ENCOUNTER — HOSPITAL ENCOUNTER (INPATIENT)
Facility: HOSPITAL | Age: 64
LOS: 15 days | Discharge: NURSING, REHAB OR LONG-TERM CARE | DRG: 459 | End: 2024-04-02
Attending: EMERGENCY MEDICINE | Admitting: SURGERY
Payer: MEDICARE

## 2024-03-18 DIAGNOSIS — S32.010A CLOSED COMPRESSION FRACTURE OF BODY OF L1 VERTEBRA: ICD-10-CM

## 2024-03-18 DIAGNOSIS — S42.291D OTHER CLOSED DISPLACED FRACTURE OF PROXIMAL END OF RIGHT HUMERUS WITH ROUTINE HEALING, SUBSEQUENT ENCOUNTER: Primary | ICD-10-CM

## 2024-03-18 DIAGNOSIS — S72.001A CLOSED DISPLACED FRACTURE OF RIGHT FEMORAL NECK: ICD-10-CM
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
## 2024-03-18 DIAGNOSIS — D50.0 IRON DEFICIENCY ANEMIA DUE TO CHRONIC BLOOD LOSS: ICD-10-CM

## 2024-03-18 DIAGNOSIS — E87.6 HYPOKALEMIA: ICD-10-CM

## 2024-03-18 DIAGNOSIS — S42.201A CLOSED FRACTURE OF PROXIMAL END OF RIGHT HUMERUS, UNSPECIFIED FRACTURE MORPHOLOGY, INITIAL ENCOUNTER: ICD-10-CM
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
## 2024-03-18 DIAGNOSIS — I95.9 HYPOTENSION, UNSPECIFIED HYPOTENSION TYPE: ICD-10-CM
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
## 2024-03-18 PROBLEM — G89.29 CHRONIC PAIN: Status: ACTIVE | Noted: 2024-03-18

## 2024-03-18 PROBLEM — N30.00 ACUTE CYSTITIS: Status: ACTIVE | Noted: 2024-03-18

## 2024-03-18 PROBLEM — S72.051A: Status: ACTIVE | Noted: 2024-03-18

## 2024-03-18 PROBLEM — S32.020A CLOSED COMPRESSION FRACTURE OF L2 VERTEBRA: Status: ACTIVE | Noted: 2024-03-18

## 2024-03-18 PROBLEM — S42.309A HUMERUS FRACTURE: Status: ACTIVE | Noted: 2024-03-18

## 2024-03-18 LAB
ABORH RETYPE: NORMAL
ALBUMIN SERPL BCP-MCNC: 2.4 G/DL (ref 3.5–5)
ALBUMIN/GLOB SERPL: 0.9 {RATIO}
ALP SERPL-CCNC: 110 U/L (ref 50–130)
ALT SERPL W P-5'-P-CCNC: 35 U/L (ref 13–56)
AMMONIA PLAS-SCNC: 10 ΜMOL/L (ref 11–32)
AMPHET UR QL SCN: NEGATIVE
ANION GAP SERPL CALCULATED.3IONS-SCNC: 7 MMOL/L (ref 7–16)
APTT PPP: 30.1 SECONDS (ref 25.2–37.3)
AST SERPL W P-5'-P-CCNC: 45 U/L (ref 15–37)
BACTERIA #/AREA URNS HPF: ABNORMAL /HPF
BARBITURATES UR QL SCN: NEGATIVE
BASOPHILS # BLD AUTO: 0.05 K/UL (ref 0–0.2)
BASOPHILS NFR BLD AUTO: 0.5 % (ref 0–1)
BENZODIAZ METAB UR QL SCN: NEGATIVE
BILIRUB SERPL-MCNC: 0.5 MG/DL (ref ?–1.2)
BILIRUB UR QL STRIP: NEGATIVE
BUN SERPL-MCNC: 26 MG/DL (ref 7–18)
BUN/CREAT SERPL: 30 (ref 6–20)
CALCIUM SERPL-MCNC: 7.7 MG/DL (ref 8.5–10.1)
CANNABINOIDS UR QL SCN: NEGATIVE
CHLORIDE SERPL-SCNC: 96 MMOL/L (ref 98–107)
CLARITY UR: ABNORMAL
CO2 SERPL-SCNC: 39 MMOL/L (ref 21–32)
COCAINE UR QL SCN: NEGATIVE
COLOR UR: ABNORMAL
CREAT SERPL-MCNC: 0.88 MG/DL (ref 0.55–1.02)
DIFFERENTIAL METHOD BLD: ABNORMAL
EGFR (NO RACE VARIABLE) (RUSH/TITUS): 74 ML/MIN/1.73M2
EOSINOPHIL # BLD AUTO: 0.04 K/UL (ref 0–0.5)
EOSINOPHIL NFR BLD AUTO: 0.4 % (ref 1–4)
ERYTHROCYTE [DISTWIDTH] IN BLOOD BY AUTOMATED COUNT: 16.2 % (ref 11.5–14.5)
ETHANOL, BLOOD (CATEGORY): NOT DETECTED
GLOBULIN SER-MCNC: 2.6 G/DL (ref 2–4)
GLUCOSE SERPL-MCNC: 126 MG/DL (ref 74–106)
GLUCOSE UR STRIP-MCNC: NORMAL MG/DL
HCO3 UR-SCNC: 38.7 MMOL/L (ref 21–28)
HCT VFR BLD AUTO: 29.7 % (ref 38–47)
HCT VFR BLD AUTO: 30.4 % (ref 38–47)
HCT VFR BLD CALC: 30 % (ref 35–51)
HGB BLD-MCNC: 9.3 G/DL (ref 12–16)
HGB BLD-MCNC: 9.5 G/DL (ref 12–16)
IMM GRANULOCYTES # BLD AUTO: 0.04 K/UL (ref 0–0.04)
IMM GRANULOCYTES NFR BLD: 0.4 % (ref 0–0.4)
INDIRECT COOMBS: NORMAL
INR BLD: 1.34
KETONES UR STRIP-SCNC: NEGATIVE MG/DL
LACTATE SERPL-SCNC: 1 MMOL/L (ref 0.4–2)
LACTATE SERPL-SCNC: 1.6 MMOL/L (ref 0.4–2)
LDH SERPL L TO P-CCNC: 1.5 MMOL/L (ref 0.3–1.2)
LEUKOCYTE ESTERASE UR QL STRIP: ABNORMAL
LYMPHOCYTES # BLD AUTO: 0.66 K/UL (ref 1–4.8)
LYMPHOCYTES NFR BLD AUTO: 6.9 % (ref 27–41)
MAGNESIUM SERPL-MCNC: 1.8 MG/DL (ref 1.7–2.3)
MCH RBC QN AUTO: 27.6 PG (ref 27–31)
MCHC RBC AUTO-ENTMCNC: 30.6 G/DL (ref 32–36)
MCV RBC AUTO: 90.2 FL (ref 80–96)
MONOCYTES # BLD AUTO: 0.64 K/UL (ref 0–0.8)
MONOCYTES NFR BLD AUTO: 6.7 % (ref 2–6)
MPC BLD CALC-MCNC: 10.4 FL (ref 9.4–12.4)
NEUTROPHILS # BLD AUTO: 8.15 K/UL (ref 1.8–7.7)
NEUTROPHILS NFR BLD AUTO: 85.1 % (ref 53–65)
NITRITE UR QL STRIP: POSITIVE
NRBC # BLD AUTO: 0 X10E3/UL
NRBC, AUTO (.00): 0 %
OPIATES UR QL SCN: NEGATIVE
PCO2 BLDA: 57 MMHG (ref 35–48)
PCP UR QL SCN: NEGATIVE
PH SMN: 7.44 [PH] (ref 7.35–7.45)
PH UR STRIP: 7.5 PH UNITS
PLATELET # BLD AUTO: 220 K/UL (ref 150–400)
PO2 BLDA: 77 MMHG (ref 83–108)
POC BASE EXCESS: 12.8 MMOL/L (ref -2–3)
POC CO2: 40.4 MMOL/L
POC IONIZED CALCIUM: 1.07 MMOL/L (ref 1.15–1.35)
POC SATURATED O2: 96 % (ref 95–98)
POCT GLUCOSE: 150 MG/DL (ref 60–95)
POTASSIUM BLD-SCNC: 2.9 MMOL/L (ref 3.4–4.5)
POTASSIUM SERPL-SCNC: 2.4 MMOL/L (ref 3.5–5.1)
POTASSIUM SERPL-SCNC: 3.3 MMOL/L (ref 3.5–5.1)
PROT SERPL-MCNC: 5 G/DL (ref 6.4–8.2)
PROT UR QL STRIP: NEGATIVE
PROTHROMBIN TIME: 16.4 SECONDS (ref 11.7–14.7)
RBC # BLD AUTO: 3.37 M/UL (ref 4.2–5.4)
RBC # UR STRIP: NEGATIVE /UL
RBC #/AREA URNS HPF: 1 /HPF
RH BLD: NORMAL
SODIUM BLD-SCNC: 135 MMOL/L (ref 136–145)
SODIUM SERPL-SCNC: 140 MMOL/L (ref 136–145)
SP GR UR STRIP: 1.02
SPECIMEN OUTDATE: NORMAL
SQUAMOUS #/AREA URNS LPF: ABNORMAL /HPF
TROPONIN I SERPL DL<=0.01 NG/ML-MCNC: 17.3 PG/ML
UROBILINOGEN UR STRIP-ACNC: NORMAL MG/DL
WBC # BLD AUTO: 9.58 K/UL (ref 4.5–11)
WBC #/AREA URNS HPF: 20 /HPF

## 2024-03-18 PROCEDURE — 25000003 PHARM REV CODE 250: Performed by: SURGERY

## 2024-03-18 PROCEDURE — 99291 CRITICAL CARE FIRST HOUR: CPT | Mod: ,,, | Performed by: EMERGENCY MEDICINE

## 2024-03-18 PROCEDURE — 80053 COMPREHEN METABOLIC PANEL: CPT | Performed by: EMERGENCY MEDICINE

## 2024-03-18 PROCEDURE — 51702 INSERT TEMP BLADDER CATH: CPT

## 2024-03-18 PROCEDURE — 99204 OFFICE O/P NEW MOD 45 MIN: CPT | Mod: 57,,, | Performed by: ORTHOPAEDIC SURGERY

## 2024-03-18 PROCEDURE — 81003 URINALYSIS AUTO W/O SCOPE: CPT | Mod: 59 | Performed by: EMERGENCY MEDICINE

## 2024-03-18 PROCEDURE — 85018 HEMOGLOBIN: CPT | Performed by: EMERGENCY MEDICINE

## 2024-03-18 PROCEDURE — 84295 ASSAY OF SERUM SODIUM: CPT

## 2024-03-18 PROCEDURE — 63600175 PHARM REV CODE 636 W HCPCS: Performed by: INTERNAL MEDICINE

## 2024-03-18 PROCEDURE — 82330 ASSAY OF CALCIUM: CPT

## 2024-03-18 PROCEDURE — 63600175 PHARM REV CODE 636 W HCPCS: Performed by: EMERGENCY MEDICINE

## 2024-03-18 PROCEDURE — 85025 COMPLETE CBC W/AUTO DIFF WBC: CPT | Performed by: EMERGENCY MEDICINE

## 2024-03-18 PROCEDURE — 87086 URINE CULTURE/COLONY COUNT: CPT | Performed by: EMERGENCY MEDICINE

## 2024-03-18 PROCEDURE — 25000003 PHARM REV CODE 250: Performed by: EMERGENCY MEDICINE

## 2024-03-18 PROCEDURE — 99223 1ST HOSP IP/OBS HIGH 75: CPT | Mod: AI,,, | Performed by: SURGERY

## 2024-03-18 PROCEDURE — 96365 THER/PROPH/DIAG IV INF INIT: CPT

## 2024-03-18 PROCEDURE — 82803 BLOOD GASES ANY COMBINATION: CPT

## 2024-03-18 PROCEDURE — 82077 ASSAY SPEC XCP UR&BREATH IA: CPT | Performed by: EMERGENCY MEDICINE

## 2024-03-18 PROCEDURE — 83735 ASSAY OF MAGNESIUM: CPT | Performed by: EMERGENCY MEDICINE

## 2024-03-18 PROCEDURE — 96361 HYDRATE IV INFUSION ADD-ON: CPT

## 2024-03-18 PROCEDURE — 25000242 PHARM REV CODE 250 ALT 637 W/ HCPCS: Performed by: NURSE PRACTITIONER

## 2024-03-18 PROCEDURE — 80307 DRUG TEST PRSMV CHEM ANLYZR: CPT | Performed by: EMERGENCY MEDICINE

## 2024-03-18 PROCEDURE — 83605 ASSAY OF LACTIC ACID: CPT | Performed by: EMERGENCY MEDICINE

## 2024-03-18 PROCEDURE — 96376 TX/PRO/DX INJ SAME DRUG ADON: CPT

## 2024-03-18 PROCEDURE — 63600175 PHARM REV CODE 636 W HCPCS: Performed by: NURSE PRACTITIONER

## 2024-03-18 PROCEDURE — 20000000 HC ICU ROOM

## 2024-03-18 PROCEDURE — 86850 RBC ANTIBODY SCREEN: CPT | Performed by: EMERGENCY MEDICINE

## 2024-03-18 PROCEDURE — 25000003 PHARM REV CODE 250: Performed by: NURSE PRACTITIONER

## 2024-03-18 PROCEDURE — 25000003 PHARM REV CODE 250: Performed by: INTERNAL MEDICINE

## 2024-03-18 PROCEDURE — 63600175 PHARM REV CODE 636 W HCPCS: Performed by: SURGERY

## 2024-03-18 PROCEDURE — 93005 ELECTROCARDIOGRAM TRACING: CPT

## 2024-03-18 PROCEDURE — 84132 ASSAY OF SERUM POTASSIUM: CPT

## 2024-03-18 PROCEDURE — 87040 BLOOD CULTURE FOR BACTERIA: CPT | Performed by: EMERGENCY MEDICINE

## 2024-03-18 PROCEDURE — 83605 ASSAY OF LACTIC ACID: CPT

## 2024-03-18 PROCEDURE — 84484 ASSAY OF TROPONIN QUANT: CPT | Performed by: EMERGENCY MEDICINE

## 2024-03-18 PROCEDURE — 85730 THROMBOPLASTIN TIME PARTIAL: CPT | Performed by: EMERGENCY MEDICINE

## 2024-03-18 PROCEDURE — 85610 PROTHROMBIN TIME: CPT | Performed by: EMERGENCY MEDICINE

## 2024-03-18 PROCEDURE — G0390 TRAUMA RESPONS W/HOSP CRITI: HCPCS | Performed by: EMERGENCY MEDICINE

## 2024-03-18 PROCEDURE — 85014 HEMATOCRIT: CPT

## 2024-03-18 PROCEDURE — 82140 ASSAY OF AMMONIA: CPT | Performed by: INTERNAL MEDICINE

## 2024-03-18 PROCEDURE — 82947 ASSAY GLUCOSE BLOOD QUANT: CPT

## 2024-03-18 PROCEDURE — 96367 TX/PROPH/DG ADDL SEQ IV INF: CPT

## 2024-03-18 PROCEDURE — 93010 ELECTROCARDIOGRAM REPORT: CPT | Mod: ,,, | Performed by: STUDENT IN AN ORGANIZED HEALTH CARE EDUCATION/TRAINING PROGRAM

## 2024-03-18 PROCEDURE — 99222 1ST HOSP IP/OBS MODERATE 55: CPT | Mod: GW,HPC,, | Performed by: NURSE PRACTITIONER

## 2024-03-18 PROCEDURE — 99285 EMERGENCY DEPT VISIT HI MDM: CPT | Mod: 25

## 2024-03-18 PROCEDURE — 25500020 PHARM REV CODE 255: Performed by: SURGERY

## 2024-03-18 PROCEDURE — 96366 THER/PROPH/DIAG IV INF ADDON: CPT

## 2024-03-18 PROCEDURE — 84132 ASSAY OF SERUM POTASSIUM: CPT | Performed by: SURGERY

## 2024-03-18 RX ORDER — DEXTROSE MONOHYDRATE, SODIUM CHLORIDE, AND POTASSIUM CHLORIDE 50; 1.49; 4.5 G/1000ML; G/1000ML; G/1000ML
INJECTION, SOLUTION INTRAVENOUS CONTINUOUS
Status: DISCONTINUED | OUTPATIENT
Start: 2024-03-18 | End: 2024-03-18

## 2024-03-18 RX ORDER — ESCITALOPRAM OXALATE 20 MG/1
20 TABLET ORAL NIGHTLY
COMMUNITY

## 2024-03-18 RX ORDER — ENOXAPARIN SODIUM 100 MG/ML
40 INJECTION SUBCUTANEOUS EVERY 24 HOURS
Status: DISCONTINUED | OUTPATIENT
Start: 2024-03-18 | End: 2024-03-27

## 2024-03-18 RX ORDER — SENNOSIDES 8.6 MG/1
1 TABLET ORAL NIGHTLY
COMMUNITY

## 2024-03-18 RX ORDER — OXYCODONE HYDROCHLORIDE 10 MG/1
10 TABLET, FILM COATED, EXTENDED RELEASE ORAL 2 TIMES DAILY
Status: ON HOLD | COMMUNITY
Start: 2024-03-15 | End: 2024-04-02

## 2024-03-18 RX ORDER — NOREPINEPHRINE BITARTRATE/D5W 4MG/250ML
0-3 PLASTIC BAG, INJECTION (ML) INTRAVENOUS CONTINUOUS
Status: DISCONTINUED | OUTPATIENT
Start: 2024-03-18 | End: 2024-03-20

## 2024-03-18 RX ORDER — PROMETHAZINE HYDROCHLORIDE 12.5 MG/1
12.5 TABLET ORAL EVERY 6 HOURS PRN
COMMUNITY
Start: 2023-12-28

## 2024-03-18 RX ORDER — TRAMADOL HYDROCHLORIDE 50 MG/1
50 TABLET ORAL EVERY 6 HOURS PRN
Status: DISCONTINUED | OUTPATIENT
Start: 2024-03-18 | End: 2024-04-02 | Stop reason: HOSPADM

## 2024-03-18 RX ORDER — POTASSIUM CHLORIDE 7.45 MG/ML
40 INJECTION INTRAVENOUS ONCE
Status: COMPLETED | OUTPATIENT
Start: 2024-03-18 | End: 2024-03-18

## 2024-03-18 RX ORDER — TALC
6 POWDER (GRAM) TOPICAL NIGHTLY PRN
Status: DISCONTINUED | OUTPATIENT
Start: 2024-03-18 | End: 2024-04-02 | Stop reason: HOSPADM

## 2024-03-18 RX ORDER — PANTOPRAZOLE SODIUM 40 MG/1
40 TABLET, DELAYED RELEASE ORAL DAILY
Status: DISCONTINUED | OUTPATIENT
Start: 2024-03-18 | End: 2024-03-19

## 2024-03-18 RX ORDER — ENOXAPARIN SODIUM 100 MG/ML
40 INJECTION SUBCUTANEOUS EVERY 24 HOURS
Status: DISCONTINUED | OUTPATIENT
Start: 2024-03-19 | End: 2024-03-18

## 2024-03-18 RX ORDER — MORPHINE SULFATE 4 MG/ML
3 INJECTION, SOLUTION INTRAMUSCULAR; INTRAVENOUS EVERY 4 HOURS PRN
Status: DISCONTINUED | OUTPATIENT
Start: 2024-03-18 | End: 2024-04-02 | Stop reason: HOSPADM

## 2024-03-18 RX ORDER — TRAZODONE HYDROCHLORIDE 150 MG/1
150 TABLET ORAL NIGHTLY
COMMUNITY
Start: 2024-03-11

## 2024-03-18 RX ORDER — FUROSEMIDE 40 MG/1
40 TABLET ORAL 2 TIMES DAILY
Status: ON HOLD | COMMUNITY
Start: 2024-03-15 | End: 2024-04-02 | Stop reason: HOSPADM

## 2024-03-18 RX ORDER — IBUPROFEN 200 MG
200 TABLET ORAL EVERY 6 HOURS PRN
Status: ON HOLD | COMMUNITY
End: 2024-04-02 | Stop reason: HOSPADM

## 2024-03-18 RX ORDER — FUROSEMIDE 20 MG/1
20 TABLET ORAL DAILY PRN
COMMUNITY
End: 2024-03-18 | Stop reason: DRUGHIGH

## 2024-03-18 RX ORDER — SODIUM CHLORIDE 9 MG/ML
INJECTION, SOLUTION INTRAVENOUS CONTINUOUS
Status: DISCONTINUED | OUTPATIENT
Start: 2024-03-18 | End: 2024-03-18

## 2024-03-18 RX ORDER — GABAPENTIN 400 MG/1
400 CAPSULE ORAL 3 TIMES DAILY
COMMUNITY
Start: 2024-02-27

## 2024-03-18 RX ORDER — ESCITALOPRAM OXALATE 10 MG/1
10 TABLET ORAL DAILY
Status: DISCONTINUED | OUTPATIENT
Start: 2024-03-18 | End: 2024-03-18

## 2024-03-18 RX ORDER — LACTULOSE 10 G/15ML
30 SOLUTION ORAL; RECTAL 3 TIMES DAILY
COMMUNITY
Start: 2024-03-08

## 2024-03-18 RX ORDER — OXYCODONE HYDROCHLORIDE 5 MG/1
5 TABLET ORAL EVERY 6 HOURS PRN
Status: ON HOLD | COMMUNITY
Start: 2024-03-15 | End: 2024-04-02

## 2024-03-18 RX ORDER — POTASSIUM CHLORIDE 7.45 MG/ML
10 INJECTION INTRAVENOUS
Status: COMPLETED | OUTPATIENT
Start: 2024-03-18 | End: 2024-03-18

## 2024-03-18 RX ORDER — IPRATROPIUM BROMIDE AND ALBUTEROL SULFATE 2.5; .5 MG/3ML; MG/3ML
3 SOLUTION RESPIRATORY (INHALATION) EVERY 6 HOURS
Status: DISCONTINUED | OUTPATIENT
Start: 2024-03-18 | End: 2024-04-02 | Stop reason: HOSPADM

## 2024-03-18 RX ORDER — MUPIROCIN 20 MG/G
OINTMENT TOPICAL 2 TIMES DAILY
Status: DISPENSED | OUTPATIENT
Start: 2024-03-18 | End: 2024-03-23

## 2024-03-18 RX ORDER — NAPROXEN SODIUM 220 MG/1
81 TABLET, FILM COATED ORAL DAILY
Status: DISCONTINUED | OUTPATIENT
Start: 2024-03-18 | End: 2024-03-18

## 2024-03-18 RX ORDER — IPRATROPIUM BROMIDE AND ALBUTEROL SULFATE 2.5; .5 MG/3ML; MG/3ML
SOLUTION RESPIRATORY (INHALATION)
COMMUNITY
Start: 2023-12-28

## 2024-03-18 RX ORDER — ESCITALOPRAM OXALATE 10 MG/1
10 TABLET ORAL DAILY
Status: DISCONTINUED | OUTPATIENT
Start: 2024-03-19 | End: 2024-04-02 | Stop reason: HOSPADM

## 2024-03-18 RX ORDER — PREDNISONE 10 MG/1
10 TABLET ORAL DAILY
Status: ON HOLD | COMMUNITY
Start: 2024-03-11 | End: 2024-04-02 | Stop reason: HOSPADM

## 2024-03-18 RX ORDER — NAPROXEN SODIUM 220 MG/1
81 TABLET, FILM COATED ORAL DAILY
Status: DISCONTINUED | OUTPATIENT
Start: 2024-03-19 | End: 2024-03-27

## 2024-03-18 RX ORDER — BENZONATATE 100 MG/1
100 CAPSULE ORAL 3 TIMES DAILY PRN
COMMUNITY
Start: 2023-12-28

## 2024-03-18 RX ORDER — ACETAMINOPHEN 325 MG/1
650 TABLET ORAL EVERY 6 HOURS PRN
Status: DISCONTINUED | OUTPATIENT
Start: 2024-03-18 | End: 2024-04-02 | Stop reason: HOSPADM

## 2024-03-18 RX ORDER — SODIUM CHLORIDE, SODIUM LACTATE, POTASSIUM CHLORIDE, CALCIUM CHLORIDE 600; 310; 30; 20 MG/100ML; MG/100ML; MG/100ML; MG/100ML
INJECTION, SOLUTION INTRAVENOUS CONTINUOUS
Status: DISCONTINUED | OUTPATIENT
Start: 2024-03-18 | End: 2024-03-21

## 2024-03-18 RX ADMIN — POTASSIUM CHLORIDE 10 MEQ: 10 INJECTION, SOLUTION INTRAVENOUS at 07:03

## 2024-03-18 RX ADMIN — TRAMADOL HYDROCHLORIDE 50 MG: 50 TABLET, COATED ORAL at 08:03

## 2024-03-18 RX ADMIN — PANTOPRAZOLE SODIUM 40 MG: 40 TABLET, DELAYED RELEASE ORAL at 03:03

## 2024-03-18 RX ADMIN — POTASSIUM CHLORIDE, DEXTROSE MONOHYDRATE AND SODIUM CHLORIDE: 150; 5; 450 INJECTION, SOLUTION INTRAVENOUS at 08:03

## 2024-03-18 RX ADMIN — POTASSIUM CHLORIDE 10 MEQ: 10 INJECTION, SOLUTION INTRAVENOUS at 06:03

## 2024-03-18 RX ADMIN — POTASSIUM CHLORIDE 10 MEQ: 7.46 INJECTION, SOLUTION INTRAVENOUS at 01:03

## 2024-03-18 RX ADMIN — POTASSIUM CHLORIDE 40 MEQ: 7.46 INJECTION, SOLUTION INTRAVENOUS at 10:03

## 2024-03-18 RX ADMIN — MUPIROCIN: 20 OINTMENT TOPICAL at 09:03

## 2024-03-18 RX ADMIN — SODIUM CHLORIDE: 9 INJECTION, SOLUTION INTRAVENOUS at 03:03

## 2024-03-18 RX ADMIN — IOPAMIDOL 100 ML: 755 INJECTION, SOLUTION INTRAVENOUS at 01:03

## 2024-03-18 RX ADMIN — IPRATROPIUM BROMIDE AND ALBUTEROL SULFATE 3 ML: 2.5; .5 SOLUTION RESPIRATORY (INHALATION) at 07:03

## 2024-03-18 RX ADMIN — ENOXAPARIN SODIUM 40 MG: 40 INJECTION SUBCUTANEOUS at 06:03

## 2024-03-18 RX ADMIN — SODIUM CHLORIDE 500 ML: 9 INJECTION, SOLUTION INTRAVENOUS at 01:03

## 2024-03-18 RX ADMIN — POTASSIUM CHLORIDE 10 MEQ: 10 INJECTION, SOLUTION INTRAVENOUS at 03:03

## 2024-03-18 RX ADMIN — SODIUM CHLORIDE, POTASSIUM CHLORIDE, SODIUM LACTATE AND CALCIUM CHLORIDE: 600; 310; 30; 20 INJECTION, SOLUTION INTRAVENOUS at 10:03

## 2024-03-18 RX ADMIN — POTASSIUM CHLORIDE 10 MEQ: 10 INJECTION, SOLUTION INTRAVENOUS at 04:03

## 2024-03-18 RX ADMIN — SODIUM CHLORIDE 1000 ML: 9 INJECTION, SOLUTION INTRAVENOUS at 12:03

## 2024-03-18 RX ADMIN — PIPERACILLIN AND TAZOBACTAM 4.5 G: 4; .5 INJECTION, POWDER, FOR SOLUTION INTRAVENOUS; PARENTERAL at 02:03

## 2024-03-18 RX ADMIN — NOREPINEPHRINE BITARTRATE 0.02 MCG/KG/MIN: 4 INJECTION, SOLUTION INTRAVENOUS at 09:03

## 2024-03-18 NOTE — ED PROVIDER NOTES
Encounter Date: 3/18/2024    SCRIBE #1 NOTE: ICelina, am scribing for, and in the presence of,  Marcelo oTmas MD.       History     Chief Complaint   Patient presents with    Fall    Hip Pain    Shoulder Pain     Patient is a 64 y/o Female that presents to the the ED via EMS from [facility] with c/o Fall. Patient stated she fell last night from walking a getting her pant leg caught  on something which caused the fall. Patient states she has pain in her right upper arm and right leg. Patients right arm and right leg shows in previous x-ray before arrival to ED, that they are broken. Pmhx, consists of Supplemental oxygen dependent, DONALD (generalized anxiety disorder), COPD (chronic obstructive pulmonary disease), GERD with esophagitis and Iron deficiency anemia due to chronic blood loss. There are no other issues to report with this patient at this time.    The history is provided by the patient and the EMS personnel. No  was used.     Review of patient's allergies indicates:   Allergen Reactions    Ondansetron hcl Swelling     Edema of tongue per patient    Celexa [citalopram]     Darvon [propoxyphene]     Flexeril [cyclobenzaprine]     Hydroxyzine     Percocet [oxycodone-acetaminophen]     Robaxin [methocarbamol]     Thorazine [chlorpromazine]     Toradol [ketorolac] Itching     Past Medical History:   Diagnosis Date    COPD (chronic obstructive pulmonary disease)     DONALD (generalized anxiety disorder) 03/24/2021    GERD with esophagitis     Iron deficiency anemia due to chronic blood loss     Supplemental oxygen dependent 11/28/2023     Past Surgical History:   Procedure Laterality Date    ABDOMINAL SURGERY      LEFT HEART CATHETERIZATION N/A 11/27/2023    Procedure: Left heart cath;  Surgeon: Ulises Crespo DO;  Location: [facility] CATH LAB;  Service: Cardiology;  Laterality: N/A;    PERCUTANEOUS PINNING OF HIP Right 3/20/2024    Procedure: PINNING, HIP, PERCUTANEOUS;  Surgeon:  Que Buckner MD;  Location: [facility] OR;  Service: Orthopedics;  Laterality: Right;     No family history on file.  Social History     Tobacco Use    Smoking status: Every Day     Types: Cigarettes    Smokeless tobacco: Never   Substance Use Topics    Alcohol use: Yes    Drug use: Never     Review of Systems   Constitutional:  Negative for fever.   Respiratory:  Positive for cough.    Gastrointestinal:  Positive for vomiting. Negative for diarrhea.   All other systems reviewed and are negative.      Physical Exam     Initial Vitals   BP Pulse Resp Temp SpO2   03/18/24 1146 03/18/24 1145 03/18/24 1145 03/18/24 1159 03/18/24 1145   (!) 76/44 (!) 58 20 97.6 °F (36.4 °C) (!) 83 %      MAP       --                Physical Exam    Nursing note and vitals reviewed.  Constitutional: She appears well-developed and well-nourished.   HENT:   Head: Normocephalic and atraumatic.   Dry mucus    Eyes: EOM are normal. Pupils are equal, round, and reactive to light.   Neck: Neck supple. No thyromegaly present.   Normal range of motion.  Cardiovascular:  Normal rate, regular rhythm, normal heart sounds and intact distal pulses.           No murmur heard.  Pulmonary/Chest: Breath sounds normal. No respiratory distress. She has no wheezes.   Abdominal: Abdomen is soft. Bowel sounds are normal. She exhibits no distension. There is no abdominal tenderness.   Musculoskeletal:         General: Tenderness present. No edema.      Cervical back: Normal range of motion and neck supple.      Comments: Right upper Arm and Right upper Leg broken.     Lymphadenopathy:     She has no cervical adenopathy.   Neurological: She is alert and oriented to person, place, and time. She has normal strength. No cranial nerve deficit or sensory deficit.   Skin: Skin is warm and dry. No rash noted.   Poor skin turgor    Psychiatric: She has a normal mood and affect.         ED Course   Procedures  Labs Reviewed   COMPREHENSIVE METABOLIC PANEL -  Abnormal; Notable for the following components:       Result Value    Potassium 2.4 (*)     Chloride 96 (*)     CO2 39 (*)     Glucose 126 (*)     BUN 26 (*)     BUN/Creatinine Ratio 30 (*)     Calcium 7.7 (*)     Total Protein 5.0 (*)     Albumin 2.4 (*)     AST 45 (*)     All other components within normal limits   PROTIME-INR - Abnormal; Notable for the following components:    PT 16.4 (*)     All other components within normal limits   URINALYSIS, REFLEX TO URINE CULTURE - Abnormal; Notable for the following components:    Leukocytes, UA Moderate (*)     Nitrites, UA Positive (*)     All other components within normal limits   CBC WITH DIFFERENTIAL - Abnormal; Notable for the following components:    RBC 3.37 (*)     Hemoglobin 9.3 (*)     Hematocrit 30.4 (*)     MCHC 30.6 (*)     RDW 16.2 (*)     Neutrophils % 85.1 (*)     Lymphocytes % 6.9 (*)     Monocytes % 6.7 (*)     Eosinophils % 0.4 (*)     Neutrophils, Abs 8.15 (*)     Lymphocytes, Absolute 0.66 (*)     All other components within normal limits   URINALYSIS, MICROSCOPIC - Abnormal; Notable for the following components:    WBC, UA 20 (*)     Bacteria, UA Few (*)     Squamous Epithelial Cells, UA Occasional (*)     All other components within normal limits   HEMOGLOBIN AND HEMATOCRIT, BLOOD - Abnormal; Notable for the following components:    Hemoglobin 9.5 (*)     Hematocrit 29.7 (*)     All other components within normal limits   AMMONIA - Abnormal; Notable for the following components:    Ammonia 10 (*)     All other components within normal limits   APTT - Normal   LACTIC ACID, PLASMA - Normal   DRUG SCREEN, URINE (BEAKER) - Normal   TROPONIN I - Normal   MAGNESIUM - Normal   LACTIC ACID, PLASMA - Normal   CBC W/ AUTO DIFFERENTIAL    Narrative:     The following orders were created for panel order CBC auto differential.  Procedure                               Abnormality         Status                     ---------                                -----------         ------                     CBC with Differential[[id number]]       Abnormal            Final result                 Please view results for these tests on the individual orders.   ALCOHOL,MEDICAL (ETHANOL)   TYPE & SCREEN   ABORH RETYPE          Imaging Results              US Lower Extremity Veins Bilateral (Final result)  Result time 03/18/24 17:43:38      Final result by Arya Morales MD (03/18/24 17:43:38)                   Impression:      No evidence of deep venous thrombosis in either lower extremity.      Electronically signed by: Arya Morales  Date:    03/18/2024  Time:    17:43               Narrative:    EXAMINATION:  US LOWER EXTREMITY VEINS BILATERAL    CLINICAL HISTORY:  sob;    TECHNIQUE:  Duplex and color flow Doppler and dynamic compression was performed of the bilateral lower extremity veins.  Ultrasound images captured and stored.    COMPARISON:  None    FINDINGS:  Right thigh veins: The common femoral, femoral, popliteal, upper greater saphenous, and deep femoral veins are patent and free of thrombus. The veins are normally compressible and have normal phasic flow and augmentation response.    Right calf veins: The visualized calf veins are patent.    Left thigh veins: The common femoral, femoral, popliteal, upper greater saphenous, and deep femoral veins are patent and free of thrombus. The veins are normally compressible and have normal phasic flow and augmentation response.    Left calf veins: The visualized calf veins are patent.    Miscellaneous: None                                       CT Head Without Contrast (Final result)  Result time 03/18/24 15:32:33      Final result by Ernst Hermosillo II, MD (03/18/24 15:32:33)                   Impression:      No evidence of acute process or interval change.      Electronically signed by: Ernst Hermosillo  Date:    03/18/2024  Time:    15:32               Narrative:    EXAMINATION:  CT HEAD WITHOUT CONTRAST    CLINICAL  HISTORY:  Mental status change, unknown cause;    TECHNIQUE:  Axial CT imaging of the brain is performed without contrast with 3 mm increments.    CT dose reduction technique used - Dose Rite and tube current modulation.    COMPARISON:  25 November 2023    FINDINGS:  No evidence of hemorrhage, mass, mass effect, midline shift or acute infarct seen.  The decreased density right cerebellar hemisphere similar to previous study likely prior infarct.  Remaining brain parenchyma attenuation and differentiation appears within normal limits for age. The ventricles and cisterns are normal in caliber.  No cranial or skull base abnormality is identified.                                       CT Cervical Spine Without Contrast (Final result)  Result time 03/18/24 15:34:00      Final result by Ernst Hermosillo II, MD (03/18/24 15:34:00)                   Impression:      No evidence of acute injury demonstrated.      Electronically signed by: Ernst Hermosillo  Date:    03/18/2024  Time:    15:34               Narrative:    EXAMINATION:  CT CERVICAL SPINE WITHOUT CONTRAST    CLINICAL HISTORY:  Polytrauma, blunt;    TECHNIQUE:  Axial CT imaging of the cervical spine is performed without contrast.  Computer reformatting is viewed in the sagittal and coronal planes.    CT dose reduction technique used - Dose Rite and tube current modulation.    COMPARISON:  27 April 2022    FINDINGS:  No fracture is seen.  Alignment of the cervical spine is within normal limits.  Vertebral body heights are normal.  The multilevel degenerative changes present.  No other abnormality is demonstrated.                                       X-Ray Hip 2 or 3 views Right (with Pelvis when performed) (Final result)  Result time 03/18/24 14:13:17      Final result by Ernst Hermosillo II, MD (03/18/24 14:13:17)                   Impression:      Suggestion of femoral neck fracture.  No other changes.      Electronically signed by: Ernst  Jaimee  Date:    03/18/2024  Time:    14:13               Narrative:    EXAMINATION:  XR HIP WITH PELVIS WHEN PERFORMED, 2 OR 3  VIEWS RIGHT    CLINICAL HISTORY:  hip pain;    COMPARISON:  30 December 2020    TECHNIQUE:  XR HIP WITH PELVIS 2 VIEWS RIGHT    FINDINGS:  There is suggestion of femoral neck fracture, detail is limited.  No other fracture seen.  The alignment of the joints appears normal.  No degenerative change is present.  No soft tissue abnormality is seen.                                       X-Ray Shoulder Complete 2 View Right (Final result)  Result time 03/18/24 13:20:38      Final result by Ernst Hermosillo II, MD (03/18/24 13:20:38)                   Impression:      Proximal humerus fracture as described above      Electronically signed by: Ernst Hermosillo  Date:    03/18/2024  Time:    13:20               Narrative:    EXAMINATION:  XR SHOULDER COMPLETE 2 OR MORE VIEWS RIGHT    CLINICAL HISTORY:  Unspecified fall, initial encounter    COMPARISON:  None available    TECHNIQUE:  XR SHOULDER 3 VIEWS RIGHT    FINDINGS:  No there is comminuted fracture of the right proximal humerus with moderate anterior displacement of the humeral shaft.  The alignment of the joints appears normal.  No degenerative change is present.  No soft tissue abnormality is seen.                                       CT Thoracic Spine Without Contrast (Final result)  Result time 03/18/24 13:23:26      Final result by Arya Morales MD (03/18/24 13:23:26)                   Impression:      No acute fracture or dislocation of the thoracic spine.      Electronically signed by: Arya Morales  Date:    03/18/2024  Time:    13:23               Narrative:    EXAMINATION:  CT THORACIC SPINE WITHOUT CONTRAST    CLINICAL HISTORY:  Back trauma, no prior imaging (Age >= 16y);    TECHNIQUE:  CT of the thoracic spine performed without intravenous contrast    The CT examination was performed using one or more of the following dose  reduction techniques: Automated exposure control, adjustment of the mA and kV according to patient's size, use of acute or iterative reconstruction techniques.    COMPARISON:  Chest CT 11/26/2023    FINDINGS:  The L1 compression fracture shows continued height loss since 11/26/2023.  Similarly there is evolution of the lower sternal fracture.  There is no acute fracture detected of the thoracic spine.  The vertebral body heights and alignment are maintained and similar to prior.  Minimal superior endplate height loss of T8 is similar.  No spinal canal or foraminal stenosis of the thoracic spine.  There is retropulsion at L1 compression fracture with mild to moderate spinal canal stenosis at that level.                                       CT Lumbar Spine Without Contrast (Final result)  Result time 03/18/24 13:19:25      Final result by Ernst Hermosillo II, MD (03/18/24 13:19:25)                   Impression:      Multiple compression fractures as described above.  The L2 fracture appears increased since previous x-ray.  Remaining fractures appears similar to previous study.      Electronically signed by: Ernst Hermosillo  Date:    03/18/2024  Time:    13:19               Narrative:    EXAMINATION:  CT LUMBAR SPINE WITHOUT CONTRAST    CLINICAL HISTORY:  fall;    TECHNIQUE:  Axial CT imaging of the lumbar spine is performed without contrast.  Computer reformatting is viewed in the sagittal and coronal planes.    CT dose reduction technique used - Dose Rite and tube current modulation.    COMPARISON:  13 December 2023    FINDINGS:  There is compression fracture of the L2 1 vertebra, height loss estimated at 70%.  There is posterior cortex retropulsion contributing to mild central canal stenosis.  This is increased from previous x-ray.    L3: There is L3 compression fracture with height loss estimated 25%.  Acute fracture line not seen.  There is L4 and L5 compression fractures height loss less than 25%.  Acute  fracture line not clearly seen.  Previous right sacral wing fracture.  Distinct acute fracture line in this area not seen.    Alignment of the spine is within normal limits.  Vertebral body heights are normal.  No other abnormality is demonstrated.                                       CT Chest Abdomen Pelvis With IV Contrast (XPD) NO Oral Contrast (Final result)  Result time 03/18/24 13:27:59      Final result by Ernst Hermosillo II, MD (03/18/24 13:27:59)                   Impression:      Right femoral neck fracture.  No other definite acute fracture seen.      Electronically signed by: Ernst Hermosillo  Date:    03/18/2024  Time:    13:27               Narrative:    EXAMINATION:  CT CHEST ABDOMEN PELVIS WITH IV CONTRAST (XPD)    CLINICAL HISTORY:  Polytrauma, blunt;   Abdomen pain.    TECHNIQUE:  Axial CT imaging of the chest, abdomen and pelvis is performed with intravenous and oral contrast. Contrast dose is 100 cc of Omnipaque 350.    COMPARISON:  None available    FINDINGS:  CT chest: Heart, mediastinum within normal limits.  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  Otherwise the great vessels show no evidence of abnormality    Small amount of right lower lung airspace density present.  No other evidence of lung parenchymal abnormality seen. No pneumothorax or effusion is present.  Comminuted proximal humerus fracture of the right partially visualized.  No acute chest wall abnormalities are identified.    CT abdomen: The liver, spleen, pancreas, adrenal glands and kidneys are normal in size and enhancement.  No evidence of focal lesion is demonstrated in the solid organs.  The bowel caliber is normal and no wall thickening or adjacent inflammatory change is seen.  No evidence of free fluid or free air is present.  Appendix is not seen.    CT pelvis: Old pelvic fractures present.  There is suggestion of acute femoral neck fracture on the right with impaction.  The bowel and bladder appear  within normal limits.  The uterus and ovaries not seen.                                       X-Ray Chest 1 View (Final result)  Result time 03/18/24 12:34:47      Final result by Arya Morales MD (03/18/24 12:34:47)                   Impression:      Similar appearance of the chest.  Chronic deformities of the left humerus partially assessed.      Electronically signed by: Arya Morales  Date:    03/18/2024  Time:    12:34               Narrative:    EXAMINATION:  XR CHEST 1 VIEW    CLINICAL HISTORY:  r/o bleeding or hemorrhage;    TECHNIQUE:  Single frontal view of the chest was performed.    COMPARISON:  02/13/2024    FINDINGS:  Left midlung opacity is again seen.  Right lung clear.  No pneumothorax.  Chronic deformity of the left humeral neck and partially imaged of the proximal humerus.                                       X-Ray Pelvis Routine AP (Final result)  Result time 03/18/24 12:35:41      Final result by Arya Morales MD (03/18/24 12:35:41)                   Impression:      Findings suspicious for a right femoral neck fracture.  Dedicated radiographs recommended.      Electronically signed by: Arya Morales  Date:    03/18/2024  Time:    12:35               Narrative:    EXAMINATION:  XR PELVIS ROUTINE AP    CLINICAL HISTORY:  r/o bleeding or hemorrhage;    TECHNIQUE:  AP view of the pelvis was performed.    COMPARISON:  11/25/2023    FINDINGS:  Postsurgical changes are seen from a previous left hip prosthetic.  There appears to be a fracture of the right femoral neck that will be better assessed on dedicated right hip radiographs.                                       Medications   acetaminophen tablet 650 mg (650 mg Oral Given 3/20/24 2210)   albuterol-ipratropium 2.5 mg-0.5 mg/3 mL nebulizer solution 3 mL (3 mLs Nebulization Given 3/22/24 0011)   aspirin chewable tablet 81 mg (81 mg Oral Given 3/21/24 0806)   EScitalopram oxalate tablet 10 mg (10 mg Oral Given 3/21/24 0806)   enoxaparin injection 40 mg  (40 mg Subcutaneous Given 3/21/24 1742)   melatonin tablet 6 mg (6 mg Oral Given 3/20/24 2210)   traMADoL tablet 50 mg (50 mg Oral Given 3/21/24 1002)   morphine injection 3 mg (3 mg Intravenous Given 3/21/24 2243)   mupirocin 2 % ointment ( Nasal Given 3/21/24 2059)   oxyCODONE immediate release tablet 5 mg (5 mg Oral Given 3/21/24 0544)   clonazePAM tablet 1 mg (1 mg Oral Given 3/21/24 2100)   tiZANidine tablet 4 mg (4 mg Oral Given 3/19/24 2131)   morphine injection 4 mg (has no administration in time range)   HYDROmorphone (PF) injection 0.5 mg (has no administration in time range)   meperidine (PF) injection 25 mg (has no administration in time range)   diphenhydrAMINE injection 25 mg (has no administration in time range)   0.9%  NaCl infusion (for blood administration) (has no administration in time range)   polyethylene glycol packet 17 g (17 g Oral Given 3/21/24 1137)   senna-docusate 8.6-50 mg per tablet 1 tablet (1 tablet Oral Given 3/21/24 1136)   traZODone tablet 50 mg (50 mg Oral Given 3/21/24 2243)   oxyCODONE 12 hr tablet 10 mg (10 mg Oral Given 3/21/24 2059)   gabapentin capsule 300 mg (has no administration in time range)   sodium chloride 0.9% bolus 1,000 mL 1,000 mL (0 mLs Intravenous Stopped 3/18/24 1349)   iopamidoL (ISOVUE-370) injection 100 mL (100 mLs Intravenous Given 3/18/24 1311)   sodium chloride 0.9% bolus 500 mL 500 mL (0 mLs Intravenous Stopped 3/18/24 1449)   potassium chloride 10 mEq in 100 mL IVPB (0 mEq Intravenous Stopped 3/18/24 1449)   piperacillin-tazobactam (ZOSYN) 4.5 g in dextrose 5 % in water (D5W) 100 mL IVPB (MB+) (0 g Intravenous Stopped 3/18/24 1520)   potassium chloride 10 mEq in 100 mL IVPB (0 mEq Intravenous Stopped 3/18/24 2007)   cefTRIAXone (Rocephin) 1 g in dextrose 5 % in water (D5W) 100 mL IVPB (MB+) (0 g Intravenous Stopped 3/21/24 0129)   potassium chloride 10 mEq in 100 mL IVPB (40 mEq Intravenous New Bag 3/18/24 2203)     Medical Decision  Making  .mdm    Amount and/or Complexity of Data Reviewed  Labs: ordered.  Radiology: ordered.    Risk  Prescription drug management.  Decision regarding hospitalization.              Attending Attestation:         Attending Critical Care:   Critical Care Times:   Direct Patient Care (initial evaluation, reassessments, and time considering the case)................................................................25 minutes.   Ordering, Reviewing, and Interpreting Diagnostic Studies...............................................................................................................10 minutes.   Consultation with other Physicians. .................................................................................................................................................15 minutes.   ==============================================================  Total Critical Care Time - exclusive of procedural time: 50 minutes.  ==============================================================  Critical Care Comments: Discussed with intensive care medicine Orthopedics and Orthopedic spine Service as well as trauma.  See ED course notes     Physician Attestation for Scribe:  Physician Attestation Statement for Scribe #1: I, Marcelo Tomas MD, reviewed documentation, as scribed by Celina Estrada in my presence, and it is both accurate and complete.             ED Course as of 03/22/24 0619   Mon Mar 18, 2024   1205 Discussed with Dr. Hills.  Patient had a fall at the nursing home proximally 24 hours ago with right humerus fracture and right normal neck fracture and now hypotensive.  Alpha activation.  Also has been vomiting for the past 5 days.  Unsure if hypotension is due to underlying medical issue. [PK]   1342 No white count no fever patient with vomiting no infectious source or infectious cause strongly suspected.  Will treat empirically with Zosyn.  Have added blood cultures.  Of note lactic acid is 1.6. [PK]   1340  Discussed with orthopedic spine surgeon Dr. Conway.  Advises MRI L-spine without contrast.  Will obtain when safe to do so [PK]   1342 Patient has preserved rectal tone.  She does have 2/5 motor strength bilateral.  She has a very debilitated cachectic habitus and chronic weakness.  She does have lower back pain and CT shows L1 fracture which is correlating clinically with the acute fracture judging from the lower back pain.  Not consistent with cauda equina syndrome [PK]   1352 Discussed with orthopedist Dr. Buckner will evaluate patient.  No emergent need for orthopedic evaluation in the ED [PK]   1511 Discussed with patient's son.  He is out of town is okay with treatment plan [PK]      ED Course User Index  [PK] Marcelo Tomas MD                           Clinical Impression:  Final diagnoses:  [W19.XXXA] Fall  [S72.001A] Closed displaced fracture of right femoral neck (Primary)  [S42.201A] Closed fracture of proximal end of right humerus, unspecified fracture morphology, initial encounter  [S32.010A] Closed compression fracture of body of L1 vertebra  [I95.9] Hypotension, unspecified hypotension type  [E87.6] Hypokalemia          ED Disposition Condition    Admit                 Marcelo Tomas MD  03/22/24 0619

## 2024-03-18 NOTE — ASSESSMENT & PLAN NOTE
- 2.4 on initially labs  - will replete to goal of >3.0  - EKG with prolong QT and sinus bogdan  - monitor on tele   - repeat labs in AM

## 2024-03-18 NOTE — HPI
Ms Low is a 64 yo female who presented to the ED via EMS after falling overnight at the nursing home. She is on hospice care at the nursing home and refused to come to the hospital when she first fell. However, this morning she was more confused and xrays done at the NH revealed a fracture in her R humerus and femur. She is with [facility].   She has a PMH of COPD with oxygen dependence, chronic pain, DONALD, GERD, and previous left humerus fracture and left femur fracture with repair.

## 2024-03-18 NOTE — ASSESSMENT & PLAN NOTE
- hx of old L humerus fracture- non operable   -- new R humerus fracture noted   - orthopedist has been consulted

## 2024-03-18 NOTE — SUBJECTIVE & OBJECTIVE
Past Medical History:   Diagnosis Date    COPD (chronic obstructive pulmonary disease)     DONALD (generalized anxiety disorder) 03/24/2021    GERD with esophagitis     Iron deficiency anemia due to chronic blood loss     Supplemental oxygen dependent 11/28/2023       Past Surgical History:   Procedure Laterality Date    ABDOMINAL SURGERY      LEFT HEART CATHETERIZATION N/A 11/27/2023    Procedure: Left heart cath;  Surgeon: Ulises Crespo DO;  Location: [facility] CATH LAB;  Service: Cardiology;  Laterality: N/A;       Review of patient's allergies indicates:   Allergen Reactions    Ondansetron hcl Swelling     Edema of tongue per patient    Celexa [citalopram]     Darvon [propoxyphene]     Flexeril [cyclobenzaprine]     Hydroxyzine     Percocet [oxycodone-acetaminophen]     Robaxin [methocarbamol]     Thorazine [chlorpromazine]     Toradol [ketorolac] Itching       Family History    None       Tobacco Use    Smoking status: Every Day     Types: Cigarettes    Smokeless tobacco: Never   Substance and Sexual Activity    Alcohol use: Yes    Drug use: Never    Sexual activity: Not on file      Review of Systems   Reason unable to perform ROS: very drowsy and difficult ROS.   Musculoskeletal:  Positive for back pain.   Skin:  Positive for pallor.   Neurological:  Positive for weakness.   Psychiatric/Behavioral:  Positive for confusion.      Objective:     Vital Signs (Most Recent):  Temp: 97.6 °F (36.4 °C) (03/18/24 1159)  Pulse: (!) 48 (03/18/24 1332)  Resp: 12 (03/18/24 1332)  BP: (!) 84/45 (03/18/24 1328)  SpO2: 100 % (03/18/24 1332) Vital Signs (24h Range):  Temp:  [97.6 °F (36.4 °C)] 97.6 °F (36.4 °C)  Pulse:  [48-58] 48  Resp:  [10-20] 12  SpO2:  [83 %-100 %] 100 %  BP: (69-84)/(40-45) 84/45   Weight: 46.3 kg (102 lb)  Body mass index is 18.07 kg/m².    No intake or output data in the 24 hours ending 03/18/24 1348       Physical Exam  Vitals and nursing note reviewed.   Constitutional:       Appearance: She is  cachectic. She is ill-appearing.      Comments: Drowsy    HENT:      Head: Normocephalic.      Mouth/Throat:      Mouth: Mucous membranes are dry.   Eyes:      Extraocular Movements: Extraocular movements intact.      Pupils: Pupils are equal, round, and reactive to light.   Cardiovascular:      Rate and Rhythm: Normal rate and regular rhythm.      Pulses: Normal pulses.      Heart sounds: Normal heart sounds.   Pulmonary:      Effort: Pulmonary effort is normal. No respiratory distress.      Breath sounds: Normal breath sounds. No wheezing.   Abdominal:      General: Bowel sounds are normal. There is distension (slight).      Palpations: Abdomen is soft.      Tenderness: There is abdominal tenderness (generalized).   Musculoskeletal:         General: Normal range of motion.      Cervical back: Neck supple.      Comments: RUE and RLE weakness noted      Skin:     General: Skin is warm and dry.      Capillary Refill: Capillary refill takes 2 to 3 seconds.      Comments: Healed scar to midline abd   Abrasion to R elbow    Neurological:      GCS: GCS eye subscore is 3. GCS verbal subscore is 5. GCS motor subscore is 6.   Psychiatric:         Behavior: Behavior is slowed.            Vents:     Lines/Drains/Airways       Peripheral Intravenous Line  Duration                  Peripheral IV - Single Lumen 03/18/24 1115 20 G Anterior;Left Forearm <1 day                  Significant Labs:    CBC/Anemia Profile:  Recent Labs   Lab 03/18/24  1221   WBC 9.58   HGB 9.3*   HCT 30.4*      MCV 90.2   RDW 16.2*        Chemistries:  Recent Labs   Lab 03/18/24  1221      K 2.4*   CL 96*   CO2 39*   BUN 26*   CREATININE 0.88   CALCIUM 7.7*   ALBUMIN 2.4*   PROT 5.0*   BILITOT 0.5   ALKPHOS 110   ALT 35   AST 45*       All pertinent labs within the past 24 hours have been reviewed.    Significant Imaging: I have reviewed all pertinent imaging results/findings within the past 24 hours.  Imaging Results              X-Ray  Shoulder Complete 2 View Right (Final result)  Result time 03/18/24 13:20:38      Final result by Ernst Hermosillo II, MD (03/18/24 13:20:38)                   Impression:      Proximal humerus fracture as described above      Electronically signed by: Ernst Hermosillo  Date:    03/18/2024  Time:    13:20               Narrative:    EXAMINATION:  XR SHOULDER COMPLETE 2 OR MORE VIEWS RIGHT    CLINICAL HISTORY:  Unspecified fall, initial encounter    COMPARISON:  None available    TECHNIQUE:  XR SHOULDER 3 VIEWS RIGHT    FINDINGS:  No there is comminuted fracture of the right proximal humerus with moderate anterior displacement of the humeral shaft.  The alignment of the joints appears normal.  No degenerative change is present.  No soft tissue abnormality is seen.                                       CT Thoracic Spine Without Contrast (Final result)  Result time 03/18/24 13:23:26      Final result by Arya Morales MD (03/18/24 13:23:26)                   Impression:      No acute fracture or dislocation of the thoracic spine.      Electronically signed by: Arya Morales  Date:    03/18/2024  Time:    13:23               Narrative:    EXAMINATION:  CT THORACIC SPINE WITHOUT CONTRAST    CLINICAL HISTORY:  Back trauma, no prior imaging (Age >= 16y);    TECHNIQUE:  CT of the thoracic spine performed without intravenous contrast    The CT examination was performed using one or more of the following dose reduction techniques: Automated exposure control, adjustment of the mA and kV according to patient's size, use of acute or iterative reconstruction techniques.    COMPARISON:  Chest CT 11/26/2023    FINDINGS:  The L1 compression fracture shows continued height loss since 11/26/2023.  Similarly there is evolution of the lower sternal fracture.  There is no acute fracture detected of the thoracic spine.  The vertebral body heights and alignment are maintained and similar to prior.  Minimal superior endplate height loss of T8  is similar.  No spinal canal or foraminal stenosis of the thoracic spine.  There is retropulsion at L1 compression fracture with mild to moderate spinal canal stenosis at that level.                                       CT Lumbar Spine Without Contrast (Final result)  Result time 03/18/24 13:19:25      Final result by Ernst Hermosillo II, MD (03/18/24 13:19:25)                   Impression:      Multiple compression fractures as described above.  The L2 fracture appears increased since previous x-ray.  Remaining fractures appears similar to previous study.      Electronically signed by: Ernst Hermosillo  Date:    03/18/2024  Time:    13:19               Narrative:    EXAMINATION:  CT LUMBAR SPINE WITHOUT CONTRAST    CLINICAL HISTORY:  fall;    TECHNIQUE:  Axial CT imaging of the lumbar spine is performed without contrast.  Computer reformatting is viewed in the sagittal and coronal planes.    CT dose reduction technique used - Dose Rite and tube current modulation.    COMPARISON:  13 December 2023    FINDINGS:  There is compression fracture of the L2 1 vertebra, height loss estimated at 70%.  There is posterior cortex retropulsion contributing to mild central canal stenosis.  This is increased from previous x-ray.    L3: There is L3 compression fracture with height loss estimated 25%.  Acute fracture line not seen.  There is L4 and L5 compression fractures height loss less than 25%.  Acute fracture line not clearly seen.  Previous right sacral wing fracture.  Distinct acute fracture line in this area not seen.    Alignment of the spine is within normal limits.  Vertebral body heights are normal.  No other abnormality is demonstrated.                                       CT Chest Abdomen Pelvis With IV Contrast (XPD) NO Oral Contrast (Final result)  Result time 03/18/24 13:27:59      Final result by Ernst Hermosillo II, MD (03/18/24 13:27:59)                   Impression:      Right femoral neck fracture.  No  other definite acute fracture seen.      Electronically signed by: Ernst Rizzojessie  Date:    03/18/2024  Time:    13:27               Narrative:    EXAMINATION:  CT CHEST ABDOMEN PELVIS WITH IV CONTRAST (XPD)    CLINICAL HISTORY:  Polytrauma, blunt;   Abdomen pain.    TECHNIQUE:  Axial CT imaging of the chest, abdomen and pelvis is performed with intravenous and oral contrast. Contrast dose is 100 cc of Omnipaque 350.    COMPARISON:  None available    FINDINGS:  CT chest: Heart, mediastinum within normal limits.  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  Otherwise the great vessels show no evidence of abnormality    Small amount of right lower lung airspace density present.  No other evidence of lung parenchymal abnormality seen. No pneumothorax or effusion is present.  Comminuted proximal humerus fracture of the right partially visualized.  No acute chest wall abnormalities are identified.    CT abdomen: The liver, spleen, pancreas, adrenal glands and kidneys are normal in size and enhancement.  No evidence of focal lesion is demonstrated in the solid organs.  The bowel caliber is normal and no wall thickening or adjacent inflammatory change is seen.  No evidence of free fluid or free air is present.  Appendix is not seen.    CT pelvis: Old pelvic fractures present.  There is suggestion of acute femoral neck fracture on the right with impaction.  The bowel and bladder appear within normal limits.  The uterus and ovaries not seen.                                       X-Ray Chest 1 View (Final result)  Result time 03/18/24 12:34:47      Final result by Arya Morales MD (03/18/24 12:34:47)                   Impression:      Similar appearance of the chest.  Chronic deformities of the left humerus partially assessed.      Electronically signed by: Arya Morales  Date:    03/18/2024  Time:    12:34               Narrative:    EXAMINATION:  XR CHEST 1 VIEW    CLINICAL HISTORY:  r/o bleeding or  hemorrhage;    TECHNIQUE:  Single frontal view of the chest was performed.    COMPARISON:  02/13/2024    FINDINGS:  Left midlung opacity is again seen.  Right lung clear.  No pneumothorax.  Chronic deformity of the left humeral neck and partially imaged of the proximal humerus.                                       X-Ray Pelvis Routine AP (Final result)  Result time 03/18/24 12:35:41      Final result by Arya Morales MD (03/18/24 12:35:41)                   Impression:      Findings suspicious for a right femoral neck fracture.  Dedicated radiographs recommended.      Electronically signed by: Arya Morales  Date:    03/18/2024  Time:    12:35               Narrative:    EXAMINATION:  XR PELVIS ROUTINE AP    CLINICAL HISTORY:  r/o bleeding or hemorrhage;    TECHNIQUE:  AP view of the pelvis was performed.    COMPARISON:  11/25/2023    FINDINGS:  Postsurgical changes are seen from a previous left hip prosthetic.  There appears to be a fracture of the right femoral neck that will be better assessed on dedicated right hip radiographs.

## 2024-03-18 NOTE — ASSESSMENT & PLAN NOTE
- s/p 2L NS   - SBP 80-90s during exam  - UDS negative   - UA with evidence of UTI  - blood and urine cultures pending   - will start on abx   - per EMS report- vomiting for the last 5 days-- so likely secondary to IVVD   - will continue IVF; may require vasopressor support if MAP consistently less than 60

## 2024-03-18 NOTE — CONSULTS
[facility] - Emergency Department  Critical Care Medicine  Consult Note    Patient Name: Darya Low  MRN: [id number]  Admission Date: 3/18/2024  Hospital Length of Stay: 0 days  Code Status: DNR  Attending Physician: Cordell Hills MD   Primary Care Provider: Maeve, Primary Doctor   Principal Problem: Fall    Inpatient consult to Critical Care Medicine  Consult performed by: Karine Calhoun, LUISP-AGACNP  Consult ordered by: Cordell Hills MD        Subjective:     HPI:  Ms Low is a 62 yo female who presented to the ED via EMS after falling overnight at the nursing home. She is on hospice care at the nursing home and refused to come to the hospital when she first fell. However, this morning she was more confused and xrays done at the NH revealed a fracture in her R humerus and femur. She is with [facility].   She has a PMH of COPD with oxygen dependence, chronic pain, DONALD, GERD, and previous left humerus fracture and left femur fracture with repair.     Addendum- multiple attempts to get in touch with NH regarding hospice status and with next of kin- no answer. Will continue with DNR per patient's wishes.     Hospital/ICU Course:  No notes on file    Past Medical History:   Diagnosis Date    COPD (chronic obstructive pulmonary disease)     DONALD (generalized anxiety disorder) 03/24/2021    GERD with esophagitis     Iron deficiency anemia due to chronic blood loss     Supplemental oxygen dependent 11/28/2023       Past Surgical History:   Procedure Laterality Date    ABDOMINAL SURGERY      LEFT HEART CATHETERIZATION N/A 11/27/2023    Procedure: Left heart cath;  Surgeon: Ulises Crespo DO;  Location: [facility] [place] CATH LAB;  Service: Cardiology;  Laterality: N/A;       Review of patient's allergies indicates:   Allergen Reactions    Ondansetron hcl Swelling     Edema of tongue per patient    Celexa [citalopram]     Darvon [propoxyphene]     Flexeril [cyclobenzaprine]     Hydroxyzine     Percocet  [oxycodone-acetaminophen]     Robaxin [methocarbamol]     Thorazine [chlorpromazine]     Toradol [ketorolac] Itching       Family History    None       Tobacco Use    Smoking status: Every Day     Types: Cigarettes    Smokeless tobacco: Never   Substance and Sexual Activity    Alcohol use: Yes    Drug use: Never    Sexual activity: Not on file      Review of Systems   Reason unable to perform ROS: very drowsy and difficult ROS.   Musculoskeletal:  Positive for back pain.   Skin:  Positive for pallor.   Neurological:  Positive for weakness.   Psychiatric/Behavioral:  Positive for confusion.      Objective:     Vital Signs (Most Recent):  Temp: 97.6 °F (36.4 °C) (03/18/24 1159)  Pulse: (!) 48 (03/18/24 1332)  Resp: 12 (03/18/24 1332)  BP: (!) 84/45 (03/18/24 1328)  SpO2: 100 % (03/18/24 1332) Vital Signs (24h Range):  Temp:  [97.6 °F (36.4 °C)] 97.6 °F (36.4 °C)  Pulse:  [48-58] 48  Resp:  [10-20] 12  SpO2:  [83 %-100 %] 100 %  BP: (69-84)/(40-45) 84/45   Weight: 46.3 kg (102 lb)  Body mass index is 18.07 kg/m².    No intake or output data in the 24 hours ending 03/18/24 1348       Physical Exam  Vitals and nursing note reviewed.   Constitutional:       Appearance: She is cachectic. She is ill-appearing.      Comments: Drowsy    HENT:      Head: Normocephalic.      Mouth/Throat:      Mouth: Mucous membranes are dry.   Eyes:      Extraocular Movements: Extraocular movements intact.      Pupils: Pupils are equal, round, and reactive to light.   Cardiovascular:      Rate and Rhythm: Normal rate and regular rhythm.      Pulses: Normal pulses.      Heart sounds: Normal heart sounds.   Pulmonary:      Effort: Pulmonary effort is normal. No respiratory distress.      Breath sounds: Normal breath sounds. No wheezing.   Abdominal:      General: Bowel sounds are normal. There is distension (slight).      Palpations: Abdomen is soft.      Tenderness: There is abdominal tenderness (generalized).   Musculoskeletal:          General: Normal range of motion.      Cervical back: Neck supple.      Comments: RUE and RLE weakness noted      Skin:     General: Skin is warm and dry.      Capillary Refill: Capillary refill takes 2 to 3 seconds.      Comments: Healed scar to midline abd   Abrasion to R elbow    Neurological:      GCS: GCS eye subscore is 3. GCS verbal subscore is 5. GCS motor subscore is 6.   Psychiatric:         Behavior: Behavior is slowed.            Vents:     Lines/Drains/Airways       Peripheral Intravenous Line  Duration                  Peripheral IV - Single Lumen 03/18/24 1115 20 G Anterior;Left Forearm <1 day                  Significant Labs:    CBC/Anemia Profile:  Recent Labs   Lab 03/18/24  1221   WBC 9.58   HGB 9.3*   HCT 30.4*      MCV 90.2   RDW 16.2*        Chemistries:  Recent Labs   Lab 03/18/24  1221      K 2.4*   CL 96*   CO2 39*   BUN 26*   CREATININE 0.88   CALCIUM 7.7*   ALBUMIN 2.4*   PROT 5.0*   BILITOT 0.5   ALKPHOS 110   ALT 35   AST 45*       All pertinent labs within the past 24 hours have been reviewed.    Significant Imaging: I have reviewed all pertinent imaging results/findings within the past 24 hours.  Imaging Results              X-Ray Shoulder Complete 2 View Right (Final result)  Result time 03/18/24 13:20:38      Final result by Ernst Hermosillo II, MD (03/18/24 13:20:38)                   Impression:      Proximal humerus fracture as described above      Electronically signed by: Ernst Hermosillo  Date:    03/18/2024  Time:    13:20               Narrative:    EXAMINATION:  XR SHOULDER COMPLETE 2 OR MORE VIEWS RIGHT    CLINICAL HISTORY:  Unspecified fall, initial encounter    COMPARISON:  None available    TECHNIQUE:  XR SHOULDER 3 VIEWS RIGHT    FINDINGS:  No there is comminuted fracture of the right proximal humerus with moderate anterior displacement of the humeral shaft.  The alignment of the joints appears normal.  No degenerative change is present.  No soft tissue  abnormality is seen.                                       CT Thoracic Spine Without Contrast (Final result)  Result time 03/18/24 13:23:26      Final result by Arya Morales MD (03/18/24 13:23:26)                   Impression:      No acute fracture or dislocation of the thoracic spine.      Electronically signed by: Arya Morales  Date:    03/18/2024  Time:    13:23               Narrative:    EXAMINATION:  CT THORACIC SPINE WITHOUT CONTRAST    CLINICAL HISTORY:  Back trauma, no prior imaging (Age >= 16y);    TECHNIQUE:  CT of the thoracic spine performed without intravenous contrast    The CT examination was performed using one or more of the following dose reduction techniques: Automated exposure control, adjustment of the mA and kV according to patient's size, use of acute or iterative reconstruction techniques.    COMPARISON:  Chest CT 11/26/2023    FINDINGS:  The L1 compression fracture shows continued height loss since 11/26/2023.  Similarly there is evolution of the lower sternal fracture.  There is no acute fracture detected of the thoracic spine.  The vertebral body heights and alignment are maintained and similar to prior.  Minimal superior endplate height loss of T8 is similar.  No spinal canal or foraminal stenosis of the thoracic spine.  There is retropulsion at L1 compression fracture with mild to moderate spinal canal stenosis at that level.                                       CT Lumbar Spine Without Contrast (Final result)  Result time 03/18/24 13:19:25      Final result by Ernst Hermosillo II, MD (03/18/24 13:19:25)                   Impression:      Multiple compression fractures as described above.  The L2 fracture appears increased since previous x-ray.  Remaining fractures appears similar to previous study.      Electronically signed by: Ernst Hermosillo  Date:    03/18/2024  Time:    13:19               Narrative:    EXAMINATION:  CT LUMBAR SPINE WITHOUT CONTRAST    CLINICAL  HISTORY:  fall;    TECHNIQUE:  Axial CT imaging of the lumbar spine is performed without contrast.  Computer reformatting is viewed in the sagittal and coronal planes.    CT dose reduction technique used - Dose Rite and tube current modulation.    COMPARISON:  13 December 2023    FINDINGS:  There is compression fracture of the L2 1 vertebra, height loss estimated at 70%.  There is posterior cortex retropulsion contributing to mild central canal stenosis.  This is increased from previous x-ray.    L3: There is L3 compression fracture with height loss estimated 25%.  Acute fracture line not seen.  There is L4 and L5 compression fractures height loss less than 25%.  Acute fracture line not clearly seen.  Previous right sacral wing fracture.  Distinct acute fracture line in this area not seen.    Alignment of the spine is within normal limits.  Vertebral body heights are normal.  No other abnormality is demonstrated.                                       CT Chest Abdomen Pelvis With IV Contrast (XPD) NO Oral Contrast (Final result)  Result time 03/18/24 13:27:59      Final result by Ernst Hermosillo II, MD (03/18/24 13:27:59)                   Impression:      Right femoral neck fracture.  No other definite acute fracture seen.      Electronically signed by: Ernst Hermosillo  Date:    03/18/2024  Time:    13:27               Narrative:    EXAMINATION:  CT CHEST ABDOMEN PELVIS WITH IV CONTRAST (XPD)    CLINICAL HISTORY:  Polytrauma, blunt;   Abdomen pain.    TECHNIQUE:  Axial CT imaging of the chest, abdomen and pelvis is performed with intravenous and oral contrast. Contrast dose is 100 cc of Omnipaque 350.    COMPARISON:  None available    FINDINGS:  CT chest: Heart, mediastinum within normal limits.  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  Otherwise the great vessels show no evidence of abnormality    Small amount of right lower lung airspace density present.  No other evidence of lung parenchymal  abnormality seen. No pneumothorax or effusion is present.  Comminuted proximal humerus fracture of the right partially visualized.  No acute chest wall abnormalities are identified.    CT abdomen: The liver, spleen, pancreas, adrenal glands and kidneys are normal in size and enhancement.  No evidence of focal lesion is demonstrated in the solid organs.  The bowel caliber is normal and no wall thickening or adjacent inflammatory change is seen.  No evidence of free fluid or free air is present.  Appendix is not seen.    CT pelvis: Old pelvic fractures present.  There is suggestion of acute femoral neck fracture on the right with impaction.  The bowel and bladder appear within normal limits.  The uterus and ovaries not seen.                                       X-Ray Chest 1 View (Final result)  Result time 03/18/24 12:34:47      Final result by Arya Morales MD (03/18/24 12:34:47)                   Impression:      Similar appearance of the chest.  Chronic deformities of the left humerus partially assessed.      Electronically signed by: Arya Morales  Date:    03/18/2024  Time:    12:34               Narrative:    EXAMINATION:  XR CHEST 1 VIEW    CLINICAL HISTORY:  r/o bleeding or hemorrhage;    TECHNIQUE:  Single frontal view of the chest was performed.    COMPARISON:  02/13/2024    FINDINGS:  Left midlung opacity is again seen.  Right lung clear.  No pneumothorax.  Chronic deformity of the left humeral neck and partially imaged of the proximal humerus.                                       X-Ray Pelvis Routine AP (Final result)  Result time 03/18/24 12:35:41      Final result by Arya Morales MD (03/18/24 12:35:41)                   Impression:      Findings suspicious for a right femoral neck fracture.  Dedicated radiographs recommended.      Electronically signed by: Arya Morales  Date:    03/18/2024  Time:    12:35               Narrative:    EXAMINATION:  XR PELVIS ROUTINE AP    CLINICAL HISTORY:  r/o bleeding or  "hemorrhage;    TECHNIQUE:  AP view of the pelvis was performed.    COMPARISON:  11/25/2023    FINDINGS:  Postsurgical changes are seen from a previous left hip prosthetic.  There appears to be a fracture of the right femoral neck that will be better assessed on dedicated right hip radiographs.                                        ABG  No results for input(s): "PH", "PO2", "PCO2", "HCO3", "BE" in the last 168 hours.  Assessment/Plan:     Neuro  Closed compression fracture of L2 vertebra  - noted on CT scan  - L2 compression fracture with aprox 70% height loss   - orthopedic spine surgeon was consulted   - recommends MRI of lumbar spine   - will wait until BP is more stabilized before obtaining     Chronic pain  - polypharmacy noted on meds list from NH however UDS negative   - limited with pain control at present given hypotension  - will treat conservatively and add back home meds as BP improves     Pulmonary  Supplemental oxygen dependent  - continue on NC     Chronic obstructive pulmonary disease  - chronic  - stable at present  - will continue on NC and duonebs  - states she does not wear CPAP or bipap     Cardiac/Vascular  Hypotension  - s/p 2L NS   - SBP 80-90s during exam  - UDS negative   - UA with evidence of UTI  - blood and urine cultures pending   - will start on abx   - per EMS report- vomiting for the last 5 days-- so likely secondary to IVVD   - will continue IVF; may require vasopressor support if MAP consistently less than 60     Renal/  Hypokalemia  - 2.4 on initially labs  - will replete to goal of >3.0  - EKG with prolong QT and sinus bogdan  - monitor on tele   - repeat labs in AM     Orthopedic  * Fall  - recurrent falls   - multiple old fractures noted   - R humerus and femoral head fracture     Fracture of head of right femur  - R femoral head fracture noted   - orthopedist consulted     Humerus fracture  - hx of old L humerus fracture- non operable   -- new R humerus fracture noted   - " orthopedist has been consulted             JAYSON Lopez-STEPHEN  Critical Care Medicine  [facility] - Emergency Department

## 2024-03-18 NOTE — H&P
[facility] - Emergency Department  General Surgery  History & Physical    Patient Name: Darya Low  MRN: [id number]  Admission Date: 3/18/2024  Attending Physician: Marcelo Tomas MD   Primary Care Provider: Maeve Primary Doctor    Patient information was obtained from patient, past medical records, and ER records.     Subjective:     Chief Complaint/Reason for Admission:  Right shoulder and right hip pain    History of Present Illness:  Patient is a 63 y.o. female presents with coming from the nursing home on hospice care she fell yesterday and allegedly refused to come into the hospital at 1st.  Was little confused today and came in found to have a fracture of her right shoulder and right femur.  Slight confusion GCS of 14 but arousable.  Blood pressure was low initially 60s over 30s and on a fluid bolus came up to the 70 systolic.  Upgraded to an alpha.  She got 1 L of fluid bolus and felt slightly better.    No current facility-administered medications on file prior to encounter.     Current Outpatient Medications on File Prior to Encounter   Medication Sig    albuterol-ipratropium (DUO-NEB) 2.5 mg-0.5 mg/3 mL nebulizer solution SMARTSI Ampule(s) Via Nebulizer 3 Times Daily    benzonatate (TESSALON) 100 MG capsule Take 100 mg by mouth.    furosemide (LASIX) 40 MG tablet Take 40 mg by mouth.    lactulose (CHRONULAC) 10 gram/15 mL solution SMARTSIG:Milliliter(s) By Mouth    oxyCODONE (ROXICODONE) 5 MG immediate release tablet Take 5 mg by mouth every 6 (six) hours as needed.    OXYCONTIN 10 mg 12 hr tablet Take 10 mg by mouth 2 (two) times daily.    predniSONE (DELTASONE) 10 MG tablet Take 10 mg by mouth.    promethazine (PHENERGAN) 12.5 MG Tab Take 12.5 mg by mouth every 6 (six) hours as needed.    aspirin 81 MG Chew Take 1 tablet (81 mg total) by mouth once daily.    clonazePAM (KLONOPIN) 2 MG Tab     EScitalopram oxalate (LEXAPRO) 10 MG tablet Take 1 tablet (10 mg total) by mouth once daily.     EScitalopram oxalate (LEXAPRO) 20 MG tablet Take 20 mg by mouth.    ferrous sulfate (FEOSOL) 325 mg (65 mg iron) Tab tablet Take 1 tablet (325 mg total) by mouth 2 (two) times daily.    fluticasone propionate (FLONASE) 50 mcg/actuation nasal spray 2 sprays by Each Nostril route once daily.    furosemide (LASIX) 20 MG tablet Take 20 mg by mouth daily as needed.    gabapentin (NEURONTIN) 100 MG capsule Take 1 capsule (100 mg total) by mouth nightly. (Patient taking differently: Take 400 mg by mouth nightly.)    glycopyrrolate-formoteroL (BEVESPI AEROSPHERE) 9-4.8 mcg HFAA Inhale 2 puffs into the lungs 2 (two) times daily. Controller    HYDROcodone-acetaminophen (NORCO)  mg per tablet     ibuprofen (ADVIL,MOTRIN) 200 MG tablet Take 200 mg by mouth every 6 (six) hours as needed for Pain.    montelukast (SINGULAIR) 10 mg tablet Take 1 tablet (10 mg total) by mouth nightly.    multivitamin Tab Take 1 tablet by mouth once daily.    NIFEdipine (PROCARDIA-XL) 60 MG (OSM) 24 hr tablet Take 1 tablet (60 mg total) by mouth once daily.    pantoprazole (PROTONIX) 40 MG tablet Take 1 tablet (40 mg total) by mouth once daily.    tiZANidine (ZANAFLEX) 4 MG tablet Take 1 tablet (4 mg total) by mouth 3 (three) times daily as needed.    traZODone (DESYREL) 100 MG tablet Take 1 tablet (100 mg total) by mouth every evening. (Patient taking differently: Take 50 mg by mouth every evening. )       Review of patient's allergies indicates:   Allergen Reactions    Ondansetron hcl Swelling     Edema of tongue per patient    Celexa [citalopram]     Darvon [propoxyphene]     Flexeril [cyclobenzaprine]     Hydroxyzine     Percocet [oxycodone-acetaminophen]     Robaxin [methocarbamol]     Thorazine [chlorpromazine]     Toradol [ketorolac] Itching       Past Medical History:   Diagnosis Date    COPD (chronic obstructive pulmonary disease)     DONALD (generalized anxiety disorder) 03/24/2021    GERD with esophagitis     Iron deficiency anemia  due to chronic blood loss     Supplemental oxygen dependent 11/28/2023     Past Surgical History:   Procedure Laterality Date    ABDOMINAL SURGERY      LEFT HEART CATHETERIZATION N/A 11/27/2023    Procedure: Left heart cath;  Surgeon: Ulises Crespo DO;  Location: [facility] CATH LAB;  Service: Cardiology;  Laterality: N/A;     Family History    None       Tobacco Use    Smoking status: Every Day     Types: Cigarettes    Smokeless tobacco: Never   Substance and Sexual Activity    Alcohol use: Yes    Drug use: Never    Sexual activity: Not on file     Review of Systems   Constitutional:  Positive for activity change. Negative for appetite change, fatigue and fever.   HENT:  Negative for trouble swallowing.    Respiratory:  Negative for cough and shortness of breath.    Cardiovascular:  Negative for chest pain and palpitations.   Gastrointestinal:  Positive for nausea. Negative for abdominal distention, abdominal pain, blood in stool, constipation and diarrhea.   Genitourinary:  Negative for flank pain.   Musculoskeletal:  Negative for neck pain and neck stiffness.   Neurological:  Positive for weakness.     Objective:     Vital Signs (Most Recent):  Temp: 97.6 °F (36.4 °C) (03/18/24 1159)  Pulse: (!) 51 (03/18/24 1232)  Resp: 15 (03/18/24 1232)  BP: (!) 72/41 (03/18/24 1228)  SpO2: 100 % (03/18/24 1232) Vital Signs (24h Range):  Temp:  [97.6 °F (36.4 °C)] 97.6 °F (36.4 °C)  Pulse:  [51-58] 51  Resp:  [15-20] 15  SpO2:  [83 %-100 %] 100 %  BP: (69-76)/(40-44) 72/41     Weight: 46.3 kg (102 lb)  Body mass index is 18.07 kg/m².    Physical Exam  Constitutional:       General: She is not in acute distress.  HENT:      Head: Normocephalic.   Cardiovascular:      Rate and Rhythm: Normal rate and regular rhythm.      Pulses: Normal pulses.   Pulmonary:      Effort: Pulmonary effort is normal. No respiratory distress.      Breath sounds: Normal breath sounds.   Abdominal:      General: Abdomen is flat. There is no  "distension.      Palpations: Abdomen is soft.      Tenderness: There is no abdominal tenderness.   Musculoskeletal:         General: Normal range of motion.   Skin:     General: Skin is warm.      Findings: Lesion (Right arm right shoulder tenderness.  No bruising or limb swelling or signs of active extravasation or bleeding.) present.   Neurological:      General: No focal deficit present.      Mental Status: She is oriented to person, place, and time.      Comments: Mild somnolence but easily arousable         Significant Labs:  I have reviewed all pertinent lab results within the past 24 hours.  CBC: No results for input(s): "WBC", "RBC", "HGB", "HCT", "PLT", "MCV", "MCH", "MCHC" in the last 168 hours.  BMP: No results for input(s): "GLU", "NA", "K", "CL", "CO2", "BUN", "CREATININE", "CALCIUM", "MG" in the last 168 hours.  CMP: No results for input(s): "GLU", "CALCIUM", "ALBUMIN", "PROT", "NA", "K", "CO2", "CL", "BUN", "CREATININE", "ALKPHOS", "ALT", "AST", "BILITOT" in the last 168 hours.    Significant Diagnostics:  I have reviewed all pertinent imaging results/findings within the past 24 hours.  X-ray showed right femoral head and right humerus fracture old left humerus fracture as well.    Assessment/Plan:     There are no hospital problems to display for this patient.  Will see what her H&H and type and screen is.  Will also get a abdomen pelvis x-ray to see if this an intra-abdominal pathology for why she is so hypotensive.  Otherwise will follow up the results.  Likely need ICU monitoring and orthopedic evaluation.    VTE Risk Mitigation (From admission, onward)      None            Cordell Hills MD  General Surgery  [facility] - Emergency Department  "

## 2024-03-18 NOTE — ASSESSMENT & PLAN NOTE
- chronic  - stable at present  - will continue on NC and duonebs  - states she does not wear CPAP or bipap

## 2024-03-18 NOTE — PHARMACY MED REC
"Admission Medication History     The home medication history was taken by Sharonda Vargas.    You may go to "Admission" then "Reconcile Home Medications" tabs to review and/or act upon these items.     The home medication list has been updated by the Pharmacy department.   Please read ALL comments highlighted in yellow.   Please address this information as you see fit.    Feel free to contact us if you have any questions or require assistance.    The following medications were added:  Gabapentin 400 mg  Senna 8.6 mg  Trazodone 150 mg      The medications listed below were removed from the home medication list. Please reorder if appropriate:  Nursing home reports patient is no longer taking the following medication(s):  Aspirin 81 mg  Escitalopram 10 mg  Ferrous sulfate 325 mg  Flonase 50 mcg  Furosemide 20 mg  Gabapentin 100 mg  Bevespi aerosphere 9-4.8 mcg HFAA  [place]   Montelukast 10 mg  Pantoprazole 40 mg    Medications listed below were obtained from: Analytic software- [employer], Medical records, and Nursing home  (Not in a hospital admission)        Current Outpatient Medications on File Prior to Encounter   Medication Sig Dispense Refill Last Dose    clonazePAM (KLONOPIN) 2 MG Tab Take 2 mg by mouth 2 (two) times daily.   3/18/2024    EScitalopram oxalate (LEXAPRO) 20 MG tablet Take 20 mg by mouth every evening.   3/17/2024    furosemide (LASIX) 40 MG tablet Take 40 mg by mouth 2 (two) times a day.   3/18/2024    gabapentin (NEURONTIN) 400 MG capsule Take 400 mg by mouth 3 (three) times daily.   3/18/2024    lactulose (CHRONULAC) 10 gram/15 mL solution Take 30 mLs by mouth 3 (three) times daily.   3/18/2024    multivitamin Tab Take 1 tablet by mouth once daily. 30 tablet 1 3/18/2024    NIFEdipine (PROCARDIA-XL) 60 MG (OSM) 24 hr tablet Take 1 tablet (60 mg total) by mouth once daily. 30 tablet 11 3/18/2024    OXYCONTIN 10 mg 12 hr tablet Take 10 mg by mouth 2 (two) times daily.   3/18/2024    predniSONE " (DELTASONE) 10 MG tablet Take 10 mg by mouth once daily.   3/18/2024    senna (SENOKOT) 8.6 mg tablet Take 1 tablet by mouth every evening.   3/17/2024    traZODone (DESYREL) 150 MG tablet Take 150 mg by mouth every evening.   3/17/2024    albuterol-ipratropium (DUO-NEB) 2.5 mg-0.5 mg/3 mL nebulizer solution SMARTSI Ampule(s) Via Nebulizer 3 Times Daily   Unknown    benzonatate (TESSALON) 100 MG capsule Take 100 mg by mouth 3 (three) times daily as needed.   Unknown    ibuprofen (ADVIL,MOTRIN) 200 MG tablet Take 200 mg by mouth every 6 (six) hours as needed for Pain.   Unknown    oxyCODONE (ROXICODONE) 5 MG immediate release tablet Take 5 mg by mouth every 6 (six) hours as needed.   Unknown    promethazine (PHENERGAN) 12.5 MG Tab Take 12.5 mg by mouth every 6 (six) hours as needed.   Unknown    tiZANidine (ZANAFLEX) 4 MG tablet Take 1 tablet (4 mg total) by mouth 3 (three) times daily as needed. (Patient taking differently: Take 4 mg by mouth nightly as needed.) 30 tablet 1 Unknown    [DISCONTINUED] aspirin 81 MG Chew Take 1 tablet (81 mg total) by mouth once daily. 360 tablet 0     [DISCONTINUED] EScitalopram oxalate (LEXAPRO) 10 MG tablet Take 1 tablet (10 mg total) by mouth once daily. 30 tablet 1     [DISCONTINUED] ferrous sulfate (FEOSOL) 325 mg (65 mg iron) Tab tablet Take 1 tablet (325 mg total) by mouth 2 (two) times daily. 60 tablet 4     [DISCONTINUED] fluticasone propionate (FLONASE) 50 mcg/actuation nasal spray 2 sprays by Each Nostril route once daily.       [DISCONTINUED] furosemide (LASIX) 20 MG tablet Take 20 mg by mouth daily as needed.       [DISCONTINUED] gabapentin (NEURONTIN) 100 MG capsule Take 1 capsule (100 mg total) by mouth nightly. (Patient taking differently: Take 400 mg by mouth nightly.) 30 capsule 1     [DISCONTINUED] glycopyrrolate-formoteroL (BEVESPI AEROSPHERE) 9-4.8 mcg HFAA Inhale 2 puffs into the lungs 2 (two) times daily. Controller       [DISCONTINUED]  HYDROcodone-acetaminophen (NORCO)  mg per tablet        [DISCONTINUED] montelukast (SINGULAIR) 10 mg tablet Take 1 tablet (10 mg total) by mouth nightly. 30 tablet 1     [DISCONTINUED] pantoprazole (PROTONIX) 40 MG tablet Take 1 tablet (40 mg total) by mouth once daily. (Patient not taking: Reported on 3/18/2024) 30 tablet 1 Not Taking    [DISCONTINUED] traZODone (DESYREL) 100 MG tablet Take 1 tablet (100 mg total) by mouth every evening. (Patient taking differently: Take 50 mg by mouth every evening.) 90 tablet 1        Potential issues to be addressed PRIOR TO DISCHARGE  N/A    Sharonda Vargas  EXT [id number]                 .

## 2024-03-18 NOTE — ED TRIAGE NOTES
Pt presents to ED via [facility] from [facility] with c/o falling last night and having right shoulder and hip pain. [facility] reports pt had scans done at [facility] that showed a right shoulder fracture and right hip fracture. Pt is a hospice pt and a DNR per pt and [facility].

## 2024-03-18 NOTE — ASSESSMENT & PLAN NOTE
- noted on CT scan  - L2 compression fracture with aprox 70% height loss   - orthopedic spine surgeon was consulted   - recommends MRI of lumbar spine   - will wait until BP is more stabilized before obtaining

## 2024-03-19 LAB
ANION GAP SERPL CALCULATED.3IONS-SCNC: 6 MMOL/L (ref 7–16)
BASOPHILS # BLD AUTO: 0.05 K/UL (ref 0–0.2)
BASOPHILS NFR BLD AUTO: 0.5 % (ref 0–1)
BUN SERPL-MCNC: 15 MG/DL (ref 7–18)
BUN/CREAT SERPL: 28 (ref 6–20)
CALCIUM SERPL-MCNC: 8.4 MG/DL (ref 8.5–10.1)
CHLORIDE SERPL-SCNC: 99 MMOL/L (ref 98–107)
CO2 SERPL-SCNC: 36 MMOL/L (ref 21–32)
CREAT SERPL-MCNC: 0.54 MG/DL (ref 0.55–1.02)
DIFFERENTIAL METHOD BLD: ABNORMAL
EGFR (NO RACE VARIABLE) (RUSH/TITUS): 104 ML/MIN/1.73M2
EOSINOPHIL # BLD AUTO: 0.14 K/UL (ref 0–0.5)
EOSINOPHIL NFR BLD AUTO: 1.4 % (ref 1–4)
ERYTHROCYTE [DISTWIDTH] IN BLOOD BY AUTOMATED COUNT: 16.1 % (ref 11.5–14.5)
GLUCOSE SERPL-MCNC: 85 MG/DL (ref 74–106)
HCT VFR BLD AUTO: 31.6 % (ref 38–47)
HGB BLD-MCNC: 9.6 G/DL (ref 12–16)
IMM GRANULOCYTES # BLD AUTO: 0.04 K/UL (ref 0–0.04)
IMM GRANULOCYTES NFR BLD: 0.4 % (ref 0–0.4)
LYMPHOCYTES # BLD AUTO: 1.89 K/UL (ref 1–4.8)
LYMPHOCYTES NFR BLD AUTO: 18.8 % (ref 27–41)
MCH RBC QN AUTO: 27.5 PG (ref 27–31)
MCHC RBC AUTO-ENTMCNC: 30.4 G/DL (ref 32–36)
MCV RBC AUTO: 90.5 FL (ref 80–96)
MONOCYTES # BLD AUTO: 0.92 K/UL (ref 0–0.8)
MONOCYTES NFR BLD AUTO: 9.1 % (ref 2–6)
MPC BLD CALC-MCNC: 10.9 FL (ref 9.4–12.4)
NEUTROPHILS # BLD AUTO: 7.02 K/UL (ref 1.8–7.7)
NEUTROPHILS NFR BLD AUTO: 69.8 % (ref 53–65)
NRBC # BLD AUTO: 0 X10E3/UL
NRBC, AUTO (.00): 0 %
PLATELET # BLD AUTO: 203 K/UL (ref 150–400)
POTASSIUM SERPL-SCNC: 4.5 MMOL/L (ref 3.5–5.1)
RBC # BLD AUTO: 3.49 M/UL (ref 4.2–5.4)
SODIUM SERPL-SCNC: 136 MMOL/L (ref 136–145)
WBC # BLD AUTO: 10.06 K/UL (ref 4.5–11)

## 2024-03-19 PROCEDURE — 27000221 HC OXYGEN, UP TO 24 HOURS

## 2024-03-19 PROCEDURE — 25000003 PHARM REV CODE 250: Performed by: INTERNAL MEDICINE

## 2024-03-19 PROCEDURE — 25000003 PHARM REV CODE 250: Performed by: NURSE PRACTITIONER

## 2024-03-19 PROCEDURE — 80048 BASIC METABOLIC PNL TOTAL CA: CPT | Performed by: NURSE PRACTITIONER

## 2024-03-19 PROCEDURE — 94640 AIRWAY INHALATION TREATMENT: CPT

## 2024-03-19 PROCEDURE — 25000003 PHARM REV CODE 250: Performed by: SURGERY

## 2024-03-19 PROCEDURE — 94761 N-INVAS EAR/PLS OXIMETRY MLT: CPT

## 2024-03-19 PROCEDURE — 20000000 HC ICU ROOM

## 2024-03-19 PROCEDURE — 63600175 PHARM REV CODE 636 W HCPCS: Mod: JZ,JG | Performed by: SURGERY

## 2024-03-19 PROCEDURE — 63600175 PHARM REV CODE 636 W HCPCS: Performed by: INTERNAL MEDICINE

## 2024-03-19 PROCEDURE — 85025 COMPLETE CBC W/AUTO DIFF WBC: CPT | Performed by: NURSE PRACTITIONER

## 2024-03-19 PROCEDURE — 63600175 PHARM REV CODE 636 W HCPCS: Performed by: NURSE PRACTITIONER

## 2024-03-19 PROCEDURE — 25000003 PHARM REV CODE 250: Performed by: HOSPITALIST

## 2024-03-19 PROCEDURE — 25000242 PHARM REV CODE 250 ALT 637 W/ HCPCS: Performed by: NURSE PRACTITIONER

## 2024-03-19 PROCEDURE — 99233 SBSQ HOSP IP/OBS HIGH 50: CPT | Mod: ,,, | Performed by: INTERNAL MEDICINE

## 2024-03-19 PROCEDURE — 99900035 HC TECH TIME PER 15 MIN (STAT)

## 2024-03-19 RX ORDER — GABAPENTIN 300 MG/1
300 CAPSULE ORAL 2 TIMES DAILY
Status: DISCONTINUED | OUTPATIENT
Start: 2024-03-19 | End: 2024-03-22

## 2024-03-19 RX ORDER — TIZANIDINE 4 MG/1
4 TABLET ORAL EVERY 8 HOURS PRN
Status: DISCONTINUED | OUTPATIENT
Start: 2024-03-19 | End: 2024-04-02 | Stop reason: HOSPADM

## 2024-03-19 RX ORDER — OXYCODONE HYDROCHLORIDE 5 MG/1
5 TABLET ORAL EVERY 6 HOURS PRN
Status: DISCONTINUED | OUTPATIENT
Start: 2024-03-19 | End: 2024-04-02 | Stop reason: HOSPADM

## 2024-03-19 RX ORDER — CLONAZEPAM 0.5 MG/1
1 TABLET ORAL 2 TIMES DAILY
Status: DISCONTINUED | OUTPATIENT
Start: 2024-03-19 | End: 2024-04-02 | Stop reason: HOSPADM

## 2024-03-19 RX ORDER — CLONAZEPAM 0.5 MG/1
1 TABLET ORAL NIGHTLY
Status: DISCONTINUED | OUTPATIENT
Start: 2024-03-19 | End: 2024-03-19

## 2024-03-19 RX ADMIN — ENOXAPARIN SODIUM 40 MG: 40 INJECTION SUBCUTANEOUS at 05:03

## 2024-03-19 RX ADMIN — SODIUM CHLORIDE, POTASSIUM CHLORIDE, SODIUM LACTATE AND CALCIUM CHLORIDE: 600; 310; 30; 20 INJECTION, SOLUTION INTRAVENOUS at 05:03

## 2024-03-19 RX ADMIN — CEFTRIAXONE SODIUM 1 G: 1 INJECTION, POWDER, FOR SOLUTION INTRAMUSCULAR; INTRAVENOUS at 11:03

## 2024-03-19 RX ADMIN — MORPHINE SULFATE 3 MG: 4 INJECTION, SOLUTION INTRAMUSCULAR; INTRAVENOUS at 08:03

## 2024-03-19 RX ADMIN — IPRATROPIUM BROMIDE AND ALBUTEROL SULFATE 3 ML: 2.5; .5 SOLUTION RESPIRATORY (INHALATION) at 01:03

## 2024-03-19 RX ADMIN — IPRATROPIUM BROMIDE AND ALBUTEROL SULFATE 3 ML: 2.5; .5 SOLUTION RESPIRATORY (INHALATION) at 07:03

## 2024-03-19 RX ADMIN — MUPIROCIN: 20 OINTMENT TOPICAL at 08:03

## 2024-03-19 RX ADMIN — IPRATROPIUM BROMIDE AND ALBUTEROL SULFATE 3 ML: 2.5; .5 SOLUTION RESPIRATORY (INHALATION) at 12:03

## 2024-03-19 RX ADMIN — MORPHINE SULFATE 3 MG: 4 INJECTION, SOLUTION INTRAMUSCULAR; INTRAVENOUS at 02:03

## 2024-03-19 RX ADMIN — ASPIRIN 81 MG 81 MG: 81 TABLET ORAL at 08:03

## 2024-03-19 RX ADMIN — OXYCODONE HYDROCHLORIDE 5 MG: 5 TABLET ORAL at 11:03

## 2024-03-19 RX ADMIN — SODIUM CHLORIDE, POTASSIUM CHLORIDE, SODIUM LACTATE AND CALCIUM CHLORIDE: 600; 310; 30; 20 INJECTION, SOLUTION INTRAVENOUS at 01:03

## 2024-03-19 RX ADMIN — CLONAZEPAM 1 MG: 0.5 TABLET ORAL at 10:03

## 2024-03-19 RX ADMIN — GABAPENTIN 300 MG: 300 CAPSULE ORAL at 08:03

## 2024-03-19 RX ADMIN — TRAMADOL HYDROCHLORIDE 50 MG: 50 TABLET, COATED ORAL at 02:03

## 2024-03-19 RX ADMIN — OXYCODONE HYDROCHLORIDE 5 MG: 5 TABLET ORAL at 05:03

## 2024-03-19 RX ADMIN — PANTOPRAZOLE SODIUM 40 MG: 40 TABLET, DELAYED RELEASE ORAL at 08:03

## 2024-03-19 RX ADMIN — ESCITALOPRAM OXALATE 10 MG: 10 TABLET, FILM COATED ORAL at 08:03

## 2024-03-19 RX ADMIN — SODIUM CHLORIDE, POTASSIUM CHLORIDE, SODIUM LACTATE AND CALCIUM CHLORIDE: 600; 310; 30; 20 INJECTION, SOLUTION INTRAVENOUS at 10:03

## 2024-03-19 RX ADMIN — CEFTRIAXONE SODIUM 1 G: 1 INJECTION, POWDER, FOR SOLUTION INTRAMUSCULAR; INTRAVENOUS at 12:03

## 2024-03-19 RX ADMIN — CLONAZEPAM 1 MG: 0.5 TABLET ORAL at 08:03

## 2024-03-19 RX ADMIN — MORPHINE SULFATE 3 MG: 4 INJECTION, SOLUTION INTRAMUSCULAR; INTRAVENOUS at 07:03

## 2024-03-19 RX ADMIN — TIZANIDINE 4 MG: 4 TABLET ORAL at 09:03

## 2024-03-19 NOTE — H&P (VIEW-ONLY)
[facility] - Emergency Department  Orthopedics  Consult Note    Patient Name: Darya Low  MRN: [id number]  Admission Date: 3/18/2024  Hospital Length of Stay: 0 days  Attending Provider: Cordell Hills MD  Primary Care Provider: Maeve Primary Doctor    Patient information was obtained from patient, EMS personnel, past medical records, and ER records.     Inpatient consult to Orthopedic Surgery  Consult performed by: Que Buckner MD  Consult ordered by: Karine Calhoun, JAYSON-STEPHEN  Reason for consult: rt hip fx, rt proximal humerus fracture  Assessment/Recommendations: Recommend sling immobilization for the right upper extremity at this time.  In regards to the femoral neck fracture could be treated with percutaneous pinning versus hemiarthroplasty.  I did discuss the case with spine surgery and they are awaiting an MRI.  We are going to try to coordinate a timing for surgical intervention.  Due to her age and minimal displacement of the fracture leaning towards percutaneous pinning at this time.  Could possibly take to OR on 03/19/2024 or 03/20/2024        Subjective:     Principal Problem:Fall    Chief Complaint:   Chief Complaint   Patient presents with    Fall    Hip Pain    Shoulder Pain        HPI:  63-year-old female who has a nursing home resident had a ground level fall over the weekend.  She sustained a right femoral neck fracture right proximal humerus fracture and an L1 burst fracture.  An alpha activation he has been admitted to the trauma service orthopedics was consulted.    Past Medical History:   Diagnosis Date    COPD (chronic obstructive pulmonary disease)     DONALD (generalized anxiety disorder) 03/24/2021    GERD with esophagitis     Iron deficiency anemia due to chronic blood loss     Supplemental oxygen dependent 11/28/2023       Past Surgical History:   Procedure Laterality Date    ABDOMINAL SURGERY      LEFT HEART CATHETERIZATION N/A 11/27/2023    Procedure: Left heart cath;   Surgeon: Ulises Crespo DO;  Location: [facility];  Service: Cardiology;  Laterality: N/A;       Review of patient's allergies indicates:   Allergen Reactions    Ondansetron hcl Swelling     Edema of tongue per patient    Celexa [citalopram]     Darvon [propoxyphene]     Flexeril [cyclobenzaprine]     Hydroxyzine     Percocet [oxycodone-acetaminophen]     Robaxin [methocarbamol]     Thorazine [chlorpromazine]     Toradol [ketorolac] Itching       Current Facility-Administered Medications   Medication    0.9%  NaCl infusion    acetaminophen tablet 650 mg    albuterol-ipratropium 2.5 mg-0.5 mg/3 mL nebulizer solution 3 mL    [START ON 3/19/2024] aspirin chewable tablet 81 mg    [START ON 3/19/2024] cefTRIAXone (Rocephin) 1 g in dextrose 5 % in water (D5W) 100 mL IVPB (MB+)    dextrose 5 % and 0.45 % NaCl with KCl 20 mEq infusion    enoxaparin injection 40 mg    [START ON 3/19/2024] EScitalopram oxalate tablet 10 mg    melatonin tablet 6 mg    morphine injection 3 mg    mupirocin 2 % ointment    pantoprazole EC tablet 40 mg    traMADoL tablet 50 mg     Current Outpatient Medications   Medication Sig    clonazePAM (KLONOPIN) 2 MG Tab Take 2 mg by mouth 2 (two) times daily.    EScitalopram oxalate (LEXAPRO) 20 MG tablet Take 20 mg by mouth every evening.    furosemide (LASIX) 40 MG tablet Take 40 mg by mouth 2 (two) times a day.    gabapentin (NEURONTIN) 400 MG capsule Take 400 mg by mouth 3 (three) times daily.    lactulose (CHRONULAC) 10 gram/15 mL solution Take 30 mLs by mouth 3 (three) times daily.    multivitamin Tab Take 1 tablet by mouth once daily.    NIFEdipine (PROCARDIA-XL) 60 MG (OSM) 24 hr tablet Take 1 tablet (60 mg total) by mouth once daily.    OXYCONTIN 10 mg 12 hr tablet Take 10 mg by mouth 2 (two) times daily.    predniSONE (DELTASONE) 10 MG tablet Take 10 mg by mouth once daily.    senna (SENOKOT) 8.6 mg tablet Take 1 tablet by mouth every evening.    traZODone (DESYREL) 150 MG tablet Take  "150 mg by mouth every evening.    albuterol-ipratropium (DUO-NEB) 2.5 mg-0.5 mg/3 mL nebulizer solution SMARTSI Ampule(s) Via Nebulizer 3 Times Daily    benzonatate (TESSALON) 100 MG capsule Take 100 mg by mouth 3 (three) times daily as needed.    ibuprofen (ADVIL,MOTRIN) 200 MG tablet Take 200 mg by mouth every 6 (six) hours as needed for Pain.    oxyCODONE (ROXICODONE) 5 MG immediate release tablet Take 5 mg by mouth every 6 (six) hours as needed.    promethazine (PHENERGAN) 12.5 MG Tab Take 12.5 mg by mouth every 6 (six) hours as needed.    tiZANidine (ZANAFLEX) 4 MG tablet Take 1 tablet (4 mg total) by mouth 3 (three) times daily as needed. (Patient taking differently: Take 4 mg by mouth nightly as needed.)     Family History    None       Tobacco Use    Smoking status: Every Day     Types: Cigarettes    Smokeless tobacco: Never   Substance and Sexual Activity    Alcohol use: Yes    Drug use: Never    Sexual activity: Not on file     ROS  Objective:     Vital Signs (Most Recent):  Temp: 97.6 °F (36.4 °C) (24 1159)  Pulse: (!) 43 (24 1908)  Resp: 15 (24)  BP: (!) 92/52 (24)  SpO2: 100 % (24) Vital Signs (24h Range):  Temp:  [97.6 °F (36.4 °C)] 97.6 °F (36.4 °C)  Pulse:  [43-58] 43  Resp:  [10-21] 15  SpO2:  [83 %-100 %] 100 %  BP: (69-98)/(40-58) 92/52     Weight: 46.3 kg (102 lb)  Height: 5' 3" (160 cm)  Body mass index is 18.07 kg/m².      Intake/Output Summary (Last 24 hours) at 3/18/2024 1952  Last data filed at 3/18/2024 1755  Gross per 24 hour   Intake 1900 ml   Output 550 ml   Net 1350 ml       General    Nursing note and vitals reviewed.  Constitutional: She is oriented to person, place, and time. She appears well-developed and well-nourished.   HENT:   Head: Normocephalic and atraumatic.   Nose: Nose normal.   Eyes: EOM are normal. Pupils are equal, round, and reactive to light.   Neck: Neck supple.   Cardiovascular:  Normal rate, regular rhythm and intact " distal pulses.            Pulmonary/Chest: Effort normal. No respiratory distress. She exhibits no tenderness.   Abdominal: Soft. She exhibits no distension. There is no abdominal tenderness. There is no guarding.   Neurological: She is alert and oriented to person, place, and time. She has normal reflexes. She displays normal reflexes. No cranial nerve deficit. Coordination normal.   Psychiatric: She has a normal mood and affect. Her behavior is normal. Judgment and thought content normal.             Right Hip Exam     Inspection   Swelling: present  Bruising: present    Tests   Circumduction test: positive  Log Roll: positive    Other   Sensation: normal  Right Shoulder Exam     Tenderness   The patient is tender to palpation of the greater tuberosity.    Crepitus   The patient has crepitus of the greater tuberosity.    Range of Motion   Active abduction:  abnormal   Passive abduction:  abnormal   Extension:  abnormal   Forward Flexion:  abnormal   Forward Elevation: abnormal  Adduction: abnormal  External Rotation 0 degrees:  abnormal   External Rotation 90 degrees: abnormal  Internal rotation 90 degrees:  abnormal     Left Shoulder Exam   Left shoulder exam is normal.       Muscle Strength   Right Upper Extremity   Shoulder External Rotation: 2/5   Supraspinatus: 2/5     Vascular Exam     Right Pulses      Radial:                    2+      Capillary Refill  Right Hand: normal capillary refill          Significant Labs:   Recent Lab Results  (Last 5 results in the past 24 hours)        03/18/24  1717   03/18/24  1650   03/18/24  1442   03/18/24  1331   03/18/24  1235        POC CO2   40.4             Benzodiazepines       Negative         Cocaine       Negative  Comment:   The results of screening tests should be considered presumptive. Confirmatory testing is available upon request.    Cutoff Points:  PCP:         25ng/mL  AMPH:        500ng/mL  STARLA:        200ng/mL  ASHUTOSH:        200ng/mL  THC:          50ng/mL  RUFINO:         300ng/mL  OPI:         2000ng/mL         BARBITURATES       Negative         ABO and RH         O POS       Ammonia 10               Amphetamine, Urine       Negative         Appearance, UA       Turbid         Bacteria, UA       Few         Bilirubin (UA)       Negative         Cannabinoid Scrn, Ur       Negative         Color, UA       Light-Yellow         Glucose, UA       Normal         Hematocrit     29.7           Hemoglobin     9.5           Ketones, UA       Negative         Lactic Acid Level     1.0           Leukocyte Esterase, UA       Moderate         NITRITE UA       Positive         Blood, UA       Negative         Opiates, Urine       Negative         pH, UA       7.5         Phencyclidine, Urine       Negative         POC Base Excess   12.8             POC HCO3   38.7             POC Hematocrit   30             POC Ionized Calcium   1.07             POC Lactate   1.5             POC PCO2   57             POC PH   7.44             POC PO2   77             Potassium, Blood Gas   2.9             POC SATURATED O2   96             Sodium, Blood Gas   135             POCT Glucose   150             Protein, UA       Negative         RBC, UA       1         Specific [place], UA       1.016         Squamous Epithelial Cells, UA       Occasional         UROBILINOGEN UA       Normal         WBC, UA       20                              All pertinent labs within the past 24 hours have been reviewed.    Significant Imaging:   US Lower Extremity Veins Bilateral    Result Date: 3/18/2024  EXAMINATION: US LOWER EXTREMITY VEINS BILATERAL CLINICAL HISTORY: sob; TECHNIQUE: Duplex and color flow Doppler and dynamic compression was performed of the bilateral lower extremity veins.  Ultrasound images captured and stored. COMPARISON: None FINDINGS: Right thigh veins: The common femoral, femoral, popliteal, upper greater saphenous, and deep femoral veins are patent and free of thrombus. The veins are  normally compressible and have normal phasic flow and augmentation response. Right calf veins: The visualized calf veins are patent. Left thigh veins: The common femoral, femoral, popliteal, upper greater saphenous, and deep femoral veins are patent and free of thrombus. The veins are normally compressible and have normal phasic flow and augmentation response. Left calf veins: The visualized calf veins are patent. Miscellaneous: None     No evidence of deep venous thrombosis in either lower extremity. Electronically signed by: Arya Morales Date:    03/18/2024 Time:    17:43    CT Cervical Spine Without Contrast    Result Date: 3/18/2024  EXAMINATION: CT CERVICAL SPINE WITHOUT CONTRAST CLINICAL HISTORY: Polytrauma, blunt; TECHNIQUE: Axial CT imaging of the cervical spine is performed without contrast.  Computer reformatting is viewed in the sagittal and coronal planes. CT dose reduction technique used - Dose Rite and tube current modulation. COMPARISON: 27 April 2022 FINDINGS: No fracture is seen.  Alignment of the cervical spine is within normal limits.  Vertebral body heights are normal.  The multilevel degenerative changes present.  No other abnormality is demonstrated.     No evidence of acute injury demonstrated. Electronically signed by: Ernst Hermosillo Date:    03/18/2024 Time:    15:34    CT Head Without Contrast    Result Date: 3/18/2024  EXAMINATION: CT HEAD WITHOUT CONTRAST CLINICAL HISTORY: Mental status change, unknown cause; TECHNIQUE: Axial CT imaging of the brain is performed without contrast with 3 mm increments. CT dose reduction technique used - Dose Rite and tube current modulation. COMPARISON: 25 November 2023 FINDINGS: No evidence of hemorrhage, mass, mass effect, midline shift or acute infarct seen.  The decreased density right cerebellar hemisphere similar to previous study likely prior infarct.  Remaining brain parenchyma attenuation and differentiation appears within normal limits for age. The  ventricles and cisterns are normal in caliber.  No cranial or skull base abnormality is identified.     No evidence of acute process or interval change. Electronically signed by: Ernst Hermosillo Date:    03/18/2024 Time:    15:32    X-Ray Hip 2 or 3 views Right (with Pelvis when performed)    Result Date: 3/18/2024  EXAMINATION: XR HIP WITH PELVIS WHEN PERFORMED, 2 OR 3  VIEWS RIGHT CLINICAL HISTORY: hip pain; COMPARISON: 30 December 2020 TECHNIQUE: XR HIP WITH PELVIS 2 VIEWS RIGHT FINDINGS: There is suggestion of femoral neck fracture, detail is limited.  No other fracture seen.  The alignment of the joints appears normal.  No degenerative change is present.  No soft tissue abnormality is seen.     Suggestion of femoral neck fracture.  No other changes. Electronically signed by: Ernst Hermosillo Date:    03/18/2024 Time:    14:13    CT Chest Abdomen Pelvis With IV Contrast (XPD) NO Oral Contrast    Result Date: 3/18/2024  EXAMINATION: CT CHEST ABDOMEN PELVIS WITH IV CONTRAST (XPD) CLINICAL HISTORY: Polytrauma, blunt;   Abdomen pain. TECHNIQUE: Axial CT imaging of the chest, abdomen and pelvis is performed with intravenous and oral contrast. Contrast dose is 100 cc of Omnipaque 350. COMPARISON: None available FINDINGS: CT chest: Heart, mediastinum within normal limits.  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  Otherwise the great vessels show no evidence of abnormality Small amount of right lower lung airspace density present.  No other evidence of lung parenchymal abnormality seen. No pneumothorax or effusion is present.  Comminuted proximal humerus fracture of the right partially visualized.  No acute chest wall abnormalities are identified. CT abdomen: The liver, spleen, pancreas, adrenal glands and kidneys are normal in size and enhancement.  No evidence of focal lesion is demonstrated in the solid organs.  The bowel caliber is normal and no wall thickening or adjacent inflammatory change is  seen.  No evidence of free fluid or free air is present.  Appendix is not seen. CT pelvis: Old pelvic fractures present.  There is suggestion of acute femoral neck fracture on the right with impaction.  The bowel and bladder appear within normal limits.  The uterus and ovaries not seen.     Right femoral neck fracture.  No other definite acute fracture seen. Electronically signed by: Ernst Hermosillo Date:    03/18/2024 Time:    13:27    CT Thoracic Spine Without Contrast    Result Date: 3/18/2024  EXAMINATION: CT THORACIC SPINE WITHOUT CONTRAST CLINICAL HISTORY: Back trauma, no prior imaging (Age >= 16y); TECHNIQUE: CT of the thoracic spine performed without intravenous contrast The CT examination was performed using one or more of the following dose reduction techniques: Automated exposure control, adjustment of the mA and kV according to patient's size, use of acute or iterative reconstruction techniques. COMPARISON: Chest CT 11/26/2023 FINDINGS: The L1 compression fracture shows continued height loss since 11/26/2023.  Similarly there is evolution of the lower sternal fracture.  There is no acute fracture detected of the thoracic spine.  The vertebral body heights and alignment are maintained and similar to prior.  Minimal superior endplate height loss of T8 is similar.  No spinal canal or foraminal stenosis of the thoracic spine.  There is retropulsion at L1 compression fracture with mild to moderate spinal canal stenosis at that level.     No acute fracture or dislocation of the thoracic spine. Electronically signed by: Arya Morales Date:    03/18/2024 Time:    13:23    X-Ray Shoulder Complete 2 View Right    Result Date: 3/18/2024  EXAMINATION: XR SHOULDER COMPLETE 2 OR MORE VIEWS RIGHT CLINICAL HISTORY: Unspecified fall, initial encounter COMPARISON: None available TECHNIQUE: XR SHOULDER 3 VIEWS RIGHT FINDINGS: No there is comminuted fracture of the right proximal humerus with moderate anterior displacement of  the humeral shaft.  The alignment of the joints appears normal.  No degenerative change is present.  No soft tissue abnormality is seen.     Proximal humerus fracture as described above Electronically signed by: Ernst Hermosillo Date:    03/18/2024 Time:    13:20    CT Lumbar Spine Without Contrast    Result Date: 3/18/2024  EXAMINATION: CT LUMBAR SPINE WITHOUT CONTRAST CLINICAL HISTORY: fall; TECHNIQUE: Axial CT imaging of the lumbar spine is performed without contrast.  Computer reformatting is viewed in the sagittal and coronal planes. CT dose reduction technique used - Dose Rite and tube current modulation. COMPARISON: 13 December 2023 FINDINGS: There is compression fracture of the L2 1 vertebra, height loss estimated at 70%.  There is posterior cortex retropulsion contributing to mild central canal stenosis.  This is increased from previous x-ray. L3: There is L3 compression fracture with height loss estimated 25%.  Acute fracture line not seen.  There is L4 and L5 compression fractures height loss less than 25%.  Acute fracture line not clearly seen.  Previous right sacral wing fracture.  Distinct acute fracture line in this area not seen. Alignment of the spine is within normal limits.  Vertebral body heights are normal.  No other abnormality is demonstrated.     Multiple compression fractures as described above.  The L2 fracture appears increased since previous x-ray.  Remaining fractures appears similar to previous study. Electronically signed by: Ernst Hermosillo Date:    03/18/2024 Time:    13:19    X-Ray Pelvis Routine AP    Result Date: 3/18/2024  EXAMINATION: XR PELVIS ROUTINE AP CLINICAL HISTORY: r/o bleeding or hemorrhage; TECHNIQUE: AP view of the pelvis was performed. COMPARISON: 11/25/2023 FINDINGS: Postsurgical changes are seen from a previous left hip prosthetic.  There appears to be a fracture of the right femoral neck that will be better assessed on dedicated right hip radiographs.     Findings  suspicious for a right femoral neck fracture.  Dedicated radiographs recommended. Electronically signed by: Arya Morales Date:    03/18/2024 Time:    12:35    X-Ray Chest 1 View    Result Date: 3/18/2024  EXAMINATION: XR CHEST 1 VIEW CLINICAL HISTORY: r/o bleeding or hemorrhage; TECHNIQUE: Single frontal view of the chest was performed. COMPARISON: 02/13/2024 FINDINGS: Left midlung opacity is again seen.  Right lung clear.  No pneumothorax.  Chronic deformity of the left humeral neck and partially imaged of the proximal humerus.     Similar appearance of the chest.  Chronic deformities of the left humerus partially assessed. Electronically signed by: Arya Morales Date:    03/18/2024 Time:    12:34     Assessment/Plan:     Active Diagnoses:    Diagnosis Date Noted POA    PRINCIPAL PROBLEM:  Fall [W19.XXXA] 03/18/2024 Yes    Hypotension [I95.9] 03/18/2024 Yes    Hypokalemia [E87.6] 03/18/2024 Yes    Chronic pain [G89.29] 03/18/2024 Yes    Humerus fracture [S42.309A] 03/18/2024 Yes    Fracture of head of right femur [S72.051A] 03/18/2024 Yes    Closed compression fracture of body of L1 vertebra [S32.010A] 03/18/2024 Yes    Acute cystitis [N30.00] 03/18/2024 Unknown    Closed displaced fracture of right femoral neck [S72.001A] 03/18/2024 Unknown    Supplemental oxygen dependent [Z99.81] 11/28/2023 Not Applicable    Chronic obstructive pulmonary disease [J44.9] 03/24/2021 Yes    DONALD (generalized anxiety disorder) [F41.1] 03/24/2021 Yes      Problems Resolved During this Admission:       Thank you for your consult. I will follow-up with patient. Please contact us if you have any additional questions.    Que Buckner MD  Orthopedics  [facility] - Emergency Department

## 2024-03-19 NOTE — ASSESSMENT & PLAN NOTE
Ortho/spine and Orthopedics consulted for compression fractures lumbar area, right femur and right humerus fracture.  Patient currently requiring vasopressors for blood pressure support.  Blood count remained stable 9/31.  We will continue to monitor

## 2024-03-19 NOTE — PLAN OF CARE
[facility] ICU  Initial Discharge Assessment       Primary Care Provider: No, Primary Doctor    Admission Diagnosis: Hypokalemia [E87.6]  Fall [W19.XXXA]  Fall, initial encounter [W19.XXXA]  Closed displaced fracture of right femoral neck [S72.001A]  Hypotension, unspecified hypotension type [I95.9]  Closed fracture of proximal end of right humerus, unspecified fracture morphology, initial encounter [S42.201A]  Closed compression fracture of body of L1 vertebra [S32.010A]    Admission Date: 3/18/2024  Expected Discharge Date:     Transition of Care Barriers: None    Payor: HU[employer] MANAGED MEDICARE / Plan: HU[employer] MEDICARE PPO / Product Type: Medicare Advantage /     Extended Emergency Contact Information  Primary Emergency Contact: Damir Low  Home Phone: [phone]  Relation: Other  Preferred language: English   needed? No  Secondary Emergency Contact: Francesco Low  [employer] Phone: [phone]  Relation: Son    Discharge Plan A: Hospice/home  Discharge Plan B: Hospice/home      [facility] [address] [address]  [address] [place], [place] [address]  Phone: [phone] Fax: [phone]    [facility] [address]  [address] [place] [id number]  Phone: [phone] Fax: [phone]      Initial Assessment (most recent)       Adult Discharge Assessment - 03/19/24 1211          Discharge Assessment    Assessment Type Discharge Planning Assessment     Source of Information family     People in Home alone     Do you expect to return to your current living situation? Yes     Do you have help at home or someone to help you manage your care at home? No     Prior to hospitilization cognitive status: Unable to Assess     Current cognitive status: Alert/Oriented     Walking or Climbing Stairs Difficulty yes     Walking or Climbing Stairs ambulation difficulty, requires equipment     Mobility Management rollator     Dressing/Bathing  Difficulty yes     Dressing/Bathing bathing difficulty, requires equipment;bathing difficulty, assistance 1 person     Home Accessibility stairs to enter home     Home Layout Able to live on 1st floor     Equipment Currently Used at Home cane, straight;walker, rolling;wheelchair;oxygen     Patient currently being followed by outpatient case management? No     Do you currently have service(s) that help you manage your care at home? Yes     Name and Contact number of agency [facility]     Is the pt/caregiver preference to resume services with current agency Yes     Do you take prescription medications? Yes     Do you have prescription coverage? Yes     Coverage [employer]     Do you have any problems affording any of your prescribed medications? No     Is the patient taking medications as prescribed? yes     How do you get to doctors appointments? family or friend will provide     Are you on dialysis? No     Do you take coumadin? No     Discharge Plan A Hospice/home     Discharge Plan B Hospice/home     DME Needed Upon Discharge  none     Discharge Plan discussed with: --   ex     Transition of Care Barriers None                       SS spoke with pt but she voiced that her throat was too dry to talk to me at this time. SS called and spoke with pt's ex . Pt lives at home alone and has [facility] prior to admission. Pt has all needed DME. Discharge plan currently is to return home with [facility].

## 2024-03-19 NOTE — CONSULTS
Office: [phone]    Orthopedic Spine Surgery Consult/H&P    ASSESSMENT:  63 y.o. female with L1 burst fracture with L3,L4 and L5 compression fracture     PLAN:  When patients BP is stable she will need a MRI lumbars spine to evaluate fractures  Based on MRI results patient may need T12-L2 percutaneous fusion with L3-L5 kyphoplasty    HPI:  63 y.o. female with recent hospital admission 3/18 after sustaining a ground level fall.  Patient reports she had a fall at the nursing home that she is currently a resident.  Complains of pain in her right shoulder and hip and mid to lower back pain as well.  Patient reports history of COPD on home oxygen she is also currently on hospice and DNR.  She has had multiple falls in the past  strength issues and balance issues.  No prior history of lumbar spine surgeries.    Past Medical History:   Diagnosis Date    COPD (chronic obstructive pulmonary disease)     DONALD (generalized anxiety disorder) 03/24/2021    GERD with esophagitis     Iron deficiency anemia due to chronic blood loss     Supplemental oxygen dependent 11/28/2023      Past Surgical History:   Procedure Laterality Date    ABDOMINAL SURGERY      LEFT HEART CATHETERIZATION N/A 11/27/2023    Procedure: Left heart cath;  Surgeon: Ulises Crespo DO;  Location: [facility] CATH LAB;  Service: Cardiology;  Laterality: N/A;      Review of patient's allergies indicates:   Allergen Reactions    Ondansetron hcl Swelling     Edema of tongue per patient    Celexa [citalopram]     Darvon [propoxyphene]     Flexeril [cyclobenzaprine]     Hydroxyzine     Percocet [oxycodone-acetaminophen]     Robaxin [methocarbamol]     Thorazine [chlorpromazine]     Toradol [ketorolac] Itching        Current Facility-Administered Medications:     acetaminophen tablet 650 mg, 650 mg, Oral, Q6H PRN, Karine Calhoun, FNP-AGACNP    albuterol-ipratropium 2.5 mg-0.5 mg/3 mL nebulizer solution 3 mL, 3 mL, Nebulization, Q6H, Karine Calhoun, FNP-AGACNP, 3  mL at 03/19/24 0706    aspirin chewable tablet 81 mg, 81 mg, Oral, Daily, Slay, Karine, FNP-AGACNP, 81 mg at 03/19/24 0858    cefTRIAXone (Rocephin) 1 g in dextrose 5 % in water (D5W) 100 mL IVPB (MB+), 1 g, Intravenous, Q24H, Kassidy Calhounsi, FNP-AGACNP, Stopped at 03/19/24 0033    clonazePAM tablet 1 mg, 1 mg, Oral, QHS, Moses Carlin MD    enoxaparin injection 40 mg, 40 mg, Subcutaneous, Q24H (prophylaxis, 1700), Moses Carlin MD, 40 mg at 03/18/24 1803    EScitalopram oxalate tablet 10 mg, 10 mg, Oral, Daily, Unique, Karine, FNP-AGACNP, 10 mg at 03/19/24 0858    gabapentin capsule 300 mg, 300 mg, Oral, BID, Moses Carlin MD, 300 mg at 03/19/24 0858    lactated ringers infusion, , Intravenous, Continuous, Reji Hameed MD, Last Rate: 125 mL/hr at 03/19/24 0801, Rate Verify at 03/19/24 0801    melatonin tablet 6 mg, 6 mg, Oral, Nightly PRN, Cordell Hills MD    morphine injection 3 mg, 3 mg, Intravenous, Q4H PRN, Cordell Hills MD, 3 mg at 03/19/24 0717    mupirocin 2 % ointment, , Nasal, BID, Cordell Hills MD, Given at 03/19/24 0858    NORepinephrine 4 mg in dextrose 5% 250 mL infusion (premix), 0-3 mcg/kg/min, Intravenous, Continuous, Reji Hameed MD, Last Rate: 3.2 mL/hr at 03/19/24 0801, 0.02 mcg/kg/min at 03/19/24 0801    pantoprazole EC tablet 40 mg, 40 mg, Oral, Daily, Kassidy Calhounsi, FNP-AGACNP, 40 mg at 03/19/24 0858    traMADoL tablet 50 mg, 50 mg, Oral, Q6H PRN, Cordell Hills MD, 50 mg at 03/19/24 0240     Review of systems:  Denies chest pain, nausea,vomiting, abdominal pain, cough, runny nose, eye pain, ear pain, fevers, chills, weight loss, weight gain, dysuria, hematuria, changes in mood    IMAGING:  US Lower Extremity Veins Bilateral  Narrative: EXAMINATION:  US LOWER EXTREMITY VEINS BILATERAL    CLINICAL HISTORY:  sob;    TECHNIQUE:  Duplex and color flow Doppler and dynamic compression was performed of the bilateral lower extremity veins.  Ultrasound images captured  and stored.    COMPARISON:  None    FINDINGS:  Right thigh veins: The common femoral, femoral, popliteal, upper greater saphenous, and deep femoral veins are patent and free of thrombus. The veins are normally compressible and have normal phasic flow and augmentation response.    Right calf veins: The visualized calf veins are patent.    Left thigh veins: The common femoral, femoral, popliteal, upper greater saphenous, and deep femoral veins are patent and free of thrombus. The veins are normally compressible and have normal phasic flow and augmentation response.    Left calf veins: The visualized calf veins are patent.    Miscellaneous: None  Impression: No evidence of deep venous thrombosis in either lower extremity.    Electronically signed by: Arya Morales  Date:    03/18/2024  Time:    17:43  CT Cervical Spine Without Contrast  Narrative: EXAMINATION:  CT CERVICAL SPINE WITHOUT CONTRAST    CLINICAL HISTORY:  Polytrauma, blunt;    TECHNIQUE:  Axial CT imaging of the cervical spine is performed without contrast.  Computer reformatting is viewed in the sagittal and coronal planes.    CT dose reduction technique used - Dose Rite and tube current modulation.    COMPARISON:  27 April 2022    FINDINGS:  No fracture is seen.  Alignment of the cervical spine is within normal limits.  Vertebral body heights are normal.  The multilevel degenerative changes present.  No other abnormality is demonstrated.  Impression: No evidence of acute injury demonstrated.    Electronically signed by: Ernst Hermosillo  Date:    03/18/2024  Time:    15:34  CT Head Without Contrast  Narrative: EXAMINATION:  CT HEAD WITHOUT CONTRAST    CLINICAL HISTORY:  Mental status change, unknown cause;    TECHNIQUE:  Axial CT imaging of the brain is performed without contrast with 3 mm increments.    CT dose reduction technique used - Dose Rite and tube current modulation.    COMPARISON:  25 November 2023    FINDINGS:  No evidence of hemorrhage, mass, mass  effect, midline shift or acute infarct seen.  The decreased density right cerebellar hemisphere similar to previous study likely prior infarct.  Remaining brain parenchyma attenuation and differentiation appears within normal limits for age. The ventricles and cisterns are normal in caliber.  No cranial or skull base abnormality is identified.  Impression: No evidence of acute process or interval change.    Electronically signed by: Ernst Hermosillo  Date:    03/18/2024  Time:    15:32  X-Ray Hip 2 or 3 views Right (with Pelvis when performed)  Narrative: EXAMINATION:  XR HIP WITH PELVIS WHEN PERFORMED, 2 OR 3  VIEWS RIGHT    CLINICAL HISTORY:  hip pain;    COMPARISON:  30 December 2020    TECHNIQUE:  XR HIP WITH PELVIS 2 VIEWS RIGHT    FINDINGS:  There is suggestion of femoral neck fracture, detail is limited.  No other fracture seen.  The alignment of the joints appears normal.  No degenerative change is present.  No soft tissue abnormality is seen.  Impression: Suggestion of femoral neck fracture.  No other changes.    Electronically signed by: Ernst Hermosillo  Date:    03/18/2024  Time:    14:13  CT Chest Abdomen Pelvis With IV Contrast (XPD) NO Oral Contrast  Narrative: EXAMINATION:  CT CHEST ABDOMEN PELVIS WITH IV CONTRAST (XPD)    CLINICAL HISTORY:  Polytrauma, blunt;   Abdomen pain.    TECHNIQUE:  Axial CT imaging of the chest, abdomen and pelvis is performed with intravenous and oral contrast. Contrast dose is 100 cc of Omnipaque 350.    COMPARISON:  None available    FINDINGS:  CT chest: Heart, mediastinum within normal limits.  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  Otherwise the great vessels show no evidence of abnormality    Small amount of right lower lung airspace density present.  No other evidence of lung parenchymal abnormality seen. No pneumothorax or effusion is present.  Comminuted proximal humerus fracture of the right partially visualized.  No acute chest wall abnormalities  are identified.    CT abdomen: The liver, spleen, pancreas, adrenal glands and kidneys are normal in size and enhancement.  No evidence of focal lesion is demonstrated in the solid organs.  The bowel caliber is normal and no wall thickening or adjacent inflammatory change is seen.  No evidence of free fluid or free air is present.  Appendix is not seen.    CT pelvis: Old pelvic fractures present.  There is suggestion of acute femoral neck fracture on the right with impaction.  The bowel and bladder appear within normal limits.  The uterus and ovaries not seen.  Impression: Right femoral neck fracture.  No other definite acute fracture seen.    Electronically signed by: Ernst Hermosillo  Date:    03/18/2024  Time:    13:27  CT Thoracic Spine Without Contrast  Narrative: EXAMINATION:  CT THORACIC SPINE WITHOUT CONTRAST    CLINICAL HISTORY:  Back trauma, no prior imaging (Age >= 16y);    TECHNIQUE:  CT of the thoracic spine performed without intravenous contrast    The CT examination was performed using one or more of the following dose reduction techniques: Automated exposure control, adjustment of the mA and kV according to patient's size, use of acute or iterative reconstruction techniques.    COMPARISON:  Chest CT 11/26/2023    FINDINGS:  The L1 compression fracture shows continued height loss since 11/26/2023.  Similarly there is evolution of the lower sternal fracture.  There is no acute fracture detected of the thoracic spine.  The vertebral body heights and alignment are maintained and similar to prior.  Minimal superior endplate height loss of T8 is similar.  No spinal canal or foraminal stenosis of the thoracic spine.  There is retropulsion at L1 compression fracture with mild to moderate spinal canal stenosis at that level.  Impression: No acute fracture or dislocation of the thoracic spine.    Electronically signed by: Arya Morales  Date:    03/18/2024  Time:    13:23  X-Ray Shoulder Complete 2 View  Right  Narrative: EXAMINATION:  XR SHOULDER COMPLETE 2 OR MORE VIEWS RIGHT    CLINICAL HISTORY:  Unspecified fall, initial encounter    COMPARISON:  None available    TECHNIQUE:  XR SHOULDER 3 VIEWS RIGHT    FINDINGS:  No there is comminuted fracture of the right proximal humerus with moderate anterior displacement of the humeral shaft.  The alignment of the joints appears normal.  No degenerative change is present.  No soft tissue abnormality is seen.  Impression: Proximal humerus fracture as described above    Electronically signed by: Ernst Hermosillo  Date:    03/18/2024  Time:    13:20  CT Lumbar Spine Without Contrast  Narrative: EXAMINATION:  CT LUMBAR SPINE WITHOUT CONTRAST    CLINICAL HISTORY:  fall;    TECHNIQUE:  Axial CT imaging of the lumbar spine is performed without contrast.  Computer reformatting is viewed in the sagittal and coronal planes.    CT dose reduction technique used - Dose Rite and tube current modulation.    COMPARISON:  13 December 2023    FINDINGS:  There is compression fracture of the L2 1 vertebra, height loss estimated at 70%.  There is posterior cortex retropulsion contributing to mild central canal stenosis.  This is increased from previous x-ray.    L3: There is L3 compression fracture with height loss estimated 25%.  Acute fracture line not seen.  There is L4 and L5 compression fractures height loss less than 25%.  Acute fracture line not clearly seen.  Previous right sacral wing fracture.  Distinct acute fracture line in this area not seen.    Alignment of the spine is within normal limits.  Vertebral body heights are normal.  No other abnormality is demonstrated.  Impression: Multiple compression fractures as described above.  The L2 fracture appears increased since previous x-ray.  Remaining fractures appears similar to previous study.    Electronically signed by: Ernst Hermosillo  Date:    03/18/2024  Time:    13:19  X-Ray Pelvis Routine AP  Narrative: EXAMINATION:  XR PELVIS  ROUTINE AP    CLINICAL HISTORY:  r/o bleeding or hemorrhage;    TECHNIQUE:  AP view of the pelvis was performed.    COMPARISON:  11/25/2023    FINDINGS:  Postsurgical changes are seen from a previous left hip prosthetic.  There appears to be a fracture of the right femoral neck that will be better assessed on dedicated right hip radiographs.  Impression: Findings suspicious for a right femoral neck fracture.  Dedicated radiographs recommended.    Electronically signed by: Arya Morales  Date:    03/18/2024  Time:    12:35  X-Ray Chest 1 View  Narrative: EXAMINATION:  XR CHEST 1 VIEW    CLINICAL HISTORY:  r/o bleeding or hemorrhage;    TECHNIQUE:  Single frontal view of the chest was performed.    COMPARISON:  02/13/2024    FINDINGS:  Left midlung opacity is again seen.  Right lung clear.  No pneumothorax.  Chronic deformity of the left humeral neck and partially imaged of the proximal humerus.  Impression: Similar appearance of the chest.  Chronic deformities of the left humerus partially assessed.    Electronically signed by: Arya Morales  Date:    03/18/2024  Time:    12:34       Vitals:    03/19/24 0900   BP: (!) 100/52   Pulse: 67   Resp: 11   Temp:         EXAM:  Constitutional  General Appearance:  Frail/chronically ill.  On home oxygen  Psychiatric   Orientation: disoriented to time, oriented to place, oriented to person  Mood and Affect: Active and alert, normal mood, normal affect  Gait and Station   Appearance: unable to walk due to fracture right hip, unable to tandem gait, unable to walk on toes, unable to walk on heels    LUMBAR  Musculoskeletal System   Hips: Normal appearance, no leg length discrepancy, decreased motion; left, painful decreased motion; right    Lumbar Spine                   Inspection:  Normal alignment, normal sagittal balance                  Range of motion: decreased flexion, extension, lateral bending, rotation. Pain with range of motion                  Bony Palpation of the Lumbar  Spine:  No tenderness of the spinous process, no tenderness of the sacrum, no tenderness of the coccyx                  Bony Palpation of the Right Hip:  No tenderness of the iliac crest, no tenderness of the sciatic notch, no tenderness of the SI joint                  Bony Palpation of the Left Hip:  No tenderness of the iliac crest, no tenderness of the sciatic notch, no tenderness of the SI joint                  Soft Tissue Palpation on the Right:  No tenderness of the paraspinal region, no tenderness of the iliolumbar region                  Soft Tissue Palpation on the Left:  No tenderness of the paraspinal region, no tenderness of the iliolumbar region    Motor Strength   L1 Right:  Hip flexion iliopsoas 4/5    L1 Left:  Hip flexion iliopsoas 2/5              L2-L4 Right:  Knee extension quadriceps 4/5, tibialis anterior 4/5              L2-L4 Left:  Knee extension quadriceps 2/5, tibialis anterior 2/5   L5 Right:  Extensor hallucis llongus 4/5,    L5 Left:  Extensor hallucis longus 3/5,    S1 Right:  Plantar flexion gastrocnemius 4/5   S1 Left:  Plantar flexion gastrocnemius 2/5    Neurological System   Ankle Reflex Right:  normal   Ankle Reflex Left: normal   Knee Reflex Right:  normal   Knee Reflex Left:  normal   Sensation on the Right:  L2 normal, L3 normal, L4 normal, L5 normal, S1 normal   Sensation on the Left:  L2 normal, L3 normal, L4 normal, L5 normal, S1 normal              Special Test on the Right:  Seated straight leg raising test negative, no clonus of the ankle              Special Test on the Left:  Seated straight leg raising test negative, no clonus of the ankle    Skin   Lumbosacral Spine:  Normal skin    Cardiovascular System   Arterial Pulses Right:  Posterior tibialis normal, dorsalis pedis normal   Arterial Pulses Left:  Posterior tibialis normal, dorsalis pedis normal   Edema Right: None   Edema Left:  None

## 2024-03-19 NOTE — PLAN OF CARE
Problem: Skin Injury Risk Increased  Goal: Skin Health and Integrity  Outcome: Ongoing, Progressing  Intervention: Optimize Skin Protection  Flowsheets (Taken 3/19/2024 1648)  Pressure Reduction Techniques:   frequent weight shift encouraged   positioned off wounds   pressure points protected   weight shift assistance provided  Pressure Reduction Devices:   foam padding utilized   positioning supports utilized   specialty bed utilized  Skin Protection:   adhesive use limited   incontinence pads utilized   skin-to-device areas padded   skin-to-skin areas padded  Head of Bed (HOB) Positioning: HOB at 30-45 degrees  Intervention: Promote and Optimize Oral Intake  Flowsheets (Taken 3/19/2024 1648)  Oral Nutrition Promotion: physical activity promoted

## 2024-03-19 NOTE — SUBJECTIVE & OBJECTIVE
Interval History:  No acute events overnight    Medications:  Continuous Infusions:   lactated ringers 125 mL/hr at 03/19/24 1501    NORepinephrine bitartrate-D5W 0.02 mcg/kg/min (03/19/24 1501)     Scheduled Meds:   albuterol-ipratropium  3 mL Nebulization Q6H    aspirin  81 mg Oral Daily    cefTRIAXone (Rocephin) IV (PEDS and ADULTS)  1 g Intravenous Q24H    clonazePAM  1 mg Oral BID    enoxparin  40 mg Subcutaneous Q24H (prophylaxis, 1700)    EScitalopram oxalate  10 mg Oral Daily    gabapentin  300 mg Oral BID    mupirocin   Nasal BID     PRN Meds:acetaminophen, melatonin, morphine, oxyCODONE, traMADoL     Review of patient's allergies indicates:   Allergen Reactions    Ondansetron hcl Swelling     Edema of tongue per patient    Celexa [citalopram]     Darvon [propoxyphene]     Flexeril [cyclobenzaprine]     Hydroxyzine     Percocet [oxycodone-acetaminophen]     Robaxin [methocarbamol]     Thorazine [chlorpromazine]     Toradol [ketorolac] Itching     Objective:     Vital Signs (Most Recent):  Temp: 98.6 °F (37 °C) (03/19/24 1501)  Pulse: 65 (03/19/24 1530)  Resp: 10 (03/19/24 1530)  BP: (!) 100/45 (03/19/24 1530)  SpO2: 96 % (03/19/24 1530) Vital Signs (24h Range):  Temp:  [97.5 °F (36.4 °C)-99.1 °F (37.3 °C)] 98.6 °F (37 °C)  Pulse:  [43-71] 65  Resp:  [9-20] 10  SpO2:  [92 %-100 %] 96 %  BP: ()/(32-70) 100/45     Weight: 43.3 kg (95 lb 7.4 oz)  Body mass index is 16.91 kg/m².    Intake/Output - Last 3 Shifts         03/17 0700  03/18 0659 03/18 0700  03/19 0659 03/19 0700  03/20 0659    I.V. (mL/kg)  1864.5 (43.1) 1155.9 (26.7)    IV Piggyback  2500     Total Intake(mL/kg)  4364.5 (100.8) 1155.9 (26.7)    Urine (mL/kg/hr)  2400 575 (1.5)    Total Output  2400 575    Net  +1964.5 +580.9                    Physical Exam  Vitals reviewed.   Constitutional:       Appearance: Normal appearance.      Interventions: She is not intubated.  HENT:      Head: Normocephalic.      Mouth/Throat:      Mouth: Mucous  membranes are dry.   Eyes:      Extraocular Movements: Extraocular movements intact.   Cardiovascular:      Rate and Rhythm: Normal rate.      Pulses: Normal pulses.      Heart sounds: No murmur heard.  Pulmonary:      Effort: Pulmonary effort is normal. She is not intubated.      Breath sounds: Normal breath sounds.   Abdominal:      General: Abdomen is flat.      Palpations: Abdomen is soft.   Musculoskeletal:         General: Normal range of motion.      Cervical back: Normal range of motion.      Right lower leg: No edema.      Left lower leg: No edema.   Skin:     General: Skin is warm and dry.      Capillary Refill: Capillary refill takes less than 2 seconds.   Neurological:      General: No focal deficit present.      Mental Status: She is alert and oriented to person, place, and time.   Psychiatric:         Mood and Affect: Mood normal.         Behavior: Behavior normal.          Significant Labs:  I have reviewed all pertinent lab results within the past 24 hours.    Significant Diagnostics:  I have reviewed all pertinent imaging results/findings within the past 24 hours.

## 2024-03-19 NOTE — ASSESSMENT & PLAN NOTE
- s/p 2L NS   - SBP 80-90s during exam  - UDS negative   - UA with evidence of UTI  - blood and urine cultures pending   Blood pressure improving with volume will wean Levophed

## 2024-03-19 NOTE — CONSULTS
[facility] - Emergency Department  Orthopedics  Consult Note    Patient Name: Darya Low  MRN: [id number]  Admission Date: 3/18/2024  Hospital Length of Stay: 0 days  Attending Provider: Cordell Hills MD  Primary Care Provider: Maeve Primary Doctor    Patient information was obtained from patient, EMS personnel, past medical records, and ER records.     Inpatient consult to Orthopedic Surgery  Consult performed by: Que Buckner MD  Consult ordered by: Karine Calhoun, JAYSON-STEPHEN  Reason for consult: rt hip fx, rt proximal humerus fracture  Assessment/Recommendations: Recommend sling immobilization for the right upper extremity at this time.  In regards to the femoral neck fracture could be treated with percutaneous pinning versus hemiarthroplasty.  I did discuss the case with spine surgery and they are awaiting an MRI.  We are going to try to coordinate a timing for surgical intervention.  Due to her age and minimal displacement of the fracture leaning towards percutaneous pinning at this time.  Could possibly take to OR on 03/19/2024 or 03/20/2024        Subjective:     Principal Problem:Fall    Chief Complaint:   Chief Complaint   Patient presents with    Fall    Hip Pain    Shoulder Pain        HPI:  63-year-old female who has a nursing home resident had a ground level fall over the weekend.  She sustained a right femoral neck fracture right proximal humerus fracture and an L1 burst fracture.  An alpha activation he has been admitted to the trauma service orthopedics was consulted.    Past Medical History:   Diagnosis Date    COPD (chronic obstructive pulmonary disease)     DONALD (generalized anxiety disorder) 03/24/2021    GERD with esophagitis     Iron deficiency anemia due to chronic blood loss     Supplemental oxygen dependent 11/28/2023       Past Surgical History:   Procedure Laterality Date    ABDOMINAL SURGERY      LEFT HEART CATHETERIZATION N/A 11/27/2023    Procedure: Left heart cath;   Surgeon: Ulises Crespo DO;  Location: [facility] CATH LAB;  Service: Cardiology;  Laterality: N/A;       Review of patient's allergies indicates:   Allergen Reactions    Ondansetron hcl Swelling     Edema of tongue per patient    Celexa [citalopram]     Darvon [propoxyphene]     Flexeril [cyclobenzaprine]     Hydroxyzine     Percocet [oxycodone-acetaminophen]     Robaxin [methocarbamol]     Thorazine [chlorpromazine]     Toradol [ketorolac] Itching       Current Facility-Administered Medications   Medication    0.9%  NaCl infusion    acetaminophen tablet 650 mg    albuterol-ipratropium 2.5 mg-0.5 mg/3 mL nebulizer solution 3 mL    [START ON 3/19/2024] aspirin chewable tablet 81 mg    [START ON 3/19/2024] cefTRIAXone (Rocephin) 1 g in dextrose 5 % in water (D5W) 100 mL IVPB (MB+)    dextrose 5 % and 0.45 % NaCl with KCl 20 mEq infusion    enoxaparin injection 40 mg    [START ON 3/19/2024] EScitalopram oxalate tablet 10 mg    melatonin tablet 6 mg    morphine injection 3 mg    mupirocin 2 % ointment    pantoprazole EC tablet 40 mg    traMADoL tablet 50 mg     Current Outpatient Medications   Medication Sig    clonazePAM (KLONOPIN) 2 MG Tab Take 2 mg by mouth 2 (two) times daily.    EScitalopram oxalate (LEXAPRO) 20 MG tablet Take 20 mg by mouth every evening.    furosemide (LASIX) 40 MG tablet Take 40 mg by mouth 2 (two) times a day.    gabapentin (NEURONTIN) 400 MG capsule Take 400 mg by mouth 3 (three) times daily.    lactulose (CHRONULAC) 10 gram/15 mL solution Take 30 mLs by mouth 3 (three) times daily.    multivitamin Tab Take 1 tablet by mouth once daily.    NIFEdipine (PROCARDIA-XL) 60 MG (OSM) 24 hr tablet Take 1 tablet (60 mg total) by mouth once daily.    OXYCONTIN 10 mg 12 hr tablet Take 10 mg by mouth 2 (two) times daily.    predniSONE (DELTASONE) 10 MG tablet Take 10 mg by mouth once daily.    senna (SENOKOT) 8.6 mg tablet Take 1 tablet by mouth every evening.    traZODone (DESYREL) 150 MG tablet Take  "150 mg by mouth every evening.    albuterol-ipratropium (DUO-NEB) 2.5 mg-0.5 mg/3 mL nebulizer solution SMARTSI Ampule(s) Via Nebulizer 3 Times Daily    benzonatate (TESSALON) 100 MG capsule Take 100 mg by mouth 3 (three) times daily as needed.    ibuprofen (ADVIL,MOTRIN) 200 MG tablet Take 200 mg by mouth every 6 (six) hours as needed for Pain.    oxyCODONE (ROXICODONE) 5 MG immediate release tablet Take 5 mg by mouth every 6 (six) hours as needed.    promethazine (PHENERGAN) 12.5 MG Tab Take 12.5 mg by mouth every 6 (six) hours as needed.    tiZANidine (ZANAFLEX) 4 MG tablet Take 1 tablet (4 mg total) by mouth 3 (three) times daily as needed. (Patient taking differently: Take 4 mg by mouth nightly as needed.)     Family History    None       Tobacco Use    Smoking status: Every Day     Types: Cigarettes    Smokeless tobacco: Never   Substance and Sexual Activity    Alcohol use: Yes    Drug use: Never    Sexual activity: Not on file     ROS  Objective:     Vital Signs (Most Recent):  Temp: 97.6 °F (36.4 °C) (24 1159)  Pulse: (!) 43 (24 1908)  Resp: 15 (24)  BP: (!) 92/52 (24)  SpO2: 100 % (24) Vital Signs (24h Range):  Temp:  [97.6 °F (36.4 °C)] 97.6 °F (36.4 °C)  Pulse:  [43-58] 43  Resp:  [10-21] 15  SpO2:  [83 %-100 %] 100 %  BP: (69-98)/(40-58) 92/52     Weight: 46.3 kg (102 lb)  Height: 5' 3" (160 cm)  Body mass index is 18.07 kg/m².      Intake/Output Summary (Last 24 hours) at 3/18/2024 1952  Last data filed at 3/18/2024 1755  Gross per 24 hour   Intake 1900 ml   Output 550 ml   Net 1350 ml       General    Nursing note and vitals reviewed.  Constitutional: She is oriented to person, place, and time. She appears well-developed and well-nourished.   HENT:   Head: Normocephalic and atraumatic.   Nose: Nose normal.   Eyes: EOM are normal. Pupils are equal, round, and reactive to light.   Neck: Neck supple.   Cardiovascular:  Normal rate, regular rhythm and intact " distal pulses.            Pulmonary/Chest: Effort normal. No respiratory distress. She exhibits no tenderness.   Abdominal: Soft. She exhibits no distension. There is no abdominal tenderness. There is no guarding.   Neurological: She is alert and oriented to person, place, and time. She has normal reflexes. She displays normal reflexes. No cranial nerve deficit. Coordination normal.   Psychiatric: She has a normal mood and affect. Her behavior is normal. Judgment and thought content normal.             Right Hip Exam     Inspection   Swelling: present  Bruising: present    Tests   Circumduction test: positive  Log Roll: positive    Other   Sensation: normal  Right Shoulder Exam     Tenderness   The patient is tender to palpation of the greater tuberosity.    Crepitus   The patient has crepitus of the greater tuberosity.    Range of Motion   Active abduction:  abnormal   Passive abduction:  abnormal   Extension:  abnormal   Forward Flexion:  abnormal   Forward Elevation: abnormal  Adduction: abnormal  External Rotation 0 degrees:  abnormal   External Rotation 90 degrees: abnormal  Internal rotation 90 degrees:  abnormal     Left Shoulder Exam   Left shoulder exam is normal.       Muscle Strength   Right Upper Extremity   Shoulder External Rotation: 2/5   Supraspinatus: 2/5     Vascular Exam     Right Pulses      Radial:                    2+      Capillary Refill  Right Hand: normal capillary refill          Significant Labs:   Recent Lab Results  (Last 5 results in the past 24 hours)        03/18/24  1717   03/18/24  1650   03/18/24  1442   03/18/24  1331   03/18/24  1235        POC CO2   40.4             Benzodiazepines       Negative         Cocaine       Negative  Comment:   The results of screening tests should be considered presumptive. Confirmatory testing is available upon request.    Cutoff Points:  PCP:         25ng/mL  AMPH:        500ng/mL  STARLA:        200ng/mL  ASHUTOSH:        200ng/mL  THC:          50ng/mL  RUFINO:         300ng/mL  OPI:         2000ng/mL         BARBITURATES       Negative         ABO and RH         O POS       Ammonia 10               Amphetamine, Urine       Negative         Appearance, UA       Turbid         Bacteria, UA       Few         Bilirubin (UA)       Negative         Cannabinoid Scrn, Ur       Negative         Color, UA       Light-Yellow         Glucose, UA       Normal         Hematocrit     29.7           Hemoglobin     9.5           Ketones, UA       Negative         Lactic Acid Level     1.0           Leukocyte Esterase, UA       Moderate         NITRITE UA       Positive         Blood, UA       Negative         Opiates, Urine       Negative         pH, UA       7.5         Phencyclidine, Urine       Negative         POC Base Excess   12.8             POC HCO3   38.7             POC Hematocrit   30             POC Ionized Calcium   1.07             POC Lactate   1.5             POC PCO2   57             POC PH   7.44             POC PO2   77             Potassium, Blood Gas   2.9             POC SATURATED O2   96             Sodium, Blood Gas   135             POCT Glucose   150             Protein, UA       Negative         RBC, UA       1         Specific [place], UA       1.016         Squamous Epithelial Cells, UA       Occasional         UROBILINOGEN UA       Normal         WBC, UA       20                              All pertinent labs within the past 24 hours have been reviewed.    Significant Imaging:   US Lower Extremity Veins Bilateral    Result Date: 3/18/2024  EXAMINATION: US LOWER EXTREMITY VEINS BILATERAL CLINICAL HISTORY: sob; TECHNIQUE: Duplex and color flow Doppler and dynamic compression was performed of the bilateral lower extremity veins.  Ultrasound images captured and stored. COMPARISON: None FINDINGS: Right thigh veins: The common femoral, femoral, popliteal, upper greater saphenous, and deep femoral veins are patent and free of thrombus. The veins are  normally compressible and have normal phasic flow and augmentation response. Right calf veins: The visualized calf veins are patent. Left thigh veins: The common femoral, femoral, popliteal, upper greater saphenous, and deep femoral veins are patent and free of thrombus. The veins are normally compressible and have normal phasic flow and augmentation response. Left calf veins: The visualized calf veins are patent. Miscellaneous: None     No evidence of deep venous thrombosis in either lower extremity. Electronically signed by: Arya Morales Date:    03/18/2024 Time:    17:43    CT Cervical Spine Without Contrast    Result Date: 3/18/2024  EXAMINATION: CT CERVICAL SPINE WITHOUT CONTRAST CLINICAL HISTORY: Polytrauma, blunt; TECHNIQUE: Axial CT imaging of the cervical spine is performed without contrast.  Computer reformatting is viewed in the sagittal and coronal planes. CT dose reduction technique used - Dose Rite and tube current modulation. COMPARISON: 27 April 2022 FINDINGS: No fracture is seen.  Alignment of the cervical spine is within normal limits.  Vertebral body heights are normal.  The multilevel degenerative changes present.  No other abnormality is demonstrated.     No evidence of acute injury demonstrated. Electronically signed by: Ernst Hermosillo Date:    03/18/2024 Time:    15:34    CT Head Without Contrast    Result Date: 3/18/2024  EXAMINATION: CT HEAD WITHOUT CONTRAST CLINICAL HISTORY: Mental status change, unknown cause; TECHNIQUE: Axial CT imaging of the brain is performed without contrast with 3 mm increments. CT dose reduction technique used - Dose Rite and tube current modulation. COMPARISON: 25 November 2023 FINDINGS: No evidence of hemorrhage, mass, mass effect, midline shift or acute infarct seen.  The decreased density right cerebellar hemisphere similar to previous study likely prior infarct.  Remaining brain parenchyma attenuation and differentiation appears within normal limits for age. The  ventricles and cisterns are normal in caliber.  No cranial or skull base abnormality is identified.     No evidence of acute process or interval change. Electronically signed by: Ernst Hermosillo Date:    03/18/2024 Time:    15:32    X-Ray Hip 2 or 3 views Right (with Pelvis when performed)    Result Date: 3/18/2024  EXAMINATION: XR HIP WITH PELVIS WHEN PERFORMED, 2 OR 3  VIEWS RIGHT CLINICAL HISTORY: hip pain; COMPARISON: 30 December 2020 TECHNIQUE: XR HIP WITH PELVIS 2 VIEWS RIGHT FINDINGS: There is suggestion of femoral neck fracture, detail is limited.  No other fracture seen.  The alignment of the joints appears normal.  No degenerative change is present.  No soft tissue abnormality is seen.     Suggestion of femoral neck fracture.  No other changes. Electronically signed by: Ernst Hermosillo Date:    03/18/2024 Time:    14:13    CT Chest Abdomen Pelvis With IV Contrast (XPD) NO Oral Contrast    Result Date: 3/18/2024  EXAMINATION: CT CHEST ABDOMEN PELVIS WITH IV CONTRAST (XPD) CLINICAL HISTORY: Polytrauma, blunt;   Abdomen pain. TECHNIQUE: Axial CT imaging of the chest, abdomen and pelvis is performed with intravenous and oral contrast. Contrast dose is 100 cc of Omnipaque 350. COMPARISON: None available FINDINGS: CT chest: Heart, mediastinum within normal limits.  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  Otherwise the great vessels show no evidence of abnormality Small amount of right lower lung airspace density present.  No other evidence of lung parenchymal abnormality seen. No pneumothorax or effusion is present.  Comminuted proximal humerus fracture of the right partially visualized.  No acute chest wall abnormalities are identified. CT abdomen: The liver, spleen, pancreas, adrenal glands and kidneys are normal in size and enhancement.  No evidence of focal lesion is demonstrated in the solid organs.  The bowel caliber is normal and no wall thickening or adjacent inflammatory change is  seen.  No evidence of free fluid or free air is present.  Appendix is not seen. CT pelvis: Old pelvic fractures present.  There is suggestion of acute femoral neck fracture on the right with impaction.  The bowel and bladder appear within normal limits.  The uterus and ovaries not seen.     Right femoral neck fracture.  No other definite acute fracture seen. Electronically signed by: Ernst Hermosillo Date:    03/18/2024 Time:    13:27    CT Thoracic Spine Without Contrast    Result Date: 3/18/2024  EXAMINATION: CT THORACIC SPINE WITHOUT CONTRAST CLINICAL HISTORY: Back trauma, no prior imaging (Age >= 16y); TECHNIQUE: CT of the thoracic spine performed without intravenous contrast The CT examination was performed using one or more of the following dose reduction techniques: Automated exposure control, adjustment of the mA and kV according to patient's size, use of acute or iterative reconstruction techniques. COMPARISON: Chest CT 11/26/2023 FINDINGS: The L1 compression fracture shows continued height loss since 11/26/2023.  Similarly there is evolution of the lower sternal fracture.  There is no acute fracture detected of the thoracic spine.  The vertebral body heights and alignment are maintained and similar to prior.  Minimal superior endplate height loss of T8 is similar.  No spinal canal or foraminal stenosis of the thoracic spine.  There is retropulsion at L1 compression fracture with mild to moderate spinal canal stenosis at that level.     No acute fracture or dislocation of the thoracic spine. Electronically signed by: Arya Morales Date:    03/18/2024 Time:    13:23    X-Ray Shoulder Complete 2 View Right    Result Date: 3/18/2024  EXAMINATION: XR SHOULDER COMPLETE 2 OR MORE VIEWS RIGHT CLINICAL HISTORY: Unspecified fall, initial encounter COMPARISON: None available TECHNIQUE: XR SHOULDER 3 VIEWS RIGHT FINDINGS: No there is comminuted fracture of the right proximal humerus with moderate anterior displacement of  the humeral shaft.  The alignment of the joints appears normal.  No degenerative change is present.  No soft tissue abnormality is seen.     Proximal humerus fracture as described above Electronically signed by: Ernst Hermosillo Date:    03/18/2024 Time:    13:20    CT Lumbar Spine Without Contrast    Result Date: 3/18/2024  EXAMINATION: CT LUMBAR SPINE WITHOUT CONTRAST CLINICAL HISTORY: fall; TECHNIQUE: Axial CT imaging of the lumbar spine is performed without contrast.  Computer reformatting is viewed in the sagittal and coronal planes. CT dose reduction technique used - Dose Rite and tube current modulation. COMPARISON: 13 December 2023 FINDINGS: There is compression fracture of the L2 1 vertebra, height loss estimated at 70%.  There is posterior cortex retropulsion contributing to mild central canal stenosis.  This is increased from previous x-ray. L3: There is L3 compression fracture with height loss estimated 25%.  Acute fracture line not seen.  There is L4 and L5 compression fractures height loss less than 25%.  Acute fracture line not clearly seen.  Previous right sacral wing fracture.  Distinct acute fracture line in this area not seen. Alignment of the spine is within normal limits.  Vertebral body heights are normal.  No other abnormality is demonstrated.     Multiple compression fractures as described above.  The L2 fracture appears increased since previous x-ray.  Remaining fractures appears similar to previous study. Electronically signed by: Ernst Hermosillo Date:    03/18/2024 Time:    13:19    X-Ray Pelvis Routine AP    Result Date: 3/18/2024  EXAMINATION: XR PELVIS ROUTINE AP CLINICAL HISTORY: r/o bleeding or hemorrhage; TECHNIQUE: AP view of the pelvis was performed. COMPARISON: 11/25/2023 FINDINGS: Postsurgical changes are seen from a previous left hip prosthetic.  There appears to be a fracture of the right femoral neck that will be better assessed on dedicated right hip radiographs.     Findings  suspicious for a right femoral neck fracture.  Dedicated radiographs recommended. Electronically signed by: Arya Morales Date:    03/18/2024 Time:    12:35    X-Ray Chest 1 View    Result Date: 3/18/2024  EXAMINATION: XR CHEST 1 VIEW CLINICAL HISTORY: r/o bleeding or hemorrhage; TECHNIQUE: Single frontal view of the chest was performed. COMPARISON: 02/13/2024 FINDINGS: Left midlung opacity is again seen.  Right lung clear.  No pneumothorax.  Chronic deformity of the left humeral neck and partially imaged of the proximal humerus.     Similar appearance of the chest.  Chronic deformities of the left humerus partially assessed. Electronically signed by: Arya Morales Date:    03/18/2024 Time:    12:34     Assessment/Plan:     Active Diagnoses:    Diagnosis Date Noted POA    PRINCIPAL PROBLEM:  Fall [W19.XXXA] 03/18/2024 Yes    Hypotension [I95.9] 03/18/2024 Yes    Hypokalemia [E87.6] 03/18/2024 Yes    Chronic pain [G89.29] 03/18/2024 Yes    Humerus fracture [S42.309A] 03/18/2024 Yes    Fracture of head of right femur [S72.051A] 03/18/2024 Yes    Closed compression fracture of body of L1 vertebra [S32.010A] 03/18/2024 Yes    Acute cystitis [N30.00] 03/18/2024 Unknown    Closed displaced fracture of right femoral neck [S72.001A] 03/18/2024 Unknown    Supplemental oxygen dependent [Z99.81] 11/28/2023 Not Applicable    Chronic obstructive pulmonary disease [J44.9] 03/24/2021 Yes    DONALD (generalized anxiety disorder) [F41.1] 03/24/2021 Yes      Problems Resolved During this Admission:       Thank you for your consult. I will follow-up with patient. Please contact us if you have any additional questions.    Que Buckner MD  Orthopedics  [facility] - Emergency Department

## 2024-03-19 NOTE — PROGRESS NOTES
[facility]  Adult Nutrition  First Assessment Note         Reason for Assessment  Reason For Assessment: identified at risk by screening criteria (MST2)   Nutrition Risk Screen: no indicators present    Assessment and Plan  Patient is a 62yo female admitted 3/18 for a fall with fractures. She was identified at risk by screening criteria with MST2. She was unsure of weight loss but denied poor PO intakes.    Patient is 43.3kg with a BMI of 16.91 and is severely underweight per geriatric standards, however chart review reveals no significant weight changes over the past year. She was identified at malnourished on a previous admission, however weight has been stable since that time. Patient is a NH resident and is on hospice. She has a PMH of COPD, which is likely contributing to low body weight.     Patient is currently NPO pending surgery to repair fractures. Recommend resuming Regular diet as soon as medically appropriate and tolerated with addition of Boost Plus TID. If unable to advance diet x 2-3 days, recommend consider alternate route of nutrition.     Last BM 3/18 per flowsheet.    Medications/labs reviewed. RD following.       Learning Needs/Social Determinants of Health  Learning Assessment       03/18/2024 2140 [facility] (3/18/2024 - Present)   Created by Clementina Gilmore, RN - RN (Nurse) Status: Complete                 PRIMARY LEARNER     Primary Learner Name:  Darya Low  - 03/18/2024 2140    Relationship:  Patient  - 03/18/2024 2140    Does the primary learner have any barriers to learning?:  No Barriers  - 03/18/2024 2140    What is the preferred language of the primary learner?:  English  - 03/18/2024 2140    How does the primary learner prefer to learn new concepts?:  Listening  - 03/18/2024 2140    How often do you need to have someone help you read instructions, pamphlets, or written material from your doctor or pharmacy?:  Never  -  03/18/2024 2140        CO-LEARNER #1     No question answered        CO-LEARNER #2     No question answered        SPECIAL TOPICS     No question answered        ANSWERED BY:     No question answered        Edit History       Clementina Gilmore, RN - RN (Nurse)   03/18/2024 2140                           Social Determinants of Health     Tobacco Use: High Risk (12/13/2023)    Patient History     Smoking Tobacco Use: Every Day     Smokeless Tobacco Use: Never     Passive Exposure: Not on file   Alcohol Use: Not At Risk (11/27/2023)    AUDIT-C     Frequency of Alcohol Consumption: Never     Average Number of Drinks: Patient does not drink     Frequency of Binge Drinking: Never   Financial Resource Strain: Low Risk  (11/27/2023)    Overall Financial Resource Strain (CARDIA)     Difficulty of Paying Living Expenses: Not hard at all   Food Insecurity: No Food Insecurity (11/27/2023)    Hunger Vital Sign     Worried About Running Out of Food in the Last Year: Never true     Ran Out of Food in the Last Year: Never true   Transportation Needs: No Transportation Needs (11/27/2023)    PRAPARE - Transportation     Lack of Transportation (Medical): No     Lack of Transportation (Non-Medical): No   Physical Activity: Inactive (11/27/2023)    Exercise Vital Sign     Days of Exercise per Week: 0 days     Minutes of Exercise per Session: 0 min   Stress: No Stress Concern Present (11/27/2023)    [employer] - Occupational Stress Questionnaire     Feeling of Stress : Only a little   Social Connections: Moderately Isolated (11/27/2023)    Social Connection and Isolation Panel [NHANES]     Frequency of Communication with Friends and Family: More than three times a week     Frequency of Social Gatherings with Friends and Family: More than three times a week     Attends Jew Services: 1 to 4 times per year     Active Member of Clubs or Organizations: No     Attends Club or Organization Meetings: Never      Marital Status:    Housing Stability: Unknown (11/27/2023)    Housing Stability Vital Sign     Unable to Pay for Housing in the Last Year: No     Number of Places Lived in the Last Year: Not on file     Unstable Housing in the Last Year: No   Depression: Not on file            Malnutrition  Is Patient Malnourished: No    Nutrition Diagnosis  Underweight related to Catabolic illness as evidenced by PMH COPD, low BMI  Comments: resume Regular diet as soon as medically appropriate.     Recent Labs   Lab 03/19/24  0631   GLU 85         Nutrition Prescription / Recommendations  Recommendation/Intervention: Recommend resume Regular diet as soon as medically appropriate and tolerated with addition of Boost Plus TID to improve kcal/protein intakes to better meet estimated nutritional needs. If unable to advance x 2-3 days, recommend consider alternate route of nutrition.  Goals: Diet advancement x 2-3 days, weight maintenance during admission  Nutrition Goal Status: new  Current Diet Order: NPO  Chewing or Swallowing Difficulty?: No Chewing or swallowing difficulty  Recommended Diet: Regular  Recommended Oral Supplement: Boost Plus [360kcals, 14g Protein, 45g Carbs] 3 times a day  Is Nutrition Support Recommended: [facility] Support: No  Is Nutrition Education Recommended: No    Monitor and Evaluation  % current Intake: NPO  % intake to meet estimated needs: P.O. + Supplements  Food and Nutrient Intake: food and beverage intake  Food and Nutrient Adminstration: diet order  Anthropometric Measurements: weight change, weight  Biochemical Data, Medical Tests and Procedures: electrolyte and renal panel, gastrointestinal profile, glucose/endocrine profile, inflammatory profile, lipid profile       Current Medical Diagnosis and Past Medical History  Diagnosis: trauma  Past Medical History:   Diagnosis Date    COPD (chronic obstructive pulmonary disease)     DONALD (generalized anxiety disorder) 03/24/2021    GERD  "with esophagitis     Iron deficiency anemia due to chronic blood loss     Supplemental oxygen dependent 11/28/2023       Nutrition/Diet History  Food Allergies: [facility]    Lab/Procedures/Meds  Recent Labs   Lab 03/18/24  1221 03/18/24 2031 03/19/24  0631     --  136   K 2.4*   < > 4.5   BUN 26*  --  15   CREATININE 0.88  --  0.54*   CALCIUM 7.7*  --  8.4*   ALBUMIN 2.4*  --   --    CL 96*  --  99   ALT 35  --   --    AST 45*  --   --     < > = values in this interval not displayed.   Note: Cr low. Alb, Ca++ low. AST low.   Last A1c:   Lab Results   Component Value Date    HGBA1C 5.2 04/23/2021     Lab Results   Component Value Date    RBC 3.49 (L) 03/19/2024    HGB 9.6 (L) 03/19/2024    HCT 31.6 (L) 03/19/2024    MCV 90.5 03/19/2024    MCH 27.5 03/19/2024    MCHC 30.4 (L) 03/19/2024    TIBC 357 03/28/2021   Note: H&H low    Pertinent Labs Reviewed: reviewed  Pertinent Medications Reviewed: reviewed  Scheduled Meds:   albuterol-ipratropium  3 mL Nebulization Q6H    aspirin  81 mg Oral Daily    cefTRIAXone (Rocephin) IV (PEDS and ADULTS)  1 g Intravenous Q24H    clonazePAM  1 mg Oral BID    enoxparin  40 mg Subcutaneous Q24H (prophylaxis, 1700)    EScitalopram oxalate  10 mg Oral Daily    gabapentin  300 mg Oral BID    mupirocin   Nasal BID     Continuous Infusions:   lactated ringers 125 mL/hr at 03/19/24 1001    NORepinephrine bitartrate-D5W 0.02 mcg/kg/min (03/19/24 1001)     PRN Meds:.acetaminophen, melatonin, morphine, oxyCODONE, traMADoL    Anthropometrics  Temp: 99.1 °F (37.3 °C)  Height Method: Stated  Height: 5' 3" (160 cm)  Height (inches): 63 in  Weight Method: Bed Scale  Weight: 43.3 kg (95 lb 7.4 oz)  Weight (lb): 95.46 lb  Ideal Body Weight (IBW), Female: 115 lb  % Ideal Body Weight, Female (lb): 83.01 %  BMI (Calculated): 16.9       Estimated/Assessed Needs  RMR ([place] [employer] Equation): 962.13     Temp: 99.1 °F (37.3 °C)Oral  Weight Used For Calorie Calculations: 43.3 kg (95 lb 7.4 oz)     " Energy Calorie Requirements (kcal): 1299-1515kcal (30-35kcal/kg)  Weight Used For Protein Calculations: 52.2 kg (115 lb)  Protein Requirements: 52-63g (1.0-1.2g/kg ideal body weight)       RDA Method (mL): 1299       Nutrition by Nursing  Diet/Nutrition Received: NPO           Last Bowel Movement: 03/18/24                Nutrition Follow-Up  RD Follow-up?: Yes      Nutrition Discharge Planning: Patient admitted from NH. Unsure of discharge plans at this time. Will monitor and assess closer to discharge.          Ashley Espinosa, MS, RD, LD  Available via Secure Chat

## 2024-03-19 NOTE — ASSESSMENT & PLAN NOTE
- noted on CT scan  - L2 compression fracture with aprox 70% height loss   - orthopedic spine surgeon was consulted   - recommends MRI of lumbar spine   We can proceed with MRI after surgery

## 2024-03-19 NOTE — PROGRESS NOTES
[facility]  General Surgery  Progress Note    Subjective:     History of Present Illness:  No notes on file    Post-Op Info:  Procedure(s) (LRB):  PINNING, HIP, PERCUTANEOUS (Right)         Interval History:  No acute events overnight    Medications:  Continuous Infusions:   lactated ringers 125 mL/hr at 03/19/24 1501    NORepinephrine bitartrate-D5W 0.02 mcg/kg/min (03/19/24 1501)     Scheduled Meds:   albuterol-ipratropium  3 mL Nebulization Q6H    aspirin  81 mg Oral Daily    cefTRIAXone (Rocephin) IV (PEDS and ADULTS)  1 g Intravenous Q24H    clonazePAM  1 mg Oral BID    enoxparin  40 mg Subcutaneous Q24H (prophylaxis, 1700)    EScitalopram oxalate  10 mg Oral Daily    gabapentin  300 mg Oral BID    mupirocin   Nasal BID     PRN Meds:acetaminophen, melatonin, morphine, oxyCODONE, traMADoL     Review of patient's allergies indicates:   Allergen Reactions    Ondansetron hcl Swelling     Edema of tongue per patient    Celexa [citalopram]     Darvon [propoxyphene]     Flexeril [cyclobenzaprine]     Hydroxyzine     Percocet [oxycodone-acetaminophen]     Robaxin [methocarbamol]     Thorazine [chlorpromazine]     Toradol [ketorolac] Itching     Objective:     Vital Signs (Most Recent):  Temp: 98.6 °F (37 °C) (03/19/24 1501)  Pulse: 65 (03/19/24 1530)  Resp: 10 (03/19/24 1530)  BP: (!) 100/45 (03/19/24 1530)  SpO2: 96 % (03/19/24 1530) Vital Signs (24h Range):  Temp:  [97.5 °F (36.4 °C)-99.1 °F (37.3 °C)] 98.6 °F (37 °C)  Pulse:  [43-71] 65  Resp:  [9-20] 10  SpO2:  [92 %-100 %] 96 %  BP: ()/(32-70) 100/45     Weight: 43.3 kg (95 lb 7.4 oz)  Body mass index is 16.91 kg/m².    Intake/Output - Last 3 Shifts         03/17 0700  03/18 0659 03/18 0700 03/19 0659 03/19 0700 03/20 0659    I.V. (mL/kg)  1864.5 (43.1) 1155.9 (26.7)    IV Piggyback  2500     Total Intake(mL/kg)  4364.5 (100.8) 1155.9 (26.7)    Urine (mL/kg/hr)  2400 575 (1.5)    Total Output  2400 575    Net  +1964.5 +580.9                     Physical Exam  Vitals reviewed.   Constitutional:       Appearance: Normal appearance.      Interventions: She is not intubated.  HENT:      Head: Normocephalic.      Mouth/Throat:      Mouth: Mucous membranes are dry.   Eyes:      Extraocular Movements: Extraocular movements intact.   Cardiovascular:      Rate and Rhythm: Normal rate.      Pulses: Normal pulses.      Heart sounds: No murmur heard.  Pulmonary:      Effort: Pulmonary effort is normal. She is not intubated.      Breath sounds: Normal breath sounds.   Abdominal:      General: Abdomen is flat.      Palpations: Abdomen is soft.   Musculoskeletal:         General: Normal range of motion.      Cervical back: Normal range of motion.      Right lower leg: No edema.      Left lower leg: No edema.   Skin:     General: Skin is warm and dry.      Capillary Refill: Capillary refill takes less than 2 seconds.   Neurological:      General: No focal deficit present.      Mental Status: She is alert and oriented to person, place, and time.   Psychiatric:         Mood and Affect: Mood normal.         Behavior: Behavior normal.          Significant Labs:  I have reviewed all pertinent lab results within the past 24 hours.    Significant Diagnostics:  I have reviewed all pertinent imaging results/findings within the past 24 hours.  Assessment/Plan:     Closed compression fracture of body of L1 vertebra  Ortho/spine and Orthopedics consulted for compression fractures lumbar area, right femur and right humerus fracture.  Patient currently requiring vasopressors for blood pressure support.  Blood count remained stable 9/31.  We will continue to monitor        LEV Yu  General Surgery  [facility]

## 2024-03-19 NOTE — PROGRESS NOTES
[facility]  Critical Care Medicine  Progress Note    Patient Name: Darya Low  MRN: [id number]  Admission Date: 3/18/2024  Hospital Length of Stay: 1 days  Code Status: DNR  Attending Provider: Cordell Hills MD  Primary Care Provider: Maeve, Primary Doctor   Principal Problem: Fall    Subjective:     HPI:  Ms Low is a 64 yo female who presented to the ED via EMS after falling overnight at the nursing home. She is on hospice care at the nursing home and refused to come to the hospital when she first fell. However, this morning she was more confused and xrays done at the NH revealed a fracture in her R humerus and femur. She is with [facility].   She has a PMH of COPD with oxygen dependence, chronic pain, DONALD, GERD, and previous left humerus fracture and left femur fracture with repair.     Hospital/ICU Course:  No notes on file    Interval History/Significant Events:  Patient without complaints    Review of Systems  Objective:     Vital Signs (Most Recent):  Temp: 97.7 °F (36.5 °C) (03/19/24 0345)  Pulse: (!) 56 (03/19/24 0515)  Resp: 11 (03/19/24 0515)  BP: 122/65 (03/19/24 0515)  SpO2: 97 % (03/19/24 0515) Vital Signs (24h Range):  Temp:  [97.5 °F (36.4 °C)-97.7 °F (36.5 °C)] 97.7 °F (36.5 °C)  Pulse:  [43-58] 56  Resp:  [10-21] 11  SpO2:  [83 %-100 %] 97 %  BP: ()/(32-65) 122/65   Weight: 43.3 kg (95 lb 7.4 oz)  Body mass index is 16.91 kg/m².      Intake/Output Summary (Last 24 hours) at 3/19/2024 0615  Last data filed at 3/19/2024 0600  Gross per 24 hour   Intake 4364.52 ml   Output 2400 ml   Net 1964.52 ml          Physical Exam  Vitals reviewed.   Constitutional:       Appearance: Normal appearance.      Interventions: She is not intubated.  HENT:      Head: Normocephalic and atraumatic.      Nose: Nose normal.      Mouth/Throat:      Mouth: Mucous membranes are dry.      Pharynx: Oropharynx is clear.   Eyes:      Extraocular Movements: Extraocular movements intact.       Conjunctiva/sclera: Conjunctivae normal.      Pupils: Pupils are equal, round, and reactive to light.   Cardiovascular:      Rate and Rhythm: Normal rate.      Heart sounds: Normal heart sounds. No murmur heard.  Pulmonary:      Effort: Pulmonary effort is normal. She is not intubated.      Breath sounds: Normal breath sounds.   Abdominal:      General: Abdomen is flat. Bowel sounds are normal.      Palpations: Abdomen is soft.   Musculoskeletal:         General: Normal range of motion.      Cervical back: Normal range of motion and neck supple.      Right lower leg: No edema.      Left lower leg: No edema.   Skin:     General: Skin is warm and dry.      Capillary Refill: Capillary refill takes less than 2 seconds.   Neurological:      General: No focal deficit present.      Mental Status: She is alert and oriented to person, place, and time.   Psychiatric:         Mood and Affect: Mood normal.         Behavior: Behavior normal.            Vents:     Lines/Drains/Airways       Drain  Duration                  Urethral Catheter 03/18/24 1322 Silicone 16 Fr. <1 day              Peripheral Intravenous Line  Duration                  Peripheral IV - Single Lumen 03/18/24 1115 20 G Anterior;Left Forearm <1 day         Peripheral IV - Single Lumen 03/18/24 2011 20 G Left Antecubital <1 day                  Significant Labs:    CBC/Anemia Profile:  Recent Labs   Lab 03/18/24  1221 03/18/24  1442 03/18/24  1650   WBC 9.58  --   --    HGB 9.3* 9.5*  --    HCT 30.4* 29.7* 30*     --   --    MCV 90.2  --   --    RDW 16.2*  --   --         Chemistries:  Recent Labs   Lab 03/18/24  1221 03/18/24 2031     --    K 2.4* 3.3*   CL 96*  --    CO2 39*  --    BUN 26*  --    CREATININE 0.88  --    CALCIUM 7.7*  --    ALBUMIN 2.4*  --    PROT 5.0*  --    BILITOT 0.5  --    ALKPHOS 110  --    ALT 35  --    AST 45*  --    MG 1.8  --        Recent Lab Results  (Last 5 results in the past 24 hours)        03/18/24 2031    03/18/24  1717   03/18/24  1650   03/18/24  1442   03/18/24  1331        POC CO2     40.4           Benzodiazepines         Negative       Cocaine         Negative  Comment:   The results of screening tests should be considered presumptive. Confirmatory testing is available upon request.    Cutoff Points:  PCP:         25ng/mL  AMPH:        500ng/mL  STARLA:        200ng/mL  ASHUTOSH:        200ng/mL  THC:         50ng/mL  RUFINO:         300ng/mL  OPI:         2000ng/mL       BARBITURATES         Negative       Ammonia   10             Amphetamine, Urine         Negative       Appearance, UA         Turbid       Bacteria, UA         Few       Bilirubin (UA)         Negative       Cannabinoid Scrn, Ur         Negative       Color, UA         Light-Yellow       Glucose, UA         Normal       Hematocrit       29.7         Hemoglobin       9.5         Ketones, UA         Negative       Lactic Acid Level       1.0         Leukocyte Esterase, UA         Moderate       NITRITE UA         Positive       Blood, UA         Negative       Opiates, Urine         Negative       pH, UA         7.5       Phencyclidine, Urine         Negative       POC Base Excess     12.8           POC HCO3     38.7           POC Hematocrit     30           POC Ionized Calcium     1.07           POC Lactate     1.5           POC PCO2     57           POC PH     7.44           POC PO2     77           Potassium, Blood Gas     2.9           POC SATURATED O2     96           Sodium, Blood Gas     135           POCT Glucose     150           Potassium 3.3               Protein, UA         Negative       RBC, UA         1       Specific [place], UA         1.016       Squamous Epithelial Cells, UA         Occasional       UROBILINOGEN UA         Normal       WBC, UA         20                              Significant Imaging:  I have reviewed all pertinent imaging results/findings within the past 24 hours.    ABG  Recent Labs   Lab 03/18/24  1650   PH 7.44    PO2 77*   PCO2 57*   HCO3 38.7*     Assessment/Plan:     Neuro  Closed compression fracture of body of L1 vertebra  - noted on CT scan  - L2 compression fracture with aprox 70% height loss   - orthopedic spine surgeon was consulted   - recommends MRI of lumbar spine   We can proceed with MRI after surgery    Chronic pain  - polypharmacy noted on meds list from NH however UDS negative     Think polypharmacy had some do the fall will restart some her medicines back her Lexapro and her Neurontin lower dose    Pulmonary  Supplemental oxygen dependent  - continue on NC     Chronic obstructive pulmonary disease  - chronic  - stable at present  - will continue on NC and duonebs  - no issues this morning     Cardiac/Vascular  Hypotension  - s/p 2L NS   - SBP 80-90s during exam  - UDS negative   - UA with evidence of UTI  - blood and urine cultures pending   Blood pressure improving with volume will wean Levophed    Renal/  Acute cystitis  Cultures pending on antibiotics    Hypokalemia  - 2.4 on initially labs  - will replete to goal of >3.0  - EKG with prolong QT and sinus bogdan  - monitor on tele   Lab pending    Orthopedic  * Fall  - recurrent falls   - multiple old fractures noted   - R humerus and femoral head fracture   Followed by orthopedics for possible surgery    Fracture of head of right femur  - R femoral head fracture noted   - orthopedist consulted   Possible surgery    Humerus fracture  - hx of old L humerus fracture- non operable   -- new R humerus fracture noted   - orthopedist has been consulted              Moses Carlin MD  Critical Care Medicine  [facility]

## 2024-03-19 NOTE — SUBJECTIVE & OBJECTIVE
Interval History/Significant Events:  Patient without complaints    Review of Systems  Objective:     Vital Signs (Most Recent):  Temp: 97.7 °F (36.5 °C) (03/19/24 0345)  Pulse: (!) 56 (03/19/24 0515)  Resp: 11 (03/19/24 0515)  BP: 122/65 (03/19/24 0515)  SpO2: 97 % (03/19/24 0515) Vital Signs (24h Range):  Temp:  [97.5 °F (36.4 °C)-97.7 °F (36.5 °C)] 97.7 °F (36.5 °C)  Pulse:  [43-58] 56  Resp:  [10-21] 11  SpO2:  [83 %-100 %] 97 %  BP: ()/(32-65) 122/65   Weight: 43.3 kg (95 lb 7.4 oz)  Body mass index is 16.91 kg/m².      Intake/Output Summary (Last 24 hours) at 3/19/2024 0615  Last data filed at 3/19/2024 0600  Gross per 24 hour   Intake 4364.52 ml   Output 2400 ml   Net 1964.52 ml          Physical Exam  Vitals reviewed.   Constitutional:       Appearance: Normal appearance.      Interventions: She is not intubated.  HENT:      Head: Normocephalic and atraumatic.      Nose: Nose normal.      Mouth/Throat:      Mouth: Mucous membranes are dry.      Pharynx: Oropharynx is clear.   Eyes:      Extraocular Movements: Extraocular movements intact.      Conjunctiva/sclera: Conjunctivae normal.      Pupils: Pupils are equal, round, and reactive to light.   Cardiovascular:      Rate and Rhythm: Normal rate.      Heart sounds: Normal heart sounds. No murmur heard.  Pulmonary:      Effort: Pulmonary effort is normal. She is not intubated.      Breath sounds: Normal breath sounds.   Abdominal:      General: Abdomen is flat. Bowel sounds are normal.      Palpations: Abdomen is soft.   Musculoskeletal:         General: Normal range of motion.      Cervical back: Normal range of motion and neck supple.      Right lower leg: No edema.      Left lower leg: No edema.   Skin:     General: Skin is warm and dry.      Capillary Refill: Capillary refill takes less than 2 seconds.   Neurological:      General: No focal deficit present.      Mental Status: She is alert and oriented to person, place, and time.   Psychiatric:          Mood and Affect: Mood normal.         Behavior: Behavior normal.            Vents:     Lines/Drains/Airways       Drain  Duration                  Urethral Catheter 03/18/24 1322 Silicone 16 Fr. <1 day              Peripheral Intravenous Line  Duration                  Peripheral IV - Single Lumen 03/18/24 1115 20 G Anterior;Left Forearm <1 day         Peripheral IV - Single Lumen 03/18/24 2011 20 G Left Antecubital <1 day                  Significant Labs:    CBC/Anemia Profile:  Recent Labs   Lab 03/18/24 1221 03/18/24  1442 03/18/24  1650   WBC 9.58  --   --    HGB 9.3* 9.5*  --    HCT 30.4* 29.7* 30*     --   --    MCV 90.2  --   --    RDW 16.2*  --   --         Chemistries:  Recent Labs   Lab 03/18/24 1221 03/18/24 2031     --    K 2.4* 3.3*   CL 96*  --    CO2 39*  --    BUN 26*  --    CREATININE 0.88  --    CALCIUM 7.7*  --    ALBUMIN 2.4*  --    PROT 5.0*  --    BILITOT 0.5  --    ALKPHOS 110  --    ALT 35  --    AST 45*  --    MG 1.8  --        Recent Lab Results  (Last 5 results in the past 24 hours)        03/18/24 2031 03/18/24  1717   03/18/24  1650   03/18/24  1442   03/18/24  1331        POC CO2     40.4           Benzodiazepines         Negative       Cocaine         Negative  Comment:   The results of screening tests should be considered presumptive. Confirmatory testing is available upon request.    Cutoff Points:  PCP:         25ng/mL  AMPH:        500ng/mL  STARLA:        200ng/mL  ASHUTOSH:        200ng/mL  THC:         50ng/mL  RUFINO:         300ng/mL  OPI:         2000ng/mL       BARBITURATES         Negative       Ammonia   10             Amphetamine, Urine         Negative       Appearance, UA         Turbid       Bacteria, UA         Few       Bilirubin (UA)         Negative       Cannabinoid Scrn, Ur         Negative       Color, UA         Light-Yellow       Glucose, UA         Normal       Hematocrit       29.7         Hemoglobin       9.5         Ketones, UA          Negative       Lactic Acid Level       1.0         Leukocyte Esterase, UA         Moderate       NITRITE UA         Positive       Blood, UA         Negative       Opiates, Urine         Negative       pH, UA         7.5       Phencyclidine, Urine         Negative       POC Base Excess     12.8           POC HCO3     38.7           POC Hematocrit     30           POC Ionized Calcium     1.07           POC Lactate     1.5           POC PCO2     57           POC PH     7.44           POC PO2     77           Potassium, Blood Gas     2.9           POC SATURATED O2     96           Sodium, Blood Gas     135           POCT Glucose     150           Potassium 3.3               Protein, UA         Negative       RBC, UA         1       Specific [place], UA         1.016       Squamous Epithelial Cells, UA         Occasional       UROBILINOGEN UA         Normal       WBC, UA         20                              Significant Imaging:  I have reviewed all pertinent imaging results/findings within the past 24 hours.

## 2024-03-19 NOTE — H&P (VIEW-ONLY)
Office: [phone]    Orthopedic Spine Surgery Consult/H&P    ASSESSMENT:  63 y.o. female with L1 burst fracture with L3,L4 and L5 compression fracture     PLAN:  When patients BP is stable she will need a MRI lumbars spine to evaluate fractures  Based on MRI results patient may need T12-L2 percutaneous fusion with L3-L5 kyphoplasty    HPI:  63 y.o. female with recent hospital admission 3/18 after sustaining a ground level fall.  Patient reports she had a fall at the nursing home that she is currently a resident.  Complains of pain in her right shoulder and hip and mid to lower back pain as well.  Patient reports history of COPD on home oxygen she is also currently on hospice and DNR.  She has had multiple falls in the past  strength issues and balance issues.  No prior history of lumbar spine surgeries.    Past Medical History:   Diagnosis Date    COPD (chronic obstructive pulmonary disease)     DONALD (generalized anxiety disorder) 03/24/2021    GERD with esophagitis     Iron deficiency anemia due to chronic blood loss     Supplemental oxygen dependent 11/28/2023      Past Surgical History:   Procedure Laterality Date    ABDOMINAL SURGERY      LEFT HEART CATHETERIZATION N/A 11/27/2023    Procedure: Left heart cath;  Surgeon: Ulises Crespo DO;  Location: [facility];  Service: Cardiology;  Laterality: N/A;      Review of patient's allergies indicates:   Allergen Reactions    Ondansetron hcl Swelling     Edema of tongue per patient    Celexa [citalopram]     Darvon [propoxyphene]     Flexeril [cyclobenzaprine]     Hydroxyzine     Percocet [oxycodone-acetaminophen]     Robaxin [methocarbamol]     Thorazine [chlorpromazine]     Toradol [ketorolac] Itching        Current Facility-Administered Medications:     acetaminophen tablet 650 mg, 650 mg, Oral, Q6H PRN, Karine Calhoun, FNP-AGACNP    albuterol-ipratropium 2.5 mg-0.5 mg/3 mL nebulizer solution 3 mL, 3 mL, Nebulization, Q6H, Karine Calhoun, FNP-AGACNP, 3  mL at 03/19/24 0706    aspirin chewable tablet 81 mg, 81 mg, Oral, Daily, Slay, Karine, FNP-AGACNP, 81 mg at 03/19/24 0858    cefTRIAXone (Rocephin) 1 g in dextrose 5 % in water (D5W) 100 mL IVPB (MB+), 1 g, Intravenous, Q24H, Kassidy Calhounsi, FNP-AGACNP, Stopped at 03/19/24 0033    clonazePAM tablet 1 mg, 1 mg, Oral, QHS, Moses Carlin MD    enoxaparin injection 40 mg, 40 mg, Subcutaneous, Q24H (prophylaxis, 1700), Moses Carlin MD, 40 mg at 03/18/24 1803    EScitalopram oxalate tablet 10 mg, 10 mg, Oral, Daily, Unique, Karine, FNP-AGACNP, 10 mg at 03/19/24 0858    gabapentin capsule 300 mg, 300 mg, Oral, BID, Moses Carlin MD, 300 mg at 03/19/24 0858    lactated ringers infusion, , Intravenous, Continuous, Reji Hameed MD, Last Rate: 125 mL/hr at 03/19/24 0801, Rate Verify at 03/19/24 0801    melatonin tablet 6 mg, 6 mg, Oral, Nightly PRN, Cordell Hills MD    morphine injection 3 mg, 3 mg, Intravenous, Q4H PRN, Cordell Hills MD, 3 mg at 03/19/24 0717    mupirocin 2 % ointment, , Nasal, BID, Cordell Hills MD, Given at 03/19/24 0858    NORepinephrine 4 mg in dextrose 5% 250 mL infusion (premix), 0-3 mcg/kg/min, Intravenous, Continuous, Reji Hameed MD, Last Rate: 3.2 mL/hr at 03/19/24 0801, 0.02 mcg/kg/min at 03/19/24 0801    pantoprazole EC tablet 40 mg, 40 mg, Oral, Daily, Kassidy Calhounsi, FNP-AGACNP, 40 mg at 03/19/24 0858    traMADoL tablet 50 mg, 50 mg, Oral, Q6H PRN, Cordell Hills MD, 50 mg at 03/19/24 0240     Review of systems:  Denies chest pain, nausea,vomiting, abdominal pain, cough, runny nose, eye pain, ear pain, fevers, chills, weight loss, weight gain, dysuria, hematuria, changes in mood    IMAGING:  US Lower Extremity Veins Bilateral  Narrative: EXAMINATION:  US LOWER EXTREMITY VEINS BILATERAL    CLINICAL HISTORY:  sob;    TECHNIQUE:  Duplex and color flow Doppler and dynamic compression was performed of the bilateral lower extremity veins.  Ultrasound images captured  and stored.    COMPARISON:  None    FINDINGS:  Right thigh veins: The common femoral, femoral, popliteal, upper greater saphenous, and deep femoral veins are patent and free of thrombus. The veins are normally compressible and have normal phasic flow and augmentation response.    Right calf veins: The visualized calf veins are patent.    Left thigh veins: The common femoral, femoral, popliteal, upper greater saphenous, and deep femoral veins are patent and free of thrombus. The veins are normally compressible and have normal phasic flow and augmentation response.    Left calf veins: The visualized calf veins are patent.    Miscellaneous: None  Impression: No evidence of deep venous thrombosis in either lower extremity.    Electronically signed by: Arya Morales  Date:    03/18/2024  Time:    17:43  CT Cervical Spine Without Contrast  Narrative: EXAMINATION:  CT CERVICAL SPINE WITHOUT CONTRAST    CLINICAL HISTORY:  Polytrauma, blunt;    TECHNIQUE:  Axial CT imaging of the cervical spine is performed without contrast.  Computer reformatting is viewed in the sagittal and coronal planes.    CT dose reduction technique used - Dose Rite and tube current modulation.    COMPARISON:  27 April 2022    FINDINGS:  No fracture is seen.  Alignment of the cervical spine is within normal limits.  Vertebral body heights are normal.  The multilevel degenerative changes present.  No other abnormality is demonstrated.  Impression: No evidence of acute injury demonstrated.    Electronically signed by: Ernst Hermosillo  Date:    03/18/2024  Time:    15:34  CT Head Without Contrast  Narrative: EXAMINATION:  CT HEAD WITHOUT CONTRAST    CLINICAL HISTORY:  Mental status change, unknown cause;    TECHNIQUE:  Axial CT imaging of the brain is performed without contrast with 3 mm increments.    CT dose reduction technique used - Dose Rite and tube current modulation.    COMPARISON:  25 November 2023    FINDINGS:  No evidence of hemorrhage, mass, mass  effect, midline shift or acute infarct seen.  The decreased density right cerebellar hemisphere similar to previous study likely prior infarct.  Remaining brain parenchyma attenuation and differentiation appears within normal limits for age. The ventricles and cisterns are normal in caliber.  No cranial or skull base abnormality is identified.  Impression: No evidence of acute process or interval change.    Electronically signed by: Ernst Hermosillo  Date:    03/18/2024  Time:    15:32  X-Ray Hip 2 or 3 views Right (with Pelvis when performed)  Narrative: EXAMINATION:  XR HIP WITH PELVIS WHEN PERFORMED, 2 OR 3  VIEWS RIGHT    CLINICAL HISTORY:  hip pain;    COMPARISON:  30 December 2020    TECHNIQUE:  XR HIP WITH PELVIS 2 VIEWS RIGHT    FINDINGS:  There is suggestion of femoral neck fracture, detail is limited.  No other fracture seen.  The alignment of the joints appears normal.  No degenerative change is present.  No soft tissue abnormality is seen.  Impression: Suggestion of femoral neck fracture.  No other changes.    Electronically signed by: Ernst Hermosillo  Date:    03/18/2024  Time:    14:13  CT Chest Abdomen Pelvis With IV Contrast (XPD) NO Oral Contrast  Narrative: EXAMINATION:  CT CHEST ABDOMEN PELVIS WITH IV CONTRAST (XPD)    CLINICAL HISTORY:  Polytrauma, blunt;   Abdomen pain.    TECHNIQUE:  Axial CT imaging of the chest, abdomen and pelvis is performed with intravenous and oral contrast. Contrast dose is 100 cc of Omnipaque 350.    COMPARISON:  None available    FINDINGS:  CT chest: Heart, mediastinum within normal limits.  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  Otherwise the great vessels show no evidence of abnormality    Small amount of right lower lung airspace density present.  No other evidence of lung parenchymal abnormality seen. No pneumothorax or effusion is present.  Comminuted proximal humerus fracture of the right partially visualized.  No acute chest wall abnormalities  are identified.    CT abdomen: The liver, spleen, pancreas, adrenal glands and kidneys are normal in size and enhancement.  No evidence of focal lesion is demonstrated in the solid organs.  The bowel caliber is normal and no wall thickening or adjacent inflammatory change is seen.  No evidence of free fluid or free air is present.  Appendix is not seen.    CT pelvis: Old pelvic fractures present.  There is suggestion of acute femoral neck fracture on the right with impaction.  The bowel and bladder appear within normal limits.  The uterus and ovaries not seen.  Impression: Right femoral neck fracture.  No other definite acute fracture seen.    Electronically signed by: Ernst Hermosillo  Date:    03/18/2024  Time:    13:27  CT Thoracic Spine Without Contrast  Narrative: EXAMINATION:  CT THORACIC SPINE WITHOUT CONTRAST    CLINICAL HISTORY:  Back trauma, no prior imaging (Age >= 16y);    TECHNIQUE:  CT of the thoracic spine performed without intravenous contrast    The CT examination was performed using one or more of the following dose reduction techniques: Automated exposure control, adjustment of the mA and kV according to patient's size, use of acute or iterative reconstruction techniques.    COMPARISON:  Chest CT 11/26/2023    FINDINGS:  The L1 compression fracture shows continued height loss since 11/26/2023.  Similarly there is evolution of the lower sternal fracture.  There is no acute fracture detected of the thoracic spine.  The vertebral body heights and alignment are maintained and similar to prior.  Minimal superior endplate height loss of T8 is similar.  No spinal canal or foraminal stenosis of the thoracic spine.  There is retropulsion at L1 compression fracture with mild to moderate spinal canal stenosis at that level.  Impression: No acute fracture or dislocation of the thoracic spine.    Electronically signed by: Arya Morales  Date:    03/18/2024  Time:    13:23  X-Ray Shoulder Complete 2 View  Right  Narrative: EXAMINATION:  XR SHOULDER COMPLETE 2 OR MORE VIEWS RIGHT    CLINICAL HISTORY:  Unspecified fall, initial encounter    COMPARISON:  None available    TECHNIQUE:  XR SHOULDER 3 VIEWS RIGHT    FINDINGS:  No there is comminuted fracture of the right proximal humerus with moderate anterior displacement of the humeral shaft.  The alignment of the joints appears normal.  No degenerative change is present.  No soft tissue abnormality is seen.  Impression: Proximal humerus fracture as described above    Electronically signed by: Ernst Hermosillo  Date:    03/18/2024  Time:    13:20  CT Lumbar Spine Without Contrast  Narrative: EXAMINATION:  CT LUMBAR SPINE WITHOUT CONTRAST    CLINICAL HISTORY:  fall;    TECHNIQUE:  Axial CT imaging of the lumbar spine is performed without contrast.  Computer reformatting is viewed in the sagittal and coronal planes.    CT dose reduction technique used - Dose Rite and tube current modulation.    COMPARISON:  13 December 2023    FINDINGS:  There is compression fracture of the L2 1 vertebra, height loss estimated at 70%.  There is posterior cortex retropulsion contributing to mild central canal stenosis.  This is increased from previous x-ray.    L3: There is L3 compression fracture with height loss estimated 25%.  Acute fracture line not seen.  There is L4 and L5 compression fractures height loss less than 25%.  Acute fracture line not clearly seen.  Previous right sacral wing fracture.  Distinct acute fracture line in this area not seen.    Alignment of the spine is within normal limits.  Vertebral body heights are normal.  No other abnormality is demonstrated.  Impression: Multiple compression fractures as described above.  The L2 fracture appears increased since previous x-ray.  Remaining fractures appears similar to previous study.    Electronically signed by: Ernst Hermosillo  Date:    03/18/2024  Time:    13:19  X-Ray Pelvis Routine AP  Narrative: EXAMINATION:  XR PELVIS  ROUTINE AP    CLINICAL HISTORY:  r/o bleeding or hemorrhage;    TECHNIQUE:  AP view of the pelvis was performed.    COMPARISON:  11/25/2023    FINDINGS:  Postsurgical changes are seen from a previous left hip prosthetic.  There appears to be a fracture of the right femoral neck that will be better assessed on dedicated right hip radiographs.  Impression: Findings suspicious for a right femoral neck fracture.  Dedicated radiographs recommended.    Electronically signed by: Arya Morales  Date:    03/18/2024  Time:    12:35  X-Ray Chest 1 View  Narrative: EXAMINATION:  XR CHEST 1 VIEW    CLINICAL HISTORY:  r/o bleeding or hemorrhage;    TECHNIQUE:  Single frontal view of the chest was performed.    COMPARISON:  02/13/2024    FINDINGS:  Left midlung opacity is again seen.  Right lung clear.  No pneumothorax.  Chronic deformity of the left humeral neck and partially imaged of the proximal humerus.  Impression: Similar appearance of the chest.  Chronic deformities of the left humerus partially assessed.    Electronically signed by: Arya Morales  Date:    03/18/2024  Time:    12:34       Vitals:    03/19/24 0900   BP: (!) 100/52   Pulse: 67   Resp: 11   Temp:         EXAM:  Constitutional  General Appearance:  Frail/chronically ill.  On home oxygen  Psychiatric   Orientation: disoriented to time, oriented to place, oriented to person  Mood and Affect: Active and alert, normal mood, normal affect  Gait and Station   Appearance: unable to walk due to fracture right hip, unable to tandem gait, unable to walk on toes, unable to walk on heels    LUMBAR  Musculoskeletal System   Hips: Normal appearance, no leg length discrepancy, decreased motion; left, painful decreased motion; right    Lumbar Spine                   Inspection:  Normal alignment, normal sagittal balance                  Range of motion: decreased flexion, extension, lateral bending, rotation. Pain with range of motion                  Bony Palpation of the Lumbar  Spine:  No tenderness of the spinous process, no tenderness of the sacrum, no tenderness of the coccyx                  Bony Palpation of the Right Hip:  No tenderness of the iliac crest, no tenderness of the sciatic notch, no tenderness of the SI joint                  Bony Palpation of the Left Hip:  No tenderness of the iliac crest, no tenderness of the sciatic notch, no tenderness of the SI joint                  Soft Tissue Palpation on the Right:  No tenderness of the paraspinal region, no tenderness of the iliolumbar region                  Soft Tissue Palpation on the Left:  No tenderness of the paraspinal region, no tenderness of the iliolumbar region    Motor Strength   L1 Right:  Hip flexion iliopsoas 4/5    L1 Left:  Hip flexion iliopsoas 2/5              L2-L4 Right:  Knee extension quadriceps 4/5, tibialis anterior 4/5              L2-L4 Left:  Knee extension quadriceps 2/5, tibialis anterior 2/5   L5 Right:  Extensor hallucis llongus 4/5,    L5 Left:  Extensor hallucis longus 3/5,    S1 Right:  Plantar flexion gastrocnemius 4/5   S1 Left:  Plantar flexion gastrocnemius 2/5    Neurological System   Ankle Reflex Right:  normal   Ankle Reflex Left: normal   Knee Reflex Right:  normal   Knee Reflex Left:  normal   Sensation on the Right:  L2 normal, L3 normal, L4 normal, L5 normal, S1 normal   Sensation on the Left:  L2 normal, L3 normal, L4 normal, L5 normal, S1 normal              Special Test on the Right:  Seated straight leg raising test negative, no clonus of the ankle              Special Test on the Left:  Seated straight leg raising test negative, no clonus of the ankle    Skin   Lumbosacral Spine:  Normal skin    Cardiovascular System   Arterial Pulses Right:  Posterior tibialis normal, dorsalis pedis normal   Arterial Pulses Left:  Posterior tibialis normal, dorsalis pedis normal   Edema Right: None   Edema Left:  None

## 2024-03-19 NOTE — ASSESSMENT & PLAN NOTE
- 2.4 on initially labs  - will replete to goal of >3.0  - EKG with prolong QT and sinus bogdan  - monitor on tele   Lab pending

## 2024-03-19 NOTE — PLAN OF CARE
Problem: Infection  Goal: Absence of Infection Signs and Symptoms  Outcome: Ongoing, Progressing  Intervention: Prevent or Manage Infection  Flowsheets (Taken 3/18/2024 2139)  Fever Reduction/Comfort Measures:   lightweight bedding   lightweight clothing  Isolation Precautions: precautions maintained     Problem: Adult Inpatient Plan of Care  Goal: Plan of Care Review  Outcome: Ongoing, Progressing     Problem: Impaired Wound Healing  Goal: Optimal Wound Healing  Outcome: Ongoing, Progressing  Intervention: Promote Wound Healing  Flowsheets (Taken 3/18/2024 2139)  Oral Nutrition Promotion: rest periods promoted  Activity Management: Rolling - L1  Pain Management Interventions:   position adjusted   pillow support provided   relaxation techniques promoted   warm blanket provided     Problem: Skin Injury Risk Increased  Goal: Skin Health and Integrity  Outcome: Ongoing, Progressing  Intervention: Optimize Skin Protection  Flowsheets (Taken 3/18/2024 2139)  Pressure Reduction Devices: foam padding utilized  Head of Bed (HOB) Positioning: HOB at 30-45 degrees      room air

## 2024-03-19 NOTE — ASSESSMENT & PLAN NOTE
- recurrent falls   - multiple old fractures noted   - R humerus and femoral head fracture   Followed by orthopedics for possible surgery

## 2024-03-19 NOTE — ASSESSMENT & PLAN NOTE
- chronic  - stable at present  - will continue on NC and duonebs  - no issues this morning    1945 - Patient on WT supine c HOB elevated. WT s heat; patient single wrapped in blanket. CR monitor and pulse oximeter on c alarms audible. Sats stable on RA. Will continue to monitor. [place] set at 20/5. Suction set at 100 mm Hg. VSS. Assessment per flowsheet. #5 Fr single lumen UVC intact c IVF's infusing s signs or symptoms of infiltration. Tolerating PIV rate weaning by 1 ml/hr for accuchecks > 50 mg/dl to minimum rate of 1 ml/hr. Receiving IV Ampicillin every 12 hours and IV Gentamicin every 24 hours. Feeding EBM/Enfacare po as tolerated every three hours. Chart reviewed. Orders verified. AM lab work order noted. Patient quietly resting without distress.

## 2024-03-19 NOTE — ASSESSMENT & PLAN NOTE
- polypharmacy noted on meds list from [facility] however UDS negative     Think polypharmacy had some do the fall will restart some her medicines back her Lexapro and her Neurontin lower dose

## 2024-03-20 ENCOUNTER — ANESTHESIA EVENT (OUTPATIENT)
Dept: SURGERY | Facility: HOSPITAL | Age: 64
DRG: 459 | End: 2024-03-20
Payer: MEDICARE

## 2024-03-20 ENCOUNTER — ANESTHESIA (OUTPATIENT)
Dept: SURGERY | Facility: HOSPITAL | Age: 64
DRG: 459 | End: 2024-03-20
Payer: MEDICARE

## 2024-03-20 LAB
ANION GAP SERPL CALCULATED.3IONS-SCNC: 9 MMOL/L (ref 7–16)
BASOPHILS # BLD AUTO: 0.04 K/UL (ref 0–0.2)
BASOPHILS NFR BLD AUTO: 0.7 % (ref 0–1)
BUN SERPL-MCNC: 12 MG/DL (ref 7–18)
BUN/CREAT SERPL: 22 (ref 6–20)
CALCIUM SERPL-MCNC: 7.9 MG/DL (ref 8.5–10.1)
CHLORIDE SERPL-SCNC: 100 MMOL/L (ref 98–107)
CO2 SERPL-SCNC: 33 MMOL/L (ref 21–32)
CREAT SERPL-MCNC: 0.54 MG/DL (ref 0.55–1.02)
DIFFERENTIAL METHOD BLD: ABNORMAL
EGFR (NO RACE VARIABLE) (RUSH/TITUS): 104 ML/MIN/1.73M2
EOSINOPHIL # BLD AUTO: 0.31 K/UL (ref 0–0.5)
EOSINOPHIL NFR BLD AUTO: 5.5 % (ref 1–4)
ERYTHROCYTE [DISTWIDTH] IN BLOOD BY AUTOMATED COUNT: 16.3 % (ref 11.5–14.5)
GLUCOSE SERPL-MCNC: 92 MG/DL (ref 74–106)
HCT VFR BLD AUTO: 27.7 % (ref 38–47)
HGB BLD-MCNC: 8.5 G/DL (ref 12–16)
IMM GRANULOCYTES # BLD AUTO: 0.02 K/UL (ref 0–0.04)
IMM GRANULOCYTES NFR BLD: 0.4 % (ref 0–0.4)
INDIRECT COOMBS: NORMAL
LYMPHOCYTES # BLD AUTO: 1.13 K/UL (ref 1–4.8)
LYMPHOCYTES NFR BLD AUTO: 20.1 % (ref 27–41)
MCH RBC QN AUTO: 27.7 PG (ref 27–31)
MCHC RBC AUTO-ENTMCNC: 30.7 G/DL (ref 32–36)
MCV RBC AUTO: 90.2 FL (ref 80–96)
MONOCYTES # BLD AUTO: 0.46 K/UL (ref 0–0.8)
MONOCYTES NFR BLD AUTO: 8.2 % (ref 2–6)
MPC BLD CALC-MCNC: 10.7 FL (ref 9.4–12.4)
NEUTROPHILS # BLD AUTO: 3.65 K/UL (ref 1.8–7.7)
NEUTROPHILS NFR BLD AUTO: 65.1 % (ref 53–65)
NRBC # BLD AUTO: 0 X10E3/UL
NRBC, AUTO (.00): 0 %
PLATELET # BLD AUTO: 174 K/UL (ref 150–400)
POTASSIUM SERPL-SCNC: 3.7 MMOL/L (ref 3.5–5.1)
RBC # BLD AUTO: 3.07 M/UL (ref 4.2–5.4)
RH BLD: NORMAL
SODIUM SERPL-SCNC: 138 MMOL/L (ref 136–145)
SPECIMEN OUTDATE: NORMAL
UA COMPLETE W REFLEX CULTURE PNL UR: ABNORMAL
WBC # BLD AUTO: 5.61 K/UL (ref 4.5–11)

## 2024-03-20 PROCEDURE — 63600175 PHARM REV CODE 636 W HCPCS: Mod: JZ,JG | Performed by: SURGERY

## 2024-03-20 PROCEDURE — D9220A PRA ANESTHESIA: Mod: CRNA,,, | Performed by: NURSE ANESTHETIST, CERTIFIED REGISTERED

## 2024-03-20 PROCEDURE — 37000009 HC ANESTHESIA EA ADD 15 MINS: Performed by: ORTHOPAEDIC SURGERY

## 2024-03-20 PROCEDURE — 36000711: Performed by: ORTHOPAEDIC SURGERY

## 2024-03-20 PROCEDURE — 63600175 PHARM REV CODE 636 W HCPCS: Performed by: INTERNAL MEDICINE

## 2024-03-20 PROCEDURE — 36000710: Performed by: ORTHOPAEDIC SURGERY

## 2024-03-20 PROCEDURE — 80048 BASIC METABOLIC PNL TOTAL CA: CPT | Performed by: NURSE PRACTITIONER

## 2024-03-20 PROCEDURE — 63600175 PHARM REV CODE 636 W HCPCS: Performed by: ANESTHESIOLOGY

## 2024-03-20 PROCEDURE — D9220A PRA ANESTHESIA: Mod: ANES,,, | Performed by: ANESTHESIOLOGY

## 2024-03-20 PROCEDURE — 0QH634Z INSERTION OF INTERNAL FIXATION DEVICE INTO RIGHT UPPER FEMUR, PERCUTANEOUS APPROACH: ICD-10-PCS | Performed by: ORTHOPAEDIC SURGERY

## 2024-03-20 PROCEDURE — 99233 SBSQ HOSP IP/OBS HIGH 50: CPT | Mod: ,,, | Performed by: INTERNAL MEDICINE

## 2024-03-20 PROCEDURE — 27000510 HC BLANKET BAIR HUGGER ANY SIZE: Performed by: ANESTHESIOLOGY

## 2024-03-20 PROCEDURE — 27000655: Performed by: ANESTHESIOLOGY

## 2024-03-20 PROCEDURE — 27000177 HC AIRWAY, LARYNGEAL MASK: Performed by: ANESTHESIOLOGY

## 2024-03-20 PROCEDURE — 25000003 PHARM REV CODE 250: Performed by: SURGERY

## 2024-03-20 PROCEDURE — 85025 COMPLETE CBC W/AUTO DIFF WBC: CPT | Performed by: NURSE PRACTITIONER

## 2024-03-20 PROCEDURE — C1769 GUIDE WIRE: HCPCS | Performed by: ORTHOPAEDIC SURGERY

## 2024-03-20 PROCEDURE — 25000003 PHARM REV CODE 250: Performed by: NURSE PRACTITIONER

## 2024-03-20 PROCEDURE — 86923 COMPATIBILITY TEST ELECTRIC: CPT

## 2024-03-20 PROCEDURE — 25000003 PHARM REV CODE 250: Performed by: INTERNAL MEDICINE

## 2024-03-20 PROCEDURE — 94640 AIRWAY INHALATION TREATMENT: CPT

## 2024-03-20 PROCEDURE — 86901 BLOOD TYPING SEROLOGIC RH(D): CPT | Performed by: SURGERY

## 2024-03-20 PROCEDURE — 11000001 HC ACUTE MED/SURG PRIVATE ROOM

## 2024-03-20 PROCEDURE — 63600175 PHARM REV CODE 636 W HCPCS: Performed by: NURSE ANESTHETIST, CERTIFIED REGISTERED

## 2024-03-20 PROCEDURE — 27235 TREAT THIGH FRACTURE: CPT | Mod: RT,,, | Performed by: ORTHOPAEDIC SURGERY

## 2024-03-20 PROCEDURE — 27000221 HC OXYGEN, UP TO 24 HOURS

## 2024-03-20 PROCEDURE — 27000716 HC OXISENSOR PROBE, ANY SIZE: Performed by: ANESTHESIOLOGY

## 2024-03-20 PROCEDURE — 37000008 HC ANESTHESIA 1ST 15 MINUTES: Performed by: ORTHOPAEDIC SURGERY

## 2024-03-20 PROCEDURE — C1713 ANCHOR/SCREW BN/BN,TIS/BN: HCPCS | Performed by: ORTHOPAEDIC SURGERY

## 2024-03-20 PROCEDURE — 99900035 HC TECH TIME PER 15 MIN (STAT)

## 2024-03-20 PROCEDURE — 94761 N-INVAS EAR/PLS OXIMETRY MLT: CPT

## 2024-03-20 PROCEDURE — 25000242 PHARM REV CODE 250 ALT 637 W/ HCPCS: Performed by: NURSE PRACTITIONER

## 2024-03-20 PROCEDURE — 25000003 PHARM REV CODE 250: Performed by: NURSE ANESTHETIST, CERTIFIED REGISTERED

## 2024-03-20 PROCEDURE — 30233N1 TRANSFUSION OF NONAUTOLOGOUS RED BLOOD CELLS INTO PERIPHERAL VEIN, PERCUTANEOUS APPROACH: ICD-10-PCS | Performed by: SURGERY

## 2024-03-20 DEVICE — WIRE 2.8 X 300 MM THREADED: Type: IMPLANTABLE DEVICE | Site: HIP | Status: FUNCTIONAL

## 2024-03-20 DEVICE — SCREW CANN 16MM THRD 6.5X80 SS: Type: IMPLANTABLE DEVICE | Site: HIP | Status: FUNCTIONAL

## 2024-03-20 RX ORDER — FENTANYL CITRATE 50 UG/ML
INJECTION, SOLUTION INTRAMUSCULAR; INTRAVENOUS
Status: DISCONTINUED | OUTPATIENT
Start: 2024-03-20 | End: 2024-03-20

## 2024-03-20 RX ORDER — MEPERIDINE HYDROCHLORIDE 25 MG/ML
25 INJECTION INTRAMUSCULAR; INTRAVENOUS; SUBCUTANEOUS EVERY 10 MIN PRN
Status: ACTIVE | OUTPATIENT
Start: 2024-03-20 | End: 2024-03-20

## 2024-03-20 RX ORDER — PHENYLEPHRINE HYDROCHLORIDE 10 MG/ML
INJECTION INTRAVENOUS
Status: DISCONTINUED | OUTPATIENT
Start: 2024-03-20 | End: 2024-03-20

## 2024-03-20 RX ORDER — GLYCOPYRROLATE 0.2 MG/ML
INJECTION INTRAMUSCULAR; INTRAVENOUS
Status: DISCONTINUED | OUTPATIENT
Start: 2024-03-20 | End: 2024-03-20

## 2024-03-20 RX ORDER — DIPHENHYDRAMINE HYDROCHLORIDE 50 MG/ML
25 INJECTION INTRAMUSCULAR; INTRAVENOUS EVERY 6 HOURS PRN
Status: DISCONTINUED | OUTPATIENT
Start: 2024-03-20 | End: 2024-03-25 | Stop reason: HOSPADM

## 2024-03-20 RX ORDER — PROPOFOL 10 MG/ML
INJECTION, EMULSION INTRAVENOUS
Status: DISCONTINUED | OUTPATIENT
Start: 2024-03-20 | End: 2024-03-20

## 2024-03-20 RX ORDER — HYDROMORPHONE HYDROCHLORIDE 2 MG/ML
0.5 INJECTION, SOLUTION INTRAMUSCULAR; INTRAVENOUS; SUBCUTANEOUS EVERY 5 MIN PRN
Status: DISCONTINUED | OUTPATIENT
Start: 2024-03-20 | End: 2024-03-24

## 2024-03-20 RX ORDER — LIDOCAINE HYDROCHLORIDE 20 MG/ML
INJECTION, SOLUTION EPIDURAL; INFILTRATION; INTRACAUDAL; PERINEURAL
Status: DISCONTINUED | OUTPATIENT
Start: 2024-03-20 | End: 2024-03-20

## 2024-03-20 RX ORDER — ONDANSETRON HYDROCHLORIDE 2 MG/ML
INJECTION, SOLUTION INTRAVENOUS
Status: DISCONTINUED | OUTPATIENT
Start: 2024-03-20 | End: 2024-03-20

## 2024-03-20 RX ORDER — HYDROCODONE BITARTRATE AND ACETAMINOPHEN 500; 5 MG/1; MG/1
TABLET ORAL
Status: DISCONTINUED | OUTPATIENT
Start: 2024-03-20 | End: 2024-03-23

## 2024-03-20 RX ORDER — MORPHINE SULFATE 10 MG/ML
4 INJECTION INTRAMUSCULAR; INTRAVENOUS; SUBCUTANEOUS EVERY 5 MIN PRN
Status: DISCONTINUED | OUTPATIENT
Start: 2024-03-20 | End: 2024-03-24

## 2024-03-20 RX ORDER — SODIUM CHLORIDE, SODIUM LACTATE, POTASSIUM CHLORIDE, CALCIUM CHLORIDE 600; 310; 30; 20 MG/100ML; MG/100ML; MG/100ML; MG/100ML
125 INJECTION, SOLUTION INTRAVENOUS CONTINUOUS
Status: DISCONTINUED | OUTPATIENT
Start: 2024-03-20 | End: 2024-03-21

## 2024-03-20 RX ADMIN — SODIUM CHLORIDE, POTASSIUM CHLORIDE, SODIUM LACTATE AND CALCIUM CHLORIDE 125 ML/HR: 600; 310; 30; 20 INJECTION, SOLUTION INTRAVENOUS at 04:03

## 2024-03-20 RX ADMIN — SODIUM CHLORIDE, POTASSIUM CHLORIDE, SODIUM LACTATE AND CALCIUM CHLORIDE: 600; 310; 30; 20 INJECTION, SOLUTION INTRAVENOUS at 06:03

## 2024-03-20 RX ADMIN — ESCITALOPRAM OXALATE 10 MG: 10 TABLET, FILM COATED ORAL at 09:03

## 2024-03-20 RX ADMIN — CLONAZEPAM 1 MG: 0.5 TABLET ORAL at 09:03

## 2024-03-20 RX ADMIN — ASPIRIN 81 MG 81 MG: 81 TABLET ORAL at 09:03

## 2024-03-20 RX ADMIN — SODIUM CHLORIDE, POTASSIUM CHLORIDE, SODIUM LACTATE AND CALCIUM CHLORIDE: 600; 310; 30; 20 INJECTION, SOLUTION INTRAVENOUS at 04:03

## 2024-03-20 RX ADMIN — MUPIROCIN: 20 OINTMENT TOPICAL at 09:03

## 2024-03-20 RX ADMIN — IPRATROPIUM BROMIDE AND ALBUTEROL SULFATE 3 ML: 2.5; .5 SOLUTION RESPIRATORY (INHALATION) at 12:03

## 2024-03-20 RX ADMIN — MORPHINE SULFATE 3 MG: 4 INJECTION, SOLUTION INTRAMUSCULAR; INTRAVENOUS at 07:03

## 2024-03-20 RX ADMIN — GABAPENTIN 300 MG: 300 CAPSULE ORAL at 09:03

## 2024-03-20 RX ADMIN — FENTANYL CITRATE 25 MCG: 50 INJECTION INTRAMUSCULAR; INTRAVENOUS at 04:03

## 2024-03-20 RX ADMIN — IPRATROPIUM BROMIDE AND ALBUTEROL SULFATE 3 ML: 2.5; .5 SOLUTION RESPIRATORY (INHALATION) at 07:03

## 2024-03-20 RX ADMIN — OXYCODONE HYDROCHLORIDE 5 MG: 5 TABLET ORAL at 09:03

## 2024-03-20 RX ADMIN — PHENYLEPHRINE HYDROCHLORIDE 100 MCG: 10 INJECTION INTRAVENOUS at 03:03

## 2024-03-20 RX ADMIN — ONDANSETRON 4 MG: 2 INJECTION INTRAMUSCULAR; INTRAVENOUS at 03:03

## 2024-03-20 RX ADMIN — MORPHINE SULFATE 3 MG: 4 INJECTION, SOLUTION INTRAMUSCULAR; INTRAVENOUS at 11:03

## 2024-03-20 RX ADMIN — MUPIROCIN: 20 OINTMENT TOPICAL at 08:03

## 2024-03-20 RX ADMIN — MORPHINE SULFATE 3 MG: 4 INJECTION, SOLUTION INTRAMUSCULAR; INTRAVENOUS at 08:03

## 2024-03-20 RX ADMIN — GLYCOPYRROLATE 0.2 MG: 0.2 INJECTION INTRAMUSCULAR; INTRAVENOUS at 03:03

## 2024-03-20 RX ADMIN — ACETAMINOPHEN 650 MG: 325 TABLET ORAL at 10:03

## 2024-03-20 RX ADMIN — LIDOCAINE HYDROCHLORIDE 50 MG: 20 INJECTION, SOLUTION INTRAVENOUS at 03:03

## 2024-03-20 RX ADMIN — PROPOFOL 70 MG: 10 INJECTION, EMULSION INTRAVENOUS at 03:03

## 2024-03-20 RX ADMIN — MELATONIN 6 MG: at 10:03

## 2024-03-20 RX ADMIN — SODIUM CHLORIDE, POTASSIUM CHLORIDE, SODIUM LACTATE AND CALCIUM CHLORIDE: 600; 310; 30; 20 INJECTION, SOLUTION INTRAVENOUS at 01:03

## 2024-03-20 NOTE — PLAN OF CARE
Problem: Infection  Goal: Absence of Infection Signs and Symptoms  Outcome: Ongoing, Progressing     Problem: Adult Inpatient Plan of Care  Goal: Plan of Care Review  Outcome: Ongoing, Progressing  Goal: Patient-Specific Goal (Individualized)  Outcome: Ongoing, Progressing  Goal: Optimal Comfort and Wellbeing  Outcome: Ongoing, Progressing

## 2024-03-20 NOTE — PROGRESS NOTES
Office: [phone]    Subjective:   63F with L1 burst fracture, L3, L4, L5 compression fractures.  MRI done today.  Plan for right hip percutaneous screw fixation with Dr. Buckner later today.  No acute events overnight.    I/O as of 1:15 PM:   Intake/Output - Last 3 Shifts         03/18 0700  03/19 0659 03/19 0700  03/20 0659 03/20 0700  03/21 0659    I.V. (mL/kg) 1864.5 (43.1) 3037.6 (69.7) 550.4 (12.6)    IV Piggyback 2500 100     Total Intake(mL/kg) 4364.5 (100.8) 3137.6 (72) 550.4 (12.6)    Urine (mL/kg/hr) 2400 2125 (2) 975 (3.6)    Total Output 2400 2125 975    Net +1964.5 +1012.6 -424.6                    Vitals / Physical Exam:  Vitals:    03/20/24 1227 03/20/24 1230 03/20/24 1245 03/20/24 1300   BP:  (!) 105/39 (!) 77/28 (!) 78/39   Pulse: 63 62 72 70   Resp: 10 13 14 12   Temp:       TempSrc:       SpO2: (!) 90% 100% 95% (!) 94%   Weight:       Height:            AOx3   NAD      Motor  C5 C6 C7 C8 T1      Left  5 5 5 5 5      Right  5 5 5 5 5   Sensory           Left  + + + + +     Right + + + + +     Motor L2 L3 L4 L5 S1   Left 4 4 4 4 4   Right 2 2 4 4 4   Sensory        Left  + + + + +   Right + + + + +         Assessment / Plan:   Darya Low is a 63 y.o. female with L1 burst fracture, L3, L4, L5 compression fractures    - hospitalist consulted for medical comanagement  - plan for T12-L2 percutaneous fusion and L2-L5 kyphoplasty on Tuesday next week  - Please keep dressing clean, dry and intact; will be changed as needed  - Please continue Diet Adult Regular (IDDSI Level 7) diet   - Pain at this time is well controlled; continue current pain regiment  - TEDs/SCDs  - PT  - Bowel regimen  - Please call should you have any questions regarding this patient's care      Signature:  Logan Conway MD     Electronic Signature

## 2024-03-20 NOTE — ANESTHESIA POSTPROCEDURE EVALUATION
Anesthesia Post Evaluation    Patient: Darya Low    Procedure(s) Performed: Procedure(s) (LRB):  PINNING, HIP, PERCUTANEOUS (Right)    Final Anesthesia Type: general      Patient location during evaluation: PACU  Post-procedure vital signs: reviewed and stable  Pain management: adequate  Airway patency: patent    PONV status at discharge: No PONV  Anesthetic complications: no      Cardiovascular status: hemodynamically stable  Respiratory status: unassisted  Hydration status: euvolemic  Follow-up not needed.              Vitals Value Taken Time   /75 03/20/24 1815   Temp 36.9 °C (98.4 °F) 03/20/24 1640   Pulse 84 03/20/24 1821   Resp 10 03/20/24 1821   SpO2 95 % 03/20/24 1820   Vitals shown include unvalidated device data.      No case tracking events are documented in the log.      Pain/Trev Score: Pain Rating Prior to Med Admin: 5 (3/20/2024 11:35 AM)  Pain Rating Post Med Admin: 4 (3/20/2024 12:05 PM)  Trev Score: 8 (3/20/2024  5:30 PM)

## 2024-03-20 NOTE — ANESTHESIA PREPROCEDURE EVALUATION
03/20/2024  Darya Low is a 63 y.o., female.      Pre-op Assessment    I have reviewed the Patient Summary Reports.     I have reviewed the Nursing Notes. I have reviewed the NPO Status.   I have reviewed the Medications.     Review of Systems  Anesthesia Hx:  No problems with previous Anesthesia                Social:  No Alcohol Use, Smoker       Hematology/Oncology:  Hematology Normal   Oncology Normal                                   EENT/Dental:  EENT/Dental Normal           Cardiovascular:  Cardiovascular Normal                                            Pulmonary:   COPD                     Renal/:  Renal/ Normal                 Hepatic/GI:  Hepatic/GI Normal                 Musculoskeletal:     Compression fx L1       Spine Disorders: lumbar            Neurological:        Chronic Pain Syndrome                         Endocrine:  Endocrine Normal            Dermatological:  Skin Normal    Psych:   anxiety                 Physical Exam  General: Well nourished    Airway:  Mallampati: II / II  Mouth Opening: Normal  TM Distance: > 6 cm  Tongue: Normal  Neck ROM: Normal ROM    Chest/Lungs:  Clear to auscultation, Normal Respiratory Rate    Heart:  Rate: Normal  Rhythm: Regular Rhythm        Anesthesia Plan  Type of Anesthesia, risks & benefits discussed:    Anesthesia Type: Regional, Gen Supraglottic Airway  Intra-op Monitoring Plan: Standard ASA Monitors  Post Op Pain Control Plan: multimodal analgesia  Induction:  IV  Informed Consent: Informed consent signed with the Patient and all parties understand the risks and agree with anesthesia plan.  All questions answered. Patient consented to blood products? Yes  ASA Score: 3  Day of Surgery Review of History & Physical: H&P Update referred to the surgeon/provider.I have interviewed and examined the patient. I have reviewed the patient's H&P  dated: There are no significant changes.     Ready For Surgery From Anesthesia Perspective.     .

## 2024-03-20 NOTE — ASSESSMENT & PLAN NOTE
- noted on CT scan  - L2 compression fracture with aprox 70% height loss   - orthopedic spine surgeon was consulted   - recommends MRI of lumbar spine   MR lumbar sacral spine hopefully this morning

## 2024-03-20 NOTE — INTERVAL H&P NOTE
The patient has been examined and the H&P has been reviewed:    I concur with the findings and no changes have occurred since H&P was written.    Anesthesia/Surgery risks, benefits and alternative options discussed and understood by patient/family.    To OR for Rt hip perc pinning      Active Hospital Problems    Diagnosis  POA    *Fall [W19.XXXA]  Yes    Hypotension [I95.9]  Yes    Hypokalemia [E87.6]  Yes    Chronic pain [G89.29]  Yes    Humerus fracture [S42.309A]  Yes    Fracture of head of right femur [S72.051A]  Yes    Closed compression fracture of body of L1 vertebra [S32.010A]  Yes    Acute cystitis [N30.00]  Yes    Closed displaced fracture of right femoral neck [S72.001A]  Unknown    Supplemental oxygen dependent [Z99.81]  Not Applicable    Chronic obstructive pulmonary disease [J44.9]  Yes    DONALD (generalized anxiety disorder) [F41.1]  Yes      Resolved Hospital Problems   No resolved problems to display.

## 2024-03-20 NOTE — CARE UPDATE
Done by RT student Estevan Marmolejo       03/20/24 2782   Patient Assessment/Suction   Level of Consciousness (AVPU) alert   Respiratory Effort Normal   Expansion/Accessory Muscles/Retractions no use of accessory muscles   All Lung Fields Breath Sounds Anterior:;clear   Cough Frequency no cough   PRE-TX-O2   Device (Oxygen Therapy) nasal cannula   Flow (L/min) 2   Oxygen Concentration (%) 28   SpO2 (!) 90 %   Pulse Oximetry Type Continuous   Pulse 63   Resp 10   Aerosol Therapy   $ Aerosol Therapy Charges Aerosol Treatment   Daily Review of Necessity (SVN) completed   Respiratory Treatment Status (SVN) given   Treatment Route (SVN) mask   Patient Position (SVN) HOB elevated   Post Treatment Assessment (SVN) breath sounds unchanged   Signs of Intolerance (SVN) none   Breath Sounds Post-Respiratory Treatment   Throughout All Fields Post-Treatment All Fields   Throughout All Fields Post-Treatment no change   Post-treatment Heart Rate (beats/min) 67   Post-treatment Resp Rate (breaths/min) 13   Ready to Wean/Extubation Screen   FIO2<=50 (chart decimal) 0.28

## 2024-03-20 NOTE — PLAN OF CARE
Per progress note, pt receiving IV Rocephin and had MRI done today. Plan is for pt to go to surgery for right hip percutaneous screw fixation with Dr. Buckner later today. SS to follow for any anticipated d/c needs.

## 2024-03-20 NOTE — OP NOTE
[facility] - Orthopedic Periop Services  Operative Note    Surgery Date: 3/20/24      Surgeon(s) and Role:     * Que Buckner MD - Primary    Assistant: none    Pre-op Diagnosis:   Closed displaced fracture of right femoral neck [S72.001A]     Post-op Diagnosis:  Post-Op Diagnosis Codes:     * Closed displaced fracture of right femoral neck [S72.001A]     Procedure:  Procedure(s) (LRB):  PINNING, HIP, PERCUTANEOUS (Right)     Anesthesia:  General    EBL:  5cc    Implants: * No implants in log *    Tourniquet time: 0 mins    Complication:   none    Procedure: The patient was taken to the operating room and placedsupine.  Anesthesia was administered and pre-operative antibiotics were given.  A timeout was performed.  Patient was positioned appropriately and prepped and draped in the usual sterile fashion.    A 2 cm incision was made just proximal to the lesser trochanter incision was carried through the ITB band fascia.  Guide pins were then placed in an inverted triangular fashion with the 1st going just to the inferior calcar region of the neck of the femur.  Once the pins were appropriately positioned there were then over-drilled and I used 6.5 mm partially-threaded cannulated screws in order to provide fixation.  These were tensioned appropriately the guide pins were removed in excellent fixation and position of the screws was achieved and confirmed under fluoroscopic imaging.  I irrigated this and then proceeded with closure closing the wound with 2-0 Vicryl followed by staples sterile dressings were applied.    Disposition:awakened from anesthesia, extubated and taken to the recovery room in a stable condition, having suffered no apparent untoward event.              We will be toe-touch weight-bearing.  We also treated the right proximal humerus fracture by placing a shoulder abduction pillow sling today we will remain nonweightbearing there

## 2024-03-20 NOTE — ASSESSMENT & PLAN NOTE
- recurrent falls   - multiple old fractures noted   - R humerus and femoral head fracture   Plan is for surgery today by Orthopedics

## 2024-03-20 NOTE — ANESTHESIA PROCEDURE NOTES
Intubation    Date/Time: 3/20/2024 3:15 PM    Performed by: Edilberto Stover Jr., CRNA  Authorized by: Nicholas Muñoz MD    Intubation:     Induction:  Intravenous    Intubated:  Postinduction    Mask Ventilation:  Easy mask    Attempted By:  CRNA    Method of Intubation:  Direct    Difficult Airway Encountered?: No      Complications:  None    Airway Device:  Intubating LMA    Airway Device Size:  3.0    Style/Cuff Inflation:  Cuffed (inflated to minimal occlusive pressure)    Secured at:  The lips    Placement Verified By:  Capnometry    Complicating Factors:  None    Findings Post-Intubation:  Atraumatic/condition of teeth unchanged

## 2024-03-20 NOTE — PROGRESS NOTES
[facility]  Critical Care Medicine  Progress Note    Patient Name: Darya Low  MRN: [id number]  Admission Date: 3/18/2024  Hospital Length of Stay: 2 days  Code Status: DNR  Attending Provider: Cordell Hills MD  Primary Care Provider: Maeve, Primary Doctor   Principal Problem: Fall    Subjective:     HPI:  Ms Low is a 64 yo female who presented to the ED via EMS after falling overnight at the nursing home. She is on hospice care at the nursing home and refused to come to the hospital when she first fell. However, this morning she was more confused and xrays done at the NH revealed a fracture in her R humerus and femur. She is with [facility].   She has a PMH of COPD with oxygen dependence, chronic pain, DONALD, GERD, and previous left humerus fracture and left femur fracture with repair.     Hospital/ICU Course:  No notes on file    Interval History/Significant Events:  Patient comfortable without complaints    Review of Systems  Objective:     Vital Signs (Most Recent):  Temp: 98.9 °F (37.2 °C) (03/20/24 0309)  Pulse: (!) 53 (03/20/24 0515)  Resp: 12 (03/20/24 0515)  BP: (!) 102/58 (03/20/24 0515)  SpO2: 100 % (03/20/24 0515) Vital Signs (24h Range):  Temp:  [98.6 °F (37 °C)-99.4 °F (37.4 °C)] 98.9 °F (37.2 °C)  Pulse:  [53-82] 53  Resp:  [8-19] 12  SpO2:  [83 %-100 %] 100 %  BP: ()/(36-75) 102/58   Weight: 43.6 kg (96 lb 1.9 oz)  Body mass index is 17.03 kg/m².      Intake/Output Summary (Last 24 hours) at 3/20/2024 0628  Last data filed at 3/20/2024 0553  Gross per 24 hour   Intake 3012.6 ml   Output 2125 ml   Net 887.6 ml          Physical Exam  Vitals reviewed.   Constitutional:       Appearance: Normal appearance.      Interventions: She is not intubated.  HENT:      Head: Normocephalic and atraumatic.      Nose: Nose normal.      Mouth/Throat:      Mouth: Mucous membranes are dry.      Pharynx: Oropharynx is clear.   Eyes:      Extraocular Movements: Extraocular movements  intact.      Conjunctiva/sclera: Conjunctivae normal.      Pupils: Pupils are equal, round, and reactive to light.   Cardiovascular:      Rate and Rhythm: Normal rate.      Heart sounds: Normal heart sounds. No murmur heard.  Pulmonary:      Effort: Pulmonary effort is normal. She is not intubated.      Breath sounds: Normal breath sounds.   Abdominal:      General: Abdomen is flat. Bowel sounds are normal.      Palpations: Abdomen is soft.   Musculoskeletal:         General: Normal range of motion.      Cervical back: Normal range of motion and neck supple.      Right lower leg: No edema.      Left lower leg: No edema.   Skin:     General: Skin is warm and dry.      Capillary Refill: Capillary refill takes less than 2 seconds.   Neurological:      General: No focal deficit present.      Mental Status: She is alert and oriented to person, place, and time.   Psychiatric:         Mood and Affect: Mood normal.         Behavior: Behavior normal.            Vents:  Oxygen Concentration (%): 24 (03/20/24 0004)  Lines/Drains/Airways       Drain  Duration                  Urethral Catheter 03/18/24 1322 Silicone 16 Fr. 1 day              Peripheral Intravenous Line  Duration                  Peripheral IV - Single Lumen 03/18/24 1115 20 G Anterior;Left Forearm 1 day         Peripheral IV - Single Lumen 03/18/24 2011 20 G Left Antecubital 1 day                  Significant Labs:    CBC/Anemia Profile:  Recent Labs   Lab 03/18/24  1221 03/18/24  1442 03/18/24  1650 03/19/24  0631   WBC 9.58  --   --  10.06   HGB 9.3* 9.5*  --  9.6*   HCT 30.4* 29.7* 30* 31.6*     --   --  203   MCV 90.2  --   --  90.5   RDW 16.2*  --   --  16.1*        Chemistries:  Recent Labs   Lab 03/18/24  1221 03/18/24  2031 03/19/24  0631     --  136   K 2.4* 3.3* 4.5   CL 96*  --  99   CO2 39*  --  36*   BUN 26*  --  15   CREATININE 0.88  --  0.54*   CALCIUM 7.7*  --  8.4*   ALBUMIN 2.4*  --   --    PROT 5.0*  --   --    BILITOT 0.5  --    --    ALKPHOS 110  --   --    ALT 35  --   --    AST 45*  --   --    MG 1.8  --   --        Recent Lab Results         03/19/24  0631        Anion Gap 6       Baso # 0.05       Basophil % 0.5       BUN 15       BUN/CREAT RATIO 28       Calcium 8.4       Chloride 99       CO2 36       Creatinine 0.54       Differential Method Auto       eGFR 104       Eos # 0.14       Eos % 1.4       Glucose 85       Hematocrit 31.6       Hemoglobin 9.6       Immature Grans (Abs) 0.04       Immature Granulocytes 0.4       Lymph # 1.89       Lymph % 18.8       MCH 27.5       MCHC 30.4       MCV 90.5       Mono # 0.92       Mono % 9.1       MPV 10.9       Neutrophils, Abs 7.02       Neutrophils Relative 69.8       nRBC 0.0       NUCLEATED RBC ABSOLUTE 0.00       Platelet Count 203       Potassium 4.5       RBC 3.49       RDW 16.1       Sodium 136       WBC 10.06               Significant Imaging:  I have reviewed all pertinent imaging results/findings within the past 24 hours.    ABG  Recent Labs   Lab 03/18/24  1650   PH 7.44   PO2 77*   PCO2 57*   HCO3 38.7*     Assessment/Plan:     Neuro  Closed compression fracture of body of L1 vertebra  - noted on CT scan  - L2 compression fracture with aprox 70% height loss   - orthopedic spine surgeon was consulted   - recommends MRI of lumbar spine   MR lumbar sacral spine hopefully this morning    Chronic pain  - polypharmacy noted on meds list from NH however UDS negative     Think polypharmacy had some do the fall will restart some her medicines back her Lexapro and her Neurontin lower dose    Pulmonary  Supplemental oxygen dependent  - continue on NC     Chronic obstructive pulmonary disease  - chronic  - stable at present  - will continue on NC and duonebs  - no issues this morning     Cardiac/Vascular  Hypotension  - s/p 2L NS   Still believe this was due to volume she is much better now off Levophed    Renal/  Acute cystitis  Cultures pending on antibiotics, Gram-negative  rods    Hypokalemia  Resolved, heart rate still low side    Orthopedic  * Fall  - recurrent falls   - multiple old fractures noted   - R humerus and femoral head fracture   Plan is for surgery today by Orthopedics    Fracture of head of right femur  - R femoral head fracture noted   - orthopedist consulted   Possible surgery    Humerus fracture  - hx of old L humerus fracture- non operable   -- new R humerus fracture noted   Surgery    Other  DONALD (generalized anxiety disorder)  Restarted home medicines at lower dose             Moses Carlin MD  Critical Care Medicine  [facility]

## 2024-03-20 NOTE — CARE UPDATE
Done by RT student Estevan Marmolejo       03/20/24 0735   Patient Assessment/Suction   Level of Consciousness (AVPU) alert   Respiratory Effort Normal   Expansion/Accessory Muscles/Retractions no use of accessory muscles   All Lung Fields Breath Sounds Anterior:;wheezes, expiratory   Cough Frequency no cough   Skin Integrity   $ Wound Care Tech Time 15 min   Area Observed Left;Right;Behind ear   Skin Appearance without discoloration   Barrier used? Other (see comments)  (soft cannula)   PRE-TX-O2   Device (Oxygen Therapy) nasal cannula   $ Is the patient on Low Flow Oxygen? Yes   Flow (L/min) 2   Oxygen Concentration (%) 28   SpO2 99 %   Pulse Oximetry Type Continuous   Pulse (!) 54   Resp 19   Aerosol Therapy   $ Aerosol Therapy Charges Aerosol Treatment   Daily Review of Necessity (SVN) completed   Respiratory Treatment Status (SVN) given   Treatment Route (SVN) mask   Patient Position (SVN) HOB elevated   Post Treatment Assessment (SVN) breath sounds unchanged   Signs of Intolerance (SVN) none   Breath Sounds Post-Respiratory Treatment   Throughout All Fields Post-Treatment All Fields   Throughout All Fields Post-Treatment no change   Post-treatment Heart Rate (beats/min) 54   Post-treatment Resp Rate (breaths/min) 16   Ready to Wean/Extubation Screen   FIO2<=50 (chart decimal) 0.28

## 2024-03-20 NOTE — PLAN OF CARE
Problem: Infection  Goal: Absence of Infection Signs and Symptoms  Outcome: Ongoing, Progressing     Problem: Adult Inpatient Plan of Care  Goal: Plan of Care Review  Outcome: Ongoing, Progressing  Goal: Patient-Specific Goal (Individualized)  Outcome: Ongoing, Progressing  Goal: Absence of Hospital-Acquired Illness or Injury  Outcome: Ongoing, Progressing  Goal: Optimal Comfort and Wellbeing  Outcome: Ongoing, Progressing  Goal: Readiness for Transition of Care  Outcome: Ongoing, Progressing     Problem: Impaired Wound Healing  Goal: Optimal Wound Healing  Outcome: Ongoing, Progressing     Problem: Skin Injury Risk Increased  Goal: Skin Health and Integrity  Outcome: Ongoing, Progressing     Problem: Gas Exchange Impaired  Goal: Optimal Gas Exchange  Outcome: Ongoing, Progressing

## 2024-03-20 NOTE — SUBJECTIVE & OBJECTIVE
Interval History/Significant Events:  Patient comfortable without complaints    Review of Systems  Objective:     Vital Signs (Most Recent):  Temp: 98.9 °F (37.2 °C) (03/20/24 0309)  Pulse: (!) 53 (03/20/24 0515)  Resp: 12 (03/20/24 0515)  BP: (!) 102/58 (03/20/24 0515)  SpO2: 100 % (03/20/24 0515) Vital Signs (24h Range):  Temp:  [98.6 °F (37 °C)-99.4 °F (37.4 °C)] 98.9 °F (37.2 °C)  Pulse:  [53-82] 53  Resp:  [8-19] 12  SpO2:  [83 %-100 %] 100 %  BP: ()/(36-75) 102/58   Weight: 43.6 kg (96 lb 1.9 oz)  Body mass index is 17.03 kg/m².      Intake/Output Summary (Last 24 hours) at 3/20/2024 0628  Last data filed at 3/20/2024 0553  Gross per 24 hour   Intake 3012.6 ml   Output 2125 ml   Net 887.6 ml          Physical Exam  Vitals reviewed.   Constitutional:       Appearance: Normal appearance.      Interventions: She is not intubated.  HENT:      Head: Normocephalic and atraumatic.      Nose: Nose normal.      Mouth/Throat:      Mouth: Mucous membranes are dry.      Pharynx: Oropharynx is clear.   Eyes:      Extraocular Movements: Extraocular movements intact.      Conjunctiva/sclera: Conjunctivae normal.      Pupils: Pupils are equal, round, and reactive to light.   Cardiovascular:      Rate and Rhythm: Normal rate.      Heart sounds: Normal heart sounds. No murmur heard.  Pulmonary:      Effort: Pulmonary effort is normal. She is not intubated.      Breath sounds: Normal breath sounds.   Abdominal:      General: Abdomen is flat. Bowel sounds are normal.      Palpations: Abdomen is soft.   Musculoskeletal:         General: Normal range of motion.      Cervical back: Normal range of motion and neck supple.      Right lower leg: No edema.      Left lower leg: No edema.   Skin:     General: Skin is warm and dry.      Capillary Refill: Capillary refill takes less than 2 seconds.   Neurological:      General: No focal deficit present.      Mental Status: She is alert and oriented to person, place, and time.    Psychiatric:         Mood and Affect: Mood normal.         Behavior: Behavior normal.            Vents:  Oxygen Concentration (%): 24 (03/20/24 0004)  Lines/Drains/Airways       Drain  Duration                  Urethral Catheter 03/18/24 1322 Silicone 16 Fr. 1 day              Peripheral Intravenous Line  Duration                  Peripheral IV - Single Lumen 03/18/24 1115 20 G Anterior;Left Forearm 1 day         Peripheral IV - Single Lumen 03/18/24 2011 20 G Left Antecubital 1 day                  Significant Labs:    CBC/Anemia Profile:  Recent Labs   Lab 03/18/24  1221 03/18/24  1442 03/18/24  1650 03/19/24  0631   WBC 9.58  --   --  10.06   HGB 9.3* 9.5*  --  9.6*   HCT 30.4* 29.7* 30* 31.6*     --   --  203   MCV 90.2  --   --  90.5   RDW 16.2*  --   --  16.1*        Chemistries:  Recent Labs   Lab 03/18/24  1221 03/18/24 2031 03/19/24  0631     --  136   K 2.4* 3.3* 4.5   CL 96*  --  99   CO2 39*  --  36*   BUN 26*  --  15   CREATININE 0.88  --  0.54*   CALCIUM 7.7*  --  8.4*   ALBUMIN 2.4*  --   --    PROT 5.0*  --   --    BILITOT 0.5  --   --    ALKPHOS 110  --   --    ALT 35  --   --    AST 45*  --   --    MG 1.8  --   --        Recent Lab Results         03/19/24  0631        Anion Gap 6       Baso # 0.05       Basophil % 0.5       BUN 15       BUN/CREAT RATIO 28       Calcium 8.4       Chloride 99       CO2 36       Creatinine 0.54       Differential Method Auto       eGFR 104       Eos # 0.14       Eos % 1.4       Glucose 85       Hematocrit 31.6       Hemoglobin 9.6       Immature Grans (Abs) 0.04       Immature Granulocytes 0.4       Lymph # 1.89       Lymph % 18.8       MCH 27.5       MCHC 30.4       MCV 90.5       Mono # 0.92       Mono % 9.1       MPV 10.9       Neutrophils, Abs 7.02       Neutrophils Relative 69.8       nRBC 0.0       NUCLEATED RBC ABSOLUTE 0.00       Platelet Count 203       Potassium 4.5       RBC 3.49       RDW 16.1       Sodium 136       WBC 10.06                Significant Imaging:  I have reviewed all pertinent imaging results/findings within the past 24 hours.

## 2024-03-21 LAB
ANION GAP SERPL CALCULATED.3IONS-SCNC: 7 MMOL/L (ref 7–16)
BASOPHILS # BLD AUTO: 0.03 K/UL (ref 0–0.2)
BASOPHILS NFR BLD AUTO: 0.6 % (ref 0–1)
BUN SERPL-MCNC: 6 MG/DL (ref 7–18)
BUN/CREAT SERPL: 9 (ref 6–20)
CALCIUM SERPL-MCNC: 7.9 MG/DL (ref 8.5–10.1)
CHLORIDE SERPL-SCNC: 102 MMOL/L (ref 98–107)
CO2 SERPL-SCNC: 36 MMOL/L (ref 21–32)
CREAT SERPL-MCNC: 0.65 MG/DL (ref 0.55–1.02)
DIFFERENTIAL METHOD BLD: ABNORMAL
EGFR (NO RACE VARIABLE) (RUSH/TITUS): 100 ML/MIN/1.73M2
EOSINOPHIL # BLD AUTO: 0.28 K/UL (ref 0–0.5)
EOSINOPHIL NFR BLD AUTO: 5.5 % (ref 1–4)
ERYTHROCYTE [DISTWIDTH] IN BLOOD BY AUTOMATED COUNT: 16.3 % (ref 11.5–14.5)
GLUCOSE SERPL-MCNC: 99 MG/DL (ref 74–106)
HCT VFR BLD AUTO: 28.2 % (ref 38–47)
HGB BLD-MCNC: 8.6 G/DL (ref 12–16)
IMM GRANULOCYTES # BLD AUTO: 0.02 K/UL (ref 0–0.04)
IMM GRANULOCYTES NFR BLD: 0.4 % (ref 0–0.4)
LYMPHOCYTES # BLD AUTO: 0.84 K/UL (ref 1–4.8)
LYMPHOCYTES NFR BLD AUTO: 16.5 % (ref 27–41)
MCH RBC QN AUTO: 27.7 PG (ref 27–31)
MCHC RBC AUTO-ENTMCNC: 30.5 G/DL (ref 32–36)
MCV RBC AUTO: 90.7 FL (ref 80–96)
MONOCYTES # BLD AUTO: 0.45 K/UL (ref 0–0.8)
MONOCYTES NFR BLD AUTO: 8.9 % (ref 2–6)
MPC BLD CALC-MCNC: 11.1 FL (ref 9.4–12.4)
NEUTROPHILS # BLD AUTO: 3.46 K/UL (ref 1.8–7.7)
NEUTROPHILS NFR BLD AUTO: 68.1 % (ref 53–65)
NRBC # BLD AUTO: 0 X10E3/UL
NRBC, AUTO (.00): 0 %
PLATELET # BLD AUTO: 165 K/UL (ref 150–400)
POTASSIUM SERPL-SCNC: 3.4 MMOL/L (ref 3.5–5.1)
RBC # BLD AUTO: 3.11 M/UL (ref 4.2–5.4)
SODIUM SERPL-SCNC: 142 MMOL/L (ref 136–145)
WBC # BLD AUTO: 5.08 K/UL (ref 4.5–11)

## 2024-03-21 PROCEDURE — 94761 N-INVAS EAR/PLS OXIMETRY MLT: CPT

## 2024-03-21 PROCEDURE — 63600175 PHARM REV CODE 636 W HCPCS: Performed by: INTERNAL MEDICINE

## 2024-03-21 PROCEDURE — 63600175 PHARM REV CODE 636 W HCPCS: Performed by: NURSE PRACTITIONER

## 2024-03-21 PROCEDURE — 99900035 HC TECH TIME PER 15 MIN (STAT)

## 2024-03-21 PROCEDURE — 85025 COMPLETE CBC W/AUTO DIFF WBC: CPT | Performed by: NURSE PRACTITIONER

## 2024-03-21 PROCEDURE — 25000003 PHARM REV CODE 250: Performed by: INTERNAL MEDICINE

## 2024-03-21 PROCEDURE — 94640 AIRWAY INHALATION TREATMENT: CPT

## 2024-03-21 PROCEDURE — 63600175 PHARM REV CODE 636 W HCPCS: Mod: JZ,JG | Performed by: SURGERY

## 2024-03-21 PROCEDURE — 27000221 HC OXYGEN, UP TO 24 HOURS

## 2024-03-21 PROCEDURE — 25000003 PHARM REV CODE 250: Performed by: NURSE PRACTITIONER

## 2024-03-21 PROCEDURE — 11000001 HC ACUTE MED/SURG PRIVATE ROOM

## 2024-03-21 PROCEDURE — 80048 BASIC METABOLIC PNL TOTAL CA: CPT | Performed by: NURSE PRACTITIONER

## 2024-03-21 PROCEDURE — 99233 SBSQ HOSP IP/OBS HIGH 50: CPT | Mod: ,,, | Performed by: INTERNAL MEDICINE

## 2024-03-21 PROCEDURE — 25000242 PHARM REV CODE 250 ALT 637 W/ HCPCS: Performed by: NURSE PRACTITIONER

## 2024-03-21 PROCEDURE — 25000003 PHARM REV CODE 250: Performed by: SURGERY

## 2024-03-21 RX ORDER — AMOXICILLIN 250 MG
1 CAPSULE ORAL DAILY
Status: DISCONTINUED | OUTPATIENT
Start: 2024-03-21 | End: 2024-04-02 | Stop reason: HOSPADM

## 2024-03-21 RX ORDER — TRAZODONE HYDROCHLORIDE 50 MG/1
50 TABLET ORAL NIGHTLY PRN
Status: DISCONTINUED | OUTPATIENT
Start: 2024-03-21 | End: 2024-04-02 | Stop reason: HOSPADM

## 2024-03-21 RX ORDER — POLYETHYLENE GLYCOL 3350 17 G/17G
17 POWDER, FOR SOLUTION ORAL DAILY
Status: DISCONTINUED | OUTPATIENT
Start: 2024-03-21 | End: 2024-04-02 | Stop reason: HOSPADM

## 2024-03-21 RX ORDER — OXYCODONE HCL 10 MG/1
10 TABLET, FILM COATED, EXTENDED RELEASE ORAL EVERY 12 HOURS
Status: DISCONTINUED | OUTPATIENT
Start: 2024-03-21 | End: 2024-04-02 | Stop reason: HOSPADM

## 2024-03-21 RX ADMIN — TRAZODONE HYDROCHLORIDE 50 MG: 50 TABLET ORAL at 10:03

## 2024-03-21 RX ADMIN — POLYETHYLENE GLYCOL 3350 17 G: 17 POWDER, FOR SOLUTION ORAL at 11:03

## 2024-03-21 RX ADMIN — GABAPENTIN 300 MG: 300 CAPSULE ORAL at 09:03

## 2024-03-21 RX ADMIN — TRAMADOL HYDROCHLORIDE 50 MG: 50 TABLET, COATED ORAL at 10:03

## 2024-03-21 RX ADMIN — CLONAZEPAM 1 MG: 0.5 TABLET ORAL at 09:03

## 2024-03-21 RX ADMIN — OXYCODONE HYDROCHLORIDE 10 MG: 10 TABLET, FILM COATED, EXTENDED RELEASE ORAL at 11:03

## 2024-03-21 RX ADMIN — IPRATROPIUM BROMIDE AND ALBUTEROL SULFATE 3 ML: 2.5; .5 SOLUTION RESPIRATORY (INHALATION) at 12:03

## 2024-03-21 RX ADMIN — MORPHINE SULFATE 3 MG: 4 INJECTION, SOLUTION INTRAMUSCULAR; INTRAVENOUS at 08:03

## 2024-03-21 RX ADMIN — MUPIROCIN: 20 OINTMENT TOPICAL at 08:03

## 2024-03-21 RX ADMIN — GABAPENTIN 300 MG: 300 CAPSULE ORAL at 08:03

## 2024-03-21 RX ADMIN — MORPHINE SULFATE 3 MG: 4 INJECTION, SOLUTION INTRAMUSCULAR; INTRAVENOUS at 01:03

## 2024-03-21 RX ADMIN — CLONAZEPAM 1 MG: 0.5 TABLET ORAL at 08:03

## 2024-03-21 RX ADMIN — ASPIRIN 81 MG 81 MG: 81 TABLET ORAL at 08:03

## 2024-03-21 RX ADMIN — OXYCODONE HYDROCHLORIDE 10 MG: 10 TABLET, FILM COATED, EXTENDED RELEASE ORAL at 08:03

## 2024-03-21 RX ADMIN — MORPHINE SULFATE 3 MG: 4 INJECTION, SOLUTION INTRAMUSCULAR; INTRAVENOUS at 12:03

## 2024-03-21 RX ADMIN — IPRATROPIUM BROMIDE AND ALBUTEROL SULFATE 3 ML: 2.5; .5 SOLUTION RESPIRATORY (INHALATION) at 07:03

## 2024-03-21 RX ADMIN — IPRATROPIUM BROMIDE AND ALBUTEROL SULFATE 3 ML: 2.5; .5 SOLUTION RESPIRATORY (INHALATION) at 01:03

## 2024-03-21 RX ADMIN — MORPHINE SULFATE 3 MG: 4 INJECTION, SOLUTION INTRAMUSCULAR; INTRAVENOUS at 10:03

## 2024-03-21 RX ADMIN — OXYCODONE HYDROCHLORIDE 5 MG: 5 TABLET ORAL at 05:03

## 2024-03-21 RX ADMIN — ESCITALOPRAM OXALATE 10 MG: 10 TABLET, FILM COATED ORAL at 08:03

## 2024-03-21 RX ADMIN — ENOXAPARIN SODIUM 40 MG: 40 INJECTION SUBCUTANEOUS at 05:03

## 2024-03-21 RX ADMIN — CEFTRIAXONE SODIUM 1 G: 1 INJECTION, POWDER, FOR SOLUTION INTRAMUSCULAR; INTRAVENOUS at 12:03

## 2024-03-21 RX ADMIN — SENNOSIDES AND DOCUSATE SODIUM 1 TABLET: 8.6; 5 TABLET ORAL at 11:03

## 2024-03-21 NOTE — ASSESSMENT & PLAN NOTE
- recurrent falls   - multiple old fractures noted   - R humerus and femoral head fracture   Status post fracture repair

## 2024-03-21 NOTE — ASSESSMENT & PLAN NOTE
- noted on CT scan  - L2 compression fracture with aprox 70% height loss   - orthopedic spine surgeon was consulted   - recommends MRI of lumbar spine   Surgery next week

## 2024-03-21 NOTE — SUBJECTIVE & OBJECTIVE
Interval History/Significant Events:  Patient without complaints    Review of Systems  Objective:     Vital Signs (Most Recent):  Temp: 99.7 °F (37.6 °C) (03/21/24 0303)  Pulse: 77 (03/21/24 0230)  Resp: 13 (03/21/24 0544)  BP: (!) 95/55 (03/21/24 0230)  SpO2: 96 % (03/21/24 0230) Vital Signs (24h Range):  Temp:  [98.4 °F (36.9 °C)-100.4 °F (38 °C)] 99.7 °F (37.6 °C)  Pulse:  [54-93] 77  Resp:  [8-22] 13  SpO2:  [78 %-100 %] 96 %  BP: ()/(28-88) 95/55   Weight: 53.2 kg (117 lb 3.2 oz)  Body mass index is 20.76 kg/m².      Intake/Output Summary (Last 24 hours) at 3/21/2024 0552  Last data filed at 3/21/2024 0531  Gross per 24 hour   Intake 1696.26 ml   Output 5250 ml   Net -3553.74 ml          Physical Exam  Vitals reviewed.   Constitutional:       Appearance: Normal appearance.      Interventions: She is not intubated.  HENT:      Head: Normocephalic and atraumatic.      Nose: Nose normal.      Mouth/Throat:      Mouth: Mucous membranes are dry.      Pharynx: Oropharynx is clear.   Eyes:      Extraocular Movements: Extraocular movements intact.      Conjunctiva/sclera: Conjunctivae normal.      Pupils: Pupils are equal, round, and reactive to light.   Cardiovascular:      Rate and Rhythm: Normal rate.      Heart sounds: Normal heart sounds. No murmur heard.  Pulmonary:      Effort: Pulmonary effort is normal. She is not intubated.      Breath sounds: Normal breath sounds.   Abdominal:      General: Abdomen is flat. Bowel sounds are normal.      Palpations: Abdomen is soft.   Musculoskeletal:         General: Normal range of motion.      Cervical back: Normal range of motion and neck supple.      Right lower leg: No edema.      Left lower leg: No edema.   Skin:     General: Skin is warm and dry.      Capillary Refill: Capillary refill takes less than 2 seconds.   Neurological:      General: No focal deficit present.      Mental Status: She is alert and oriented to person, place, and time.   Psychiatric:          Mood and Affect: Mood normal.         Behavior: Behavior normal.            Vents:  Oxygen Concentration (%): 28 (03/21/24 0130)  Lines/Drains/Airways       Drain  Duration                  Urethral Catheter 03/18/24 1322 Silicone 16 Fr. 2 days              Peripheral Intravenous Line  Duration                  Peripheral IV - Single Lumen 03/18/24 1115 20 G Anterior;Left Forearm 2 days                  Significant Labs:    CBC/Anemia Profile:  Recent Labs   Lab 03/19/24  0631 03/20/24  0919   WBC 10.06 5.61   HGB 9.6* 8.5*   HCT 31.6* 27.7*    174   MCV 90.5 90.2   RDW 16.1* 16.3*        Chemistries:  Recent Labs   Lab 03/19/24  0631 03/20/24  0420    138   K 4.5 3.7   CL 99 100   CO2 36* 33*   BUN 15 12   CREATININE 0.54* 0.54*   CALCIUM 8.4* 7.9*       Recent Lab Results         03/20/24  1025   03/20/24  0919        Baso #   0.04       Basophil %   0.7       Differential Method   Auto       Eos #   0.31       Eos %   5.5       Group & Rh O POS         Hematocrit   27.7       Hemoglobin   8.5       Immature Grans (Abs)   0.02       Immature Granulocytes   0.4       INDIRECT JEN NEG         Lymph #   1.13       Lymph %   20.1       MCH   27.7       MCHC   30.7       MCV   90.2       Mono #   0.46       Mono %   8.2       MPV   10.7       Neutrophils, Abs   3.65       Neutrophils Relative   65.1       nRBC   0.0       NUCLEATED RBC ABSOLUTE   0.00       Platelet Count   174       RBC   3.07       RDW   16.3       Specimen Outdate 03/23/2024 23:59         WBC   5.61               Significant Imaging:  I have reviewed all pertinent imaging results/findings within the past 24 hours.

## 2024-03-21 NOTE — ASSESSMENT & PLAN NOTE
Ortho/spine and Orthopedics consulted for compression fractures lumbar area, right femur and right humerus fracture.  Patient currently requiring vasopressors for blood pressure support.  Blood count remained stable 9/31.  We will continue to monitor  3/21:  Status post right femur repair.  Vasopressor off.  BP stable.  Per ortho/fine node plan for T12-L2 percutaneous fusion and L2-L5 kyphoplasty 03/26/2024

## 2024-03-21 NOTE — PROGRESS NOTES
[facility]  General Surgery  Progress Note    Subjective:     History of Present Illness:  No notes on file    Post-Op Info:  Procedure(s) (LRB):  PINNING, HIP, PERCUTANEOUS (Right)   1 Day Post-Op     Interval History:  No acute events overnight.  Status post humerus repair    Medications:  Continuous Infusions:  Scheduled Meds:   albuterol-ipratropium  3 mL Nebulization Q6H    aspirin  81 mg Oral Daily    clonazePAM  1 mg Oral BID    enoxparin  40 mg Subcutaneous Q24H (prophylaxis, 1700)    EScitalopram oxalate  10 mg Oral Daily    gabapentin  300 mg Oral BID    mupirocin   Nasal BID    oxyCODONE  10 mg Oral Q12H    polyethylene glycol  17 g Oral Daily    senna-docusate 8.6-50 mg  1 tablet Oral Daily     PRN Meds:0.9%  NaCl infusion (for blood administration), acetaminophen, diphenhydrAMINE, HYDROmorphone, melatonin, morphine, morphine, oxyCODONE, tiZANidine, traMADoL, traZODone     Review of patient's allergies indicates:   Allergen Reactions    Ondansetron hcl Swelling     Edema of tongue per patient    Celexa [citalopram]     Darvon [propoxyphene]     Flexeril [cyclobenzaprine]     Hydroxyzine     Percocet [oxycodone-acetaminophen]     Robaxin [methocarbamol]     Thorazine [chlorpromazine]     Toradol [ketorolac] Itching     Objective:     Vital Signs (Most Recent):  Temp: 99.7 °F (37.6 °C) (03/21/24 1501)  Pulse: 90 (03/21/24 1530)  Resp: 12 (03/21/24 1530)  BP: 132/78 (03/21/24 1530)  SpO2: (!) 79 % (03/21/24 1530) Vital Signs (24h Range):  Temp:  [98.4 °F (36.9 °C)-100.4 °F (38 °C)] 99.7 °F (37.6 °C)  Pulse:  [65-95] 90  Resp:  [8-22] 12  SpO2:  [78 %-100 %] 79 %  BP: ()/(35-81) 132/78     Weight: 53.2 kg (117 lb 3.2 oz)  Body mass index is 20.76 kg/m².    Intake/Output - Last 3 Shifts         03/19 0700 03/20 0659 03/20 0700 03/21 0659 03/21 0700 03/22 0659    I.V. (mL/kg) 3037.6 (69.7) 2095 (39.4) 357.5 (6.7)    IV Piggyback 100 100     Total Intake(mL/kg) 3137.6 (72) 2192  (41.3) 357.5 (6.7)    Urine (mL/kg/hr) 2125 (2) 3675 (2.9) 150 (0.3)    Blood  25     Total Output 2125 3700 150    Net +1012.6 -1505 +207.5                    Physical Exam  Vitals reviewed.   Constitutional:       Appearance: Normal appearance.      Interventions: She is not intubated.  HENT:      Head: Normocephalic.      Mouth/Throat:      Mouth: Mucous membranes are dry.   Eyes:      Extraocular Movements: Extraocular movements intact.   Cardiovascular:      Rate and Rhythm: Normal rate.      Pulses: Normal pulses.      Heart sounds: No murmur heard.  Pulmonary:      Effort: Pulmonary effort is normal. She is not intubated.      Breath sounds: Normal breath sounds.   Abdominal:      General: Abdomen is flat.      Palpations: Abdomen is soft.   Musculoskeletal:         General: Normal range of motion.      Cervical back: Normal range of motion.      Right lower leg: No edema.      Left lower leg: No edema.   Skin:     General: Skin is warm and dry.      Capillary Refill: Capillary refill takes less than 2 seconds.   Neurological:      General: No focal deficit present.      Mental Status: She is alert and oriented to person, place, and time.   Psychiatric:         Mood and Affect: Mood normal.         Behavior: Behavior normal.          Significant Labs:  I have reviewed all pertinent lab results within the past 24 hours.    Significant Diagnostics:  I have reviewed all pertinent imaging results/findings within the past 24 hours.  Assessment/Plan:     Closed compression fracture of body of L1 vertebra  Ortho/spine and Orthopedics consulted for compression fractures lumbar area, right femur and right humerus fracture.  Patient currently requiring vasopressors for blood pressure support.  Blood count remained stable 9/31.  We will continue to monitor  3/21:  Status post right femur repair.  Vasopressor off.  BP stable.  Per ortho/fine node plan for T12-L2 percutaneous fusion and L2-L5 kyphoplasty  03/26/2024        Javi La, ARINP  General Surgery  [facility]

## 2024-03-21 NOTE — ASSESSMENT & PLAN NOTE
- polypharmacy noted on meds list from NH however UDS negative     Pain seems to be reasonably controlled

## 2024-03-21 NOTE — PROGRESS NOTES
[facility]  Critical Care Medicine  Progress Note    Patient Name: Darya Low  MRN: [id number]  Admission Date: 3/18/2024  Hospital Length of Stay: 3 days  Code Status: DNR  Attending Provider: Cordell Hills MD  Primary Care Provider: Maeve, Primary Doctor   Principal Problem: Fall    Subjective:     HPI:  Ms Low is a 64 yo female who presented to the ED via EMS after falling overnight at the nursing home. She is on hospice care at the nursing home and refused to come to the hospital when she first fell. However, this morning she was more confused and xrays done at the NH revealed a fracture in her R humerus and femur. She is with [facility].   She has a PMH of COPD with oxygen dependence, chronic pain, DONALD, GERD, and previous left humerus fracture and left femur fracture with repair.     Hospital/ICU Course:  No notes on file    Interval History/Significant Events:  Patient without complaints    Review of Systems  Objective:     Vital Signs (Most Recent):  Temp: 99.7 °F (37.6 °C) (03/21/24 0303)  Pulse: 77 (03/21/24 0230)  Resp: 13 (03/21/24 0544)  BP: (!) 95/55 (03/21/24 0230)  SpO2: 96 % (03/21/24 0230) Vital Signs (24h Range):  Temp:  [98.4 °F (36.9 °C)-100.4 °F (38 °C)] 99.7 °F (37.6 °C)  Pulse:  [54-93] 77  Resp:  [8-22] 13  SpO2:  [78 %-100 %] 96 %  BP: ()/(28-88) 95/55   Weight: 53.2 kg (117 lb 3.2 oz)  Body mass index is 20.76 kg/m².      Intake/Output Summary (Last 24 hours) at 3/21/2024 0552  Last data filed at 3/21/2024 0531  Gross per 24 hour   Intake 1696.26 ml   Output 5250 ml   Net -3553.74 ml          Physical Exam  Vitals reviewed.   Constitutional:       Appearance: Normal appearance.      Interventions: She is not intubated.  HENT:      Head: Normocephalic and atraumatic.      Nose: Nose normal.      Mouth/Throat:      Mouth: Mucous membranes are dry.      Pharynx: Oropharynx is clear.   Eyes:      Extraocular Movements: Extraocular movements intact.       Conjunctiva/sclera: Conjunctivae normal.      Pupils: Pupils are equal, round, and reactive to light.   Cardiovascular:      Rate and Rhythm: Normal rate.      Heart sounds: Normal heart sounds. No murmur heard.  Pulmonary:      Effort: Pulmonary effort is normal. She is not intubated.      Breath sounds: Normal breath sounds.   Abdominal:      General: Abdomen is flat. Bowel sounds are normal.      Palpations: Abdomen is soft.   Musculoskeletal:         General: Normal range of motion.      Cervical back: Normal range of motion and neck supple.      Right lower leg: No edema.      Left lower leg: No edema.   Skin:     General: Skin is warm and dry.      Capillary Refill: Capillary refill takes less than 2 seconds.   Neurological:      General: No focal deficit present.      Mental Status: She is alert and oriented to person, place, and time.   Psychiatric:         Mood and Affect: Mood normal.         Behavior: Behavior normal.            Vents:  Oxygen Concentration (%): 28 (03/21/24 0130)  Lines/Drains/Airways       Drain  Duration                  Urethral Catheter 03/18/24 1322 Silicone 16 Fr. 2 days              Peripheral Intravenous Line  Duration                  Peripheral IV - Single Lumen 03/18/24 1115 20 G Anterior;Left Forearm 2 days                  Significant Labs:    CBC/Anemia Profile:  Recent Labs   Lab 03/19/24  0631 03/20/24  0919   WBC 10.06 5.61   HGB 9.6* 8.5*   HCT 31.6* 27.7*    174   MCV 90.5 90.2   RDW 16.1* 16.3*        Chemistries:  Recent Labs   Lab 03/19/24  0631 03/20/24  0420    138   K 4.5 3.7   CL 99 100   CO2 36* 33*   BUN 15 12   CREATININE 0.54* 0.54*   CALCIUM 8.4* 7.9*       Recent Lab Results         03/20/24  1025   03/20/24  0919        Baso #   0.04       Basophil %   0.7       Differential Method   Auto       Eos #   0.31       Eos %   5.5       Group & Rh O POS         Hematocrit   27.7       Hemoglobin   8.5       Immature Grans (Abs)   0.02        Immature Granulocytes   0.4       INDIRECT JEN NEG         Lymph #   1.13       Lymph %   20.1       MCH   27.7       MCHC   30.7       MCV   90.2       Mono #   0.46       Mono %   8.2       MPV   10.7       Neutrophils, Abs   3.65       Neutrophils Relative   65.1       nRBC   0.0       NUCLEATED RBC ABSOLUTE   0.00       Platelet Count   174       RBC   3.07       RDW   16.3       Specimen Outdate 03/23/2024 23:59         WBC   5.61               Significant Imaging:  I have reviewed all pertinent imaging results/findings within the past 24 hours.    ABG  Recent Labs   Lab 03/18/24  1650   PH 7.44   PO2 77*   PCO2 57*   HCO3 38.7*     Assessment/Plan:     Neuro  Closed compression fracture of body of L1 vertebra  - noted on CT scan  - L2 compression fracture with aprox 70% height loss   - orthopedic spine surgeon was consulted   - recommends MRI of lumbar spine   Surgery next week    Chronic pain  - polypharmacy noted on meds list from NH however UDS negative     Pain seems to be reasonably controlled    Pulmonary  Supplemental oxygen dependent  - continue on NC     Chronic obstructive pulmonary disease  - chronic  - stable at present  - will continue on NC and duonebs  - no issues this morning     Cardiac/Vascular  Hypotension  - s/p 2L NS   Patient off Levophed since yesterday    Renal/  Acute cystitis  Proteus mirabilis has been treated    Hypokalemia  Resolved    Orthopedic  * Fall  - recurrent falls   - multiple old fractures noted   - R humerus and femoral head fracture   Status post fracture repair    Fracture of head of right femur  - R femoral head fracture noted   - orthopedist consulted   Status post surgery    Humerus fracture  - hx of old L humerus fracture- non operable   -- new R humerus fracture noted   Splint    Other  DONALD (generalized anxiety disorder)  Restarted home medicines at lower dose             Moses Carlin MD  Critical Care Medicine  [facility]

## 2024-03-21 NOTE — PLAN OF CARE
Problem: Infection  Goal: Absence of Infection Signs and Symptoms  Outcome: Ongoing, Progressing     Problem: Adult Inpatient Plan of Care  Goal: Plan of Care Review  Outcome: Ongoing, Progressing  Goal: Patient-Specific Goal (Individualized)  Outcome: Ongoing, Progressing  Goal: Optimal Comfort and Wellbeing  Outcome: Ongoing, Progressing  Goal: Readiness for Transition of Care  Outcome: Ongoing, Progressing     Problem: Impaired Wound Healing  Goal: Optimal Wound Healing  Outcome: Ongoing, Progressing

## 2024-03-21 NOTE — SUBJECTIVE & OBJECTIVE
Interval History:  No acute events overnight.  Status post humerus repair    Medications:  Continuous Infusions:  Scheduled Meds:   albuterol-ipratropium  3 mL Nebulization Q6H    aspirin  81 mg Oral Daily    clonazePAM  1 mg Oral BID    enoxparin  40 mg Subcutaneous Q24H (prophylaxis, 1700)    EScitalopram oxalate  10 mg Oral Daily    gabapentin  300 mg Oral BID    mupirocin   Nasal BID    oxyCODONE  10 mg Oral Q12H    polyethylene glycol  17 g Oral Daily    senna-docusate 8.6-50 mg  1 tablet Oral Daily     PRN Meds:0.9%  NaCl infusion (for blood administration), acetaminophen, diphenhydrAMINE, HYDROmorphone, melatonin, morphine, morphine, oxyCODONE, tiZANidine, traMADoL, traZODone     Review of patient's allergies indicates:   Allergen Reactions    Ondansetron hcl Swelling     Edema of tongue per patient    Celexa [citalopram]     Darvon [propoxyphene]     Flexeril [cyclobenzaprine]     Hydroxyzine     Percocet [oxycodone-acetaminophen]     Robaxin [methocarbamol]     Thorazine [chlorpromazine]     Toradol [ketorolac] Itching     Objective:     Vital Signs (Most Recent):  Temp: 99.7 °F (37.6 °C) (03/21/24 1501)  Pulse: 90 (03/21/24 1530)  Resp: 12 (03/21/24 1530)  BP: 132/78 (03/21/24 1530)  SpO2: (!) 79 % (03/21/24 1530) Vital Signs (24h Range):  Temp:  [98.4 °F (36.9 °C)-100.4 °F (38 °C)] 99.7 °F (37.6 °C)  Pulse:  [65-95] 90  Resp:  [8-22] 12  SpO2:  [78 %-100 %] 79 %  BP: ()/(35-81) 132/78     Weight: 53.2 kg (117 lb 3.2 oz)  Body mass index is 20.76 kg/m².    Intake/Output - Last 3 Shifts         03/19 0700  03/20 0659 03/20 0700 03/21 0659 03/21 0700 03/22 0659    I.V. (mL/kg) 3037.6 (69.7) 2095 (39.4) 357.5 (6.7)    IV Piggyback 100 100     Total Intake(mL/kg) 3137.6 (72) 2195 (41.3) 357.5 (6.7)    Urine (mL/kg/hr) 2125 (2) 3675 (2.9) 150 (0.3)    Blood  25     Total Output 2125 3700 150    Net +1012.6 -1505 +207.5                    Physical Exam  Vitals reviewed.   Constitutional:        Appearance: Normal appearance.      Interventions: She is not intubated.  HENT:      Head: Normocephalic.      Mouth/Throat:      Mouth: Mucous membranes are dry.   Eyes:      Extraocular Movements: Extraocular movements intact.   Cardiovascular:      Rate and Rhythm: Normal rate.      Pulses: Normal pulses.      Heart sounds: No murmur heard.  Pulmonary:      Effort: Pulmonary effort is normal. She is not intubated.      Breath sounds: Normal breath sounds.   Abdominal:      General: Abdomen is flat.      Palpations: Abdomen is soft.   Musculoskeletal:         General: Normal range of motion.      Cervical back: Normal range of motion.      Right lower leg: No edema.      Left lower leg: No edema.   Skin:     General: Skin is warm and dry.      Capillary Refill: Capillary refill takes less than 2 seconds.   Neurological:      General: No focal deficit present.      Mental Status: She is alert and oriented to person, place, and time.   Psychiatric:         Mood and Affect: Mood normal.         Behavior: Behavior normal.          Significant Labs:  I have reviewed all pertinent lab results within the past 24 hours.    Significant Diagnostics:  I have reviewed all pertinent imaging results/findings within the past 24 hours.

## 2024-03-22 LAB
ABO + RH BLD: NORMAL
ANION GAP SERPL CALCULATED.3IONS-SCNC: 4 MMOL/L (ref 7–16)
BASOPHILS # BLD AUTO: 0.03 K/UL (ref 0–0.2)
BASOPHILS NFR BLD AUTO: 0.6 % (ref 0–1)
BLD PROD TYP BPU: NORMAL
BLOOD UNIT EXPIRATION DATE: NORMAL
BLOOD UNIT TYPE CODE: 5100
BUN SERPL-MCNC: 6 MG/DL (ref 7–18)
BUN/CREAT SERPL: 11 (ref 6–20)
CALCIUM SERPL-MCNC: 8.3 MG/DL (ref 8.5–10.1)
CHLORIDE SERPL-SCNC: 102 MMOL/L (ref 98–107)
CO2 SERPL-SCNC: 38 MMOL/L (ref 21–32)
CREAT SERPL-MCNC: 0.55 MG/DL (ref 0.55–1.02)
CROSSMATCH INTERPRETATION: NORMAL
DIFFERENTIAL METHOD BLD: ABNORMAL
DISPENSE STATUS: NORMAL
EGFR (NO RACE VARIABLE) (RUSH/TITUS): 104 ML/MIN/1.73M2
EOSINOPHIL # BLD AUTO: 0.25 K/UL (ref 0–0.5)
EOSINOPHIL NFR BLD AUTO: 4.6 % (ref 1–4)
ERYTHROCYTE [DISTWIDTH] IN BLOOD BY AUTOMATED COUNT: 16 % (ref 11.5–14.5)
GLUCOSE SERPL-MCNC: 106 MG/DL (ref 74–106)
HCT VFR BLD AUTO: 27.4 % (ref 38–47)
HGB BLD-MCNC: 8.2 G/DL (ref 12–16)
IMM GRANULOCYTES # BLD AUTO: 0.03 K/UL (ref 0–0.04)
IMM GRANULOCYTES NFR BLD: 0.6 % (ref 0–0.4)
LYMPHOCYTES # BLD AUTO: 0.94 K/UL (ref 1–4.8)
LYMPHOCYTES NFR BLD AUTO: 17.4 % (ref 27–41)
MCH RBC QN AUTO: 27.2 PG (ref 27–31)
MCHC RBC AUTO-ENTMCNC: 29.9 G/DL (ref 32–36)
MCV RBC AUTO: 90.7 FL (ref 80–96)
MONOCYTES # BLD AUTO: 0.4 K/UL (ref 0–0.8)
MONOCYTES NFR BLD AUTO: 7.4 % (ref 2–6)
MPC BLD CALC-MCNC: 10.6 FL (ref 9.4–12.4)
NEUTROPHILS # BLD AUTO: 3.76 K/UL (ref 1.8–7.7)
NEUTROPHILS NFR BLD AUTO: 69.4 % (ref 53–65)
NRBC # BLD AUTO: 0 X10E3/UL
NRBC, AUTO (.00): 0 %
PLATELET # BLD AUTO: 148 K/UL (ref 150–400)
POTASSIUM SERPL-SCNC: 3.9 MMOL/L (ref 3.5–5.1)
RBC # BLD AUTO: 3.02 M/UL (ref 4.2–5.4)
SODIUM SERPL-SCNC: 140 MMOL/L (ref 136–145)
UNIT NUMBER: NORMAL
WBC # BLD AUTO: 5.41 K/UL (ref 4.5–11)

## 2024-03-22 PROCEDURE — 63600175 PHARM REV CODE 636 W HCPCS: Performed by: INTERNAL MEDICINE

## 2024-03-22 PROCEDURE — 27000221 HC OXYGEN, UP TO 24 HOURS

## 2024-03-22 PROCEDURE — 25000003 PHARM REV CODE 250: Performed by: INTERNAL MEDICINE

## 2024-03-22 PROCEDURE — 85025 COMPLETE CBC W/AUTO DIFF WBC: CPT | Performed by: NURSE PRACTITIONER

## 2024-03-22 PROCEDURE — 99233 SBSQ HOSP IP/OBS HIGH 50: CPT | Mod: ,,, | Performed by: INTERNAL MEDICINE

## 2024-03-22 PROCEDURE — 94761 N-INVAS EAR/PLS OXIMETRY MLT: CPT

## 2024-03-22 PROCEDURE — 25000003 PHARM REV CODE 250: Performed by: NURSE PRACTITIONER

## 2024-03-22 PROCEDURE — 11000001 HC ACUTE MED/SURG PRIVATE ROOM

## 2024-03-22 PROCEDURE — 99900035 HC TECH TIME PER 15 MIN (STAT)

## 2024-03-22 PROCEDURE — 25000242 PHARM REV CODE 250 ALT 637 W/ HCPCS: Performed by: NURSE PRACTITIONER

## 2024-03-22 PROCEDURE — 63600175 PHARM REV CODE 636 W HCPCS: Mod: JZ,JG | Performed by: SURGERY

## 2024-03-22 PROCEDURE — 80048 BASIC METABOLIC PNL TOTAL CA: CPT | Performed by: NURSE PRACTITIONER

## 2024-03-22 PROCEDURE — 94640 AIRWAY INHALATION TREATMENT: CPT

## 2024-03-22 RX ORDER — GABAPENTIN 300 MG/1
300 CAPSULE ORAL 3 TIMES DAILY
Status: DISCONTINUED | OUTPATIENT
Start: 2024-03-22 | End: 2024-04-02 | Stop reason: HOSPADM

## 2024-03-22 RX ADMIN — OXYCODONE HYDROCHLORIDE 10 MG: 10 TABLET, FILM COATED, EXTENDED RELEASE ORAL at 08:03

## 2024-03-22 RX ADMIN — MUPIROCIN: 20 OINTMENT TOPICAL at 08:03

## 2024-03-22 RX ADMIN — POLYETHYLENE GLYCOL 3350 17 G: 17 POWDER, FOR SOLUTION ORAL at 08:03

## 2024-03-22 RX ADMIN — IPRATROPIUM BROMIDE AND ALBUTEROL SULFATE 3 ML: 2.5; .5 SOLUTION RESPIRATORY (INHALATION) at 07:03

## 2024-03-22 RX ADMIN — GABAPENTIN 300 MG: 300 CAPSULE ORAL at 08:03

## 2024-03-22 RX ADMIN — GABAPENTIN 300 MG: 300 CAPSULE ORAL at 04:03

## 2024-03-22 RX ADMIN — TRAZODONE HYDROCHLORIDE 50 MG: 50 TABLET ORAL at 08:03

## 2024-03-22 RX ADMIN — IPRATROPIUM BROMIDE AND ALBUTEROL SULFATE 3 ML: 2.5; .5 SOLUTION RESPIRATORY (INHALATION) at 12:03

## 2024-03-22 RX ADMIN — ESCITALOPRAM OXALATE 10 MG: 10 TABLET, FILM COATED ORAL at 08:03

## 2024-03-22 RX ADMIN — CLONAZEPAM 1 MG: 0.5 TABLET ORAL at 08:03

## 2024-03-22 RX ADMIN — ENOXAPARIN SODIUM 40 MG: 40 INJECTION SUBCUTANEOUS at 04:03

## 2024-03-22 RX ADMIN — SENNOSIDES AND DOCUSATE SODIUM 1 TABLET: 8.6; 5 TABLET ORAL at 08:03

## 2024-03-22 RX ADMIN — MORPHINE SULFATE 3 MG: 4 INJECTION, SOLUTION INTRAMUSCULAR; INTRAVENOUS at 11:03

## 2024-03-22 RX ADMIN — OXYCODONE HYDROCHLORIDE 5 MG: 5 TABLET ORAL at 04:03

## 2024-03-22 RX ADMIN — ASPIRIN 81 MG 81 MG: 81 TABLET ORAL at 08:03

## 2024-03-22 NOTE — PROGRESS NOTES
Daily Orthopaedic Progress Note    Darya Low is a 63 y.o. female admitted on 3/18/2024  Hospital Day: 4  Post Op Day: 2 Days Post-Op    Antibiotics (From admission, onward)      Start     Stop Route Frequency Ordered    03/18/24 2100  mupirocin 2 % ointment         03/23/24 2059 Nasl 2 times daily 03/18/24 1722             The patient was seen and examined this morning at the bedside. Patient reports no acute issues overnight and adequate control of pain on current regimen.  Patient worked with physical therapy over the last 24 hours.      PHYSICAL EXAM:  Awake/alert/oriented x3, No acute distress, Afebrile, Vital signs stable  Normocephalic, Atraumatic  Good inspiratory effort with unlaboured breathing  Dressings clean/dry/intact, Operative limb neurovascularly intact with 5/5 strength distally and sensation intact to light touch distally; some decreased sensation over the area of the regional block and palpable pulses  Vitals:    03/22/24 0850 03/22/24 1100 03/22/24 1118 03/22/24 1204   BP:       Pulse:    85   Resp: 12  11 10   Temp:  99.7 °F (37.6 °C)     TempSrc:       SpO2:    96%   Weight:       Height:         I/O last 3 completed shifts:  In: 1282.5 [I.V.:1182.5; IV Piggyback:100]  Out: 2550 [Urine:2550]    Significant Labs:  Recent Lab Results         03/22/24  0459        Anion Gap 4       Baso # 0.03       Basophil % 0.6       BUN 6       BUN/CREAT RATIO 11       Calcium 8.3       Chloride 102       CO2 38       Creatinine 0.55       Differential Method Auto       eGFR 104       Eos # 0.25       Eos % 4.6       Glucose 106       Hematocrit 27.4       Hemoglobin 8.2       Immature Grans (Abs) 0.03       Immature Granulocytes 0.6       Lymph # 0.94       Lymph % 17.4       MCH 27.2       MCHC 29.9       MCV 90.7       Mono # 0.40       Mono % 7.4       MPV 10.6       Neutrophils, Abs 3.76       Neutrophils Relative 69.4       nRBC 0.0       NUCLEATED RBC ABSOLUTE 0.00       Platelet Count 148        Potassium 3.9       RBC 3.02       RDW 16.0       Sodium 140       WBC 5.41               Significant Diagnostics:  X-Ray Hip 2 or 3 views Right (with Pelvis when performed)  Narrative: EXAMINATION:  XR HIP WITH PELVIS WHEN PERFORMED, 2 OR 3  VIEWS RIGHT    CLINICAL HISTORY:  po (post op xrays never taken);.  Fracture of unspecified part of neck of right femur, initial encounter for closed fracture    COMPARISON:  March 18, 2024    TECHNIQUE:  AP and frog-lateral views right hip to include AP pelvis.  Three total views    FINDINGS:  The patient is postop ORIF right hip fracture.  Three orthopedic screws traverse the subcapital fracture right hip with excellent alignment and positioning.    There is no radiographic evidence to suggest postoperative complication.  There is old healed fracture deformity of the bilateral inferior pubic rami as well as the right acetabulum.  There has been previous left hip replacement.  Osteopenia  Impression: Postop right hip    Electronically signed by: Michele Alba  Date:    03/22/2024  Time:    08:38  X-ray Shoulder 2 or More Views Right  Narrative: EXAMINATION:  XR SHOULDER COMPLETE 2 OR MORE VIEWS RIGHT    CLINICAL HISTORY:  recheck; Unspecified fracture of upper end of right humerus, initial encounter for closed fracture    COMPARISON:  18 March 2024    TECHNIQUE:  XR SHOULDER 3 VIEWS RIGHT    FINDINGS:  Previously seen fracture present with suggestion of increased medial and anterior displacement compared to prior study.  Otherwise the alignment of the joints appears normal.  No degenerative change is present.  No soft tissue abnormality is seen.  Impression: Suggestion of increased displacement of proximal humerus fracture.  No other definite changes.    Electronically signed by: Ernst Hermosillo  Date:    03/22/2024  Time:    08:38       A/P: 63 y.o. female 2 Days Post-Op s/p right hip perc pinning  -Continue with current pain control regimen  -Continue with current  physical therapy plan (TTWB RLE)  -Continue with DVT prophylaxis  A re-x-ray to proximal humerus today this is displaced.  She has been taking her sling on and off.  This may require open reduction internal fixation which could be performed on Monday.  She is scheduled undergo spine surgery in the coming days as well.  Would recommend nonweightbearing strict sling immobilization and we can likely address the fracture on Monday  Que Buckner MD  Orthopaedic Staff Surgeon

## 2024-03-22 NOTE — SUBJECTIVE & OBJECTIVE
Interval History:  No acute events overnight.  Without complaints upon exam    Medications:  Continuous Infusions:  Scheduled Meds:   albuterol-ipratropium  3 mL Nebulization Q6H    aspirin  81 mg Oral Daily    clonazePAM  1 mg Oral BID    enoxparin  40 mg Subcutaneous Q24H (prophylaxis, 1700)    EScitalopram oxalate  10 mg Oral Daily    gabapentin  300 mg Oral TID    mupirocin   Nasal BID    oxyCODONE  10 mg Oral Q12H    polyethylene glycol  17 g Oral Daily    senna-docusate 8.6-50 mg  1 tablet Oral Daily     PRN Meds:0.9%  NaCl infusion (for blood administration), acetaminophen, diphenhydrAMINE, HYDROmorphone, melatonin, morphine, morphine, oxyCODONE, tiZANidine, traMADoL, traZODone     Review of patient's allergies indicates:   Allergen Reactions    Ondansetron hcl Swelling     Edema of tongue per patient    Celexa [citalopram]     Darvon [propoxyphene]     Flexeril [cyclobenzaprine]     Hydroxyzine     Percocet [oxycodone-acetaminophen]     Robaxin [methocarbamol]     Thorazine [chlorpromazine]     Toradol [ketorolac] Itching     Objective:     Vital Signs (Most Recent):  Temp: 98.2 °F (36.8 °C) (03/22/24 1545)  Pulse: 94 (03/22/24 1600)  Resp: 15 (03/22/24 1615)  BP: (!) 99/57 (03/22/24 1600)  SpO2: (!) 82 % (03/22/24 1600) Vital Signs (24h Range):  Temp:  [98.2 °F (36.8 °C)-100 °F (37.8 °C)] 98.2 °F (36.8 °C)  Pulse:  [] 94  Resp:  [8-19] 15  SpO2:  [75 %-97 %] 82 %  BP: ()/(32-80) 99/57     Weight: 53.7 kg (118 lb 6.4 oz)  Body mass index is 20.97 kg/m².    Intake/Output - Last 3 Shifts         03/20 0700  03/21 0659 03/21 0700 03/22 0659 03/22 0700 03/23 0659    I.V. (mL/kg) 2095 (39.4) 357.5 (6.7)     IV Piggyback 100      Total Intake(mL/kg) 2195 (41.3) 357.5 (6.7)     Urine (mL/kg/hr) 3675 (2.9) 900 (0.7)     Blood 25      Total Output 3700 900     Net -1505 -542.5                     Physical Exam  Vitals reviewed.   Constitutional:       Appearance: Normal appearance.      Interventions:  She is not intubated.  HENT:      Head: Normocephalic.      Mouth/Throat:      Mouth: Mucous membranes are dry.   Eyes:      Extraocular Movements: Extraocular movements intact.   Cardiovascular:      Rate and Rhythm: Normal rate.      Pulses: Normal pulses.      Heart sounds: No murmur heard.  Pulmonary:      Effort: Pulmonary effort is normal. She is not intubated.      Breath sounds: Normal breath sounds.   Abdominal:      General: Abdomen is flat.      Palpations: Abdomen is soft.   Musculoskeletal:         General: Normal range of motion.      Cervical back: Normal range of motion.      Right lower leg: No edema.      Left lower leg: No edema.   Skin:     General: Skin is warm and dry.      Capillary Refill: Capillary refill takes less than 2 seconds.   Neurological:      General: No focal deficit present.      Mental Status: She is alert and oriented to person, place, and time.   Psychiatric:         Mood and Affect: Mood normal.         Behavior: Behavior normal.          Significant Labs:  I have reviewed all pertinent lab results within the past 24 hours.    Significant Diagnostics:  I have reviewed all pertinent imaging results/findings within the past 24 hours.

## 2024-03-22 NOTE — PROGRESS NOTES
[facility]  Adult Nutrition  First Assessment Note         Reason for Assessment  Reason For Assessment: RD follow-up   Nutrition Risk Screen: no indicators present    Assessment and Plan  3/22/2024: RD follow up. Patient advanced to Regular diet and tolerating well. Recommend continue current diet as tolerated. Encourage good PO Intakes. Last BM 3/18 per flowsheet. RD following.     3/19/2024: Patient is a 64yo female admitted 3/18 for a fall with fractures. She was identified at risk by screening criteria with MST2. She was unsure of weight loss but denied poor PO intakes.    Patient is 43.3kg with a BMI of 16.91 and is severely underweight per geriatric standards, however chart review reveals no significant weight changes over the past year. She was identified at malnourished on a previous admission, however weight has been stable since that time. Patient is a NH resident and is on hospice. She has a PMH of COPD, which is likely contributing to low body weight.     Patient is currently NPO pending surgery to repair fractures. Recommend resuming Regular diet as soon as medically appropriate and tolerated with addition of Boost Plus TID. If unable to advance diet x 2-3 days, recommend consider alternate route of nutrition.     Last BM 3/18 per flowsheet.    Medications/labs reviewed. RD following.       Learning Needs/Social Determinants of Health  Learning Assessment       03/18/2024 2140 [facility] (3/18/2024 - Present)   Created by Clementina Gilmore, RN - RN (Nurse) Status: Complete                 PRIMARY LEARNER     Primary Learner Name:  Darya Low  - 03/18/2024 2140    Relationship:  Patient MW - 03/18/2024 2140    Does the primary learner have any barriers to learning?:  No Barriers  - 03/18/2024 2140    What is the preferred language of the primary learner?:  English  - 03/18/2024 2140    How does the primary learner prefer to learn new concepts?:   Listening MW - 03/18/2024 2140    How often do you need to have someone help you read instructions, pamphlets, or written material from your doctor or pharmacy?:  Never MW - 03/18/2024 2140        CO-LEARNER #1     No question answered        CO-LEARNER #2     No question answered        SPECIAL TOPICS     No question answered        ANSWERED BY:     No question answered        Edit History       Clementina Gilmore, RN - RN (Nurse)   03/18/2024 2140                           Social Determinants of Health     Tobacco Use: High Risk (3/21/2024)    Patient History     Smoking Tobacco Use: Every Day     Smokeless Tobacco Use: Never     Passive Exposure: Not on file   Alcohol Use: Not At Risk (11/27/2023)    AUDIT-C     Frequency of Alcohol Consumption: Never     Average Number of Drinks: Patient does not drink     Frequency of Binge Drinking: Never   Financial Resource Strain: Low Risk  (11/27/2023)    Overall Financial Resource Strain (CARDIA)     Difficulty of Paying Living Expenses: Not hard at all   Food Insecurity: No Food Insecurity (11/27/2023)    Hunger Vital Sign     Worried About Running Out of Food in the Last Year: Never true     Ran Out of Food in the Last Year: Never true   Transportation Needs: No Transportation Needs (11/27/2023)    PRAPARE - Transportation     Lack of Transportation (Medical): No     Lack of Transportation (Non-Medical): No   Physical Activity: Inactive (11/27/2023)    Exercise Vital Sign     Days of Exercise per Week: 0 days     Minutes of Exercise per Session: 0 min   Stress: No Stress Concern Present (11/27/2023)    [employer] [place] [employer] - Occupational Stress Questionnaire     Feeling of Stress : Only a little   Social Connections: Moderately Isolated (11/27/2023)    Social Connection and Isolation Panel [NHANES]     Frequency of Communication with Friends and Family: More than three times a week     Frequency of Social Gatherings with Friends and Family: More than  three times a week     Attends [employer] Services: 1 to 4 times per year     Active Member of Clubs or Organizations: No     Attends Club or Organization Meetings: Never     Marital Status:    Housing Stability: Unknown (11/27/2023)    Housing Stability Vital Sign     Unable to Pay for Housing in the Last Year: No     Number of Places Lived in the Last Year: Not on file     Unstable Housing in the Last Year: No   Depression: Not on file            Malnutrition  Is Patient Malnourished: No    Nutrition Diagnosis  Underweight related to Catabolic illness as evidenced by PMH COPD, low BMI  Comments: resume Regular diet as soon as medically appropriate.     Recent Labs   Lab 03/22/24  0459            Nutrition Prescription / Recommendations  Recommendation/Intervention: Recommend continue current diet as tolerated. Encourage good PO intakes.  Goals: Weight maintenance during admission, intake 50-75% of meals during admission  Nutrition Goal Status: progressing towards goal  Current Diet Order: Regular  Chewing or Swallowing Difficulty?: No Chewing or swallowing difficulty  Recommended Diet: Regular  Recommended Oral Supplement: Boost Plus [360kcals, 14g Protein, 45g Carbs] 3 times a day  Is Nutrition Support Recommended: [facility] Support: No  Is Nutrition Education Recommended: No    Monitor and Evaluation  % current Intake: NPO  % intake to meet estimated needs: P.O. + Supplements  Food and Nutrient Intake: food and beverage intake  Food and Nutrient Adminstration: diet order  Anthropometric Measurements: weight change, weight  Biochemical Data, Medical Tests and Procedures: electrolyte and renal panel, gastrointestinal profile, glucose/endocrine profile, inflammatory profile, lipid profile       Current Medical Diagnosis and Past Medical History  Diagnosis: trauma  Past Medical History:   Diagnosis Date    COPD (chronic obstructive pulmonary disease)     DONALD (generalized anxiety disorder)  "03/24/2021    GERD with esophagitis     Iron deficiency anemia due to chronic blood loss     Supplemental oxygen dependent 11/28/2023       Nutrition/Diet History  Spiritual, Cultural Beliefs, Church Practices, Values that Affect Care: no  Food Allergies: NKFA    Lab/Procedures/Meds  Recent Labs   Lab 03/22/24  0459      K 3.9   BUN 6*   CREATININE 0.55   CALCIUM 8.3*      Note: Cr low. Ca++ low   Last A1c:   Lab Results   Component Value Date    HGBA1C 5.2 04/23/2021     Lab Results   Component Value Date    RBC 3.02 (L) 03/22/2024    HGB 8.2 (L) 03/22/2024    HCT 27.4 (L) 03/22/2024    MCV 90.7 03/22/2024    MCH 27.2 03/22/2024    MCHC 29.9 (L) 03/22/2024    TIBC 357 03/28/2021   Note: H&H low    Pertinent Labs Reviewed: reviewed  Pertinent Medications Reviewed: reviewed  Scheduled Meds:   albuterol-ipratropium  3 mL Nebulization Q6H    aspirin  81 mg Oral Daily    clonazePAM  1 mg Oral BID    enoxparin  40 mg Subcutaneous Q24H (prophylaxis, 1700)    EScitalopram oxalate  10 mg Oral Daily    gabapentin  300 mg Oral TID    mupirocin   Nasal BID    oxyCODONE  10 mg Oral Q12H    polyethylene glycol  17 g Oral Daily    senna-docusate 8.6-50 mg  1 tablet Oral Daily     Continuous Infusions:      PRN Meds:.0.9%  NaCl infusion (for blood administration), acetaminophen, diphenhydrAMINE, HYDROmorphone, melatonin, morphine, morphine, oxyCODONE, tiZANidine, traMADoL, traZODone    Anthropometrics  Temp: 99.5 °F (37.5 °C)  Height Method: Stated  Height: 5' 3" (160 cm)  Height (inches): 63 in  Weight Method: Bed Scale  Weight: 53.7 kg (118 lb 6.4 oz)  Weight (lb): 118.4 lb  Ideal Body Weight (IBW), Female: 115 lb  % Ideal Body Weight, Female (lb): 83.01 %  BMI (Calculated): 21       Estimated/Assessed Needs  RMR (Una-St. Jeor Equation): 1066.19     Temp: 99.5 °F (37.5 °C)Oral  Weight Used For Calorie Calculations: 43.3 kg (95 lb 7.4 oz)     Energy Calorie Requirements (kcal): 1299-1515kcal " (30-35kcal/kg)  Weight Used For Protein Calculations: 52.2 kg (115 lb)  Protein Requirements: 52-63g (1.0-1.2g/kg ideal body weight)       RDA Method (mL): 1299       Nutrition by Nursing  Diet/Nutrition Received: regular           Last Bowel Movement: 03/18/24                Nutrition Follow-Up  RD Follow-up?: Yes      Nutrition Discharge Planning: Patient admitted from NH. Unsure of discharge plans at this time. Will monitor and assess closer to discharge.          Ashley Espinosa, MS, RD, LD  Available via Secure Chat

## 2024-03-22 NOTE — SUBJECTIVE & OBJECTIVE
Interval History/Significant Events: patient without complaints except for pain    Review of Systems  Objective:     Vital Signs (Most Recent):  Temp: 99.6 °F (37.6 °C) (03/22/24 0315)  Pulse: 82 (03/22/24 0415)  Resp: 12 (03/22/24 0415)  BP: (!) 98/47 (03/22/24 0415)  SpO2: 96 % (03/22/24 0415) Vital Signs (24h Range):  Temp:  [98.4 °F (36.9 °C)-100 °F (37.8 °C)] 99.6 °F (37.6 °C)  Pulse:  [] 82  Resp:  [8-19] 12  SpO2:  [79 %-100 %] 96 %  BP: ()/(32-80) 98/47   Weight: 53.7 kg (118 lb 6.4 oz)  Body mass index is 20.97 kg/m².      Intake/Output Summary (Last 24 hours) at 3/22/2024 0541  Last data filed at 3/21/2024 1046  Gross per 24 hour   Intake 881.25 ml   Output 150 ml   Net 731.25 ml          Physical Exam  Vitals reviewed.   Constitutional:       Appearance: Normal appearance.      Interventions: She is not intubated.  HENT:      Head: Normocephalic and atraumatic.      Nose: Nose normal.      Mouth/Throat:      Mouth: Mucous membranes are dry.      Pharynx: Oropharynx is clear.   Eyes:      Extraocular Movements: Extraocular movements intact.      Conjunctiva/sclera: Conjunctivae normal.      Pupils: Pupils are equal, round, and reactive to light.   Cardiovascular:      Rate and Rhythm: Normal rate.      Heart sounds: Normal heart sounds. No murmur heard.  Pulmonary:      Effort: Pulmonary effort is normal. She is not intubated.      Breath sounds: Normal breath sounds.   Abdominal:      General: Abdomen is flat. Bowel sounds are normal.      Palpations: Abdomen is soft.   Musculoskeletal:         General: Normal range of motion.      Cervical back: Normal range of motion and neck supple.      Right lower leg: No edema.      Left lower leg: No edema.   Skin:     General: Skin is warm and dry.      Capillary Refill: Capillary refill takes less than 2 seconds.   Neurological:      General: No focal deficit present.      Mental Status: She is alert and oriented to person, place, and time.    Psychiatric:         Mood and Affect: Mood normal.         Behavior: Behavior normal.            Vents:  Oxygen Concentration (%): 24 (03/22/24 0011)  Lines/Drains/Airways       Peripheral Intravenous Line  Duration                  Peripheral IV - Single Lumen 03/18/24 1115 20 G Anterior;Left Forearm 3 days                  Significant Labs:    CBC/Anemia Profile:  Recent Labs   Lab 03/20/24  0919 03/21/24  0434 03/22/24  0459   WBC 5.61 5.08 5.41   HGB 8.5* 8.6* 8.2*   HCT 27.7* 28.2* 27.4*    165 148*   MCV 90.2 90.7 90.7   RDW 16.3* 16.3* 16.0*        Chemistries:  Recent Labs   Lab 03/21/24  0431      K 3.4*      CO2 36*   BUN 6*   CREATININE 0.65   CALCIUM 7.9*       Recent Lab Results         03/22/24  0459        Baso # 0.03       Basophil % 0.6       Differential Method Auto       Eos # 0.25       Eos % 4.6       Hematocrit 27.4       Hemoglobin 8.2       Immature Grans (Abs) 0.03       Immature Granulocytes 0.6       Lymph # 0.94       Lymph % 17.4       MCH 27.2       MCHC 29.9       MCV 90.7       Mono # 0.40       Mono % 7.4       MPV 10.6       Neutrophils, Abs 3.76       Neutrophils Relative 69.4       nRBC 0.0       NUCLEATED RBC ABSOLUTE 0.00       Platelet Count 148       RBC 3.02       RDW 16.0       WBC 5.41               Significant Imaging:  I have reviewed all pertinent imaging results/findings within the past 24 hours.

## 2024-03-22 NOTE — PROGRESS NOTES
[facility]  Critical Care Medicine  Progress Note    Patient Name: Darya Low  MRN: [id number]  Admission Date: 3/18/2024  Hospital Length of Stay: 4 days  Code Status: DNR  Attending Provider: Cordell Hills MD  Primary Care Provider: Maeve, Primary Doctor   Principal Problem: Fall    Subjective:     HPI:  Ms Low is a 62 yo female who presented to the ED via EMS after falling overnight at the nursing home. She is on hospice care at the nursing home and refused to come to the hospital when she first fell. However, this morning she was more confused and xrays done at the NH revealed a fracture in her R humerus and femur. [facility] with [facility].   She has a PMH of COPD with oxygen dependence, chronic pain, DONALD, GERD, and previous left humerus fracture and left femur fracture with repair.     Hospital/ICU Course:  No notes on file    Interval History/Significant Events: patient without complaints except for pain    Review of Systems  Objective:     Vital Signs (Most Recent):  Temp: 99.6 °F (37.6 °C) (03/22/24 0315)  Pulse: 82 (03/22/24 0415)  Resp: 12 (03/22/24 0415)  BP: (!) 98/47 (03/22/24 0415)  SpO2: 96 % (03/22/24 0415) Vital Signs (24h Range):  Temp:  [98.4 °F (36.9 °C)-100 °F (37.8 °C)] 99.6 °F (37.6 °C)  Pulse:  [] 82  Resp:  [8-19] 12  SpO2:  [79 %-100 %] 96 %  BP: ()/(32-80) 98/47   Weight: 53.7 kg (118 lb 6.4 oz)  Body mass index is 20.97 kg/m².      Intake/Output Summary (Last 24 hours) at 3/22/2024 0541  Last data filed at 3/21/2024 1046  Gross per 24 hour   Intake 881.25 ml   Output 150 ml   Net 731.25 ml          Physical Exam  Vitals reviewed.   Constitutional:       Appearance: Normal appearance.      Interventions: She is not intubated.  HENT:      Head: Normocephalic and atraumatic.      Nose: Nose normal.      Mouth/Throat:      Mouth: Mucous membranes are dry.      Pharynx: Oropharynx is clear.   Eyes:      Extraocular Movements: Extraocular movements  intact.      Conjunctiva/sclera: Conjunctivae normal.      Pupils: Pupils are equal, round, and reactive to light.   Cardiovascular:      Rate and Rhythm: Normal rate.      Heart sounds: Normal heart sounds. No murmur heard.  Pulmonary:      Effort: Pulmonary effort is normal. She is not intubated.      Breath sounds: Normal breath sounds.   Abdominal:      General: Abdomen is flat. Bowel sounds are normal.      Palpations: Abdomen is soft.   Musculoskeletal:         General: Normal range of motion.      Cervical back: Normal range of motion and neck supple.      Right lower leg: No edema.      Left lower leg: No edema.   Skin:     General: Skin is warm and dry.      Capillary Refill: Capillary refill takes less than 2 seconds.   Neurological:      General: No focal deficit present.      Mental Status: She is alert and oriented to person, place, and time.   Psychiatric:         Mood and Affect: Mood normal.         Behavior: Behavior normal.            Vents:  Oxygen Concentration (%): 24 (03/22/24 0011)  Lines/Drains/Airways       Peripheral Intravenous Line  Duration                  Peripheral IV - Single Lumen 03/18/24 1115 20 G Anterior;Left Forearm 3 days                  Significant Labs:    CBC/Anemia Profile:  Recent Labs   Lab 03/20/24  0919 03/21/24  0434 03/22/24  0459   WBC 5.61 5.08 5.41   HGB 8.5* 8.6* 8.2*   HCT 27.7* 28.2* 27.4*    165 148*   MCV 90.2 90.7 90.7   RDW 16.3* 16.3* 16.0*        Chemistries:  Recent Labs   Lab 03/21/24  0431      K 3.4*      CO2 36*   BUN 6*   CREATININE 0.65   CALCIUM 7.9*       Recent Lab Results         03/22/24  0459        Baso # 0.03       Basophil % 0.6       Differential Method Auto       Eos # 0.25       Eos % 4.6       Hematocrit 27.4       Hemoglobin 8.2       Immature Grans (Abs) 0.03       Immature Granulocytes 0.6       Lymph # 0.94       Lymph % 17.4       MCH 27.2       MCHC 29.9       MCV 90.7       Mono # 0.40       Mono % 7.4        MPV 10.6       Neutrophils, Abs 3.76       Neutrophils Relative 69.4       nRBC 0.0       NUCLEATED RBC ABSOLUTE 0.00       Platelet Count 148       RBC 3.02       RDW 16.0       WBC 5.41               Significant Imaging:  I have reviewed all pertinent imaging results/findings within the past 24 hours.    ABG  Recent Labs   Lab 03/18/24  1650   PH 7.44   PO2 77*   PCO2 57*   HCO3 38.7*     Assessment/Plan:     Neuro  Closed compression fracture of body of L1 vertebra  - noted on CT scan  - L2 compression fracture with aprox 70% height loss   - orthopedic spine surgeon was consulted   - recommends MRI of lumbar spine   Surgery next week    Chronic pain  - polypharmacy noted on meds list from NH however UDS negative     Increase Neurontin 3 times a day    Pulmonary  Supplemental oxygen dependent  - continue on NC     Chronic obstructive pulmonary disease  - chronic  Stable    Cardiac/Vascular  Hypotension  Resolved    Renal/  Acute cystitis  Proteus mirabilis has been treated    Hypokalemia  Lab pending    Orthopedic  * Fall  - recurrent falls   - multiple old fractures noted   - R humerus and femoral head fracture   Status post fracture repair    Fracture of head of right femur  - R femoral head fracture noted   - orthopedist consulted   Status post surgery    Humerus fracture  - hx of old L humerus fracture- non operable   -- new R humerus fracture noted   Splint    Other  DONALD (generalized anxiety disorder)  Restarted home medicines at lower dose           Moses Carlin MD  Critical Care Medicine  [facility]

## 2024-03-22 NOTE — ASSESSMENT & PLAN NOTE
- polypharmacy noted on meds list from NH however UDS negative     Increase Neurontin 3 times a day

## 2024-03-22 NOTE — PLAN OF CARE
Per progress notes. Pt is status post right femur repair and is scheduled undergo spine surgery possible next week. Pt is not medically ready for d/c at this time. SS following.

## 2024-03-22 NOTE — PHYSICIAN QUERY
PT Name: Darya Low  MR #: [id number]     DOCUMENTATION CLARIFICATION     CDS:  Marsha SEALS, RN   Contact information:  [email or web address]     This form is a permanent document in the medical record.     Query Date: March 22, 2024    By submitting this query, we are merely seeking further clarification of documentation.  Please utilize your independent clinical judgment when addressing the question(s) below.  The Medical Record contains the following:  Indicators Supporting Clinical Findings Location in Medical Record   x Acute Illness (e.g. AMI, Sepsis, etc.) Assessment/Plan:  Closed compression fracture of body of L1 vertebra  Hypotension  Acute cystitis  Hypokalemia  Fracture of head of right femur  Humerus fracture   Dr Carlin PN 3/22/24   x Vital Signs  Initial Vitals   BP Pulse Resp Temp SpO2   03/18/24 1146 03/18/24 1145 03/18/24 1145 03/18/24 1159 03/18/24 1145   (!) 76/44 (!) 58 20 97.6 °F (36.4 °C) (!) 83 %     Vital Signs (24h Range):3/18/24  Temp:  [97.6 °F (36.4 °C)] 97.6 °F (36.4 °C)  Pulse:  [48-58] 48  Resp:  [10-20] 12  SpO2:  [83 %-100 %] 100 %  BP: (69-84)/(40-45) 84/45      Vital Signs (24h Range): 3/19/24  Temp:  [97.5 °F (36.4 °C)-97.7 °F (36.5 °C)] 97.7 °F (36.5 °C)  Pulse:  [43-58] 56  Resp:  [10-21] 11  SpO2:  [83 %-100 %] 97 %  BP: ()/(32-65) 122/65    Vital Signs (24h Range): 3/20/24  Temp:  [98.6 °F (37 °C)-99.4 °F (37.4 °C)] 98.9 °F (37.2 °C)  Pulse:  [53-82] 53  Resp:  [8-19] 12  SpO2:  [83 %-100 %] 100 %  BP: ()/(36-75) 102/58      Vital Signs (24h Range): 3/21/24  Temp:  [98.4 °F (36.9 °C)-100.4 °F (38 °C)] 99.7 °F (37.6 °C)  Pulse:  [54-93] 77  Resp:  [8-22] 13  SpO2:  [78 %-100 %] 96 %  BP: ()/(28-88) 95/55    Vital Signs (24h Range): 3/22/24  Temp:  [98.4 °F (36.9 °C)-100 °F (37.8 °C)] 99.6 °F (37.6 °C)  Pulse:  [] 82  Resp:  [8-19] 12  SpO2:  [79 %-100 %] 96 %  BP: ()/(32-80) 98/47       ED PN 3/18/24            VS 24 hr Range PN  3/18-  3/22/24    Acidosis documented     x ABGs/Labs POC PH 7.44   POC PCO2 57    POC PO2 77    POC Sodium 135    POC Potassium 2.9    POC Ionized Calcium 1.07    POCT Glucose 150    POC Lactate 1.5    POC Hematocrit 30    POC HCO3 38.7    POC CO2 40.4   POC Base Excess 12.8    POC SATURATED O2 96    Labs 3/18/24   x Hypotension or Low Blood Pressure documented Also has been vomiting for the past 5 days. Unsure if hypotension is due to underlying medical issue.   Final diagnoses:   Hypotension, unspecified hypotension type     Hypotension  - s/p 2L NS   - SBP 80-90s during exam  - UDS negative   - UA with evidence of UTI  - blood and urine cultures pending   - will start on abx   - per EMS report- vomiting for the last 5 days-- so likely secondary to IVVD   - will continue IVF; may require vasopressor support if MAP consistently less than 60     Patient currently requiring vasopressors for blood pressure support.      Hypotension  - s/p 2L NS   Still believe this was due to volume she is much better now off Levophed     Hypotension  Resolved   ED PN 3/18/24        WALE Calhoun PN 3/18/24                      NORMA OHARA PN 3/19/24    Dr Carlin PN 3/20/24      Dr Carlin PN 3/22/24   x Altered Mental Status or Confusion Was little confused today and came in found to have a fracture of her right shoulder and right femur. Slight confusion GCS of 14 but arousable. Blood pressure was low initially 60s over 30s and on a fluid bolus came up to the 70 systolic. Upgraded to an alpha. She got 1 L of fluid bolus and felt slightly better.     She is on hospice care at the nursing home and refused to come to the hospital when she first fell. However, this morning she was more confused and xrays done at the NH revealed a fracture in her R humerus and femur.   Review of Systems   Psychiatric/Behavioral:  Positive for confusion.   Dr Hills Pn 3/18/24            WALE Calhoun PN 3/18/24    Diaphoresis, Cold Extremities or Cyanosis      Oliguria       Medication/Treatment  -Vasopressors  -Inotropic Drugs  -IV Fluids   -IV Antibiotics  -Cardiac Assist Devices  -Hemodynamic Monitoring  -Blood/Blood Products sodium chloride 0.9% bolus 1,000 mL ED 1 Time @ 999 mL/hr over 1 Hours  Scheduled Start Date/Time: 03/18/24 1215  End Date/Time: 03/18/24 1349 after 1 doses    sodium chloride 0.9% bolus 500 mL  Once @ 500 mL/hr over 1 Hours  Scheduled Start Date/Time: 03/18/24 1430  End Date/Time: 03/18/24 1449 after 1 doses    0.9%  NaCl infusion  IV Continuous @ 100 mL/hr  Scheduled Start Date/Time: 03/18/24 1515  End Date/Time: 03/18/24 2155    NORepinephrine 4 mg in dextrose 5% 250 mL infusion (premix) Continuous @ 0-487.1 mL/hr  Scheduled Start Date/Time: 03/18/24 2145  End Date/Time: 03/20/24 0633 MAR 3/18/24    Other       Provider, please specify diagnosis or diagnoses associated with above clinical findings.    [     x  ] Hypovolemic Shock   [       ] Other Shock (please specify): __________   [       ] Shock, Unspecified   [       ] Other Condition (please specify): _________       Please document in your progress notes daily for the duration of treatment until resolved and include in your discharge summary.     Form No. [id number]

## 2024-03-22 NOTE — ASSESSMENT & PLAN NOTE
Ortho/spine and Orthopedics consulted for compression fractures lumbar area, right femur and right humerus fracture.  Patient currently requiring vasopressors for blood pressure support.  Blood count remained stable 9/31.  We will continue to monitor    3/21:  Status post right femur repair.  Vasopressor off.  BP stable.  Per ortho/spine node plan for T12-L2 percutaneous fusion and L2-L5 kyphoplasty 03/26/2024  3/22:  No acute events overnight.  H&H stable 8/27.  Blood pressure stable pressors remain off.  Plan for spinal procedure early next week.

## 2024-03-23 PROBLEM — E87.6 HYPOKALEMIA: Status: RESOLVED | Noted: 2024-03-18 | Resolved: 2024-03-23

## 2024-03-23 PROBLEM — R57.1 HYPOVOLEMIC SHOCK: Status: ACTIVE | Noted: 2024-03-18

## 2024-03-23 PROBLEM — S42.201D CLOSED FRACTURE OF PROXIMAL END OF RIGHT HUMERUS WITH ROUTINE HEALING: Status: ACTIVE | Noted: 2024-03-18

## 2024-03-23 LAB
ABO + RH BLD: NORMAL
ABO + RH BLD: NORMAL
ANION GAP SERPL CALCULATED.3IONS-SCNC: 4 MMOL/L (ref 7–16)
BASOPHILS # BLD AUTO: 0.01 K/UL (ref 0–0.2)
BASOPHILS NFR BLD AUTO: 0.2 % (ref 0–1)
BLD PROD TYP BPU: NORMAL
BLD PROD TYP BPU: NORMAL
BLOOD UNIT EXPIRATION DATE: NORMAL
BLOOD UNIT EXPIRATION DATE: NORMAL
BLOOD UNIT TYPE CODE: 5100
BLOOD UNIT TYPE CODE: 5100
BUN SERPL-MCNC: 6 MG/DL (ref 7–18)
BUN/CREAT SERPL: 13 (ref 6–20)
CALCIUM SERPL-MCNC: 8 MG/DL (ref 8.5–10.1)
CHLORIDE SERPL-SCNC: 104 MMOL/L (ref 98–107)
CO2 SERPL-SCNC: 37 MMOL/L (ref 21–32)
CREAT SERPL-MCNC: 0.46 MG/DL (ref 0.55–1.02)
CROSSMATCH INTERPRETATION: NORMAL
CROSSMATCH INTERPRETATION: NORMAL
DIFFERENTIAL METHOD BLD: ABNORMAL
DISPENSE STATUS: NORMAL
DISPENSE STATUS: NORMAL
EGFR (NO RACE VARIABLE) (RUSH/TITUS): 108 ML/MIN/1.73M2
EOSINOPHIL # BLD AUTO: 0.23 K/UL (ref 0–0.5)
EOSINOPHIL NFR BLD AUTO: 4.3 % (ref 1–4)
ERYTHROCYTE [DISTWIDTH] IN BLOOD BY AUTOMATED COUNT: 16.2 % (ref 11.5–14.5)
GLUCOSE SERPL-MCNC: 101 MG/DL (ref 74–106)
HCT VFR BLD AUTO: 23.7 % (ref 38–47)
HGB BLD-MCNC: 7 G/DL (ref 12–16)
IMM GRANULOCYTES # BLD AUTO: 0.03 K/UL (ref 0–0.04)
IMM GRANULOCYTES NFR BLD: 0.6 % (ref 0–0.4)
LYMPHOCYTES # BLD AUTO: 0.9 K/UL (ref 1–4.8)
LYMPHOCYTES NFR BLD AUTO: 16.8 % (ref 27–41)
MCH RBC QN AUTO: 27.6 PG (ref 27–31)
MCHC RBC AUTO-ENTMCNC: 29.5 G/DL (ref 32–36)
MCV RBC AUTO: 93.3 FL (ref 80–96)
MONOCYTES # BLD AUTO: 0.53 K/UL (ref 0–0.8)
MONOCYTES NFR BLD AUTO: 9.9 % (ref 2–6)
MPC BLD CALC-MCNC: 11.2 FL (ref 9.4–12.4)
NEUTROPHILS # BLD AUTO: 3.65 K/UL (ref 1.8–7.7)
NEUTROPHILS NFR BLD AUTO: 68.2 % (ref 53–65)
NRBC # BLD AUTO: 0 X10E3/UL
NRBC, AUTO (.00): 0 %
PLATELET # BLD AUTO: 150 K/UL (ref 150–400)
POTASSIUM SERPL-SCNC: 3.7 MMOL/L (ref 3.5–5.1)
RBC # BLD AUTO: 2.54 M/UL (ref 4.2–5.4)
SODIUM SERPL-SCNC: 141 MMOL/L (ref 136–145)
UNIT NUMBER: NORMAL
UNIT NUMBER: NORMAL
WBC # BLD AUTO: 5.35 K/UL (ref 4.5–11)

## 2024-03-23 PROCEDURE — 27000221 HC OXYGEN, UP TO 24 HOURS

## 2024-03-23 PROCEDURE — 25000003 PHARM REV CODE 250: Performed by: ORTHOPAEDIC SURGERY

## 2024-03-23 PROCEDURE — 99900035 HC TECH TIME PER 15 MIN (STAT)

## 2024-03-23 PROCEDURE — 36430 TRANSFUSION BLD/BLD COMPNT: CPT

## 2024-03-23 PROCEDURE — 25000003 PHARM REV CODE 250: Performed by: NURSE PRACTITIONER

## 2024-03-23 PROCEDURE — 86923 COMPATIBILITY TEST ELECTRIC: CPT | Performed by: ORTHOPAEDIC SURGERY

## 2024-03-23 PROCEDURE — 25000242 PHARM REV CODE 250 ALT 637 W/ HCPCS: Performed by: NURSE PRACTITIONER

## 2024-03-23 PROCEDURE — 63600175 PHARM REV CODE 636 W HCPCS: Performed by: INTERNAL MEDICINE

## 2024-03-23 PROCEDURE — 25000003 PHARM REV CODE 250: Performed by: INTERNAL MEDICINE

## 2024-03-23 PROCEDURE — 99232 SBSQ HOSP IP/OBS MODERATE 35: CPT | Mod: ,,, | Performed by: INTERNAL MEDICINE

## 2024-03-23 PROCEDURE — 25000003 PHARM REV CODE 250: Performed by: HOSPITALIST

## 2024-03-23 PROCEDURE — 85025 COMPLETE CBC W/AUTO DIFF WBC: CPT | Performed by: NURSE PRACTITIONER

## 2024-03-23 PROCEDURE — 94761 N-INVAS EAR/PLS OXIMETRY MLT: CPT

## 2024-03-23 PROCEDURE — 63600175 PHARM REV CODE 636 W HCPCS: Mod: JZ,JG | Performed by: SURGERY

## 2024-03-23 PROCEDURE — P9016 RBC LEUKOCYTES REDUCED: HCPCS | Performed by: ORTHOPAEDIC SURGERY

## 2024-03-23 PROCEDURE — 94640 AIRWAY INHALATION TREATMENT: CPT

## 2024-03-23 PROCEDURE — 11000001 HC ACUTE MED/SURG PRIVATE ROOM

## 2024-03-23 PROCEDURE — 80048 BASIC METABOLIC PNL TOTAL CA: CPT | Performed by: NURSE PRACTITIONER

## 2024-03-23 RX ORDER — HYDROCODONE BITARTRATE AND ACETAMINOPHEN 500; 5 MG/1; MG/1
TABLET ORAL
Status: DISCONTINUED | OUTPATIENT
Start: 2024-03-23 | End: 2024-04-02 | Stop reason: HOSPADM

## 2024-03-23 RX ADMIN — ENOXAPARIN SODIUM 40 MG: 40 INJECTION SUBCUTANEOUS at 04:03

## 2024-03-23 RX ADMIN — GABAPENTIN 300 MG: 300 CAPSULE ORAL at 03:03

## 2024-03-23 RX ADMIN — OXYCODONE HYDROCHLORIDE 10 MG: 10 TABLET, FILM COATED, EXTENDED RELEASE ORAL at 10:03

## 2024-03-23 RX ADMIN — MORPHINE SULFATE 3 MG: 4 INJECTION, SOLUTION INTRAMUSCULAR; INTRAVENOUS at 09:03

## 2024-03-23 RX ADMIN — SODIUM CHLORIDE: 9 INJECTION, SOLUTION INTRAVENOUS at 12:03

## 2024-03-23 RX ADMIN — GABAPENTIN 300 MG: 300 CAPSULE ORAL at 08:03

## 2024-03-23 RX ADMIN — POLYETHYLENE GLYCOL 3350 17 G: 17 POWDER, FOR SOLUTION ORAL at 08:03

## 2024-03-23 RX ADMIN — IPRATROPIUM BROMIDE AND ALBUTEROL SULFATE 3 ML: 2.5; .5 SOLUTION RESPIRATORY (INHALATION) at 07:03

## 2024-03-23 RX ADMIN — CLONAZEPAM 1 MG: 0.5 TABLET ORAL at 08:03

## 2024-03-23 RX ADMIN — IPRATROPIUM BROMIDE AND ALBUTEROL SULFATE 3 ML: 2.5; .5 SOLUTION RESPIRATORY (INHALATION) at 12:03

## 2024-03-23 RX ADMIN — ASPIRIN 81 MG 81 MG: 81 TABLET ORAL at 08:03

## 2024-03-23 RX ADMIN — TIZANIDINE 4 MG: 4 TABLET ORAL at 03:03

## 2024-03-23 RX ADMIN — MORPHINE SULFATE 3 MG: 4 INJECTION, SOLUTION INTRAMUSCULAR; INTRAVENOUS at 02:03

## 2024-03-23 RX ADMIN — ESCITALOPRAM OXALATE 10 MG: 10 TABLET, FILM COATED ORAL at 08:03

## 2024-03-23 RX ADMIN — OXYCODONE HYDROCHLORIDE 10 MG: 10 TABLET, FILM COATED, EXTENDED RELEASE ORAL at 08:03

## 2024-03-23 RX ADMIN — SENNOSIDES AND DOCUSATE SODIUM 1 TABLET: 8.6; 5 TABLET ORAL at 08:03

## 2024-03-23 RX ADMIN — IPRATROPIUM BROMIDE AND ALBUTEROL SULFATE 3 ML: 2.5; .5 SOLUTION RESPIRATORY (INHALATION) at 08:03

## 2024-03-23 NOTE — ASSESSMENT & PLAN NOTE
MRI L-spine with compression fracture of the L1 vertebra, height loss estimated at the 70%.  There is posterior cortex retropulsion contributing to mild-to-moderate central canal stenosis.   Plan for T12-L2 percutaneous fusion and L2-L5 kyphoplasty on 3/26 by Dr. Conway.

## 2024-03-23 NOTE — ASSESSMENT & PLAN NOTE
Patient's anemia is currently uncontrolled. Has not received any PRBCs to date but plan to transfuse 1 unit as she will require OR trips. Etiology likely d/t acute blood loss which was from surgery on top of chronic anemia (baseline Hgb ~ 9-10)  Current CBC reviewed-   Lab Results   Component Value Date    HGB 7.0 (L) 03/23/2024    HCT 23.7 (L) 03/23/2024     Monitor serial CBC and transfuse if patient becomes hemodynamically unstable, symptomatic or H/H drops below 7/21.

## 2024-03-23 NOTE — SUBJECTIVE & OBJECTIVE
Interval History: Patient seen and examined at the bedside, reports having pain but controlled with medication. She denies any shortness of breath which is more than her baseline and chest pain.     Review of Systems   Musculoskeletal:  Positive for arthralgias and back pain.     Objective:     Vital Signs (Most Recent):  Temp: 98.1 °F (36.7 °C) (03/23/24 0729)  Pulse: 90 (03/23/24 0729)  Resp: 18 (03/23/24 0853)  BP: (!) 144/80 (03/23/24 0729)  SpO2: 96 % (03/23/24 0729) Vital Signs (24h Range):  Temp:  [98.1 °F (36.7 °C)-99.7 °F (37.6 °C)] 98.1 °F (36.7 °C)  Pulse:  [] 90  Resp:  [10-21] 18  SpO2:  [77 %-96 %] 96 %  BP: ()/(52-80) 144/80     Weight: 53.7 kg (118 lb 6.4 oz)  Body mass index is 20.97 kg/m².    Intake/Output Summary (Last 24 hours) at 3/23/2024 0930  Last data filed at 3/22/2024 1600  Gross per 24 hour   Intake --   Output 1350 ml   Net -1350 ml         Physical Exam  Constitutional:       Appearance: She is ill-appearing.   HENT:      Head: Normocephalic.   Cardiovascular:      Rate and Rhythm: Normal rate and regular rhythm.   Pulmonary:      Effort: Pulmonary effort is normal. No respiratory distress.      Breath sounds: Wheezing present.   Abdominal:      General: Bowel sounds are normal.      Palpations: Abdomen is soft.   Musculoskeletal:      Comments: Right arm in sling, right hip dressing in place.    Skin:     Findings: Bruising present.   Neurological:      General: No focal deficit present.      Mental Status: She is alert and oriented to person, place, and time.             Significant Labs: All pertinent labs within the past 24 hours have been reviewed.    Significant Imaging: I have reviewed all pertinent imaging results/findings within the past 24 hours.

## 2024-03-23 NOTE — ASSESSMENT & PLAN NOTE
Presented to ED with fall, resides at nursing home.  Admitted under trauma team- trauma workup with fractures, no major bleeding noted.   Plan as outlined below.

## 2024-03-23 NOTE — PROGRESS NOTES
[facility]  [facility] Medicine  Progress Note    Patient Name: Darya Low  MRN: [id number]  Patient Class: IP- Inpatient   Admission Date: 3/18/2024  Length of Stay: 5 days  Attending Physician: Marcelino Otero MD  Primary Care Provider: Maeve, Primary Doctor        Subjective:     Principal Problem:Fall        HPI:  No notes on file    Overview/Hospital Course:  Admitted on 3/18/24 under trauma surgery for right shoulder (proximal humerus), hip and vertebral fractures. She was hypotensive on admission so was monitored in ICU and required pressor support for the first 36 hours of admission but has been off since. She underwent right hip pinning by Dr. Buckner, also possibly to require ORIF of right humerus on 3/25. Ortho spine evaluated and plan for T12-L2 percutaneous fusion and L2-L5 kyphoplasty on 3/26.    3/23- Transferred out of ICU and hospitalist assumed care. Dr. Hills from Trauma team has requested hospitalist to assume primary.     Interval History: Patient seen and examined at the bedside, reports having pain but controlled with medication. She denies any shortness of breath which is more than her baseline and chest pain.     Review of Systems   Musculoskeletal:  Positive for arthralgias and back pain.     Objective:     Vital Signs (Most Recent):  Temp: 98.1 °F (36.7 °C) (03/23/24 0729)  Pulse: 90 (03/23/24 0729)  Resp: 18 (03/23/24 0853)  BP: (!) 144/80 (03/23/24 0729)  SpO2: 96 % (03/23/24 0729) Vital Signs (24h Range):  Temp:  [98.1 °F (36.7 °C)-99.7 °F (37.6 °C)] 98.1 °F (36.7 °C)  Pulse:  [] 90  Resp:  [10-21] 18  SpO2:  [77 %-96 %] 96 %  BP: ()/(52-80) 144/80     Weight: 53.7 kg (118 lb 6.4 oz)  Body mass index is 20.97 kg/m².    Intake/Output Summary (Last 24 hours) at 3/23/2024 0930  Last data filed at 3/22/2024 1600  Gross per 24 hour   Intake --   Output 1350 ml   Net -1350 ml         Physical Exam  Constitutional:       Appearance: She is ill-appearing.   HENT:       Head: Normocephalic.   Cardiovascular:      Rate and Rhythm: Normal rate and regular rhythm.   Pulmonary:      Effort: Pulmonary effort is normal. No respiratory distress.      Breath sounds: Wheezing present.   Abdominal:      General: Bowel sounds are normal.      Palpations: Abdomen is soft.   Musculoskeletal:      Comments: Right arm in sling, right hip dressing in place.    Skin:     Findings: Bruising present.   Neurological:      General: No focal deficit present.      Mental Status: She is alert and oriented to person, place, and time.             Significant Labs: All pertinent labs within the past 24 hours have been reviewed.    Significant Imaging: I have reviewed all pertinent imaging results/findings within the past 24 hours.    Assessment/Plan:      * Fall  Presented to ED with fall, resides at nursing home.  Admitted under trauma team- trauma workup with fractures, no major bleeding noted.   Plan as outlined below.       Closed compression fracture of body of L1 vertebra  MRI L-spine with compression fracture of the L1 vertebra, height loss estimated at the 70%.  There is posterior cortex retropulsion contributing to mild-to-moderate central canal stenosis.   Plan for T12-L2 percutaneous fusion and L2-L5 kyphoplasty on 3/26 by Dr. Conway.      Closed displaced fracture of right femoral neck  Ortho consulted; s/p pinning by Dr. Buckner on 3/20/24.  PT/OT and pain control.       Closed fracture of proximal end of right humerus with routine healing  Initial X-ray showed proximal fracture but later showed increasing displacement.  Ortho consulted; possibly to require ORIF per Dr. Buckner.       Acute cystitis  S/p IV ceftriaxone x 3 days.       Chronic pain  Resume home Oxycontin and gabapentin.  For acute pain she is on IV narcotics as well.       Hypovolemic shock  Required pressor support initially in ICU but off since.  Stable BP.       Supplemental oxygen dependent  On 3L O2 at baseline for COPD.        Iron deficiency anemia due to chronic blood loss  Patient's anemia is currently uncontrolled. Has not received any PRBCs to date but plan to transfuse 1 unit as she will require OR trips. Etiology likely d/t acute blood loss which was from surgery on top of chronic anemia (baseline Hgb ~ 9-10)  Current CBC reviewed-   Lab Results   Component Value Date    HGB 7.0 (L) 03/23/2024    HCT 23.7 (L) 03/23/2024     Monitor serial CBC and transfuse if patient becomes hemodynamically unstable, symptomatic or H/H drops below 7/21.    DONALD (generalized anxiety disorder)  Resume home escitalopram and clonazepam.     Chronic obstructive pulmonary disease  Patient's COPD is controlled currently.  Patient is currently off COPD Pathway. Continue scheduled inhalers Supplemental oxygen and monitor respiratory status closely.       VTE Risk Mitigation (From admission, onward)           Ordered     enoxaparin injection 40 mg  Every 24 hours         03/18/24 1650                    Discharge Planning   VALENTIN: 3/27/2024     Code Status: DNR   Is the patient medically ready for discharge?:     Reason for patient still in hospital (select all that apply): Patient trending condition and Consult recommendations  Discharge Plan A: Hospice/home              RENETTA DIOR MD  Department of Hospital Medicine   [facility]

## 2024-03-23 NOTE — HOSPITAL COURSE
Admitted on 3/18/24 under trauma surgery for right shoulder (proximal humerus), hip and vertebral fractures. She was hypotensive on admission so was monitored in ICU and required pressor support for the first 36 hours of admission but has been off since. She underwent right hip pinning by Dr. Buckner, also possibly to require ORIF of right humerus on 3/25. Ortho spine evaluated and plan for T12-L2 percutaneous fusion and L2-L5 kyphoplasty on 3/26.    3/23- Transferred out of ICU and hospitalist assumed care. Dr. Hills from Trauma team has requested hospitalist to assume primary. S/p 1 unit transfusion for pre-op reasons.   3/24- Hgb stabilized. Ortho tentatively plans for ORIF right humerus fracture tomorrow.   3/25- s/p ORIF right humerus today.  3/26- s/p spinal surgery with Dr. Conway today. PT/OT and SW consulted.   4/1- Awaiting SNF placement. Pain is controlled.  4/2- Accepted and approved for SNF at [facility]. Discharge today.

## 2024-03-23 NOTE — PROGRESS NOTES
Post op hip fx and preop orif humerus for Sunday per dr jenkins , hb 7, comfortable, rec prbc today secondary periop blood loss anemia and likelihood of second surgery monday

## 2024-03-23 NOTE — NURSING
2053 Report given via telephone to MAGGIE Matthews 4N.     2115 pt transferred to 4M via bed. Belongings aside pt. ALICIA, pt on 1L NC 02.

## 2024-03-23 NOTE — ASSESSMENT & PLAN NOTE
Initial X-ray showed proximal fracture but later showed increasing displacement.  Ortho consulted; possibly to require ORIF per Dr. Buckner.
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
## 2024-03-24 LAB
ANION GAP SERPL CALCULATED.3IONS-SCNC: 5 MMOL/L (ref 7–16)
BACTERIA BLD CULT: NORMAL
BACTERIA BLD CULT: NORMAL
BASOPHILS # BLD AUTO: 0.04 K/UL (ref 0–0.2)
BASOPHILS NFR BLD AUTO: 0.7 % (ref 0–1)
BUN SERPL-MCNC: 5 MG/DL (ref 7–18)
BUN/CREAT SERPL: 11 (ref 6–20)
CALCIUM SERPL-MCNC: 8.6 MG/DL (ref 8.5–10.1)
CHLORIDE SERPL-SCNC: 104 MMOL/L (ref 98–107)
CO2 SERPL-SCNC: 34 MMOL/L (ref 21–32)
CREAT SERPL-MCNC: 0.46 MG/DL (ref 0.55–1.02)
DIFFERENTIAL METHOD BLD: ABNORMAL
EGFR (NO RACE VARIABLE) (RUSH/TITUS): 108 ML/MIN/1.73M2
EOSINOPHIL # BLD AUTO: 0.25 K/UL (ref 0–0.5)
EOSINOPHIL NFR BLD AUTO: 4.6 % (ref 1–4)
ERYTHROCYTE [DISTWIDTH] IN BLOOD BY AUTOMATED COUNT: 16.6 % (ref 11.5–14.5)
GLUCOSE SERPL-MCNC: 105 MG/DL (ref 74–106)
HCT VFR BLD AUTO: 29.2 % (ref 38–47)
HGB BLD-MCNC: 9.6 G/DL (ref 12–16)
IMM GRANULOCYTES # BLD AUTO: 0.05 K/UL (ref 0–0.04)
IMM GRANULOCYTES NFR BLD: 0.9 % (ref 0–0.4)
LYMPHOCYTES # BLD AUTO: 1.06 K/UL (ref 1–4.8)
LYMPHOCYTES NFR BLD AUTO: 19.4 % (ref 27–41)
MCH RBC QN AUTO: 28.2 PG (ref 27–31)
MCHC RBC AUTO-ENTMCNC: 32.9 G/DL (ref 32–36)
MCV RBC AUTO: 85.6 FL (ref 80–96)
MONOCYTES # BLD AUTO: 0.66 K/UL (ref 0–0.8)
MONOCYTES NFR BLD AUTO: 12.1 % (ref 2–6)
MPC BLD CALC-MCNC: 10.7 FL (ref 9.4–12.4)
NEUTROPHILS # BLD AUTO: 3.4 K/UL (ref 1.8–7.7)
NEUTROPHILS NFR BLD AUTO: 62.3 % (ref 53–65)
NRBC # BLD AUTO: 0 X10E3/UL
NRBC, AUTO (.00): 0 %
PLATELET # BLD AUTO: 143 K/UL (ref 150–400)
POTASSIUM SERPL-SCNC: 3.8 MMOL/L (ref 3.5–5.1)
RBC # BLD AUTO: 3.41 M/UL (ref 4.2–5.4)
SODIUM SERPL-SCNC: 139 MMOL/L (ref 136–145)
WBC # BLD AUTO: 5.46 K/UL (ref 4.5–11)

## 2024-03-24 PROCEDURE — 85025 COMPLETE CBC W/AUTO DIFF WBC: CPT | Performed by: INTERNAL MEDICINE

## 2024-03-24 PROCEDURE — 94761 N-INVAS EAR/PLS OXIMETRY MLT: CPT

## 2024-03-24 PROCEDURE — 25000003 PHARM REV CODE 250: Performed by: NURSE PRACTITIONER

## 2024-03-24 PROCEDURE — 80048 BASIC METABOLIC PNL TOTAL CA: CPT | Performed by: INTERNAL MEDICINE

## 2024-03-24 PROCEDURE — 94640 AIRWAY INHALATION TREATMENT: CPT

## 2024-03-24 PROCEDURE — 99900035 HC TECH TIME PER 15 MIN (STAT)

## 2024-03-24 PROCEDURE — 27000221 HC OXYGEN, UP TO 24 HOURS

## 2024-03-24 PROCEDURE — 25000003 PHARM REV CODE 250: Performed by: HOSPITALIST

## 2024-03-24 PROCEDURE — 99232 SBSQ HOSP IP/OBS MODERATE 35: CPT | Mod: ,,, | Performed by: INTERNAL MEDICINE

## 2024-03-24 PROCEDURE — 25000242 PHARM REV CODE 250 ALT 637 W/ HCPCS: Performed by: NURSE PRACTITIONER

## 2024-03-24 PROCEDURE — 11000001 HC ACUTE MED/SURG PRIVATE ROOM

## 2024-03-24 PROCEDURE — 25000003 PHARM REV CODE 250: Performed by: SURGERY

## 2024-03-24 PROCEDURE — 63600175 PHARM REV CODE 636 W HCPCS: Mod: JZ,JG | Performed by: SURGERY

## 2024-03-24 PROCEDURE — 63600175 PHARM REV CODE 636 W HCPCS: Performed by: INTERNAL MEDICINE

## 2024-03-24 PROCEDURE — 25000003 PHARM REV CODE 250: Performed by: INTERNAL MEDICINE

## 2024-03-24 RX ADMIN — GABAPENTIN 300 MG: 300 CAPSULE ORAL at 08:03

## 2024-03-24 RX ADMIN — ASPIRIN 81 MG 81 MG: 81 TABLET ORAL at 08:03

## 2024-03-24 RX ADMIN — TIZANIDINE 4 MG: 4 TABLET ORAL at 10:03

## 2024-03-24 RX ADMIN — SENNOSIDES AND DOCUSATE SODIUM 1 TABLET: 8.6; 5 TABLET ORAL at 08:03

## 2024-03-24 RX ADMIN — OXYCODONE HYDROCHLORIDE 5 MG: 5 TABLET ORAL at 01:03

## 2024-03-24 RX ADMIN — GABAPENTIN 300 MG: 300 CAPSULE ORAL at 05:03

## 2024-03-24 RX ADMIN — TRAZODONE HYDROCHLORIDE 50 MG: 50 TABLET ORAL at 10:03

## 2024-03-24 RX ADMIN — MORPHINE SULFATE 3 MG: 4 INJECTION, SOLUTION INTRAMUSCULAR; INTRAVENOUS at 05:03

## 2024-03-24 RX ADMIN — MORPHINE SULFATE 3 MG: 4 INJECTION, SOLUTION INTRAMUSCULAR; INTRAVENOUS at 10:03

## 2024-03-24 RX ADMIN — IPRATROPIUM BROMIDE AND ALBUTEROL SULFATE 3 ML: 2.5; .5 SOLUTION RESPIRATORY (INHALATION) at 01:03

## 2024-03-24 RX ADMIN — OXYCODONE HYDROCHLORIDE 10 MG: 10 TABLET, FILM COATED, EXTENDED RELEASE ORAL at 08:03

## 2024-03-24 RX ADMIN — ENOXAPARIN SODIUM 40 MG: 40 INJECTION SUBCUTANEOUS at 05:03

## 2024-03-24 RX ADMIN — TIZANIDINE 4 MG: 4 TABLET ORAL at 11:03

## 2024-03-24 RX ADMIN — POLYETHYLENE GLYCOL 3350 17 G: 17 POWDER, FOR SOLUTION ORAL at 08:03

## 2024-03-24 RX ADMIN — CLONAZEPAM 1 MG: 0.5 TABLET ORAL at 08:03

## 2024-03-24 RX ADMIN — IPRATROPIUM BROMIDE AND ALBUTEROL SULFATE 3 ML: 2.5; .5 SOLUTION RESPIRATORY (INHALATION) at 07:03

## 2024-03-24 RX ADMIN — IPRATROPIUM BROMIDE AND ALBUTEROL SULFATE 3 ML: 2.5; .5 SOLUTION RESPIRATORY (INHALATION) at 08:03

## 2024-03-24 RX ADMIN — IPRATROPIUM BROMIDE AND ALBUTEROL SULFATE 3 ML: 2.5; .5 SOLUTION RESPIRATORY (INHALATION) at 12:03

## 2024-03-24 RX ADMIN — MORPHINE SULFATE 3 MG: 4 INJECTION, SOLUTION INTRAMUSCULAR; INTRAVENOUS at 03:03

## 2024-03-24 RX ADMIN — MELATONIN 6 MG: at 10:03

## 2024-03-24 RX ADMIN — ESCITALOPRAM OXALATE 10 MG: 10 TABLET, FILM COATED ORAL at 08:03

## 2024-03-24 NOTE — PROGRESS NOTES
[facility]  [facility] Medicine  Progress Note    Patient Name: Darya Low  MRN: [id number]  Patient Class: IP- Inpatient   Admission Date: 3/18/2024  Length of Stay: 6 days  Attending Physician: Marcelino Otero MD  Primary Care Provider: Maeve, Primary Doctor        Subjective:     Principal Problem:Falls, subsequent encounter        HPI:  No notes on file    Overview/Hospital Course:  Admitted on 3/18/24 under trauma surgery for right shoulder (proximal humerus), hip and vertebral fractures. She was hypotensive on admission so was monitored in ICU and required pressor support for the first 36 hours of admission but has been off since. She underwent right hip pinning by Dr. Buckner, also possibly to require ORIF of right humerus on 3/25. Ortho spine evaluated and plan for T12-L2 percutaneous fusion and L2-L5 kyphoplasty on 3/26.    3/23- Transferred out of ICU and hospitalist assumed care. Dr. Hills from Trauma team has requested hospitalist to assume primary. S/p 1 unit transfusion for pre-op reasons.   3/24- Hgb stabilized. Ortho tentatively plans for ORIF right humerus fracture tomorrow.     Interval History: Patient seen and examined at the bedside, reports pain is controlled.     Review of Systems   Musculoskeletal:  Positive for arthralgias and back pain.     Objective:     Vital Signs (Most Recent):  Temp: 100 °F (37.8 °C) (03/24/24 1039)  Pulse: 79 (03/24/24 1039)  Resp: 18 (03/24/24 1039)  BP: (!) 144/71 (03/24/24 1039)  SpO2: 96 % (03/24/24 1039) Vital Signs (24h Range):  Temp:  [97.5 °F (36.4 °C)-100 °F (37.8 °C)] 100 °F (37.8 °C)  Pulse:  [66-96] 79  Resp:  [12-18] 18  SpO2:  [92 %-98 %] 96 %  BP: ()/(50-77) 144/71     Weight: 53.7 kg (118 lb 6.4 oz)  Body mass index is 20.97 kg/m².    Intake/Output Summary (Last 24 hours) at 3/24/2024 1140  Last data filed at 3/23/2024 1229  Gross per 24 hour   Intake 320 ml   Output --   Net 320 ml           Physical Exam  Constitutional:        Appearance: She is ill-appearing.   HENT:      Head: Normocephalic.   Cardiovascular:      Rate and Rhythm: Normal rate and regular rhythm.   Pulmonary:      Effort: Pulmonary effort is normal. No respiratory distress.      Breath sounds: Wheezing present.   Abdominal:      General: Bowel sounds are normal.      Palpations: Abdomen is soft.   Musculoskeletal:      Comments: Right arm in sling, right hip dressing in place.    Skin:     Findings: Bruising present.   Neurological:      General: No focal deficit present.      Mental Status: She is alert and oriented to person, place, and time.             Significant Labs: All pertinent labs within the past 24 hours have been reviewed.    Significant Imaging: I have reviewed all pertinent imaging results/findings within the past 24 hours.    Assessment/Plan:      * Falls, subsequent encounter  Presented to ED with fall, resides at nursing home.  Admitted under trauma team- trauma workup with fractures, no major bleeding noted.   Plan as outlined below.       Closed compression fracture of body of L1 vertebra  MRI L-spine with compression fracture of the L1 vertebra, height loss estimated at the 70%.  There is posterior cortex retropulsion contributing to mild-to-moderate central canal stenosis.   Plan for T12-L2 percutaneous fusion and L2-L5 kyphoplasty on 3/26 by Dr. Conway.      Closed displaced fracture of right femoral neck  Ortho consulted; s/p pinning by Dr. Buckner on 3/20/24.  PT/OT and pain control.       Closed fracture of proximal end of right humerus with routine healing  Initial X-ray showed proximal fracture but later showed increasing displacement.  Ortho consulted; possibly to require ORIF per Dr. Buckner- tentatively NPO for OR on 3/25.       Acute cystitis  S/p IV ceftriaxone x 3 days.       Chronic pain  Resume home Oxycontin and gabapentin.  For acute pain she is on IV narcotics as well.       Hypovolemic shock  Required pressor support initially in ICU but  off since.  Stable BP.       Supplemental oxygen dependent  On 3L O2 at baseline for COPD.       Iron deficiency anemia due to chronic blood loss  Patient's anemia is currently uncontrolled. S/p 1 unit PRBC for Hgb 7 on 3/23/24 in preparation of upcoming OR trips. Etiology likely d/t acute blood loss which was from surgery on top of chronic anemia (baseline Hgb ~ 9-10)  Current CBC reviewed-   Lab Results   Component Value Date    HGB 9.6 (L) 03/24/2024    HCT 29.2 (L) 03/24/2024     Monitor serial CBC and transfuse if patient becomes hemodynamically unstable, symptomatic or H/H drops below 7/21.    DONALD (generalized anxiety disorder)  Resume home escitalopram and clonazepam.     Chronic obstructive pulmonary disease  Patient's COPD is controlled currently.  Patient is currently off COPD Pathway. Continue scheduled inhalers Supplemental oxygen and monitor respiratory status closely.       VTE Risk Mitigation (From admission, onward)           Ordered     enoxaparin injection 40 mg  Every 24 hours         03/18/24 1650                    Discharge Planning   VALENTIN: 3/27/2024     Code Status: DNR   Is the patient medically ready for discharge?:     Reason for patient still in hospital (select all that apply): Patient trending condition and Consult recommendations  Discharge Plan A: Hospice/home              RENETTA DIOR MD  Department of Hospital Medicine   [facility]

## 2024-03-24 NOTE — PLAN OF CARE
Problem: Infection  Goal: Absence of Infection Signs and Symptoms  Outcome: Ongoing, Progressing     Problem: Adult Inpatient Plan of Care  Goal: Plan of Care Review  Outcome: Ongoing, Progressing  Goal: Patient-Specific Goal (Individualized)  Outcome: Ongoing, Progressing  Goal: Absence of Hospital-Acquired Illness or Injury  Outcome: Ongoing, Progressing  Goal: Optimal Comfort and Wellbeing  Outcome: Ongoing, Progressing  Goal: Readiness for Transition of Care  Outcome: Ongoing, Progressing     Problem: Impaired Wound Healing  Goal: Optimal Wound Healing  Outcome: Ongoing, Progressing     Problem: Skin Injury Risk Increased  Goal: Skin Health and Integrity  Outcome: Ongoing, Progressing     Problem: Gas Exchange Impaired  Goal: Optimal Gas Exchange  Outcome: Ongoing, Progressing     Problem: Fall Injury Risk  Goal: Absence of Fall and Fall-Related Injury  Outcome: Ongoing, Progressing

## 2024-03-24 NOTE — SUBJECTIVE & OBJECTIVE
Interval History: Patient seen and examined at the bedside, reports pain is controlled.     Review of Systems   Musculoskeletal:  Positive for arthralgias and back pain.     Objective:     Vital Signs (Most Recent):  Temp: 100 °F (37.8 °C) (03/24/24 1039)  Pulse: 79 (03/24/24 1039)  Resp: 18 (03/24/24 1039)  BP: (!) 144/71 (03/24/24 1039)  SpO2: 96 % (03/24/24 1039) Vital Signs (24h Range):  Temp:  [97.5 °F (36.4 °C)-100 °F (37.8 °C)] 100 °F (37.8 °C)  Pulse:  [66-96] 79  Resp:  [12-18] 18  SpO2:  [92 %-98 %] 96 %  BP: ()/(50-77) 144/71     Weight: 53.7 kg (118 lb 6.4 oz)  Body mass index is 20.97 kg/m².    Intake/Output Summary (Last 24 hours) at 3/24/2024 1140  Last data filed at 3/23/2024 1229  Gross per 24 hour   Intake 320 ml   Output --   Net 320 ml           Physical Exam  Constitutional:       Appearance: She is ill-appearing.   HENT:      Head: Normocephalic.   Cardiovascular:      Rate and Rhythm: Normal rate and regular rhythm.   Pulmonary:      Effort: Pulmonary effort is normal. No respiratory distress.      Breath sounds: Wheezing present.   Abdominal:      General: Bowel sounds are normal.      Palpations: Abdomen is soft.   Musculoskeletal:      Comments: Right arm in sling, right hip dressing in place.    Skin:     Findings: Bruising present.   Neurological:      General: No focal deficit present.      Mental Status: She is alert and oriented to person, place, and time.             Significant Labs: All pertinent labs within the past 24 hours have been reviewed.    Significant Imaging: I have reviewed all pertinent imaging results/findings within the past 24 hours.

## 2024-03-24 NOTE — ASSESSMENT & PLAN NOTE
Initial X-ray showed proximal fracture but later showed increasing displacement.  Ortho consulted; possibly to require ORIF per Dr. Buckner- tentatively NPO for OR on 3/25.

## 2024-03-24 NOTE — ASSESSMENT & PLAN NOTE
Patient's anemia is currently uncontrolled. S/p 1 unit PRBC for Hgb 7 on 3/23/24 in preparation of upcoming OR trips. Etiology likely d/t acute blood loss which was from surgery on top of chronic anemia (baseline Hgb ~ 9-10)  Current CBC reviewed-   Lab Results   Component Value Date    HGB 9.6 (L) 03/24/2024    HCT 29.2 (L) 03/24/2024     Monitor serial CBC and transfuse if patient becomes hemodynamically unstable, symptomatic or H/H drops below 7/21.

## 2024-03-24 NOTE — PROGRESS NOTES
Comfortable.  Hemoglobin 9.6 this morning.  We will hold NPO after midnight for possible ORIF of shoulder per Dr. Buckner tomorrow.

## 2024-03-25 ENCOUNTER — ANESTHESIA (OUTPATIENT)
Dept: SURGERY | Facility: HOSPITAL | Age: 64
DRG: 459 | End: 2024-03-25
Payer: MEDICARE

## 2024-03-25 ENCOUNTER — ANESTHESIA EVENT (OUTPATIENT)
Dept: SURGERY | Facility: HOSPITAL | Age: 64
DRG: 459 | End: 2024-03-25
Payer: MEDICARE

## 2024-03-25 LAB
ANION GAP SERPL CALCULATED.3IONS-SCNC: 7 MMOL/L (ref 7–16)
BASOPHILS # BLD AUTO: 0.05 K/UL (ref 0–0.2)
BASOPHILS NFR BLD AUTO: 0.8 % (ref 0–1)
BUN SERPL-MCNC: 5 MG/DL (ref 7–18)
BUN/CREAT SERPL: 14 (ref 6–20)
CALCIUM SERPL-MCNC: 8.3 MG/DL (ref 8.5–10.1)
CHLORIDE SERPL-SCNC: 104 MMOL/L (ref 98–107)
CO2 SERPL-SCNC: 33 MMOL/L (ref 21–32)
CREAT SERPL-MCNC: 0.35 MG/DL (ref 0.55–1.02)
DIFFERENTIAL METHOD BLD: ABNORMAL
EGFR (NO RACE VARIABLE) (RUSH/TITUS): 116 ML/MIN/1.73M2
EOSINOPHIL # BLD AUTO: 0.19 K/UL (ref 0–0.5)
EOSINOPHIL NFR BLD AUTO: 3.1 % (ref 1–4)
ERYTHROCYTE [DISTWIDTH] IN BLOOD BY AUTOMATED COUNT: 15.9 % (ref 11.5–14.5)
GLUCOSE SERPL-MCNC: 85 MG/DL (ref 74–106)
HCT VFR BLD AUTO: 30 % (ref 38–47)
HGB BLD-MCNC: 9.7 G/DL (ref 12–16)
IMM GRANULOCYTES # BLD AUTO: 0.05 K/UL (ref 0–0.04)
IMM GRANULOCYTES NFR BLD: 0.8 % (ref 0–0.4)
INDIRECT COOMBS: NORMAL
LYMPHOCYTES # BLD AUTO: 1.03 K/UL (ref 1–4.8)
LYMPHOCYTES NFR BLD AUTO: 16.9 % (ref 27–41)
MCH RBC QN AUTO: 27.9 PG (ref 27–31)
MCHC RBC AUTO-ENTMCNC: 32.3 G/DL (ref 32–36)
MCV RBC AUTO: 86.2 FL (ref 80–96)
MONOCYTES # BLD AUTO: 0.76 K/UL (ref 0–0.8)
MONOCYTES NFR BLD AUTO: 12.5 % (ref 2–6)
MPC BLD CALC-MCNC: 10.9 FL (ref 9.4–12.4)
NEUTROPHILS # BLD AUTO: 4.02 K/UL (ref 1.8–7.7)
NEUTROPHILS NFR BLD AUTO: 65.9 % (ref 53–65)
NRBC # BLD AUTO: 0 X10E3/UL
NRBC, AUTO (.00): 0 %
PLATELET # BLD AUTO: 172 K/UL (ref 150–400)
POTASSIUM SERPL-SCNC: 3.9 MMOL/L (ref 3.5–5.1)
RBC # BLD AUTO: 3.48 M/UL (ref 4.2–5.4)
RH BLD: NORMAL
SODIUM SERPL-SCNC: 140 MMOL/L (ref 136–145)
SPECIMEN OUTDATE: NORMAL
WBC # BLD AUTO: 6.1 K/UL (ref 4.5–11)

## 2024-03-25 PROCEDURE — 85025 COMPLETE CBC W/AUTO DIFF WBC: CPT | Performed by: INTERNAL MEDICINE

## 2024-03-25 PROCEDURE — 25000003 PHARM REV CODE 250: Performed by: ORTHOPAEDIC SURGERY

## 2024-03-25 PROCEDURE — 63600175 PHARM REV CODE 636 W HCPCS: Performed by: ORTHOPAEDIC SURGERY

## 2024-03-25 PROCEDURE — 99900035 HC TECH TIME PER 15 MIN (STAT)

## 2024-03-25 PROCEDURE — 25000003 PHARM REV CODE 250

## 2024-03-25 PROCEDURE — 25000242 PHARM REV CODE 250 ALT 637 W/ HCPCS: Performed by: ORTHOPAEDIC SURGERY

## 2024-03-25 PROCEDURE — C1713 ANCHOR/SCREW BN/BN,TIS/BN: HCPCS | Performed by: ORTHOPAEDIC SURGERY

## 2024-03-25 PROCEDURE — 36000711: Performed by: ORTHOPAEDIC SURGERY

## 2024-03-25 PROCEDURE — 23615 OPTX PROX HUMRL FX W/INT FIX: CPT | Mod: 79,RT,, | Performed by: ORTHOPAEDIC SURGERY

## 2024-03-25 PROCEDURE — 27000221 HC OXYGEN, UP TO 24 HOURS

## 2024-03-25 PROCEDURE — 94640 AIRWAY INHALATION TREATMENT: CPT

## 2024-03-25 PROCEDURE — 25000003 PHARM REV CODE 250: Performed by: NURSE PRACTITIONER

## 2024-03-25 PROCEDURE — 25000003 PHARM REV CODE 250: Performed by: INTERNAL MEDICINE

## 2024-03-25 PROCEDURE — 37000008 HC ANESTHESIA 1ST 15 MINUTES: Performed by: ORTHOPAEDIC SURGERY

## 2024-03-25 PROCEDURE — 63600175 PHARM REV CODE 636 W HCPCS: Mod: JZ,JG | Performed by: ORTHOPAEDIC SURGERY

## 2024-03-25 PROCEDURE — D9220A PRA ANESTHESIA: Mod: GW,ANES,, | Performed by: ANESTHESIOLOGY

## 2024-03-25 PROCEDURE — 86901 BLOOD TYPING SEROLOGIC RH(D): CPT | Performed by: ORTHOPAEDIC SURGERY

## 2024-03-25 PROCEDURE — 37000009 HC ANESTHESIA EA ADD 15 MINS: Performed by: ORTHOPAEDIC SURGERY

## 2024-03-25 PROCEDURE — C1769 GUIDE WIRE: HCPCS | Performed by: ORTHOPAEDIC SURGERY

## 2024-03-25 PROCEDURE — 0PSC04Z REPOSITION RIGHT HUMERAL HEAD WITH INTERNAL FIXATION DEVICE, OPEN APPROACH: ICD-10-PCS | Performed by: ORTHOPAEDIC SURGERY

## 2024-03-25 PROCEDURE — 94761 N-INVAS EAR/PLS OXIMETRY MLT: CPT

## 2024-03-25 PROCEDURE — 36000710: Performed by: ORTHOPAEDIC SURGERY

## 2024-03-25 PROCEDURE — 80048 BASIC METABOLIC PNL TOTAL CA: CPT | Performed by: INTERNAL MEDICINE

## 2024-03-25 PROCEDURE — 63600175 PHARM REV CODE 636 W HCPCS: Mod: JZ,JG | Performed by: SURGERY

## 2024-03-25 PROCEDURE — 99232 SBSQ HOSP IP/OBS MODERATE 35: CPT | Mod: ,,, | Performed by: INTERNAL MEDICINE

## 2024-03-25 PROCEDURE — 27201423 OPTIME MED/SURG SUP & DEVICES STERILE SUPPLY: Performed by: ORTHOPAEDIC SURGERY

## 2024-03-25 PROCEDURE — 25000242 PHARM REV CODE 250 ALT 637 W/ HCPCS: Performed by: NURSE PRACTITIONER

## 2024-03-25 PROCEDURE — D9220A PRA ANESTHESIA: Mod: GW,CRNA,,

## 2024-03-25 PROCEDURE — 63600175 PHARM REV CODE 636 W HCPCS

## 2024-03-25 PROCEDURE — 11000001 HC ACUTE MED/SURG PRIVATE ROOM

## 2024-03-25 PROCEDURE — 71000033 HC RECOVERY, INTIAL HOUR: Performed by: ORTHOPAEDIC SURGERY

## 2024-03-25 DEVICE — IMPLANTABLE DEVICE: Type: IMPLANTABLE DEVICE | Site: HUMERUS | Status: FUNCTIONAL

## 2024-03-25 DEVICE — SCREW CORTEX 3.5 X 26: Type: IMPLANTABLE DEVICE | Site: HUMERUS | Status: FUNCTIONAL

## 2024-03-25 DEVICE — SCREW CORTEX 3.5 X 24: Type: IMPLANTABLE DEVICE | Site: HUMERUS | Status: FUNCTIONAL

## 2024-03-25 DEVICE — SCREW VA ANG LOK ST 3.5X28MM: Type: IMPLANTABLE DEVICE | Site: HUMERUS | Status: FUNCTIONAL

## 2024-03-25 DEVICE — PLATE PROXIMAL HUMERAL 3 HOLE: Type: IMPLANTABLE DEVICE | Site: HUMERUS | Status: FUNCTIONAL

## 2024-03-25 DEVICE — SCREW CORTEX 3.5 X 22: Type: IMPLANTABLE DEVICE | Site: HUMERUS | Status: FUNCTIONAL

## 2024-03-25 DEVICE — CHRONOS(TM) GRANULES-LARGE 2.8-5.6MM/10CC-STERILE
Type: IMPLANTABLE DEVICE | Site: HUMERUS | Status: FUNCTIONAL
Brand: CHRONOS

## 2024-03-25 RX ORDER — LABETALOL HYDROCHLORIDE 5 MG/ML
INJECTION, SOLUTION INTRAVENOUS
Status: DISCONTINUED | OUTPATIENT
Start: 2024-03-25 | End: 2024-03-25

## 2024-03-25 RX ORDER — MUPIROCIN 20 MG/G
OINTMENT TOPICAL 2 TIMES DAILY
Status: COMPLETED | OUTPATIENT
Start: 2024-03-25 | End: 2024-03-27

## 2024-03-25 RX ORDER — FENTANYL CITRATE 50 UG/ML
INJECTION, SOLUTION INTRAMUSCULAR; INTRAVENOUS
Status: DISCONTINUED | OUTPATIENT
Start: 2024-03-25 | End: 2024-03-25

## 2024-03-25 RX ORDER — POLYETHYLENE GLYCOL 3350 17 G/17G
17 POWDER, FOR SOLUTION ORAL DAILY
Status: DISCONTINUED | OUTPATIENT
Start: 2024-03-26 | End: 2024-03-25

## 2024-03-25 RX ORDER — ONDANSETRON 4 MG/1
8 TABLET, ORALLY DISINTEGRATING ORAL EVERY 8 HOURS PRN
Status: DISCONTINUED | OUTPATIENT
Start: 2024-03-25 | End: 2024-03-25

## 2024-03-25 RX ORDER — LIDOCAINE HYDROCHLORIDE 20 MG/ML
INJECTION, SOLUTION EPIDURAL; INFILTRATION; INTRACAUDAL; PERINEURAL
Status: DISCONTINUED | OUTPATIENT
Start: 2024-03-25 | End: 2024-03-25

## 2024-03-25 RX ORDER — GLYCOPYRROLATE 0.2 MG/ML
INJECTION INTRAMUSCULAR; INTRAVENOUS
Status: DISCONTINUED | OUTPATIENT
Start: 2024-03-25 | End: 2024-03-25

## 2024-03-25 RX ORDER — CLINDAMYCIN PHOSPHATE 900 MG/50ML
900 INJECTION, SOLUTION INTRAVENOUS
Status: COMPLETED | OUTPATIENT
Start: 2024-03-25 | End: 2024-03-26

## 2024-03-25 RX ORDER — CEFAZOLIN SODIUM 1 G/3ML
INJECTION, POWDER, FOR SOLUTION INTRAMUSCULAR; INTRAVENOUS
Status: DISCONTINUED | OUTPATIENT
Start: 2024-03-25 | End: 2024-03-25

## 2024-03-25 RX ORDER — EPHEDRINE SULFATE 50 MG/ML
INJECTION, SOLUTION INTRAVENOUS
Status: DISCONTINUED | OUTPATIENT
Start: 2024-03-25 | End: 2024-03-25

## 2024-03-25 RX ORDER — DIPHENHYDRAMINE HYDROCHLORIDE 50 MG/ML
INJECTION INTRAMUSCULAR; INTRAVENOUS
Status: DISCONTINUED | OUTPATIENT
Start: 2024-03-25 | End: 2024-03-25

## 2024-03-25 RX ORDER — DEXAMETHASONE SODIUM PHOSPHATE 4 MG/ML
INJECTION, SOLUTION INTRA-ARTICULAR; INTRALESIONAL; INTRAMUSCULAR; INTRAVENOUS; SOFT TISSUE
Status: DISCONTINUED | OUTPATIENT
Start: 2024-03-25 | End: 2024-03-25

## 2024-03-25 RX ORDER — PROMETHAZINE HYDROCHLORIDE 25 MG/1
25 TABLET ORAL EVERY 6 HOURS PRN
Status: DISCONTINUED | OUTPATIENT
Start: 2024-03-25 | End: 2024-04-02 | Stop reason: HOSPADM

## 2024-03-25 RX ORDER — TRANEXAMIC ACID 100 MG/ML
INJECTION, SOLUTION INTRAVENOUS
Status: DISCONTINUED | OUTPATIENT
Start: 2024-03-25 | End: 2024-03-25

## 2024-03-25 RX ORDER — ROCURONIUM BROMIDE 10 MG/ML
INJECTION, SOLUTION INTRAVENOUS
Status: DISCONTINUED | OUTPATIENT
Start: 2024-03-25 | End: 2024-03-25

## 2024-03-25 RX ORDER — IPRATROPIUM BROMIDE AND ALBUTEROL SULFATE 2.5; .5 MG/3ML; MG/3ML
3 SOLUTION RESPIRATORY (INHALATION) ONCE AS NEEDED
Status: DISCONTINUED | OUTPATIENT
Start: 2024-03-25 | End: 2024-03-25 | Stop reason: HOSPADM

## 2024-03-25 RX ORDER — SODIUM CHLORIDE 0.9 % (FLUSH) 0.9 %
3 SYRINGE (ML) INJECTION EVERY 6 HOURS PRN
Status: DISCONTINUED | OUTPATIENT
Start: 2024-03-25 | End: 2024-04-02 | Stop reason: HOSPADM

## 2024-03-25 RX ORDER — LIDOCAINE HYDROCHLORIDE 40 MG/ML
SOLUTION TOPICAL
Status: DISCONTINUED | OUTPATIENT
Start: 2024-03-25 | End: 2024-03-25

## 2024-03-25 RX ORDER — FAMOTIDINE 20 MG/1
20 TABLET, FILM COATED ORAL 2 TIMES DAILY
Status: DISCONTINUED | OUTPATIENT
Start: 2024-03-25 | End: 2024-04-02 | Stop reason: HOSPADM

## 2024-03-25 RX ORDER — HYDROMORPHONE HYDROCHLORIDE 2 MG/ML
0.5 INJECTION, SOLUTION INTRAMUSCULAR; INTRAVENOUS; SUBCUTANEOUS EVERY 5 MIN PRN
Status: DISCONTINUED | OUTPATIENT
Start: 2024-03-25 | End: 2024-03-25 | Stop reason: HOSPADM

## 2024-03-25 RX ORDER — NEOSTIGMINE METHYLSULFATE 1 MG/ML
INJECTION, SOLUTION INTRAVENOUS
Status: DISCONTINUED | OUTPATIENT
Start: 2024-03-25 | End: 2024-03-25

## 2024-03-25 RX ORDER — PROPOFOL 10 MG/ML
VIAL (ML) INTRAVENOUS
Status: DISCONTINUED | OUTPATIENT
Start: 2024-03-25 | End: 2024-03-25

## 2024-03-25 RX ORDER — ACETAMINOPHEN 10 MG/ML
1000 INJECTION, SOLUTION INTRAVENOUS ONCE
Status: COMPLETED | OUTPATIENT
Start: 2024-03-25 | End: 2024-03-25

## 2024-03-25 RX ORDER — DOCUSATE SODIUM 100 MG/1
100 CAPSULE, LIQUID FILLED ORAL 2 TIMES DAILY
Status: DISCONTINUED | OUTPATIENT
Start: 2024-03-25 | End: 2024-03-25

## 2024-03-25 RX ORDER — PHENYLEPHRINE HYDROCHLORIDE 10 MG/ML
INJECTION INTRAVENOUS
Status: DISCONTINUED | OUTPATIENT
Start: 2024-03-25 | End: 2024-03-25

## 2024-03-25 RX ORDER — SODIUM CHLORIDE 0.9 % (FLUSH) 0.9 %
2 SYRINGE (ML) INJECTION
Status: DISCONTINUED | OUTPATIENT
Start: 2024-03-25 | End: 2024-04-02 | Stop reason: HOSPADM

## 2024-03-25 RX ORDER — DOCUSATE SODIUM 100 MG/1
100 CAPSULE, LIQUID FILLED ORAL 2 TIMES DAILY
Status: DISCONTINUED | OUTPATIENT
Start: 2024-03-25 | End: 2024-04-02 | Stop reason: HOSPADM

## 2024-03-25 RX ORDER — MORPHINE SULFATE 10 MG/ML
4 INJECTION INTRAMUSCULAR; INTRAVENOUS; SUBCUTANEOUS EVERY 5 MIN PRN
Status: DISCONTINUED | OUTPATIENT
Start: 2024-03-25 | End: 2024-03-25 | Stop reason: HOSPADM

## 2024-03-25 RX ORDER — DIPHENHYDRAMINE HYDROCHLORIDE 50 MG/ML
25 INJECTION INTRAMUSCULAR; INTRAVENOUS EVERY 6 HOURS PRN
Status: DISCONTINUED | OUTPATIENT
Start: 2024-03-25 | End: 2024-03-25 | Stop reason: HOSPADM

## 2024-03-25 RX ORDER — MEPERIDINE HYDROCHLORIDE 25 MG/ML
25 INJECTION INTRAMUSCULAR; INTRAVENOUS; SUBCUTANEOUS ONCE AS NEEDED
Status: DISCONTINUED | OUTPATIENT
Start: 2024-03-25 | End: 2024-03-25 | Stop reason: HOSPADM

## 2024-03-25 RX ORDER — MUPIROCIN 20 MG/G
OINTMENT TOPICAL 2 TIMES DAILY
Status: DISCONTINUED | OUTPATIENT
Start: 2024-03-25 | End: 2024-03-25

## 2024-03-25 RX ADMIN — ACETAMINOPHEN 1000 MG: 10 INJECTION, SOLUTION INTRAVENOUS at 05:03

## 2024-03-25 RX ADMIN — DEXAMETHASONE SODIUM PHOSPHATE 4 MG: 4 INJECTION, SOLUTION INTRA-ARTICULAR; INTRALESIONAL; INTRAMUSCULAR; INTRAVENOUS; SOFT TISSUE at 01:03

## 2024-03-25 RX ADMIN — MORPHINE SULFATE 3 MG: 4 INJECTION, SOLUTION INTRAMUSCULAR; INTRAVENOUS at 05:03

## 2024-03-25 RX ADMIN — PROPOFOL 110 MG: 10 INJECTION, EMULSION INTRAVENOUS at 01:03

## 2024-03-25 RX ADMIN — CLONAZEPAM 1 MG: 0.5 TABLET ORAL at 09:03

## 2024-03-25 RX ADMIN — SENNOSIDES AND DOCUSATE SODIUM 1 TABLET: 8.6; 5 TABLET ORAL at 08:03

## 2024-03-25 RX ADMIN — FAMOTIDINE 20 MG: 20 TABLET ORAL at 09:03

## 2024-03-25 RX ADMIN — MORPHINE SULFATE 3 MG: 4 INJECTION, SOLUTION INTRAMUSCULAR; INTRAVENOUS at 10:03

## 2024-03-25 RX ADMIN — IPRATROPIUM BROMIDE AND ALBUTEROL SULFATE 3 ML: 2.5; .5 SOLUTION RESPIRATORY (INHALATION) at 12:03

## 2024-03-25 RX ADMIN — FENTANYL CITRATE 50 MCG: 50 INJECTION INTRAMUSCULAR; INTRAVENOUS at 12:03

## 2024-03-25 RX ADMIN — TIZANIDINE 4 MG: 4 TABLET ORAL at 09:03

## 2024-03-25 RX ADMIN — ASPIRIN 81 MG 81 MG: 81 TABLET ORAL at 08:03

## 2024-03-25 RX ADMIN — LIDOCAINE HYDROCHLORIDE 40 MG: 20 INJECTION, SOLUTION INTRAVENOUS at 01:03

## 2024-03-25 RX ADMIN — SODIUM CHLORIDE, POTASSIUM CHLORIDE, SODIUM LACTATE AND CALCIUM CHLORIDE: 600; 310; 30; 20 INJECTION, SOLUTION INTRAVENOUS at 12:03

## 2024-03-25 RX ADMIN — GLYCOPYRROLATE 0.4 MG: 0.2 INJECTION INTRAMUSCULAR; INTRAVENOUS at 03:03

## 2024-03-25 RX ADMIN — FENTANYL CITRATE 50 MCG: 50 INJECTION INTRAMUSCULAR; INTRAVENOUS at 01:03

## 2024-03-25 RX ADMIN — DIPHENHYDRAMINE HYDROCHLORIDE 12.5 MG: 50 INJECTION, SOLUTION INTRAMUSCULAR; INTRAVENOUS at 01:03

## 2024-03-25 RX ADMIN — GABAPENTIN 300 MG: 300 CAPSULE ORAL at 05:03

## 2024-03-25 RX ADMIN — CEFAZOLIN 2 G: 1 INJECTION, POWDER, FOR SOLUTION INTRAMUSCULAR; INTRAVENOUS; PARENTERAL at 01:03

## 2024-03-25 RX ADMIN — NEOSTIGMINE METHYLSULFATE 3 MG: 1 INJECTION INTRAVENOUS at 03:03

## 2024-03-25 RX ADMIN — TRANEXAMIC ACID 500 MG: 100 INJECTION, SOLUTION INTRAVENOUS at 01:03

## 2024-03-25 RX ADMIN — CLONAZEPAM 1 MG: 0.5 TABLET ORAL at 08:03

## 2024-03-25 RX ADMIN — ESCITALOPRAM OXALATE 10 MG: 10 TABLET, FILM COATED ORAL at 08:03

## 2024-03-25 RX ADMIN — OXYCODONE HYDROCHLORIDE 10 MG: 10 TABLET, FILM COATED, EXTENDED RELEASE ORAL at 09:03

## 2024-03-25 RX ADMIN — MELATONIN 6 MG: at 09:03

## 2024-03-25 RX ADMIN — OXYCODONE HYDROCHLORIDE 10 MG: 10 TABLET, FILM COATED, EXTENDED RELEASE ORAL at 08:03

## 2024-03-25 RX ADMIN — EPHEDRINE SULFATE 25 MG: 50 INJECTION INTRAVENOUS at 02:03

## 2024-03-25 RX ADMIN — TRANEXAMIC ACID 500 MG: 100 INJECTION, SOLUTION INTRAVENOUS at 02:03

## 2024-03-25 RX ADMIN — ROCURONIUM BROMIDE 30 MG: 10 INJECTION, SOLUTION INTRAVENOUS at 01:03

## 2024-03-25 RX ADMIN — PHENYLEPHRINE HYDROCHLORIDE 150 MCG: 10 INJECTION INTRAVENOUS at 01:03

## 2024-03-25 RX ADMIN — DOCUSATE SODIUM 100 MG: 100 CAPSULE, LIQUID FILLED ORAL at 09:03

## 2024-03-25 RX ADMIN — EPHEDRINE SULFATE 10 MG: 50 INJECTION INTRAVENOUS at 01:03

## 2024-03-25 RX ADMIN — CEFAZOLIN 2 G: 2 INJECTION, POWDER, FOR SOLUTION INTRAMUSCULAR; INTRAVENOUS at 09:03

## 2024-03-25 RX ADMIN — LABETALOL HYDROCHLORIDE 5 MG: 5 INJECTION INTRAVENOUS at 01:03

## 2024-03-25 RX ADMIN — EPHEDRINE SULFATE 15 MG: 50 INJECTION INTRAVENOUS at 02:03

## 2024-03-25 RX ADMIN — GABAPENTIN 300 MG: 300 CAPSULE ORAL at 08:03

## 2024-03-25 RX ADMIN — CLINDAMYCIN PHOSPHATE 900 MG: 900 INJECTION, SOLUTION INTRAVENOUS at 05:03

## 2024-03-25 RX ADMIN — MUPIROCIN: 20 OINTMENT TOPICAL at 10:03

## 2024-03-25 RX ADMIN — IPRATROPIUM BROMIDE AND ALBUTEROL SULFATE 3 ML: 2.5; .5 SOLUTION RESPIRATORY (INHALATION) at 07:03

## 2024-03-25 RX ADMIN — SODIUM CHLORIDE, POTASSIUM CHLORIDE, SODIUM LACTATE AND CALCIUM CHLORIDE: 600; 310; 30; 20 INJECTION, SOLUTION INTRAVENOUS at 03:03

## 2024-03-25 RX ADMIN — LIDOCAINE HYDROCHLORIDE 100 MG: 40 SOLUTION TOPICAL at 01:03

## 2024-03-25 RX ADMIN — ENOXAPARIN SODIUM 40 MG: 40 INJECTION SUBCUTANEOUS at 05:03

## 2024-03-25 RX ADMIN — MORPHINE SULFATE 3 MG: 4 INJECTION, SOLUTION INTRAMUSCULAR; INTRAVENOUS at 03:03

## 2024-03-25 NOTE — TRANSFER OF CARE
"Anesthesia Transfer of Care Note    Patient: Darya Low    Procedure(s) Performed: Procedure(s) (LRB):  ORIF, FRACTURE, HUMERUS, PROXIMAL (Right)    Patient location: PACU    Anesthesia Type: general    Transport from OR: Transported from OR on 2-3 L/min O2 by NC with adequate spontaneous ventilation    Post pain: adequate analgesia    Post assessment: no apparent anesthetic complications    Post vital signs: stable    Level of consciousness: responds to stimulation    Nausea/Vomiting: no nausea/vomiting    Complications: none    Transfer of care protocol was followed      Last vitals: Visit Vitals  BP (!) 151/70   Pulse 92   Temp 36.9 °C (98.5 °F) (Oral)   Resp 18   Ht 5' 3" (1.6 m)   Wt 53.7 kg (118 lb 6.4 oz)   LMP  (LMP Unknown)   SpO2 (!) 93%   Breastfeeding No   BMI 20.97 kg/m²     "

## 2024-03-25 NOTE — INTERVAL H&P NOTE
The patient has been examined and the H&P has been reviewed:    I concur with the findings and no changes have occurred since H&P was written.    Anesthesia/Surgery risks, benefits and alternative options discussed and understood by patient/family.    To surgery for Rt proxial humerus ORIF today      Active Hospital Problems    Diagnosis  POA    *Falls, subsequent encounter [W19.XXXD]  Not Applicable    Hypovolemic shock [R57.1]  Yes    Chronic pain [G89.29]  Yes    Closed fracture of proximal end of right humerus with routine healing [S42.201D]  Not Applicable    Closed compression fracture of body of L1 vertebra [S32.010A]  Yes    Acute cystitis [N30.00]  Yes    Closed displaced fracture of right femoral neck [S72.001A]  Yes    Supplemental oxygen dependent [Z99.81]  Not Applicable    Iron deficiency anemia due to chronic blood loss [D50.0]  Yes    Chronic obstructive pulmonary disease [J44.9]  Yes    DONALD (generalized anxiety disorder) [F41.1]  Yes      Resolved Hospital Problems    Diagnosis Date Resolved POA    Hypokalemia [E87.6] 03/23/2024 Yes

## 2024-03-25 NOTE — ANESTHESIA PROCEDURE NOTES
Intubation    Date/Time: 3/25/2024 1:05 PM    Performed by: Sulaiman Tineo II, CRNA  Authorized by: Lawrence Maher DO    Intubation:     Induction:  Intravenous    Intubated:  Postinduction    Mask Ventilation:  Easy mask    Attempts:  1    Attempted By:  CRNA    Method of Intubation:  Direct    Blade:  Edwin 4    Laryngeal View Grade: Grade I - full view of cords      Difficult Airway Encountered?: No      Complications:  None    Airway Device:  Oral endotracheal tube    Airway Device Size:  7.0    Style/Cuff Inflation:  Cuffed    Inflation Amount (mL):  7    Tube secured:  20    Secured at:  The lips    Placement Verified By:  Capnometry    Complicating Factors:  None    Findings Post-Intubation:  BS equal bilateral and atraumatic/condition of teeth unchanged

## 2024-03-25 NOTE — SUBJECTIVE & OBJECTIVE
Interval History: Patient seen and examined at the bedside, reports pain is controlled.     Review of Systems   Musculoskeletal:  Positive for arthralgias and back pain.     Objective:     Vital Signs (Most Recent):  Temp: 98.4 °F (36.9 °C) (03/25/24 1045)  Pulse: 64 (03/25/24 1045)  Resp: 18 (03/25/24 1024)  BP: (!) 151/70 (03/25/24 1045)  SpO2: (!) 93 % (03/25/24 1045) Vital Signs (24h Range):  Temp:  [98.4 °F (36.9 °C)-99.7 °F (37.6 °C)] 98.4 °F (36.9 °C)  Pulse:  [64-97] 64  Resp:  [16-20] 18  SpO2:  [88 %-97 %] 93 %  BP: (137-180)/(63-99) 151/70     Weight: 53.7 kg (118 lb 6.4 oz)  Body mass index is 20.97 kg/m².    Intake/Output Summary (Last 24 hours) at 3/25/2024 1520  Last data filed at 3/25/2024 1508  Gross per 24 hour   Intake 1000 ml   Output --   Net 1000 ml      3     Physical Exam  Constitutional:       Appearance: She is ill-appearing.   HENT:      Head: Normocephalic.   Cardiovascular:      Rate and Rhythm: Normal rate and regular rhythm.   Pulmonary:      Effort: Pulmonary effort is normal. No respiratory distress.      Breath sounds: Wheezing present.   Abdominal:      General: Bowel sounds are normal.      Palpations: Abdomen is soft.   Musculoskeletal:      Comments: Right arm in sling, right hip dressing in place.    Skin:     Findings: Bruising present.   Neurological:      General: No focal deficit present.      Mental Status: She is alert and oriented to person, place, and time.             Significant Labs: All pertinent labs within the past 24 hours have been reviewed.    Significant Imaging: I have reviewed all pertinent imaging results/findings within the past 24 hours.

## 2024-03-25 NOTE — ASSESSMENT & PLAN NOTE
Initial X-ray showed proximal fracture but later showed increasing displacement.  Ortho consulted; plan for ORIF per Dr. Buckner on 3/25.

## 2024-03-25 NOTE — OP NOTE
[facility]  OPERATIVE REPORT   ORTHOPAEDIC SURGERY   PROVIDER: DR. SIGRID BUCKNER MD    PATIENT INFORMATION   Darya Low 63 y.o. female 1960   MRN: [id number]   LOCATION: [facility]    DATE OF PROCEDURE: 3/25/2024     PREOPERATIVE DIAGNOSES:   Other closed displaced fracture of proximal end of right humerus with routine healing, subsequent encounter [S41.304W]    POSTOPERATIVE DIAGNOSES:   Other closed displaced fracture of proximal end of right humerus with routine healing, subsequent encounter [L43.480K]    PROCEDURES PERFORMED:   Open reduction internal fixation of right proximal humerus fracture    Surgeon(s) and Role:     * Sigrid Buckner MD - Primary    ANESTHESIA: General + interscalene block    ESTIMATED BLOOD LOSS: less than 50 mL    IMPLANTS:   Implant Name Type Inv. Item Serial No.  Lot No. LRB No. Used Action   [employer] CHRONOS GRANULES LARGE 2.8-5.6MM/10CC     [id number] Right 1 Implanted        FINDINGS:see below    SPECIMENS:   Specimen (24h ago, onward)      None            COMPLICATIONS: None.     INTRAOPERATIVE COUNTS: Correct.     PROPHYLACTIC IV ANTIBIOTICS: Given per [facility] Protocol.    INDICATIONS FOR OPERATION: Darya Low 63 y.o. female has been seen and evaluated on the floor and found to have a displaced proximal humerus fracture    DESCRIPTION OF PROCEDURE:     The patient was taken to the operating room and placed in the beach chair position.  Anesthesia was administered and pre-operative antibiotics were given.  A timeout was performed.  Patient was positioned appropriately and prepped and draped in the usual sterile fashion with the operative arm suspended in a Spider Arm andrews. A standard deltopectoral incision was then undertaken to the operative extremity. The skin was cut, and then bovie electrocautery was used on the skin edges to control bleeding and reduce the bacterial burden of p. Acnes.  The cephalic vein was identified and  mobilized laterally.  The deltopectoral interval was thus exposed.  A Kolbel self-retaining retractor was placed.  The clavipectoral fascia was identified and released.  The leading edge of the pectoralis major tendon was partially released, thus exposing the biceps tendon.  The biceps tendon was adjacent to a lesser tuberosity fracture fragment    This was a comminuted fracture and there was a small avulsion adjacent to the bicipital groove I was able to expose the fracture site by lifting up this lesser tuberosity fragment and remove the clot from within the area.  There was a large defect and I elected to augment this with cancellous chips these chips were placed in the shaft fracture was held provisionally reduced and then a plate was applied to the lateral cortex of the proximal humerus.  This was provisionally pinned and then a screw was placed through the oblong hole in order to fixate this.  K-wires were then utilized to provide provisional reduction and under fluoroscopic imaging confirmed adequate reduction of the fracture.  I then placed a combination of locking screws proximally followed by additional nonlocking screws distally.  This provided excellent fixation of the comminuted proximal humerus fracture.   Additionally I placed nonabsorbable 5. Ethibond into the lesser tuberosity and subscapularis and greater tuberosity and supraspinatus tendons and these were then threaded through the holes within the plate and tied in order to provide additional fixation the wound was then copiously irrigated      The wounds were copiously irrigated and closed in the standard fashion.  Sterile dressing was applied with a Polar Care and shoulder abduction pillow sling.  Patient was  taken to recovery room.    POSTOPERATIVE PLAN: The patient will be admitted for postoperative care per protocol.  24 hours of antibiotic prophylaxis.  DVT prophylaxis given.  Follow a RTSA rehab protocol.

## 2024-03-25 NOTE — PLAN OF CARE
Problem: Infection  Goal: Absence of Infection Signs and Symptoms  Outcome: Ongoing, Progressing     Problem: Adult Inpatient Plan of Care  Goal: Plan of Care Review  Outcome: Ongoing, Progressing  Goal: Patient-Specific Goal (Individualized)  Outcome: Ongoing, Progressing  Goal: Absence of Hospital-Acquired Illness or Injury  Outcome: Ongoing, Progressing  Goal: Optimal Comfort and Wellbeing  Outcome: Ongoing, Progressing  Goal: Readiness for Transition of Care  Outcome: Ongoing, Progressing     Problem: Impaired Wound Healing  Goal: Optimal Wound Healing  Outcome: Ongoing, Progressing     Problem: Skin Injury Risk Increased  Goal: Skin Health and Integrity  Outcome: Ongoing, Progressing     Problem: Gas Exchange Impaired  Goal: Optimal Gas Exchange  Outcome: Ongoing, Progressing     Problem: Fall Injury Risk  Goal: Absence of Fall and Fall-Related Injury  Outcome: Ongoing, Progressing     Problem: Airway Clearance Ineffective  Goal: Effective Airway Clearance  Outcome: Ongoing, Progressing

## 2024-03-25 NOTE — PLAN OF CARE
Problem: Adult Inpatient Plan of Care  Goal: Plan of Care Review  Outcome: Ongoing, Progressing  Goal: Optimal Comfort and Wellbeing  Outcome: Ongoing, Progressing  Intervention: Monitor Pain and Promote Comfort  Flowsheets (Taken 3/25/2024 0703)  Pain Management Interventions:   pain management plan reviewed with patient/caregiver   quiet environment facilitated

## 2024-03-25 NOTE — PROGRESS NOTES
[facility] - Orthopedic Periop Services  [facility] Medicine  Progress Note    Patient Name: Darya Low  MRN: [id number]  Patient Class: IP- Inpatient   Admission Date: 3/18/2024  Length of Stay: 7 days  Attending Physician: Marcelino Otero MD  Primary Care Provider: Maeve, Primary Doctor        Subjective:     Principal Problem:Falls, subsequent encounter        HPI:  No notes on file    Overview/Hospital Course:  Admitted on 3/18/24 under trauma surgery for right shoulder (proximal humerus), hip and vertebral fractures. She was hypotensive on admission so was monitored in ICU and required pressor support for the first 36 hours of admission but has been off since. She underwent right hip pinning by Dr. Buckner, also possibly to require ORIF of right humerus on 3/25. Ortho spine evaluated and plan for T12-L2 percutaneous fusion and L2-L5 kyphoplasty on 3/26.    3/23- Transferred out of ICU and hospitalist assumed care. Dr. Hills from Trauma team has requested hospitalist to assume primary. S/p 1 unit transfusion for pre-op reasons.   3/24- Hgb stabilized. Ortho tentatively plans for ORIF right humerus fracture tomorrow.       Interval History: Patient seen and examined at the bedside, reports pain is controlled.     Review of Systems   Musculoskeletal:  Positive for arthralgias and back pain.     Objective:     Vital Signs (Most Recent):  Temp: 98.4 °F (36.9 °C) (03/25/24 1045)  Pulse: 64 (03/25/24 1045)  Resp: 18 (03/25/24 1024)  BP: (!) 151/70 (03/25/24 1045)  SpO2: (!) 93 % (03/25/24 1045) Vital Signs (24h Range):  Temp:  [98.4 °F (36.9 °C)-99.7 °F (37.6 °C)] 98.4 °F (36.9 °C)  Pulse:  [64-97] 64  Resp:  [16-20] 18  SpO2:  [88 %-97 %] 93 %  BP: (137-180)/(63-99) 151/70     Weight: 53.7 kg (118 lb 6.4 oz)  Body mass index is 20.97 kg/m².    Intake/Output Summary (Last 24 hours) at 3/25/2024 1520  Last data filed at 3/25/2024 1508  Gross per 24 hour   Intake 1000 ml   Output --   Net 1000 ml      3     Physical  Exam  Constitutional:       Appearance: She is ill-appearing.   HENT:      Head: Normocephalic.   Cardiovascular:      Rate and Rhythm: Normal rate and regular rhythm.   Pulmonary:      Effort: Pulmonary effort is normal. No respiratory distress.      Breath sounds: Wheezing present.   Abdominal:      General: Bowel sounds are normal.      Palpations: Abdomen is soft.   Musculoskeletal:      Comments: Right arm in sling, right hip dressing in place.    Skin:     Findings: Bruising present.   Neurological:      General: No focal deficit present.      Mental Status: She is alert and oriented to person, place, and time.             Significant Labs: All pertinent labs within the past 24 hours have been reviewed.    Significant Imaging: I have reviewed all pertinent imaging results/findings within the past 24 hours.    Assessment/Plan:      * Falls, subsequent encounter  Presented to ED with fall, resides at nursing home.  Admitted under trauma team- trauma workup with fractures, no major bleeding noted.   Plan as outlined below.       Closed compression fracture of body of L1 vertebra  MRI L-spine with compression fracture of the L1 vertebra, height loss estimated at the 70%.  There is posterior cortex retropulsion contributing to mild-to-moderate central canal stenosis.   Plan for T12-L2 percutaneous fusion and L2-L5 kyphoplasty on 3/26 by Dr. Conway.      Closed displaced fracture of right femoral neck  Ortho consulted; s/p pinning by Dr. Buckner on 3/20/24.  PT/OT and pain control.       Closed fracture of proximal end of right humerus with routine healing  Initial X-ray showed proximal fracture but later showed increasing displacement.  Ortho consulted; plan for ORIF per Dr. Buckner on 3/25.      Acute cystitis  S/p IV ceftriaxone x 3 days.       Chronic pain  Resume home Oxycontin and gabapentin.  For acute pain she is on IV narcotics as well.       Hypovolemic shock  Required pressor support initially in ICU but off  since.  Stable BP.       Supplemental oxygen dependent  On 3L O2 at baseline for COPD.       Iron deficiency anemia due to chronic blood loss  Patient's anemia is currently uncontrolled. S/p 1 unit PRBC for Hgb 7 on 3/23/24 in preparation of upcoming OR trips. Etiology likely d/t acute blood loss which was from surgery on top of chronic anemia (baseline Hgb ~ 9-10)  Current CBC reviewed-   Lab Results   Component Value Date    HGB 9.7 (L) 03/25/2024    HCT 30.0 (L) 03/25/2024     Monitor serial CBC and transfuse if patient becomes hemodynamically unstable, symptomatic or H/H drops below 7/21.    DONALD (generalized anxiety disorder)  Resume home escitalopram and clonazepam.     Chronic obstructive pulmonary disease  Patient's COPD is controlled currently.  Patient is currently off COPD Pathway. Continue scheduled inhalers Supplemental oxygen and monitor respiratory status closely.       VTE Risk Mitigation (From admission, onward)           Ordered     enoxaparin injection 40 mg  Every 24 hours         03/18/24 1650                    Discharge Planning   VALENTIN: 3/27/2024     Code Status: DNR   Is the patient medically ready for discharge?:     Reason for patient still in hospital (select all that apply): Patient trending condition, Consult recommendations, and Pending disposition  Discharge Plan A: Hospice/home              RENETTA DIOR MD  Department of Hospital Medicine   [facility] - Orthopedic Periop Services

## 2024-03-25 NOTE — PROGRESS NOTES
Office: [phone]    Subjective:   63F with L1 burst fracture, L3, L4, L5 compression fractures.  Status post right proximal humerus ORIF today with Dr. Buckner.  Prior right femoral neck cannulated screw fixation.    I/O as of 1:15 PM:   Intake/Output - Last 3 Shifts         03/23 0700 03/24 0659 03/24 0700 03/25 0659 03/25 0700 03/26 0659    P.O. 542      IV Piggyback   1200    Total Intake(mL/kg) 542 (10.1)  1200 (22.3)    Urine (mL/kg/hr)   0 (0)    Stool       Blood   30    Total Output   30    Net +542  +1170           Urine Occurrence 2 x 5 x 3 x    Stool Occurrence  1 x              Vitals / Physical Exam:  Vitals:    03/25/24 1630 03/25/24 1633 03/25/24 1646 03/25/24 1647   BP: (!) 156/69 (!) 145/83 (!) 171/87 (!) 169/83   Pulse: 82 79 80 78   Resp: 13 (!) 8     Temp:   97.9 °F (36.6 °C)    TempSrc:   Oral    SpO2: 96% 97% 97% 97%   Weight:       Height:            AOx3   NAD      Motor  C5 C6 C7 C8 T1      Left  5 5 5 5 5      Right  pain pain pain 5 5   Sensory           Left  + + + + +     Right + + + + +     Motor L2 L3 L4 L5 S1   Left 4 4 4 4 4   Right pain pain 4 4 4   Sensory        Left  + + + + +   Right + + + + +         Assessment / Plan:   Darya Low is a 63 y.o. female with L1 burst fracture, L3, L4, L5 compression fractures    - hospitalist consulted for medical comanagement  - plan for T12-L2 percutaneous fusion and L3-L5 kyphoplasty tomorrow  - Please keep dressing clean, dry and intact; will be changed as needed  - Please continue Diet NPO Except for: Medication diet   - Pain at this time is well controlled; continue current pain regiment  - TEDs/SCDs  - PT  - Bowel regimen  - Please call should you have any questions regarding this patient's care      Signature:  Logan Conway MD     Electronic Signature

## 2024-03-25 NOTE — PROGRESS NOTES
Daily Orthopaedic Progress Note    Darya Low is a 63 y.o. female admitted on 3/18/2024  Hospital Day: 7  Post Op Day: * No surgery date entered *    Antibiotics (From admission, onward)      Start     Stop Route Frequency Ordered    03/18/24 2100  mupirocin 2 % ointment         03/23/24 2059 Nasl 2 times daily 03/18/24 1722             The patient was seen and examined this morning at the bedside. Patient reports no acute issues overnight and adequate control of pain on current regimen.  Patient worked with physical therapy over the last 24 hours.      PHYSICAL EXAM:  Awake/alert/oriented x3, No acute distress, Afebrile, Vital signs stable  Normocephalic, Atraumatic  Good inspiratory effort with unlaboured breathing  Dressings clean/dry/intact, Operative limb neurovascularly intact with 5/5 strength distally and sensation intact to light touch distally; some decreased sensation over the area of the regional block and palpable pulses  Vitals:    03/25/24 0803 03/25/24 0804 03/25/24 1024 03/25/24 1045   BP: 137/72   (!) 151/70   Pulse: 81   64   Resp:  18 18    Temp:    98.4 °F (36.9 °C)   TempSrc:    Oral   SpO2:    (!) 93%   Weight:       Height:         No intake/output data recorded.    Significant Labs:  Recent Lab Results         03/25/24  0407        Anion Gap 7       Baso # 0.05       Basophil % 0.8       BUN 5       BUN/CREAT RATIO 14       Calcium 8.3       Chloride 104       CO2 33       Creatinine 0.35       Differential Method Auto       eGFR 116       Eos # 0.19       Eos % 3.1       Glucose 85       Hematocrit 30.0       Hemoglobin 9.7       Immature Grans (Abs) 0.05       Immature Granulocytes 0.8       Lymph # 1.03       Lymph % 16.9       MCH 27.9       MCHC 32.3       MCV 86.2       Mono # 0.76       Mono % 12.5       MPV 10.9       Neutrophils, Abs 4.02       Neutrophils Relative 65.9       nRBC 0.0       NUCLEATED RBC ABSOLUTE 0.00       Platelet Count 172       Potassium 3.9       RBC 3.48        RDW 15.9       Sodium 140       WBC 6.10               Significant Diagnostics:  X-Ray Hip 2 or 3 views Right (with Pelvis when performed)  Narrative: EXAMINATION:  XR HIP WITH PELVIS WHEN PERFORMED, 2 OR 3  VIEWS RIGHT    CLINICAL HISTORY:  po (post op xrays never taken);.  Fracture of unspecified part of neck of right femur, initial encounter for closed fracture    COMPARISON:  March 18, 2024    TECHNIQUE:  AP and frog-lateral views right hip to include AP pelvis.  Three total views    FINDINGS:  The patient is postop ORIF right hip fracture.  Three orthopedic screws traverse the subcapital fracture right hip with excellent alignment and positioning.    There is no radiographic evidence to suggest postoperative complication.  There is old healed fracture deformity of the bilateral inferior pubic rami as well as the right acetabulum.  There has been previous left hip replacement.  Osteopenia  Impression: Postop right hip    Electronically signed by: Michele Alba  Date:    03/22/2024  Time:    08:38  X-ray Shoulder 2 or More Views Right  Narrative: EXAMINATION:  XR SHOULDER COMPLETE 2 OR MORE VIEWS RIGHT    CLINICAL HISTORY:  recheck; Unspecified fracture of upper end of right humerus, initial encounter for closed fracture    COMPARISON:  18 March 2024    TECHNIQUE:  XR SHOULDER 3 VIEWS RIGHT    FINDINGS:  Previously seen fracture present with suggestion of increased medial and anterior displacement compared to prior study.  Otherwise the alignment of the joints appears normal.  No degenerative change is present.  No soft tissue abnormality is seen.  Impression: Suggestion of increased displacement of proximal humerus fracture.  No other definite changes.    Electronically signed by: Ernst Hermosillo  Date:    03/22/2024  Time:    08:38       A/P: 63 y.o. female * No surgery date entered * s/p right hip perc pinning with right proximal humerus fracture  -Continue with current pain control regimen  -Continue  with current physical therapy plan (TTWB RLE)  -Continue with DVT prophylaxis  A re-x-ray to proximal humerus today this is displaced.  She has been taking her sling on and off.        Discuss this with her will plan for right shoulder open reduction internal fixation of the right proximal humerus fracture NPO plan for surgery today.  Que Buckner MD  Orthopaedic Staff Surgeon

## 2024-03-25 NOTE — PLAN OF CARE
TICKET TO RIDE OBTAINED PRIOR TO SURGERY TRANSPORT.   ATTEMPTED TO CONTACT SON PER PT REQUEST X 3 WITHOUT SUCCESS.

## 2024-03-25 NOTE — ANESTHESIA PREPROCEDURE EVALUATION
03/25/2024  Darya Low is a 63 y.o., female.      Pre-op Assessment    I have reviewed the Patient Summary Reports.     I have reviewed the Nursing Notes. I have reviewed the NPO Status.   I have reviewed the Medications.     Review of Systems  Anesthesia Hx:  No problems with previous Anesthesia             Denies Family Hx of Anesthesia complications.    Denies Personal Hx of Anesthesia complications.                    Social:  No Alcohol Use, Smoker       Hematology/Oncology:    Oncology Normal    -- Anemia:                                  EENT/Dental:  EENT/Dental Normal           Cardiovascular:  Cardiovascular Normal Exercise tolerance: poor                 ECG has been reviewed.                          Pulmonary:   COPD                     Renal/:  Renal/ Normal                 Hepatic/GI:  Hepatic/GI Normal                 Musculoskeletal:     Fall at nursing home on 3/18/24 with lumbar injuries, right hip fracture, and right humerus fracture.  Patient on hospice care and is DNR.    Right hip pinning performed on 3/20/24.    Planned lumbar percutaneous fixation on 3/36/24.    Patient to OR today for fixation of right humerus fracture.       Spine Disorders: lumbar            Neurological:           On chronic narcotics and benzos      Chronic Pain Syndrome   Peripheral Neuropathy                          Endocrine:  Endocrine Normal            Dermatological:  Skin Normal    Psych:  Psychiatric History anxiety                 Physical Exam  General: Well nourished    Airway:  Mallampati: II / II  Mouth Opening: Normal  TM Distance: > 6 cm  Tongue: Normal  Neck ROM: Normal ROM    Chest/Lungs:  Clear to auscultation, Normal Respiratory Rate    Heart:  Rate: Normal  Rhythm: Regular Rhythm        Chemistry        Component Value Date/Time     03/25/2024 0407    K 3.9 03/25/2024 0407    CL  104 03/25/2024 0407    CO2 33 (H) 03/25/2024 0407    BUN 5 (L) 03/25/2024 0407    CREATININE 0.35 (L) 03/25/2024 0407    GLU 85 03/25/2024 0407        Component Value Date/Time    CALCIUM 8.3 (L) 03/25/2024 0407    ALKPHOS 110 03/18/2024 1221    AST 45 (H) 03/18/2024 1221    ALT 35 03/18/2024 1221    BILITOT 0.5 03/18/2024 1221    ESTGFRAFRICA 108 03/29/2021 0330    EGFRNONAA 78 04/27/2022 1531        Lab Results   Component Value Date    WBC 6.10 03/25/2024    HGB 9.7 (L) 03/25/2024    HCT 30.0 (L) 03/25/2024     03/25/2024     Results for orders placed or performed during the hospital encounter of 02/13/24   EKG 12-lead    Collection Time: 02/13/24 11:55 AM   Result Value Ref Range    QRS Duration 78 ms    OHS QTC Calculation 381 ms    Narrative    Test Reason : R06.02,    Vent. Rate : 064 BPM     Atrial Rate : 000 BPM     P-R Int : 240 ms          QRS Dur : 078 ms      QT Int : 374 ms       P-R-T Axes : 079 061 074 degrees     QTc Int : 381 ms    Sinus rhythm   Q in V1/V2 may be due to lead placement error though septal infarct cannot  be excluded  Lateral T wave abnormality is nonspecific  Abnormal ECG    Confirmed by Tesfaye ESTRADA, Moses HENSLEY ([id number]) on 2/13/2024 5:16:38 PM    Referred By: [email or web address]   SELF           Confirmed By:Moses Carlin MD     Summary         Left Ventricle: The left ventricle is normal in size. Septal thickening. There is concentric remodeling. Normal wall motion. There is normal systolic function. Biplane (2D) method of discs ejection fraction is 60%. There is normal diastolic function.    Right Ventricle: Normal right ventricular cavity size. Systolic function is normal.    Right Atrium: Normal right atrial size. There is a prominent Eustachian valve. Differential includes RA thrombus.    Aortic Valve: The aortic valve is a trileaflet valve.    Mitral Valve: There is mild regurgitation.    Tricuspid Valve: There is moderate regurgitation.    Pulmonary Artery: The estimated  pulmonary artery systolic pressure is 34 mmHg.    IVC/SVC: Normal venous pressure at 3 mmHg.    Anesthesia Plan  Type of Anesthesia, risks & benefits discussed:    Anesthesia Type: Gen ETT  Intra-op Monitoring Plan: Standard ASA Monitors  Post Op Pain Control Plan: multimodal analgesia  Induction:  IV  Airway Plan: Direct, Post-Induction  Informed Consent: Informed consent signed with the Patient and all parties understand the risks and agree with anesthesia plan.  All questions answered. Patient consented to blood products? Yes  ASA Score: 3  Day of Surgery Review of History & Physical: H&P Update referred to the surgeon/provider.I have interviewed and examined the patient. I have reviewed the patient's H&P dated: There are no significant changes.     Ready For Surgery From Anesthesia Perspective.     .

## 2024-03-25 NOTE — ASSESSMENT & PLAN NOTE
Patient's anemia is currently uncontrolled. S/p 1 unit PRBC for Hgb 7 on 3/23/24 in preparation of upcoming OR trips. Etiology likely d/t acute blood loss which was from surgery on top of chronic anemia (baseline Hgb ~ 9-10)  Current CBC reviewed-   Lab Results   Component Value Date    HGB 9.7 (L) 03/25/2024    HCT 30.0 (L) 03/25/2024     Monitor serial CBC and transfuse if patient becomes hemodynamically unstable, symptomatic or H/H drops below 7/21.

## 2024-03-26 ENCOUNTER — ANESTHESIA (OUTPATIENT)
Dept: SURGERY | Facility: HOSPITAL | Age: 64
DRG: 459 | End: 2024-03-26
Payer: MEDICARE

## 2024-03-26 ENCOUNTER — ANESTHESIA EVENT (OUTPATIENT)
Dept: SURGERY | Facility: HOSPITAL | Age: 64
DRG: 459 | End: 2024-03-26
Payer: MEDICARE

## 2024-03-26 PROBLEM — I10 ESSENTIAL HYPERTENSION: Status: ACTIVE | Noted: 2024-03-26

## 2024-03-26 LAB
ANION GAP SERPL CALCULATED.3IONS-SCNC: 12 MMOL/L (ref 7–16)
BASOPHILS # BLD AUTO: 0.03 K/UL (ref 0–0.2)
BASOPHILS NFR BLD AUTO: 0.5 % (ref 0–1)
BUN SERPL-MCNC: 12 MG/DL (ref 7–18)
BUN/CREAT SERPL: 15 (ref 6–20)
CALCIUM SERPL-MCNC: 7.8 MG/DL (ref 8.5–10.1)
CHLORIDE SERPL-SCNC: 101 MMOL/L (ref 98–107)
CO2 SERPL-SCNC: 30 MMOL/L (ref 21–32)
CREAT SERPL-MCNC: 0.8 MG/DL (ref 0.55–1.02)
DIFFERENTIAL METHOD BLD: ABNORMAL
EGFR (NO RACE VARIABLE) (RUSH/TITUS): 83 ML/MIN/1.73M2
EOSINOPHIL # BLD AUTO: 0 K/UL (ref 0–0.5)
EOSINOPHIL NFR BLD AUTO: 0 % (ref 1–4)
ERYTHROCYTE [DISTWIDTH] IN BLOOD BY AUTOMATED COUNT: 15.4 % (ref 11.5–14.5)
GLUCOSE SERPL-MCNC: 155 MG/DL (ref 70–105)
GLUCOSE SERPL-MCNC: 211 MG/DL (ref 74–106)
HCT VFR BLD AUTO: 25.9 % (ref 38–47)
HCT VFR BLD AUTO: 27.2 % (ref 38–47)
HGB BLD-MCNC: 8 G/DL (ref 12–16)
HGB BLD-MCNC: 8.3 G/DL (ref 12–16)
IMM GRANULOCYTES # BLD AUTO: 0.05 K/UL (ref 0–0.04)
IMM GRANULOCYTES NFR BLD: 0.9 % (ref 0–0.4)
LYMPHOCYTES # BLD AUTO: 0.71 K/UL (ref 1–4.8)
LYMPHOCYTES NFR BLD AUTO: 12.7 % (ref 27–41)
MCH RBC QN AUTO: 27.7 PG (ref 27–31)
MCHC RBC AUTO-ENTMCNC: 30.5 G/DL (ref 32–36)
MCV RBC AUTO: 90.7 FL (ref 80–96)
MONOCYTES # BLD AUTO: 0.46 K/UL (ref 0–0.8)
MONOCYTES NFR BLD AUTO: 8.2 % (ref 2–6)
MPC BLD CALC-MCNC: 11.6 FL (ref 9.4–12.4)
NEUTROPHILS # BLD AUTO: 4.36 K/UL (ref 1.8–7.7)
NEUTROPHILS NFR BLD AUTO: 77.7 % (ref 53–65)
NRBC # BLD AUTO: 0 X10E3/UL
NRBC, AUTO (.00): 0 %
PLATELET # BLD AUTO: 210 K/UL (ref 150–400)
POTASSIUM SERPL-SCNC: 4.1 MMOL/L (ref 3.5–5.1)
RBC # BLD AUTO: 3 M/UL (ref 4.2–5.4)
SODIUM SERPL-SCNC: 139 MMOL/L (ref 136–145)
WBC # BLD AUTO: 5.61 K/UL (ref 4.5–11)

## 2024-03-26 PROCEDURE — 22612 ARTHRD PST TQ 1NTRSPC LUMBAR: CPT | Mod: AS,,, | Performed by: NURSE PRACTITIONER

## 2024-03-26 PROCEDURE — 27201423 OPTIME MED/SURG SUP & DEVICES STERILE SUPPLY: Performed by: ORTHOPAEDIC SURGERY

## 2024-03-26 PROCEDURE — 25000003 PHARM REV CODE 250: Performed by: ORTHOPAEDIC SURGERY

## 2024-03-26 PROCEDURE — C1726 CATH, BAL DIL, NON-VASCULAR: HCPCS | Performed by: ORTHOPAEDIC SURGERY

## 2024-03-26 PROCEDURE — 63600175 PHARM REV CODE 636 W HCPCS: Performed by: ORTHOPAEDIC SURGERY

## 2024-03-26 PROCEDURE — 27000655: Performed by: ANESTHESIOLOGY

## 2024-03-26 PROCEDURE — 63600175 PHARM REV CODE 636 W HCPCS: Mod: JZ,JG | Performed by: ORTHOPAEDIC SURGERY

## 2024-03-26 PROCEDURE — 0SG1371 FUSION OF 2 OR MORE LUMBAR VERTEBRAL JOINTS WITH AUTOLOGOUS TISSUE SUBSTITUTE, POSTERIOR APPROACH, POSTERIOR COLUMN, PERCUTANEOUS APPROACH: ICD-10-PCS | Performed by: ORTHOPAEDIC SURGERY

## 2024-03-26 PROCEDURE — 11000001 HC ACUTE MED/SURG PRIVATE ROOM

## 2024-03-26 PROCEDURE — 0RGA371 FUSION OF THORACOLUMBAR VERTEBRAL JOINT WITH AUTOLOGOUS TISSUE SUBSTITUTE, POSTERIOR APPROACH, POSTERIOR COLUMN, PERCUTANEOUS APPROACH: ICD-10-PCS | Performed by: ORTHOPAEDIC SURGERY

## 2024-03-26 PROCEDURE — D9220A PRA ANESTHESIA: Mod: GW,ANES,, | Performed by: ANESTHESIOLOGY

## 2024-03-26 PROCEDURE — 22612 ARTHRD PST TQ 1NTRSPC LUMBAR: CPT | Mod: 79,,, | Performed by: ORTHOPAEDIC SURGERY

## 2024-03-26 PROCEDURE — 85014 HEMATOCRIT: CPT | Performed by: ORTHOPAEDIC SURGERY

## 2024-03-26 PROCEDURE — D9220A PRA ANESTHESIA: Mod: GW,CRNA,, | Performed by: NURSE ANESTHETIST, CERTIFIED REGISTERED

## 2024-03-26 PROCEDURE — 37000008 HC ANESTHESIA 1ST 15 MINUTES: Performed by: ORTHOPAEDIC SURGERY

## 2024-03-26 PROCEDURE — 27000716 HC OXISENSOR PROBE, ANY SIZE: Performed by: ANESTHESIOLOGY

## 2024-03-26 PROCEDURE — 0QS03ZZ REPOSITION LUMBAR VERTEBRA, PERCUTANEOUS APPROACH: ICD-10-PCS | Performed by: ORTHOPAEDIC SURGERY

## 2024-03-26 PROCEDURE — 22840 INSERT SPINE FIXATION DEVICE: CPT | Mod: ,,, | Performed by: ORTHOPAEDIC SURGERY

## 2024-03-26 PROCEDURE — 27000221 HC OXYGEN, UP TO 24 HOURS

## 2024-03-26 PROCEDURE — 25500020 PHARM REV CODE 255: Performed by: ORTHOPAEDIC SURGERY

## 2024-03-26 PROCEDURE — 22325 TREAT SPINE FRACTURE: CPT | Mod: AS,51,, | Performed by: NURSE PRACTITIONER

## 2024-03-26 PROCEDURE — 85025 COMPLETE CBC W/AUTO DIFF WBC: CPT | Performed by: ORTHOPAEDIC SURGERY

## 2024-03-26 PROCEDURE — 85018 HEMOGLOBIN: CPT | Performed by: ORTHOPAEDIC SURGERY

## 2024-03-26 PROCEDURE — 25000242 PHARM REV CODE 250 ALT 637 W/ HCPCS: Performed by: ORTHOPAEDIC SURGERY

## 2024-03-26 PROCEDURE — 22325 TREAT SPINE FRACTURE: CPT | Mod: 79,51,, | Performed by: ORTHOPAEDIC SURGERY

## 2024-03-26 PROCEDURE — 99232 SBSQ HOSP IP/OBS MODERATE 35: CPT | Mod: ,,, | Performed by: INTERNAL MEDICINE

## 2024-03-26 PROCEDURE — 97166 OT EVAL MOD COMPLEX 45 MIN: CPT

## 2024-03-26 PROCEDURE — 36000710: Performed by: ORTHOPAEDIC SURGERY

## 2024-03-26 PROCEDURE — 27000165 HC TUBE, ETT CUFFED: Performed by: ANESTHESIOLOGY

## 2024-03-26 PROCEDURE — 71000033 HC RECOVERY, INTIAL HOUR: Performed by: ORTHOPAEDIC SURGERY

## 2024-03-26 PROCEDURE — C1769 GUIDE WIRE: HCPCS | Performed by: ORTHOPAEDIC SURGERY

## 2024-03-26 PROCEDURE — 37000009 HC ANESTHESIA EA ADD 15 MINS: Performed by: ORTHOPAEDIC SURGERY

## 2024-03-26 PROCEDURE — 94761 N-INVAS EAR/PLS OXIMETRY MLT: CPT

## 2024-03-26 PROCEDURE — 97163 PT EVAL HIGH COMPLEX 45 MIN: CPT

## 2024-03-26 PROCEDURE — 22840 INSERT SPINE FIXATION DEVICE: CPT | Mod: AS,,, | Performed by: NURSE PRACTITIONER

## 2024-03-26 PROCEDURE — 22614 ARTHRD PST TQ 1NTRSPC EA ADD: CPT | Mod: ,,, | Performed by: ORTHOPAEDIC SURGERY

## 2024-03-26 PROCEDURE — 63600175 PHARM REV CODE 636 W HCPCS: Performed by: NURSE ANESTHETIST, CERTIFIED REGISTERED

## 2024-03-26 PROCEDURE — 25000003 PHARM REV CODE 250: Performed by: NURSE ANESTHETIST, CERTIFIED REGISTERED

## 2024-03-26 PROCEDURE — 80048 BASIC METABOLIC PNL TOTAL CA: CPT | Performed by: ORTHOPAEDIC SURGERY

## 2024-03-26 PROCEDURE — 82962 GLUCOSE BLOOD TEST: CPT

## 2024-03-26 PROCEDURE — C1713 ANCHOR/SCREW BN/BN,TIS/BN: HCPCS | Performed by: ORTHOPAEDIC SURGERY

## 2024-03-26 PROCEDURE — 63600175 PHARM REV CODE 636 W HCPCS: Performed by: ANESTHESIOLOGY

## 2024-03-26 PROCEDURE — 22514 PERQ VERTEBRAL AUGMENTATION: CPT | Mod: 79,59,, | Performed by: ORTHOPAEDIC SURGERY

## 2024-03-26 PROCEDURE — 36000711: Performed by: ORTHOPAEDIC SURGERY

## 2024-03-26 PROCEDURE — 20936 SP BONE AGRFT LOCAL ADD-ON: CPT | Mod: ,,, | Performed by: ORTHOPAEDIC SURGERY

## 2024-03-26 PROCEDURE — 22614 ARTHRD PST TQ 1NTRSPC EA ADD: CPT | Mod: AS,,, | Performed by: NURSE PRACTITIONER

## 2024-03-26 PROCEDURE — 27000689 HC BLADE LARYNGOSCOPE ANY SIZE: Performed by: ANESTHESIOLOGY

## 2024-03-26 PROCEDURE — 94640 AIRWAY INHALATION TREATMENT: CPT

## 2024-03-26 PROCEDURE — 27000510 HC BLANKET BAIR HUGGER ANY SIZE: Performed by: ANESTHESIOLOGY

## 2024-03-26 PROCEDURE — 22515 PERQ VERTEBRAL AUGMENTATION: CPT | Mod: 59,,, | Performed by: ORTHOPAEDIC SURGERY

## 2024-03-26 PROCEDURE — 99900035 HC TECH TIME PER 15 MIN (STAT)

## 2024-03-26 PROCEDURE — 0QU03JZ SUPPLEMENT LUMBAR VERTEBRA WITH SYNTHETIC SUBSTITUTE, PERCUTANEOUS APPROACH: ICD-10-PCS | Performed by: ORTHOPAEDIC SURGERY

## 2024-03-26 DEVICE — CEMENT AUTOPLEX KIT W/VERTEPLE: Type: IMPLANTABLE DEVICE | Site: SPINE LUMBAR | Status: FUNCTIONAL

## 2024-03-26 RX ORDER — ROCURONIUM BROMIDE 10 MG/ML
INJECTION, SOLUTION INTRAVENOUS
Status: DISCONTINUED | OUTPATIENT
Start: 2024-03-26 | End: 2024-03-26

## 2024-03-26 RX ORDER — PHENYLEPHRINE HYDROCHLORIDE 10 MG/ML
INJECTION INTRAVENOUS
Status: DISCONTINUED | OUTPATIENT
Start: 2024-03-26 | End: 2024-03-26

## 2024-03-26 RX ORDER — FENTANYL CITRATE 50 UG/ML
INJECTION, SOLUTION INTRAMUSCULAR; INTRAVENOUS
Status: DISCONTINUED | OUTPATIENT
Start: 2024-03-26 | End: 2024-03-26

## 2024-03-26 RX ORDER — DIPHENHYDRAMINE HYDROCHLORIDE 50 MG/ML
25 INJECTION INTRAMUSCULAR; INTRAVENOUS EVERY 6 HOURS PRN
Status: DISCONTINUED | OUTPATIENT
Start: 2024-03-26 | End: 2024-03-26 | Stop reason: HOSPADM

## 2024-03-26 RX ORDER — HYDROMORPHONE HYDROCHLORIDE 2 MG/ML
0.5 INJECTION, SOLUTION INTRAMUSCULAR; INTRAVENOUS; SUBCUTANEOUS EVERY 5 MIN PRN
Status: DISCONTINUED | OUTPATIENT
Start: 2024-03-26 | End: 2024-03-26 | Stop reason: HOSPADM

## 2024-03-26 RX ORDER — PROPOFOL 10 MG/ML
VIAL (ML) INTRAVENOUS
Status: DISCONTINUED | OUTPATIENT
Start: 2024-03-26 | End: 2024-03-26

## 2024-03-26 RX ORDER — PROMETHAZINE HYDROCHLORIDE 25 MG/ML
INJECTION, SOLUTION INTRAMUSCULAR; INTRAVENOUS
Status: DISCONTINUED | OUTPATIENT
Start: 2024-03-26 | End: 2024-03-26

## 2024-03-26 RX ORDER — LIDOCAINE HYDROCHLORIDE 20 MG/ML
INJECTION, SOLUTION EPIDURAL; INFILTRATION; INTRACAUDAL; PERINEURAL
Status: DISCONTINUED | OUTPATIENT
Start: 2024-03-26 | End: 2024-03-26

## 2024-03-26 RX ORDER — ONDANSETRON 2 MG/ML
INJECTION INTRAMUSCULAR; INTRAVENOUS
Status: DISCONTINUED | OUTPATIENT
Start: 2024-03-26 | End: 2024-03-26

## 2024-03-26 RX ORDER — EPINEPHRINE 1 MG/ML
INJECTION INTRAMUSCULAR; INTRAVENOUS; SUBCUTANEOUS
Status: DISCONTINUED | OUTPATIENT
Start: 2024-03-26 | End: 2024-03-26 | Stop reason: HOSPADM

## 2024-03-26 RX ORDER — IPRATROPIUM BROMIDE AND ALBUTEROL SULFATE 2.5; .5 MG/3ML; MG/3ML
3 SOLUTION RESPIRATORY (INHALATION) ONCE AS NEEDED
Status: DISCONTINUED | OUTPATIENT
Start: 2024-03-26 | End: 2024-03-26 | Stop reason: HOSPADM

## 2024-03-26 RX ORDER — MORPHINE SULFATE 10 MG/ML
4 INJECTION INTRAMUSCULAR; INTRAVENOUS; SUBCUTANEOUS EVERY 5 MIN PRN
Status: DISCONTINUED | OUTPATIENT
Start: 2024-03-26 | End: 2024-03-26 | Stop reason: HOSPADM

## 2024-03-26 RX ORDER — MEPERIDINE HYDROCHLORIDE 25 MG/ML
25 INJECTION INTRAMUSCULAR; INTRAVENOUS; SUBCUTANEOUS ONCE AS NEEDED
Status: DISCONTINUED | OUTPATIENT
Start: 2024-03-26 | End: 2024-03-26 | Stop reason: HOSPADM

## 2024-03-26 RX ORDER — DEXAMETHASONE SODIUM PHOSPHATE 4 MG/ML
INJECTION, SOLUTION INTRA-ARTICULAR; INTRALESIONAL; INTRAMUSCULAR; INTRAVENOUS; SOFT TISSUE
Status: DISCONTINUED | OUTPATIENT
Start: 2024-03-26 | End: 2024-03-26

## 2024-03-26 RX ADMIN — FENTANYL CITRATE 50 MCG: 50 INJECTION INTRAMUSCULAR; INTRAVENOUS at 09:03

## 2024-03-26 RX ADMIN — HYDROMORPHONE HYDROCHLORIDE 0.5 MG: 2 INJECTION INTRAMUSCULAR; INTRAVENOUS; SUBCUTANEOUS at 10:03

## 2024-03-26 RX ADMIN — CEFAZOLIN 2 G: 2 INJECTION, POWDER, FOR SOLUTION INTRAMUSCULAR; INTRAVENOUS at 05:03

## 2024-03-26 RX ADMIN — ENOXAPARIN SODIUM 40 MG: 40 INJECTION SUBCUTANEOUS at 04:03

## 2024-03-26 RX ADMIN — ACETAMINOPHEN 650 MG: 325 TABLET ORAL at 06:03

## 2024-03-26 RX ADMIN — GABAPENTIN 300 MG: 300 CAPSULE ORAL at 04:03

## 2024-03-26 RX ADMIN — DEXAMETHASONE SODIUM PHOSPHATE 8 MG: 4 INJECTION, SOLUTION INTRA-ARTICULAR; INTRALESIONAL; INTRAMUSCULAR; INTRAVENOUS; SOFT TISSUE at 07:03

## 2024-03-26 RX ADMIN — PHENYLEPHRINE HYDROCHLORIDE 100 MCG: 10 INJECTION INTRAVENOUS at 08:03

## 2024-03-26 RX ADMIN — OXYCODONE HYDROCHLORIDE 10 MG: 10 TABLET, FILM COATED, EXTENDED RELEASE ORAL at 08:03

## 2024-03-26 RX ADMIN — CALCIUM CHLORIDE 0.5 G: 100 INJECTION, SOLUTION INTRAVENOUS at 08:03

## 2024-03-26 RX ADMIN — IPRATROPIUM BROMIDE AND ALBUTEROL SULFATE 3 ML: 2.5; .5 SOLUTION RESPIRATORY (INHALATION) at 12:03

## 2024-03-26 RX ADMIN — FENTANYL CITRATE 50 MCG: 50 INJECTION INTRAMUSCULAR; INTRAVENOUS at 08:03

## 2024-03-26 RX ADMIN — CLONAZEPAM 1 MG: 0.5 TABLET ORAL at 08:03

## 2024-03-26 RX ADMIN — PHENYLEPHRINE HYDROCHLORIDE 100 MCG: 10 INJECTION INTRAVENOUS at 09:03

## 2024-03-26 RX ADMIN — GABAPENTIN 300 MG: 300 CAPSULE ORAL at 08:03

## 2024-03-26 RX ADMIN — MORPHINE SULFATE 3 MG: 4 INJECTION, SOLUTION INTRAMUSCULAR; INTRAVENOUS at 06:03

## 2024-03-26 RX ADMIN — SODIUM CHLORIDE 2 G: 9 INJECTION, SOLUTION INTRAVENOUS at 07:03

## 2024-03-26 RX ADMIN — SODIUM CHLORIDE, POTASSIUM CHLORIDE, SODIUM LACTATE AND CALCIUM CHLORIDE: 600; 310; 30; 20 INJECTION, SOLUTION INTRAVENOUS at 07:03

## 2024-03-26 RX ADMIN — OXYCODONE HYDROCHLORIDE 5 MG: 5 TABLET ORAL at 04:03

## 2024-03-26 RX ADMIN — PROMETHAZINE HYDROCHLORIDE 12.5 MG: 25 INJECTION INTRAMUSCULAR; INTRAVENOUS at 08:03

## 2024-03-26 RX ADMIN — TRAMADOL HYDROCHLORIDE 50 MG: 50 TABLET, COATED ORAL at 01:03

## 2024-03-26 RX ADMIN — CLINDAMYCIN PHOSPHATE 900 MG: 900 INJECTION, SOLUTION INTRAVENOUS at 12:03

## 2024-03-26 RX ADMIN — MUPIROCIN: 20 OINTMENT TOPICAL at 08:03

## 2024-03-26 RX ADMIN — FENTANYL CITRATE 50 MCG: 50 INJECTION INTRAMUSCULAR; INTRAVENOUS at 07:03

## 2024-03-26 RX ADMIN — PROPOFOL 140 MG: 10 INJECTION, EMULSION INTRAVENOUS at 07:03

## 2024-03-26 RX ADMIN — ROCURONIUM BROMIDE 30 MG: 10 INJECTION, SOLUTION INTRAVENOUS at 07:03

## 2024-03-26 RX ADMIN — MUPIROCIN: 20 OINTMENT TOPICAL at 04:03

## 2024-03-26 RX ADMIN — FAMOTIDINE 20 MG: 20 TABLET ORAL at 08:03

## 2024-03-26 RX ADMIN — IPRATROPIUM BROMIDE AND ALBUTEROL SULFATE 3 ML: 2.5; .5 SOLUTION RESPIRATORY (INHALATION) at 07:03

## 2024-03-26 RX ADMIN — LIDOCAINE HYDROCHLORIDE 80 MG: 20 INJECTION, SOLUTION INTRAVENOUS at 07:03

## 2024-03-26 RX ADMIN — DOCUSATE SODIUM 100 MG: 100 CAPSULE, LIQUID FILLED ORAL at 08:03

## 2024-03-26 NOTE — OP NOTE
Certification of Assistant at Surgery       Surgery Date: 3/26/2024     Participating Surgeons:  Surgeon(s) and Role:     * Logan Conway MD - Primary    Procedures:  Procedure(s) (LRB):  T12-L2 percutaneous fusion (N/A)  KYPHOPLASTY, SPINE, LUMBAR, L3-L5 (N/A)    Assistant Surgeon's Certification of Necessity:  I understand that section 1842 (b) (6) (d) of the Social Security Act generally prohibits Medicare Part B reasonable charge payment for the services of assistants at surgery in teaching hospitals when qualified residents are available to furnish such services. I certify that the services for which payment is claimed were medically necessary, and that no qualified resident was available to perform the services. I further understand that these services are subject to post-payment review by the Medicare carrier.      Renee De Leon NP    03/26/2024  9:55 AM

## 2024-03-26 NOTE — PROGRESS NOTES
[facility]  [facility] Medicine  Progress Note    Patient Name: Darya Low  MRN: [id number]  Patient Class: IP- Inpatient   Admission Date: 3/18/2024  Length of Stay: 8 days  Attending Physician: Marcelino Otero MD  Primary Care Provider: Maeve, Primary Doctor        Subjective:     Principal Problem:Falls, subsequent encounter        HPI:  No notes on file    Overview/Hospital Course:  Admitted on 3/18/24 under trauma surgery for right shoulder (proximal humerus), hip and vertebral fractures. She was hypotensive on admission so was monitored in ICU and required pressor support for the first 36 hours of admission but has been off since. She underwent right hip pinning by Dr. Buckner, also possibly to require ORIF of right humerus on 3/25. Ortho spine evaluated and plan for T12-L2 percutaneous fusion and L2-L5 kyphoplasty on 3/26.    3/23- Transferred out of ICU and hospitalist assumed care. Dr. Hills from Trauma team has requested hospitalist to assume primary. S/p 1 unit transfusion for pre-op reasons.   3/24- Hgb stabilized. Ortho tentatively plans for ORIF right humerus fracture tomorrow.   3/25- s/p ORIF right humerus today.  3/26- s/p spinal surgery with Dr. Conway today. PT/OT and SW consulted.       Interval History: Patient seen and examined at the bedside, reports pain is controlled.     Review of Systems   Musculoskeletal:  Positive for arthralgias and back pain.     Objective:     Vital Signs (Most Recent):  Temp: 97.5 °F (36.4 °C) (03/26/24 1145)  Pulse: 68 (03/26/24 1145)  Resp: 18 (03/26/24 1145)  BP: (!) 159/75 (03/26/24 1145)  SpO2: (!) 94 % (03/26/24 1145) Vital Signs (24h Range):  Temp:  [97.4 °F (36.3 °C)-98.5 °F (36.9 °C)] 97.5 °F (36.4 °C)  Pulse:  [63-95] 68  Resp:  [8-20] 18  SpO2:  [79 %-100 %] 94 %  BP: ()/(43-89) 159/75     Weight: 53.7 kg (118 lb 6.4 oz)  Body mass index is 20.97 kg/m².    Intake/Output Summary (Last 24 hours) at 3/26/2024 0687  Last data filed at  3/26/2024 0915  Gross per 24 hour   Intake 1250 ml   Output 230 ml   Net 1020 ml      3     Physical Exam  Constitutional:       Appearance: She is ill-appearing.   HENT:      Head: Normocephalic.   Cardiovascular:      Rate and Rhythm: Normal rate and regular rhythm.   Pulmonary:      Effort: Pulmonary effort is normal. No respiratory distress.      Breath sounds: Wheezing present.   Abdominal:      General: Bowel sounds are normal.      Palpations: Abdomen is soft.   Musculoskeletal:      Comments: Right arm in sling, right hip dressing in place.    Skin:     Findings: Bruising present.   Neurological:      General: No focal deficit present.      Mental Status: She is alert and oriented to person, place, and time.             Significant Labs: All pertinent labs within the past 24 hours have been reviewed.    Significant Imaging: I have reviewed all pertinent imaging results/findings within the past 24 hours.    Assessment/Plan:      * Falls, subsequent encounter  Presented to ED with fall, resides at nursing home.  Admitted under trauma team- trauma workup with fractures, no major bleeding noted.   Plan as outlined below.       Closed compression fracture of body of L1 vertebra  MRI L-spine with compression fracture of the L1 vertebra, height loss estimated at the 70%.  There is posterior cortex retropulsion contributing to mild-to-moderate central canal stenosis.   S/p Posterior spinal fusion and instrumentation T12-L2 and percutaneous balloon kyphoplasty L3, L4, L5 on 3/26 by Dr. Conway.  Continue pain control, PT/OT.       Closed displaced fracture of right femoral neck  Ortho consulted; s/p pinning by Dr. Buckner on 3/20/24.        Closed fracture of proximal end of right humerus with routine healing  Initial X-ray showed proximal fracture but later showed increasing displacement.  Ortho consulted; s/p ORIF per Dr. Buckner on 3/25.      Acute cystitis  S/p IV ceftriaxone x 3 days.       Essential  hypertension  Chronic, controlled. Latest blood pressure and vitals reviewed-     Temp:  [97.4 °F (36.3 °C)-98.5 °F (36.9 °C)]   Pulse:  [63-95]   Resp:  [8-20]   BP: ()/(43-89)   SpO2:  [79 %-100 %] .   Home meds for hypertension were reviewed and noted below.   Hypertension Medications               furosemide (LASIX) 40 MG tablet Take 40 mg by mouth 2 (two) times a day.    NIFEdipine (PROCARDIA-XL) 60 MG (OSM) 24 hr tablet Take 1 tablet (60 mg total) by mouth once daily.            While in the hospital, will manage blood pressure as follows; Adjust home antihypertensive regimen as follows- was held given shock- resume as able if BP elevates since she is out of shock    Will utilize p.r.n. blood pressure medication only if patient's blood pressure greater than 180/110 and she develops symptoms such as worsening chest pain or shortness of breath.    Chronic pain  Resume home Oxycontin and gabapentin.  For acute pain she is on IV narcotics as well.       Hypovolemic shock  Required pressor support initially in ICU but off since.  Stable BP.       Supplemental oxygen dependent  On 3L O2 at baseline for COPD.       Iron deficiency anemia due to chronic blood loss  Patient's anemia is currently uncontrolled. S/p 1 unit PRBC for Hgb 7 on 3/23/24 in preparation of upcoming OR trips. Etiology likely d/t acute blood loss which was from surgery on top of chronic anemia (baseline Hgb ~ 9-10)  Current CBC reviewed-   Lab Results   Component Value Date    HGB 8.3 (L) 03/26/2024    HCT 27.2 (L) 03/26/2024     Monitor serial CBC and transfuse if patient becomes hemodynamically unstable, symptomatic or H/H drops below 7/21.    DONALD (generalized anxiety disorder)  Resume home escitalopram and clonazepam.     Chronic obstructive pulmonary disease  Patient's COPD is controlled currently.  Patient is currently off COPD Pathway. Continue scheduled inhalers Supplemental oxygen and monitor respiratory status closely.       VTE  Risk Mitigation (From admission, onward)           Ordered     enoxaparin injection 40 mg  Every 24 hours         03/18/24 1650                    Discharge Planning   VALENTIN: 3/29/2024     Code Status: DNR   Is the patient medically ready for discharge?:     Reason for patient still in hospital (select all that apply): Patient trending condition, Consult recommendations, and Pending disposition  Discharge Plan A: Hospice/home              RENETTA DIOR MD  Department of Hospital Medicine   [facility]

## 2024-03-26 NOTE — NURSING
At 1125 I called  Yamileth to see if the pt had requested to go to [facility] after discharge and she was not aware that the pt is living in [facility] [place], she will check into it.

## 2024-03-26 NOTE — ANESTHESIA PREPROCEDURE EVALUATION
03/26/2024  Darya Low is a 63 y.o., female.      Pre-op Assessment    I have reviewed the Patient Summary Reports.     I have reviewed the Nursing Notes. I have reviewed the NPO Status.   I have reviewed the Medications.     Review of Systems  Anesthesia Hx:  No problems with previous Anesthesia             Denies Family Hx of Anesthesia complications.    Denies Personal Hx of Anesthesia complications.                    Social:  No Alcohol Use, Smoker       Hematology/Oncology:    Oncology Normal    -- Anemia:                                  EENT/Dental:  EENT/Dental Normal           Cardiovascular:  Cardiovascular Normal Exercise tolerance: poor                 ECG has been reviewed.                          Pulmonary:   COPD                     Renal/:  Renal/ Normal                 Hepatic/GI:  Hepatic/GI Normal                 Musculoskeletal:     Fall at nursing home on 3/18/24 with lumbar injuries, right hip fracture, and right humerus fracture.  Patient on hospice care and is DNR.    Right hip pinning performed on 3/20/24.    Planned lumbar percutaneous fixation on 3/36/24.    S/p right humerus fracture ORIF on 3/35/24 under GETA       Spine Disorders: lumbar            Neurological:           On chronic narcotics and benzos      Chronic Pain Syndrome   Peripheral Neuropathy                          Endocrine:  Endocrine Normal            Dermatological:  Skin Normal    Psych:  Psychiatric History anxiety                 Physical Exam  General: Well nourished    Airway:  Mallampati: II / II  Mouth Opening: Normal  TM Distance: > 6 cm  Tongue: Normal  Neck ROM: Normal ROM    Chest/Lungs:  Clear to auscultation, Normal Respiratory Rate    Heart:  Rate: Normal  Rhythm: Regular Rhythm        Chemistry        Component Value Date/Time     03/25/2024 0407    K 3.9 03/25/2024 0407      03/25/2024 0407    CO2 33 (H) 03/25/2024 0407    BUN 5 (L) 03/25/2024 0407    CREATININE 0.35 (L) 03/25/2024 0407    GLU 85 03/25/2024 0407        Component Value Date/Time    CALCIUM 8.3 (L) 03/25/2024 0407    ALKPHOS 110 03/18/2024 1221    AST 45 (H) 03/18/2024 1221    ALT 35 03/18/2024 1221    BILITOT 0.5 03/18/2024 1221    ESTGFRAFRICA 108 03/29/2021 0330    EGFRNONAA 78 04/27/2022 1531        Lab Results   Component Value Date    WBC 6.10 03/25/2024    HGB 9.7 (L) 03/25/2024    HCT 30.0 (L) 03/25/2024     03/25/2024     Results for orders placed or performed during the hospital encounter of 02/13/24   EKG 12-lead    Collection Time: 02/13/24 11:55 AM   Result Value Ref Range    QRS Duration 78 ms    OHS QTC Calculation 381 ms    Narrative    Test Reason : R06.02,    Vent. Rate : 064 BPM     Atrial Rate : 000 BPM     P-R Int : 240 ms          QRS Dur : 078 ms      QT Int : 374 ms       P-R-T Axes : 079 061 074 degrees     QTc Int : 381 ms    Sinus rhythm   Q in V1/V2 may be due to lead placement error though septal infarct cannot  be excluded  Lateral T wave abnormality is nonspecific  Abnormal ECG    Confirmed by Tesfaye ESTRADA, Moses HENSLEY ([id number]) on 2/13/2024 5:16:38 PM    Referred By: [email or web address]   SELF           Confirmed By:Moses Carlin MD     Summary         Left Ventricle: The left ventricle is normal in size. Septal thickening. There is concentric remodeling. Normal wall motion. There is normal systolic function. Biplane (2D) method of discs ejection fraction is 60%. There is normal diastolic function.    Right Ventricle: Normal right ventricular cavity size. Systolic function is normal.    Right Atrium: Normal right atrial size. There is a prominent Eustachian valve. Differential includes RA thrombus.    Aortic Valve: The aortic valve is a trileaflet valve.    Mitral Valve: There is mild regurgitation.    Tricuspid Valve: There is moderate regurgitation.    Pulmonary Artery: The estimated  pulmonary artery systolic pressure is 34 mmHg.    IVC/SVC: Normal venous pressure at 3 mmHg.    Anesthesia Plan  Type of Anesthesia, risks & benefits discussed:    Anesthesia Type: Gen ETT  Intra-op Monitoring Plan: Standard ASA Monitors  Post Op Pain Control Plan: multimodal analgesia  Induction:  IV  Airway Plan: Direct, Post-Induction  Informed Consent: Informed consent signed with the Patient and all parties understand the risks and agree with anesthesia plan.  All questions answered. Patient consented to blood products? Yes  ASA Score: 3  Day of Surgery Review of History & Physical: H&P Update referred to the surgeon/provider.I have interviewed and examined the patient. I have reviewed the patient's H&P dated: There are no significant changes.     Ready For Surgery From Anesthesia Perspective.     .

## 2024-03-26 NOTE — INTERVAL H&P NOTE
The patient has been examined and the H&P has been reviewed:    I concur with the findings and changes have been noted since the H&P was written:  Status post right proximal humerus ORIF and right femoral neck screw fixation.  Plan for T12-L2 percutaneous fusion and L3-L5 kyphoplasty    Anesthesia/Surgery risks, benefits and alternative options discussed and understood by patient/family.          Active Hospital Problems    Diagnosis  POA    *Falls, subsequent encounter [W19.XXXD]  Not Applicable    Hypovolemic shock [R57.1]  Yes    Chronic pain [G89.29]  Yes    Closed fracture of proximal end of right humerus with routine healing [S42.201D]  Not Applicable    Closed compression fracture of body of L1 vertebra [S32.010A]  Yes    Acute cystitis [N30.00]  Yes    Closed displaced fracture of right femoral neck [S72.001A]  Yes    Supplemental oxygen dependent [Z99.81]  Not Applicable    Iron deficiency anemia due to chronic blood loss [D50.0]  Yes    Chronic obstructive pulmonary disease [J44.9]  Yes    DONALD (generalized anxiety disorder) [F41.1]  Yes      Resolved Hospital Problems    Diagnosis Date Resolved POA    Hypokalemia [E87.6] 03/23/2024 Yes

## 2024-03-26 NOTE — PLAN OF CARE
Chart reviewed, pt went to surgery today. FELIPA consulted for dc planning. The plan is for pt to dc back home when medically ready with [facility]. FELIPA following.     1245: FELIPA spoke with pt at bedside and pt is from [facility] and is on Hospice with [facility] at [place], Choice obtained and pkt made. Pt will dc back to [facility] at [place]. FELIPA called and spoke with Roselyn at [facility] and pt is on bed hold and is able to return at dc. FELIPA following.

## 2024-03-26 NOTE — OR NURSING
0953- pt received from KIMBERLI Jaramillo CRNA with eyes closed, dressing intact to posterior thoracic areas, incisions are unable to be seen, immobilizer to RUE in place and snug, pt on 6L facemask  1010- 0.5 dilaudid given, pt placed on 2L NC  1045- pt transported to [facility] and report given to Trinity SERRANO, care released. 97.4, 167/74, 78, 92% on 3L NC

## 2024-03-26 NOTE — ANESTHESIA PROCEDURE NOTES
Intubation    Date/Time: 3/26/2024 7:44 AM    Performed by: Haile Jaramillo CRNA  Authorized by: Nicholas Muñoz MD    Intubation:     Induction:  Intravenous    Intubated:  Postinduction    Mask Ventilation:  Easy mask    Attempts:  1    Attempted By:  CRNA    Method of Intubation:  Direct    Blade:  Iraheta 2    Laryngeal View Grade: Grade I - full view of cords      Difficult Airway Encountered?: No      Complications:  None    Airway Device:  Oral endotracheal tube    Airway Device Size:  7.0    Style/Cuff Inflation:  Cuffed (inflated to minimal occlusive pressure)    Inflation Amount (mL):  7    Tube secured:  21    Secured at:  The lips    Placement Verified By:  Capnometry    Complicating Factors:  None    Findings Post-Intubation:  BS equal bilateral

## 2024-03-26 NOTE — TRANSFER OF CARE
"Anesthesia Transfer of Care Note    Patient: Darya Low    Procedure(s) Performed: Procedure(s) (LRB):  T12-L2 percutaneous fusion (N/A)  KYPHOPLASTY, SPINE, LUMBAR, L3-L5 (N/A)    Patient location: PACU    Anesthesia Type: general    Transport from OR: Transported from OR on 6-10 L/min O2 by face mask with adequate spontaneous ventilation    Post pain: adequate analgesia    Post assessment: no apparent anesthetic complications    Post vital signs: stable    Level of consciousness: responds to stimulation and awake    Nausea/Vomiting: no nausea/vomiting    Complications: none    Transfer of care protocol was followedComments: Good SV continue, NAD noted, VSS, RTRN      Last vitals: Visit Vitals  BP (!) 166/69   Pulse 77   Temp 36.4 °C (97.6 °F)   Resp 12   Ht 5' 3" (1.6 m)   Wt 53.7 kg (118 lb 6.4 oz)   LMP  (LMP Unknown)   SpO2 98%   Breastfeeding No   BMI 20.97 kg/m²     "

## 2024-03-26 NOTE — ASSESSMENT & PLAN NOTE
Patient's anemia is currently uncontrolled. S/p 1 unit PRBC for Hgb 7 on 3/23/24 in preparation of upcoming OR trips. Etiology likely d/t acute blood loss which was from surgery on top of chronic anemia (baseline Hgb ~ 9-10)  Current CBC reviewed-   Lab Results   Component Value Date    HGB 8.3 (L) 03/26/2024    HCT 27.2 (L) 03/26/2024     Monitor serial CBC and transfuse if patient becomes hemodynamically unstable, symptomatic or H/H drops below 7/21.

## 2024-03-26 NOTE — ASSESSMENT & PLAN NOTE
MRI L-spine with compression fracture of the L1 vertebra, height loss estimated at the 70%.  There is posterior cortex retropulsion contributing to mild-to-moderate central canal stenosis.   S/p Posterior spinal fusion and instrumentation T12-L2 and percutaneous balloon kyphoplasty L3, L4, L5 on 3/26 by Dr. Conway.  Continue pain control, PT/OT.

## 2024-03-26 NOTE — OP NOTE
[facility] - Periop Services  Surgery Department  Operative Note    SUMMARY     Date of Procedure: 3/26/2024     Procedure: Procedure(s) (LRB):  T12-L2 percutaneous fusion (N/A)  KYPHOPLASTY, SPINE, LUMBAR, L3-L5 (N/A)     Surgeon(s) and Role:     * Logan Conway MD - Primary    Assistant: LIZY Aguilar RNFA; no qualified resident or fellow was available to assist with this case    Pre-Operative Diagnosis:  L1 burst fracture, L3, L4, L5 compression fractures    Post-Operative Diagnosis:  L1 burst fracture, L3, L4, L5 compression fractures    Anesthesia: General    Technical Procedures Used:   1. Posterior spinal fusion T12-L2   2. Posterior segmental instrumentation T12 and L2   3. Open treatment of vertebral fracture L1   4. Percutaneous balloon kyphoplasty L3, L4, L5    Description of the Findings of the Procedure:   Indications:  This is a 63 y.o. female with multiple injuries after recent fall in nursing home including L1 burst fracture, L3-L5 compression fractures, also right proximal humerus and hip fractures.  The right proximal humerus and right hip have been addressed by Dr. Buckner.  They failed attempted conservative treatement.  Risks, benefits, and alternatives were discussed with the patient and they elected to proceed with surgery.    Procedure in detail:  The patient was identified in the preoperative holding area and the surgical site was marked. They were then taken back to the operating room where general endotracheal anesthesia was induced. They were then transitioned to the prone position on the Shahid frame with the arms and legs in the physiologic position and all bony prominences well-padded. The posterior thoracolumbar spine was prepped and draped in the normal sterile fashion. A timeout was performed.  An erector spinae block was performed bilaterally at T12.     The T12 vertebral body and pedicles were localized with intraoperative fluoroscopy. After the incision was  marked out a solution of epinephrine was injected in the skin and subcutaneous tissues. 1/4% plain Marcaine was injected in the skin, subcutaneous tissues, and paraspinal musculature. A 1 cm incision was made on the left side 1 cm lateral to the pedicle. A Jamshidi needle was advanced through the soft tissues to the superolateral aspect of the pedicle. This was then advanced into the pedicle on fluoroscopic guidance. Once the Jamshidi needle had passed into the posterior vertebral body the inner trochar was removed and a guidewire was placed.  This was then repeated on the right side.  An attempt was made to cannulate the pedicles at L1 however due to severe vertebral plana screw placement at L1 would not be possible.  The above procedure was then repeated at L2 with guidewire placement.  T12-L1 and L1-L2 facet joints bilaterally were prepared for fusion with the Bovie electrocautery and high-speed bur.    The L3 vertebral body and pedicles were localized with intraoperative fluoroscopy. After the incision was marked out a solution of epinephrine was injected in the skin and subcutaneous tissues. 1/4% plain Marcaine was injected in the skin, subcutaneous tissues, and paraspinal musculature. A stab incision was made on the left side 1 cm lateral to the pedicle. A Jamshidi needle was advanced through the soft tissues to the superolateral aspect of the pedicle. This was then advanced into the pedicle on fluoroscopic guidance.  This was then repeated at L4 on the left and then L5 on the right.  The inner trochars were removed and a drill was used to create a path for the kyphoplasty balloons. The kyphoplasty balloons were inserted and proper position verified on fluoroscopic imaging.  The kyphoplasty balloons were then inflated with radiopaque dye and proper cavity formation was noted on fluoroscopic imaging. The balloons were then deflated and removed and cement was injected into the cavity. Proper cement placement and  fill was verified on fluoroscopic imaging found be satisfactory. The inner trochars were then reinserted into the Jamshidi needles to drive any remaining cement into the vertebral body. The Jamshidi needles were then removed and no posterior migration of cement was noted.    The cannulated pedicle screws were then placed over the guidewires at T12 and L2.  The guidewires were removed.  The appropriate size rods were selected and secured with set screws.  The set screws were final tightened using torque .  Final implant placement and spinal alignment was verified on fluoroscopic imaging found be satisfactory.  The wound was copiously irrigated with normal saline.  Autograft from the facet joint preparation was placed in the facet joints bilaterally.    The fascial layer was closed with interrupted figure-of-eight #1 Vicryl suture.  The subcutaneous layer was closed with interrupted 2-0 Vicryl suture.  A running subcuticular 4-0 Monocryl suture was used to close the skin.  Dermabond prineo was applied to cover the incision. A sterile dressing of gauze and Tegaderm was then applied.    The patient was then transitioned back to supine position, extubated and awakened and taken to recovery in good condition having suffered no untoward event.      Complications: No    Estimated Blood Loss (EBL): 200 mL           Implants: * No implants in log *    Specimens:   Specimen (24h ago, onward)      None                    Condition: Good    Disposition: PACU - hemodynamically stable.    Attestation: I was present and scrubbed for the entire procedure.

## 2024-03-26 NOTE — NURSING
At 1625 A nurse from [facility] called to check on pt and update is given at this time.

## 2024-03-26 NOTE — PT/OT/SLP EVAL
Physical Therapy Evaluation     Patient Name: Darya Low   MRN: [id number]  Recent Surgery: Procedure(s) (LRB):  T12-L2 percutaneous fusion (N/A)  KYPHOPLASTY, SPINE, LUMBAR, L3-L5 (N/A) Day of Surgery    Recommendations:     Discharge Recommendations: Moderate Intensity Therapy   Discharge Equipment Recommendations: to be determined by next level of care   Barriers to discharge: Increased level of assist and Ongoing medical treatment    Assessment:     Darya Low is a 63 y.o. female admitted with a medical diagnosis of Falls, subsequent encounter. She presents with the following impairments/functional limitations: weakness, impaired functional mobility, impaired self care skills, decreased upper extremity function, decreased lower extremity function, pain, decreased ROM, orthopedic precautions. Pt presents with multiple fractures including newly repaired right humerus, right hip, and lumbar spine. Pt has chronic malunion of left humerus. Pt has severe pain with any attempted mobility and was unable to maintain upright sitting without assistance. Pt is unable to assist with mobility using UE and is NWB to RLE. She will need total care at d/c.     Rehab Prognosis: Poor; patient would benefit from acute PT services to address these deficits and reach maximum level of function.    Plan:     During this hospitalization, patient to be seen daily to address the above listed problems via therapeutic activities, therapeutic exercises, neuromuscular re-education, wheelchair management/training    Plan of Care Expires: 04/26/24    Subjective     Chief Complaint: multiple fractures  Patient Comments/Goals: Pt having increased pain  Pain/Comfort:  Pain Rating 1: 7/10  Location 1: back  Pain Addressed 1: Pre-medicate for activity  Pain Rating Post-Intervention 1: 10/10  Pain Rating 2: 7/10  Location - Side 2: Right  Location 2: hip  Pain Addressed 2: Pre-medicate for activity  Pain Rating Post-Intervention 2:  10/10    Social History:  Living Environment: Patient lives in a nursing home   Prior Level of Function: Prior to admission, patient ambulated household distances using rollator  Equipment Used at Home: wheelchair, oxygen, cane, straight, walker, rolling  DME owned (not currently used): none  Assistance Upon Discharge: facility staff    Objective:     Communicated with DAFNE Brennan RN prior to session. Patient found HOB elevated with peripheral IV, oxygen, PureWick upon PT entry to room.    General Precautions: Standard,     Orthopedic Precautions: LUE non weight bearing, RUE non weight bearing, RLE non weight bearing   Braces: UE Sling    Respiratory Status: Nasal cannula, flow 2 L/min    Exams:  RLE ROM: WFL except limited by pain  RLE Strength:  2/5  LLE ROM: WFL  LLE Strength:  3/5  Cognitive: Patient is oriented to Person, Place, Time, Situation  Gross Motor Coordination: WFL  Skin Integrity/Edema:     -       Skin integrity: Bruising of entire right side    Functional Mobility:  Gait belt applied - No  Bed Mobility  Scooting: dependence   Supine to Sit: total assistance and of 2 persons for LE management and trunk management  Sit to Supine: total assistance and of 2 persons for LE management and trunk management  Transfers  unable  Gait  Unable  Balance  Sitting: POOR: N/A  Standin: N/A      Therapeutic Activities and Exercises:  Patient educated on role of acute care PT and PT POC, safety while in hospital including calling nurse for mobility, and call light usage.  Educated about weightbearing precautions and provided cuing for adherence as appropriate during session.      AM-PAC 6 CLICK MOBILITY  Total Score:8    Patient left HOB elevated with all lines intact and call button in reach.    GOALS:   Multidisciplinary Problems       Physical Therapy Goals          Problem: Physical Therapy    Goal Priority Disciplines Outcome Goal Variances Interventions   Physical Therapy Goal     PT, PT/OT Ongoing,  Progressing     Description: Short term goals:  1. Supine to sit with Moderate Assistance  2. Bed to chair transfer with Maximum Assistance using Slideboard  3. Sitting at edge of bed x10 minutes with Minimal Assistance    Long term goals:  1. Supine to sit with MInimal Assistance  2. Rolling to Left and Right with Minimal Assistance.  3. Bed to chair transfer with Minimal Assistance using Slideboard  4. Sitting at edge of bed x15 minutes with Stand-by Assistance                         History:     Past Medical History:   Diagnosis Date    COPD (chronic obstructive pulmonary disease)     DONALD (generalized anxiety disorder) 03/24/2021    GERD with esophagitis     Iron deficiency anemia due to chronic blood loss     Supplemental oxygen dependent 11/28/2023       Past Surgical History:   Procedure Laterality Date    ABDOMINAL SURGERY      LEFT HEART CATHETERIZATION N/A 11/27/2023    Procedure: Left heart cath;  Surgeon: Ulises Crespo DO;  Location: [facility] CATH LAB;  Service: Cardiology;  Laterality: N/A;    OPEN REDUCTION AND INTERNAL FIXATION (ORIF) OF FRACTURE OF PROXIMAL HUMERUS Right 3/25/2024    Procedure: ORIF, FRACTURE, HUMERUS, PROXIMAL;  Surgeon: Que Buckner MD;  Location: [facility] OR;  Service: Orthopedics;  Laterality: Right;    PERCUTANEOUS PINNING OF HIP Right 3/20/2024    Procedure: PINNING, HIP, PERCUTANEOUS;  Surgeon: Que Buckner MD;  Location: [facility] OR;  Service: Orthopedics;  Laterality: Right;       Time Tracking:     PT Received On: 03/26/24  PT Start Time: 1341  PT Stop Time: 1410  PT Total Time (min): 29 min     Billable Minutes: Evaluation high complexity    3/26/2024

## 2024-03-26 NOTE — SUBJECTIVE & OBJECTIVE
Interval History: Patient seen and examined at the bedside, reports pain is controlled.     Review of Systems   Musculoskeletal:  Positive for arthralgias and back pain.     Objective:     Vital Signs (Most Recent):  Temp: 97.5 °F (36.4 °C) (03/26/24 1145)  Pulse: 68 (03/26/24 1145)  Resp: 18 (03/26/24 1145)  BP: (!) 159/75 (03/26/24 1145)  SpO2: (!) 94 % (03/26/24 1145) Vital Signs (24h Range):  Temp:  [97.4 °F (36.3 °C)-98.5 °F (36.9 °C)] 97.5 °F (36.4 °C)  Pulse:  [63-95] 68  Resp:  [8-20] 18  SpO2:  [79 %-100 %] 94 %  BP: ()/(43-89) 159/75     Weight: 53.7 kg (118 lb 6.4 oz)  Body mass index is 20.97 kg/m².    Intake/Output Summary (Last 24 hours) at 3/26/2024 1154  Last data filed at 3/26/2024 0915  Gross per 24 hour   Intake 1250 ml   Output 230 ml   Net 1020 ml      3     Physical Exam  Constitutional:       Appearance: She is ill-appearing.   HENT:      Head: Normocephalic.   Cardiovascular:      Rate and Rhythm: Normal rate and regular rhythm.   Pulmonary:      Effort: Pulmonary effort is normal. No respiratory distress.      Breath sounds: Wheezing present.   Abdominal:      General: Bowel sounds are normal.      Palpations: Abdomen is soft.   Musculoskeletal:      Comments: Right arm in sling, right hip dressing in place.    Skin:     Findings: Bruising present.   Neurological:      General: No focal deficit present.      Mental Status: She is alert and oriented to person, place, and time.             Significant Labs: All pertinent labs within the past 24 hours have been reviewed.    Significant Imaging: I have reviewed all pertinent imaging results/findings within the past 24 hours.

## 2024-03-26 NOTE — ANESTHESIA POSTPROCEDURE EVALUATION
Anesthesia Post Evaluation    Patient: Darya Low    Procedure(s) Performed: Procedure(s) (LRB):  ORIF, FRACTURE, HUMERUS, PROXIMAL (Right)    Final Anesthesia Type: general      Patient location during evaluation: PACU  Patient participation: Yes- Able to Participate  Level of consciousness: awake and alert and oriented  Post-procedure vital signs: reviewed and stable  Pain management: adequate  Airway patency: patent  SANDEEP mitigation strategies: Multimodal analgesia  PONV status at discharge: No PONV  Anesthetic complications: no      Cardiovascular status: hemodynamically stable  Respiratory status: unassisted and spontaneous ventilation  Hydration status: euvolemic  Follow-up not needed.              Vitals Value Taken Time   /55 03/26/24 0256   Temp 36.4 °C (97.6 °F) 03/26/24 0256   Pulse 72 03/26/24 0256   Resp 16 03/26/24 0256   SpO2 96 % 03/26/24 0256         Event Time   Out of Recovery 03/25/2024 16:33:47         Pain/Trev Score: Pain Rating Prior to Med Admin: 7 (3/25/2024  9:05 PM)  Pain Rating Post Med Admin: 4 (3/25/2024 10:05 PM)  Trev Score: 8 (3/25/2024  8:10 PM)

## 2024-03-26 NOTE — PLAN OF CARE
Problem: Physical Therapy  Goal: Physical Therapy Goal  Description: Short term goals:  1. Supine to sit with Moderate Assistance  2. Bed to chair transfer with Maximum Assistance using Slideboard  3. Sitting at edge of bed x10 minutes with Minimal Assistance    Long term goals:  1. Supine to sit with MInimal Assistance  2. Rolling to Left and Right with Minimal Assistance.  3. Bed to chair transfer with Minimal Assistance using Slideboard  4. Sitting at edge of bed x15 minutes with Stand-by Assistance    Outcome: Ongoing, Progressing

## 2024-03-26 NOTE — ASSESSMENT & PLAN NOTE
Initial X-ray showed proximal fracture but later showed increasing displacement.  Ortho consulted; s/p ORIF per Dr. Buckner on 3/25.

## 2024-03-26 NOTE — NURSING
At 1115 I called pt's son Francesco Low to let him know that the pt is doing good after her surgery today.

## 2024-03-26 NOTE — ASSESSMENT & PLAN NOTE
Chronic, controlled. Latest blood pressure and vitals reviewed-     Temp:  [97.4 °F (36.3 °C)-98.5 °F (36.9 °C)]   Pulse:  [63-95]   Resp:  [8-20]   BP: ()/(43-89)   SpO2:  [79 %-100 %] .   Home meds for hypertension were reviewed and noted below.   Hypertension Medications               furosemide (LASIX) 40 MG tablet Take 40 mg by mouth 2 (two) times a day.    NIFEdipine (PROCARDIA-XL) 60 MG (OSM) 24 hr tablet Take 1 tablet (60 mg total) by mouth once daily.            While in the hospital, will manage blood pressure as follows; Adjust home antihypertensive regimen as follows- was held given shock- resume as able if BP elevates since she is out of shock    Will utilize p.r.n. blood pressure medication only if patient's blood pressure greater than 180/110 and she develops symptoms such as worsening chest pain or shortness of breath.

## 2024-03-27 PROBLEM — D62 ACUTE BLOOD LOSS ANEMIA: Status: ACTIVE | Noted: 2024-03-27

## 2024-03-27 LAB
ABO + RH BLD: NORMAL
ANION GAP SERPL CALCULATED.3IONS-SCNC: 9 MMOL/L (ref 7–16)
BASOPHILS # BLD AUTO: 0.05 K/UL (ref 0–0.2)
BASOPHILS NFR BLD AUTO: 0.6 % (ref 0–1)
BLD PROD TYP BPU: NORMAL
BLOOD UNIT EXPIRATION DATE: NORMAL
BLOOD UNIT TYPE CODE: 5100
BUN SERPL-MCNC: 10 MG/DL (ref 7–18)
BUN/CREAT SERPL: 24 (ref 6–20)
CALCIUM SERPL-MCNC: 8.2 MG/DL (ref 8.5–10.1)
CHLORIDE SERPL-SCNC: 103 MMOL/L (ref 98–107)
CO2 SERPL-SCNC: 30 MMOL/L (ref 21–32)
CREAT SERPL-MCNC: 0.42 MG/DL (ref 0.55–1.02)
CROSSMATCH INTERPRETATION: NORMAL
DIFFERENTIAL METHOD BLD: ABNORMAL
DISPENSE STATUS: NORMAL
EGFR (NO RACE VARIABLE) (RUSH/TITUS): 111 ML/MIN/1.73M2
EOSINOPHIL # BLD AUTO: 0 K/UL (ref 0–0.5)
EOSINOPHIL NFR BLD AUTO: 0 % (ref 1–4)
ERYTHROCYTE [DISTWIDTH] IN BLOOD BY AUTOMATED COUNT: 15.9 % (ref 11.5–14.5)
GLUCOSE SERPL-MCNC: 102 MG/DL (ref 74–106)
HCT VFR BLD AUTO: 23.1 % (ref 38–47)
HGB BLD-MCNC: 7.1 G/DL (ref 12–16)
IMM GRANULOCYTES # BLD AUTO: 0.08 K/UL (ref 0–0.04)
IMM GRANULOCYTES NFR BLD: 0.9 % (ref 0–0.4)
LYMPHOCYTES # BLD AUTO: 1.37 K/UL (ref 1–4.8)
LYMPHOCYTES NFR BLD AUTO: 16 % (ref 27–41)
MCH RBC QN AUTO: 27.6 PG (ref 27–31)
MCHC RBC AUTO-ENTMCNC: 30.7 G/DL (ref 32–36)
MCV RBC AUTO: 89.9 FL (ref 80–96)
MONOCYTES # BLD AUTO: 0.88 K/UL (ref 0–0.8)
MONOCYTES NFR BLD AUTO: 10.3 % (ref 2–6)
MPC BLD CALC-MCNC: 11.2 FL (ref 9.4–12.4)
NEUTROPHILS # BLD AUTO: 6.2 K/UL (ref 1.8–7.7)
NEUTROPHILS NFR BLD AUTO: 72.2 % (ref 53–65)
NRBC # BLD AUTO: 0 X10E3/UL
NRBC, AUTO (.00): 0 %
PLATELET # BLD AUTO: 244 K/UL (ref 150–400)
POTASSIUM SERPL-SCNC: 3.8 MMOL/L (ref 3.5–5.1)
RBC # BLD AUTO: 2.57 M/UL (ref 4.2–5.4)
SODIUM SERPL-SCNC: 138 MMOL/L (ref 136–145)
UNIT NUMBER: NORMAL
WBC # BLD AUTO: 8.58 K/UL (ref 4.5–11)

## 2024-03-27 PROCEDURE — 36430 TRANSFUSION BLD/BLD COMPNT: CPT

## 2024-03-27 PROCEDURE — 94640 AIRWAY INHALATION TREATMENT: CPT

## 2024-03-27 PROCEDURE — 25000003 PHARM REV CODE 250: Performed by: HOSPITALIST

## 2024-03-27 PROCEDURE — 97110 THERAPEUTIC EXERCISES: CPT

## 2024-03-27 PROCEDURE — 11000001 HC ACUTE MED/SURG PRIVATE ROOM

## 2024-03-27 PROCEDURE — 86923 COMPATIBILITY TEST ELECTRIC: CPT | Performed by: HOSPITALIST

## 2024-03-27 PROCEDURE — 97530 THERAPEUTIC ACTIVITIES: CPT

## 2024-03-27 PROCEDURE — 25000003 PHARM REV CODE 250: Performed by: ORTHOPAEDIC SURGERY

## 2024-03-27 PROCEDURE — 99900035 HC TECH TIME PER 15 MIN (STAT)

## 2024-03-27 PROCEDURE — 80048 BASIC METABOLIC PNL TOTAL CA: CPT | Performed by: ORTHOPAEDIC SURGERY

## 2024-03-27 PROCEDURE — 99233 SBSQ HOSP IP/OBS HIGH 50: CPT | Mod: GW,,, | Performed by: HOSPITALIST

## 2024-03-27 PROCEDURE — 63600175 PHARM REV CODE 636 W HCPCS: Mod: JZ,JG | Performed by: ORTHOPAEDIC SURGERY

## 2024-03-27 PROCEDURE — 97535 SELF CARE MNGMENT TRAINING: CPT

## 2024-03-27 PROCEDURE — 63600175 PHARM REV CODE 636 W HCPCS: Performed by: ORTHOPAEDIC SURGERY

## 2024-03-27 PROCEDURE — 27000221 HC OXYGEN, UP TO 24 HOURS

## 2024-03-27 PROCEDURE — 94761 N-INVAS EAR/PLS OXIMETRY MLT: CPT

## 2024-03-27 PROCEDURE — 85025 COMPLETE CBC W/AUTO DIFF WBC: CPT | Performed by: ORTHOPAEDIC SURGERY

## 2024-03-27 PROCEDURE — 25000242 PHARM REV CODE 250 ALT 637 W/ HCPCS: Performed by: ORTHOPAEDIC SURGERY

## 2024-03-27 PROCEDURE — P9016 RBC LEUKOCYTES REDUCED: HCPCS | Performed by: HOSPITALIST

## 2024-03-27 RX ORDER — HYDROCODONE BITARTRATE AND ACETAMINOPHEN 500; 5 MG/1; MG/1
TABLET ORAL
Status: DISCONTINUED | OUTPATIENT
Start: 2024-03-27 | End: 2024-04-02 | Stop reason: HOSPADM

## 2024-03-27 RX ADMIN — FAMOTIDINE 20 MG: 20 TABLET ORAL at 08:03

## 2024-03-27 RX ADMIN — SODIUM CHLORIDE: 9 INJECTION, SOLUTION INTRAVENOUS at 08:03

## 2024-03-27 RX ADMIN — IPRATROPIUM BROMIDE AND ALBUTEROL SULFATE 3 ML: 2.5; .5 SOLUTION RESPIRATORY (INHALATION) at 12:03

## 2024-03-27 RX ADMIN — PROMETHAZINE HYDROCHLORIDE 25 MG: 25 TABLET ORAL at 12:03

## 2024-03-27 RX ADMIN — ENOXAPARIN SODIUM 40 MG: 40 INJECTION SUBCUTANEOUS at 04:03

## 2024-03-27 RX ADMIN — GABAPENTIN 300 MG: 300 CAPSULE ORAL at 08:03

## 2024-03-27 RX ADMIN — CLONAZEPAM 1 MG: 0.5 TABLET ORAL at 08:03

## 2024-03-27 RX ADMIN — TIZANIDINE 4 MG: 4 TABLET ORAL at 02:03

## 2024-03-27 RX ADMIN — POLYETHYLENE GLYCOL 3350 17 G: 17 POWDER, FOR SOLUTION ORAL at 08:03

## 2024-03-27 RX ADMIN — MORPHINE SULFATE 3 MG: 4 INJECTION, SOLUTION INTRAMUSCULAR; INTRAVENOUS at 12:03

## 2024-03-27 RX ADMIN — ESCITALOPRAM OXALATE 10 MG: 10 TABLET, FILM COATED ORAL at 08:03

## 2024-03-27 RX ADMIN — MUPIROCIN: 20 OINTMENT TOPICAL at 08:03

## 2024-03-27 RX ADMIN — OXYCODONE HYDROCHLORIDE 10 MG: 10 TABLET, FILM COATED, EXTENDED RELEASE ORAL at 09:03

## 2024-03-27 RX ADMIN — MORPHINE SULFATE 3 MG: 4 INJECTION, SOLUTION INTRAMUSCULAR; INTRAVENOUS at 08:03

## 2024-03-27 RX ADMIN — ASPIRIN 81 MG 81 MG: 81 TABLET ORAL at 08:03

## 2024-03-27 RX ADMIN — SODIUM CHLORIDE 1000 ML: 9 INJECTION, SOLUTION INTRAVENOUS at 08:03

## 2024-03-27 RX ADMIN — IPRATROPIUM BROMIDE AND ALBUTEROL SULFATE 3 ML: 2.5; .5 SOLUTION RESPIRATORY (INHALATION) at 07:03

## 2024-03-27 RX ADMIN — MORPHINE SULFATE 3 MG: 4 INJECTION, SOLUTION INTRAMUSCULAR; INTRAVENOUS at 04:03

## 2024-03-27 RX ADMIN — OXYCODONE HYDROCHLORIDE 5 MG: 5 TABLET ORAL at 02:03

## 2024-03-27 RX ADMIN — GABAPENTIN 300 MG: 300 CAPSULE ORAL at 02:03

## 2024-03-27 RX ADMIN — DOCUSATE SODIUM 100 MG: 100 CAPSULE, LIQUID FILLED ORAL at 08:03

## 2024-03-27 RX ADMIN — SENNOSIDES AND DOCUSATE SODIUM 1 TABLET: 8.6; 5 TABLET ORAL at 08:03

## 2024-03-27 NOTE — PROGRESS NOTES
[facility]  Adult Nutrition  First Assessment Note         Reason for Assessment  Reason For Assessment: RD follow-up   Nutrition Risk Screen: no indicators present    Assessment and Plan    3/27/2024: RD follow up. Patient continues on a Regular diet and tolerating well. Intake 75% per flowsheet. Recommend continue as tolerated. Encourage good PO Intakes. Last BM 3/26. RD following.    3/22/2024: RD follow up. Patient advanced to Regular diet and tolerating well. Recommend continue current diet as tolerated. Encourage good PO Intakes. Last BM 3/18 per flowsheet. RD following.     3/19/2024: Patient is a 64yo female admitted 3/18 for a fall with fractures. She was identified at risk by screening criteria with MST2. She was unsure of weight loss but denied poor PO intakes.    Patient is 43.3kg with a BMI of 16.91 and is severely underweight per geriatric standards, however chart review reveals no significant weight changes over the past year. She was identified at malnourished on a previous admission, however weight has been stable since that time. Patient is a NH resident and is on hospice. She has a PMH of COPD, which is likely contributing to low body weight.     Patient is currently NPO pending surgery to repair fractures. Recommend resuming Regular diet as soon as medically appropriate and tolerated with addition of Boost Plus TID. If unable to advance diet x 2-3 days, recommend consider alternate route of nutrition.     Last BM 3/18 per flowsheet.    Medications/labs reviewed. RD following.       Learning Needs/Social Determinants of Health  Learning Assessment       03/18/2024 2140 [facility] (3/18/2024 - Present)   Created by Clementina Gilmore, RN - RN (Nurse) Status: Complete                 PRIMARY LEARNER     Primary Learner Name:  Darya Low MW - 03/18/2024 2140    Relationship:  Patient MW - 03/18/2024 2140    Does the primary learner have any barriers to  learning?:  No Barriers  - 03/18/2024 2140    What is the preferred language of the primary learner?:  English MW - 03/18/2024 2140    How does the primary learner prefer to learn new concepts?:  Listening MW - 03/18/2024 2140    How often do you need to have someone help you read instructions, pamphlets, or written material from your doctor or pharmacy?:  Never MW - 03/18/2024 2140        CO-LEARNER #1     No question answered        CO-LEARNER #2     No question answered        SPECIAL TOPICS     No question answered        ANSWERED BY:     No question answered        Edit History       Clementina Gilmore, RN - RN (Nurse)   03/18/2024 2140                           Social Determinants of Health     Tobacco Use: High Risk (3/27/2024)    Patient History     Smoking Tobacco Use: Every Day     Smokeless Tobacco Use: Never     Passive Exposure: Not on file   Alcohol Use: Not At Risk (11/27/2023)    AUDIT-C     Frequency of Alcohol Consumption: Never     Average Number of Drinks: Patient does not drink     Frequency of Binge Drinking: Never   Financial Resource Strain: Low Risk  (11/27/2023)    Overall Financial Resource Strain (CARDIA)     Difficulty of Paying Living Expenses: Not hard at all   Food Insecurity: No Food Insecurity (11/27/2023)    Hunger Vital Sign     Worried About Running Out of Food in the Last Year: Never true     Ran Out of Food in the Last Year: Never true   Transportation Needs: No Transportation Needs (11/27/2023)    PRAPARE - Transportation     Lack of Transportation (Medical): No     Lack of Transportation (Non-Medical): No   Physical Activity: Inactive (11/27/2023)    Exercise Vital Sign     Days of Exercise per Week: 0 days     Minutes of Exercise per Session: 0 min   Stress: No Stress Concern Present (11/27/2023)    [place] [employer] - Occupational Stress Questionnaire     Feeling of Stress : Only a little   Social Connections: Moderately Isolated (11/27/2023)    Social  Connection and Isolation Panel [NHANES]     Frequency of Communication with Friends and Family: More than three times a week     Frequency of Social Gatherings with Friends and Family: More than three times a week     Attends Nondenominational Services: 1 to 4 times per year     Active Member of Clubs or Organizations: No     Attends Club or Organization Meetings: Never     Marital Status:    Housing Stability: Unknown (11/27/2023)    Housing Stability Vital Sign     Unable to Pay for Housing in the Last Year: No     Number of Places Lived in the Last Year: Not on file     Unstable Housing in the Last Year: No   Depression: Not on file            Malnutrition  Is Patient Malnourished: No    Nutrition Diagnosis  Underweight related to Catabolic illness as evidenced by PMH COPD, low BMI  Comments: resume Regular diet as soon as medically appropriate.     Recent Labs   Lab 03/26/24  0647 03/27/24  0358   GLU  --  102   POCGLU 155*  --        Nutrition Prescription / Recommendations  Recommendation/Intervention: Recommend continue current diet as tolerated.  Goals: Weight maintenance during admission, intake 50-75% of meals during admission  Nutrition Goal Status: progressing towards goal  Current Diet Order: Regular  Chewing or Swallowing Difficulty?: No Chewing or swallowing difficulty  Recommended Diet: Regular  Recommended Oral Supplement: Boost Plus [360kcals, 14g Protein, 45g Carbs] 3 times a day  Is Nutrition Support Recommended: [facility] Support: No  Is Nutrition Education Recommended: No    Monitor and Evaluation  % current Intake: P.O. intake of 75 - 100 %  % intake to meet estimated needs: P.O. + Supplements  Food and Nutrient Intake: food and beverage intake  Food and Nutrient Adminstration: diet order  Anthropometric Measurements: weight change, weight  Biochemical Data, Medical Tests and Procedures: electrolyte and renal panel, gastrointestinal profile, glucose/endocrine profile, inflammatory  "profile, lipid profile       Current Medical Diagnosis and Past Medical History  Diagnosis: trauma  Past Medical History:   Diagnosis Date    COPD (chronic obstructive pulmonary disease)     DONALD (generalized anxiety disorder) 03/24/2021    GERD with esophagitis     Iron deficiency anemia due to chronic blood loss     Supplemental oxygen dependent 11/28/2023       Nutrition/Diet History  Spiritual, Cultural Beliefs, Cheondoism Practices, Values that Affect Care: no  Food Allergies: NKFA    Lab/Procedures/Meds  Recent Labs   Lab 03/27/24  0358      K 3.8   BUN 10   CREATININE 0.42*   CALCIUM 8.2*      Note: Cr low. Ca++ low     Last A1c:   Lab Results   Component Value Date    HGBA1C 5.2 04/23/2021     Lab Results   Component Value Date    RBC 2.57 (L) 03/27/2024    HGB 7.1 (L) 03/27/2024    HCT 23.1 (L) 03/27/2024    MCV 89.9 03/27/2024    MCH 27.6 03/27/2024    MCHC 30.7 (L) 03/27/2024    TIBC 357 03/28/2021   Note: H&H low    Pertinent Labs Reviewed: reviewed  Pertinent Medications Reviewed: reviewed  Scheduled Meds:   albuterol-ipratropium  3 mL Nebulization Q6H    aspirin  81 mg Oral Daily    clonazePAM  1 mg Oral BID    docusate sodium  100 mg Oral BID    enoxparin  40 mg Subcutaneous Q24H (prophylaxis, 1700)    EScitalopram oxalate  10 mg Oral Daily    famotidine  20 mg Oral BID    gabapentin  300 mg Oral TID    mupirocin   Nasal BID    oxyCODONE  10 mg Oral Q12H    polyethylene glycol  17 g Oral Daily    senna-docusate 8.6-50 mg  1 tablet Oral Daily     Continuous Infusions:      PRN Meds:.0.9%  NaCl infusion (for blood administration), acetaminophen, melatonin, morphine, oxyCODONE, promethazine, ROPIvacaine (PF) 2 mg/ml (0.2%) 30 mL, dexAMETHasone sodium phos (PF) 5 mg, dexmedeTOMIDine 25 mcg in syringe (disposable) 1 each, sodium chloride 0.9%, sodium chloride 0.9%, tiZANidine, traMADoL, traZODone    Anthropometrics  Temp: 99.1 °F (37.3 °C)  Height Method: Stated  Height: 5' 3" (160 cm)  Height " (inches): 63 in  Weight Method: Bed Scale  Weight: 53.7 kg (118 lb 6.4 oz)  Weight (lb): 118.4 lb  Ideal Body Weight (IBW), Female: 115 lb  % Ideal Body Weight, Female (lb): 83.01 %  BMI (Calculated): 21       Estimated/Assessed Needs  RMR ([place]-St. Jeor Equation): 1066.19     Temp: 99.1 °F (37.3 °C)Oral  Weight Used For Calorie Calculations: 43.3 kg (95 lb 7.4 oz)     Energy Calorie Requirements (kcal): 1299-1515kcal (30-35kcal/kg)  Weight Used For Protein Calculations: 52.2 kg (115 lb)  Protein Requirements: 52-63g (1.0-1.2g/kg ideal body weight)       RDA Method (mL): 1299       Nutrition by Nursing  Diet/Nutrition Received: NPO  Intake (%): 75%  Diet/Feeding Assistance: tray set-up  Diet/Feeding Tolerance: good  Last Bowel Movement: 03/26/24                Nutrition Follow-Up  RD Follow-up?: Yes      Nutrition Discharge Planning: Patient admitted from [facility]. Will likely return on discharge. Will benefit from continued liberalized diet on discharge.          Ashley Espinosa, MS, RD, LD  Available via Secure Chat

## 2024-03-27 NOTE — ANESTHESIA POSTPROCEDURE EVALUATION
Anesthesia Post Evaluation    Patient: Darya Low    Procedure(s) Performed: Procedure(s) (LRB):  T12-L2 percutaneous fusion (N/A)  KYPHOPLASTY, SPINE, LUMBAR, L3-L5 (N/A)    Final Anesthesia Type: general      Patient location during evaluation: PACU  Post-procedure vital signs: reviewed and stable  Pain management: adequate  Airway patency: patent    PONV status at discharge: No PONV  Anesthetic complications: no      Cardiovascular status: hemodynamically stable  Respiratory status: unassisted  Hydration status: euvolemic  Follow-up not needed.              Vitals Value Taken Time   /81 03/27/24 1432   Temp 36.7 °C (98 °F) 03/27/24 1432   Pulse 86 03/27/24 1432   Resp 16 03/27/24 1437   SpO2 98 % 03/27/24 1432         Event Time   Out of Recovery 03/26/2024 10:28:20         Pain/Trev Score: Pain Rating Prior to Med Admin: 6 (3/27/2024  2:37 PM)  Pain Rating Post Med Admin: 7 (3/27/2024 12:47 PM)  Trev Score: 7 (3/26/2024  8:00 PM)

## 2024-03-27 NOTE — CARE UPDATE
Treatment given by RT student, Lamont Booth.     03/27/24 0742   Patient Assessment/Suction   Level of Consciousness (AVPU) alert   Respiratory Effort Unlabored   Expansion/Accessory Muscles/Retractions no retractions;no use of accessory muscles   All Lung Fields Breath Sounds Anterior:;Lateral:;clear;equal bilaterally   Rhythm/Pattern, Respiratory unlabored;pattern regular;depth regular;no shortness of breath reported   Cough Frequency no cough   Skin Integrity   $ Wound Care Tech Time 15 min   Area Observed Right;Behind ear;Left;Cheek;Nares   Skin Appearance without discoloration   Barrier used? Other (see comments)  (soft cannula)   PRE-TX-O2   Device (Oxygen Therapy) nasal cannula   $ Is the patient on Low Flow Oxygen? Yes   Flow (L/min) 2   Oxygen Concentration (%) 28   SpO2 95 %   Pulse Oximetry Type Intermittent   $ Pulse Oximetry - Multiple Charge Pulse Oximetry - Multiple   Pulse 83   Resp 16   Positioning   Body Position position maintained   Head of Bed (HOB) Positioning HOB elevated   Aerosol Therapy   $ Aerosol Therapy Charges Aerosol Treatment   Daily Review of Necessity (SVN) completed   Respiratory Treatment Status (SVN) given   Treatment Route (SVN) oxygen;mask   Patient Position (SVN) HOB elevated   Post Treatment Assessment (SVN) breath sounds unchanged   Signs of Intolerance (SVN) none   Breath Sounds Post-Respiratory Treatment   Throughout All Fields Post-Treatment All Fields   Throughout All Fields Post-Treatment Anterior:;Lateral:;no change   Post-treatment Heart Rate (beats/min) 84   Post-treatment Resp Rate (breaths/min) 16   Respiratory Evaluation   $ Care Plan Tech Time 15 min

## 2024-03-27 NOTE — PT/OT/SLP EVAL
Occupational Therapy Evaluation     Name: Darya Low  MRN: [id number]  Admitting Diagnosis: Falls, subsequent encounter Day of Surgery  Recent Surgery: Procedure(s) (LRB):  T12-L2 percutaneous fusion (N/A)  KYPHOPLASTY, SPINE, LUMBAR, L3-L5 (N/A) Day of Surgery    Recommendations:     Discharge Recommendations: Moderate Intensity Therapy  Level of Assistance Recommended: 24 hours significant assistance  Discharge Equipment Recommendations: to be determined by next level of care  Barriers to discharge: None    Assessment:     Darya Low is a 63 y.o. female with a medical diagnosis of Falls, subsequent encounter. She presents with performance deficits affecting function including weakness, impaired endurance, impaired self care skills, impaired functional mobility, decreased upper extremity function, decreased lower extremity function, pain, decreased ROM, impaired skin, impaired cardiopulmonary response to activity, orthopedic precautions.     Pt is admitted from [facility] post fall with (R) humerus fx, (R) hip fx, and L1 burst fx, and L3 to L5 compression fx. Pt is post op ORIF to (R) humerus, (R) hip and T12 to L2 percutaneous fusion and L3- L5 kyphoplasty. 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 Pt reports that prior to this fall, she was able to ambulate with a rollator and perform mush of her self-care. Pt states that she has been at [facility] for 3 months with hospice care. Pt's (R) UE is in an abduction sling and her (L) UE function is severely impaired by malunion of humerus. Pt is NWB on (L) LE. Pt will require 24 hour total assistance at D/C. OT will see pt to assist with sitting up and seeing if pt can assist with her feeding and grooming with AD.       Rehab Prognosis: Poor; patient would benefit from acute OT services to address these deficits and reach maximum level of function.    Plan:     Patient to be seen 5 x/week to address the above listed  problems via self-care/home management, therapeutic activities, therapeutic exercises  Plan of Care Expires: 04/22/24  Plan of Care Reviewed with: patient    Subjective     Chief Complaint: falls with multiple fractures  Patient Comments/Goals: [facility]  Pain/Comfort:  Pain Rating 1: 7/10  Location 1: back  Pain Addressed 1: Pre-medicate for activity, Reposition, Cessation of Activity  Pain Rating Post-Intervention 1: 10/10  Pain Rating 2: 7/10  Location - Side 2: Right  Location 2: hip (shoulder)  Pain Addressed 2: Pre-medicate for activity, Reposition, Cessation of Activity  Pain Rating Post-Intervention 2: 10/10    Patients cultural, spiritual, Gnosticism conflicts given the current situation: no    Social History:  Living Environment: Patient lives in a nursing home.   Prior Level of Function: Prior to admission, patient  able to perform most of her bathing and dressing and ambulate with a rollator for short distances. .  Roles and Routines: Patient was disabled prior to admission.  Equipment Used at Home: wheelchair, walker, rolling, cane, straight, oxygen  DME owned (not currently used):  uncertain  Assistance Upon Discharge: facility staff    Objective:     Communicated with RN  prior to session. Patient found HOB elevated with oxygen, pulse ox (continuous), peripheral IV upon OT entry to room.    General Precautions: Standard, fall   Orthopedic Precautions: RUE non weight bearing, LUE non weight bearing, RLE non weight bearing   Braces: UE Sling    Respiratory Status: Nasal cannula, flow 2 L/min    Occupational Performance    Gait belt applied - N/A    Bed Mobility:   Supine to sit from left side of bed with total assistance and of 2 persons  Sit to Supine with total assistance and of 2 persons on left side of bed    Functional Mobility/Transfers:  NT    Activities of Daily Living:  Feeding: total assistance  Upper Body Dressing: total assistance  Lower Body  Dressing: total assistance    Cognitive/Visual Perceptual:  Cognitive/Psychosocial Skills:    -     Oriented to: Person, Place, and Situation  -     Follows Commands/attention: Follows one-step commands  -     Communication: clear and fluent, but very low voice volume  -     Safety awareness/insight to disability: impaired. Pt is wanting to D/C to her aunt's home. It is unlikely her aunt will be able to provide the level of care that pt requires.  -     Mood/Affect/Coping skills/emotional control: Cooperative, Pleasant, and Guarded    Physical Exam:  Balance:    -     Sitting: maximal assistance  Skin integrity: Bruising of (R) arm, shoulder and flank  Dominant hand: Right  Upper Extremity Range of Motion:     -       Right Upper Extremity: Pt is able to move her fingers, but the rest of the arm is mostly immobilized  -       Left Upper Extremity: pt is able to move her elbow a very little, her wrist and her fingers WFL. Shoulder is severely impaired and subluxed.   Strength:    -       Right Upper Extremity: WFL  -       Left Upper Extremity: WFL  Gross motor coordination:   limited by severely limited ROM in (B) UE     AMPAC 6 Click ADL:  AMPAC Total Score: 7    Treatment & Education:  Educated on the importance of mobility to maximize recovery  Educated on weight bearing status  Educated on weight-bearing precautions and restrictions   Will continue to educate as needed  OT treatment [plan    Patient not clear to transfer with RN/PCT.    Patient left HOB elevated with all lines intact, call button in reach, and RN notified.    GOALS:   Multidisciplinary Problems       Occupational Therapy Goals       Not on file                    History:     Past Medical History:   Diagnosis Date    COPD (chronic obstructive pulmonary disease)     DONALD (generalized anxiety disorder) 03/24/2021    GERD with esophagitis     Iron deficiency anemia due to chronic blood loss     Supplemental oxygen dependent 11/28/2023          Past Surgical History:   Procedure Laterality Date    ABDOMINAL SURGERY      LEFT HEART CATHETERIZATION N/A 11/27/2023    Procedure: Left heart cath;  Surgeon: Ulises Crespo DO;  Location: [facility] CATH LAB;  Service: Cardiology;  Laterality: N/A;    OPEN REDUCTION AND INTERNAL FIXATION (ORIF) OF FRACTURE OF PROXIMAL HUMERUS Right 3/25/2024    Procedure: ORIF, FRACTURE, HUMERUS, PROXIMAL;  Surgeon: Que Buckner MD;  Location: [facility] ORTHO OR;  Service: Orthopedics;  Laterality: Right;    PERCUTANEOUS PINNING OF HIP Right 3/20/2024    Procedure: PINNING, HIP, PERCUTANEOUS;  Surgeon: Que Buckner MD;  Location: [facility] OR;  Service: Orthopedics;  Laterality: Right;       Time Tracking:     OT Date of Treatment: 03/26/24  OT Start Time: 1334  OT Stop Time: 1407  OT Total Time (min): 33 min    Billable Minutes: Evaluation moderate complexity    3/26/2024

## 2024-03-27 NOTE — PLAN OF CARE
Problem: Occupational Therapy  Goal: Occupational Therapy Goal  Description: STG: (in 2 weeks)  Pt will perform grooming with setup mod(A) with AD  Pt will sit EOB x 10 min with moderate assistance  Pt will transfer bed/chair/bsc with max(A) with slide board or stand pivot transfer on (L) LE  Pt will tolerate 15 minutes of tx without fatigue      LTG: (in 5 weeks)  1.Restore to max I with self care and mobility.    Outcome: Ongoing, Progressing     Pt is admitted from [facility]  post fall with (R) humerus fx, (R) hip fx, and L1 burst fx, and L3 to L5 compression fx. Pt is post op ORIF to (R) humerus, (R) hip and T12 to L2 percutaneous fusion and L3- L5 kyphoplasty. 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 Pt reports that prior to this fall, she was able to ambulate with a rollator and perform mush of her self-care. Pt states that she has been at [facility] for 3 months with hospice care. Pt's (R) UE is in an abduction sling and her (L) UE function is severely impaired by malunion of humerus. Pt is NWB on (L) LE. Pt will require 24 hour total assistance at D/C. OT will see pt to assist with sitting up and seeing if pt can assist with her feeding and grooming with AD.

## 2024-03-27 NOTE — PLAN OF CARE
Problem: Infection  Goal: Absence of Infection Signs and Symptoms  Outcome: Ongoing, Progressing     Problem: Gas Exchange Impaired  Goal: Optimal Gas Exchange  Outcome: Ongoing, Progressing

## 2024-03-27 NOTE — PT/OT/SLP PROGRESS
Physical Therapy Treatment    Patient Name:  Darya Low   MRN:  [id number]    Recommendations:     Discharge Recommendations: Moderate Intensity Therapy  Discharge Equipment Recommendations: to be determined by next level of care  Barriers to discharge: Inaccessible home and Decreased caregiver support    Assessment:     Darya Low is a 63 y.o. female admitted with a medical diagnosis of Falls, subsequent encounter.  She presents with the following impairments/functional limitations: weakness, impaired functional mobility, impaired self care skills, decreased upper extremity function, decreased lower extremity function, pain, decreased ROM, orthopedic precautions Pt is significantly limited with mobility due to multiple fractures. She requires maximum assistance with mobility due to inability to use either arm. Was able to perform SPT to chair despite increased pain.    Rehab Prognosis: Fair; patient would benefit from acute skilled PT services to address these deficits and reach maximum level of function.    Recent Surgery: Procedure(s) (LRB):  T12-L2 percutaneous fusion (N/A)  KYPHOPLASTY, SPINE, LUMBAR, L3-L5 (N/A) 1 Day Post-Op    Plan:     During this hospitalization, patient to be seen daily to address the identified rehab impairments via therapeutic activities, therapeutic exercises, neuromuscular re-education, wheelchair management/training and progress toward the following goals:    Plan of Care Expires:  04/26/24    Subjective     Chief Complaint: multiple fractures  Patient/Family Comments/goals: Pt is agreeable to PT   Pain/Comfort:  Pain Rating 1: 6/10  Location - Orientation 1: lower  Location 1: back  Pain Addressed 1: Pre-medicate for activity  Pain Rating Post-Intervention 1: 8/10  Pain Rating 2: 6/10  Location - Side 2: Right  Location 2: shoulder  Pain Addressed 2: Pre-medicate for activity  Pain Rating Post-Intervention 2: 8/10      Objective:     Communicated with CONNIE George RN prior to  session.  Patient found HOB elevated with peripheral IV, oxygen, telemetry upon PT entry to room.     General Precautions: Standard, fall  Orthopedic Precautions: LUE non weight bearing, RUE non weight bearing, RLE non weight bearing  Braces: UE Sling  Respiratory Status: Nasal cannula, flow 2 L/min     Functional Mobility:  Bed Mobility:     Scooting: maximal assistance  Supine to Sit: maximal assistance and of 2 persons  Transfers:     Sit to Stand:  maximal assistance with no AD  Bed to Chair: maximal assistance with  no AD  using  Stand Pivot  Balance: fair      AM-PAC 6 CLICK MOBILITY  Turning over in bed (including adjusting bedclothes, sheets and blankets)?: 2  Sitting down on and standing up from a chair with arms (e.g., wheelchair, bedside commode, etc.): 2  Moving from lying on back to sitting on the side of the bed?: 2  Moving to and from a bed to a chair (including a wheelchair)?: 2  Need to walk in hospital room?: 1  Climbing 3-5 steps with a railing?: 1  Basic Mobility Total Score: 10       Treatment & Education:  Pt performed bilateral LE: bed level exercises: ankle pumps, heel slides, hip abduction, and SAQ  x 30 each  Assisted to edge of bed with maximum assistance, sitting edge of bed x 7 minutes, maximum assistance transfer to chair    Patient left up in chair with all lines intact and call button in reach..    GOALS:   Multidisciplinary Problems       Physical Therapy Goals          Problem: Physical Therapy    Goal Priority Disciplines Outcome Goal Variances Interventions   Physical Therapy Goal     PT, PT/OT Ongoing, Progressing     Description: Short term goals:  1. Supine to sit with Moderate Assistance  2. Bed to chair transfer with Maximum Assistance using Slideboard  3. Sitting at edge of bed x10 minutes with Minimal Assistance    Long term goals:  1. Supine to sit with MInimal Assistance  2. Rolling to Left and Right with Minimal Assistance.  3. Bed to chair transfer with Minimal  Assistance using Slideboard  4. Sitting at edge of bed x15 minutes with Stand-by Assistance                         Time Tracking:     PT Received On: 03/27/24  PT Start Time: 1055     PT Stop Time: 1125  PT Total Time (min): 30 min     Billable Minutes: Therapeutic Activity 15 and Therapeutic Exercise 15    Treatment Type: Treatment  PT/PTA: PT     Number of PTA visits since last PT visit: 0     03/27/2024

## 2024-03-27 NOTE — PT/OT/SLP PROGRESS
"Occupational Therapy   Treatment    Name: Darya Low  MRN: [id number]  Admitting Diagnosis:  Falls, subsequent encounter  1 Day Post-Op    Recommendations:     Discharge Recommendations: Moderate Intensity Therapy  Discharge Equipment Recommendations:  to be determined by next level of care  Barriers to discharge:  None    Assessment:     Darya Low is a 63 y.o. female with a medical diagnosis of Falls, subsequent encounter.  She presents with alert and agreeable to treatment. Pt is found sitting up EOB with PT. Pt is supporting herself and tolerating EOB sitting much better today. Performance deficits affecting function are weakness, impaired endurance, impaired self care skills, impaired functional mobility, gait instability, decreased upper extremity function, decreased lower extremity function, impaired cardiopulmonary response to activity, impaired skin, pain, decreased ROM.     Rehab Prognosis:  Good; patient would benefit from acute skilled OT services to address these deficits and reach maximum level of function.       Plan:     Patient to be seen 5 x/week to address the above listed problems via self-care/home management, therapeutic activities, therapeutic exercises  Plan of Care Expires: 04/22/24  Plan of Care Reviewed with: patient    Subjective     Chief Complaint: pain from (R) humerus fx, post ORIF, (R) hip fx post ORIF, and lumbar kyphoplasty  Patient/Family Comments/goals: "I am hurting ."  Pain/Comfort:  Pain Rating 1: 6/10  Location - Orientation 1: lower  Location 1: back  Pain Addressed 1: Reposition, Nurse notified  Pain Rating Post-Intervention 1: 10/10  Pain Rating 2: 8/10  Location - Side 2: Right  Location 2: shoulder  Pain Addressed 2: Nurse notified (ice pack)  Pain Rating Post-Intervention 2: 10/10    Objective:     Communicated with: MAGGIE George prior to session.  Patient found sitting edge of bed with peripheral IV, telemetry, oxygen upon OT entry to room.    General " Precautions: Standard, fall    Orthopedic Precautions:LUE non weight bearing, RUE non weight bearing, RLE non weight bearing  Braces: Shoulder abduction brace  Respiratory Status: Nasal cannula, flow 2 L/min     Occupational Performance:     Bed Mobility:    NT    Functional Mobility/Transfers:  Patient completed Sit <> Stand Transfer with maximal assistance  with  manual assist and gait belt- performed by PT   Patient completed Bed <> Chair Transfer using Stand Pivot technique with maximal assistance with gaitbelt and manual assist - performed by PT    Activities of Daily Living:  Grooming: Pt washed her face and hands, attempting to clean her fingernails as well with setup.    Bathing: pt washed her upper body anterior with moderate assistance using her (L) UE. Total (A) with lower body bathing.      AMPAC 6 Click ADL: 8    Treatment & Education:  Pt sat EOB x 7 min with SBA to CGA , using her (L) UE to balance. Pt's sling was not correct, so it was straightened out. Pt participated with bathing , demonstrating improved ROM and function of (L) UE. Pt was very nauseated after transferring to the chair, so RN brought medication to pt for her nausea and for her pain.    Patient left up in chair with call button in reach, MAGGIE Riggs notified, and Respiratory therapist present    GOALS:   Multidisciplinary Problems       Occupational Therapy Goals          Problem: Occupational Therapy    Goal Priority Disciplines Outcome Interventions   Occupational Therapy Goal     OT, PT/OT Ongoing, Progressing    Description: STG: (in 2 weeks)  Pt will perform grooming with setup mod(A) with AD  Pt will sit EOB x 10 min with moderate assistance  Pt will transfer bed/chair/bsc with max(A) with slide board or stand pivot transfer on (L) LE  Pt will tolerate 15 minutes of tx without fatigue      LTG: (in 5 weeks)  1.Restore to max I with self care and mobility.                         Time Tracking:     OT Date of Treatment:  03/27/24  OT Start Time: 1118  OT Stop Time: 1210  OT Total Time (min): 52 min    Billable Minutes:Self Care/Home Management 45 min    OT/AGUILAR: OT          3/27/2024

## 2024-03-27 NOTE — RESPIRATORY THERAPY
Treatment given by RT student, Lamont Booth.     03/27/24 1210   Patient Assessment/Suction   Level of Consciousness (AVPU) alert   Respiratory Effort Unlabored   Expansion/Accessory Muscles/Retractions no use of accessory muscles;no retractions   All Lung Fields Breath Sounds Anterior:;Lateral:;clear;equal bilaterally   Rhythm/Pattern, Respiratory unlabored;pattern regular;depth regular;no shortness of breath reported   Cough Frequency no cough   PRE-TX-O2   Device (Oxygen Therapy) nasal cannula   Flow (L/min) 2   Oxygen Concentration (%) 28   SpO2 96 %   Pulse Oximetry Type Intermittent   Pulse 85   Resp 13   Positioning   Body Position position maintained   Head of Bed (HOB) Positioning HOB elevated   Aerosol Therapy   $ Aerosol Therapy Charges Aerosol Treatment   Daily Review of Necessity (SVN) completed   Respiratory Treatment Status (SVN) given   Treatment Route (SVN) oxygen;mask   Patient Position (SVN) HOB elevated   Post Treatment Assessment (SVN) breath sounds unchanged   Signs of Intolerance (SVN) none   Breath Sounds Post-Respiratory Treatment   Throughout All Fields Post-Treatment All Fields   Throughout All Fields Post-Treatment Anterior:;Lateral:;no change   Post-treatment Heart Rate (beats/min) 88   Post-treatment Resp Rate (breaths/min) 12

## 2024-03-28 LAB
BASOPHILS # BLD AUTO: 0.07 K/UL (ref 0–0.2)
BASOPHILS NFR BLD AUTO: 1.3 % (ref 0–1)
DIFFERENTIAL METHOD BLD: ABNORMAL
EOSINOPHIL # BLD AUTO: 0.21 K/UL (ref 0–0.5)
EOSINOPHIL NFR BLD AUTO: 3.9 % (ref 1–4)
ERYTHROCYTE [DISTWIDTH] IN BLOOD BY AUTOMATED COUNT: 16 % (ref 11.5–14.5)
HCT VFR BLD AUTO: 27.6 % (ref 38–47)
HGB BLD-MCNC: 8.6 G/DL (ref 12–16)
IMM GRANULOCYTES # BLD AUTO: 0.09 K/UL (ref 0–0.04)
IMM GRANULOCYTES NFR BLD: 1.7 % (ref 0–0.4)
LYMPHOCYTES # BLD AUTO: 1.2 K/UL (ref 1–4.8)
LYMPHOCYTES NFR BLD AUTO: 22.1 % (ref 27–41)
MCH RBC QN AUTO: 28.7 PG (ref 27–31)
MCHC RBC AUTO-ENTMCNC: 31.2 G/DL (ref 32–36)
MCV RBC AUTO: 92 FL (ref 80–96)
MONOCYTES # BLD AUTO: 0.69 K/UL (ref 0–0.8)
MONOCYTES NFR BLD AUTO: 12.7 % (ref 2–6)
MPC BLD CALC-MCNC: 11.1 FL (ref 9.4–12.4)
NEUTROPHILS # BLD AUTO: 3.16 K/UL (ref 1.8–7.7)
NEUTROPHILS NFR BLD AUTO: 58.3 % (ref 53–65)
NRBC # BLD AUTO: 0 X10E3/UL
NRBC, AUTO (.00): 0 %
OCCULT BLOOD: NEGATIVE
PLATELET # BLD AUTO: 227 K/UL (ref 150–400)
RBC # BLD AUTO: 3 M/UL (ref 4.2–5.4)
WBC # BLD AUTO: 5.42 K/UL (ref 4.5–11)

## 2024-03-28 PROCEDURE — 94640 AIRWAY INHALATION TREATMENT: CPT

## 2024-03-28 PROCEDURE — 97110 THERAPEUTIC EXERCISES: CPT | Mod: CO

## 2024-03-28 PROCEDURE — 94761 N-INVAS EAR/PLS OXIMETRY MLT: CPT

## 2024-03-28 PROCEDURE — 25000003 PHARM REV CODE 250: Performed by: ORTHOPAEDIC SURGERY

## 2024-03-28 PROCEDURE — 63600175 PHARM REV CODE 636 W HCPCS: Mod: JZ,JG | Performed by: ORTHOPAEDIC SURGERY

## 2024-03-28 PROCEDURE — 11000001 HC ACUTE MED/SURG PRIVATE ROOM

## 2024-03-28 PROCEDURE — 99233 SBSQ HOSP IP/OBS HIGH 50: CPT | Mod: GW,HPC,, | Performed by: HOSPITALIST

## 2024-03-28 PROCEDURE — 85025 COMPLETE CBC W/AUTO DIFF WBC: CPT | Performed by: ORTHOPAEDIC SURGERY

## 2024-03-28 PROCEDURE — 27000221 HC OXYGEN, UP TO 24 HOURS

## 2024-03-28 PROCEDURE — 25000242 PHARM REV CODE 250 ALT 637 W/ HCPCS: Performed by: ORTHOPAEDIC SURGERY

## 2024-03-28 PROCEDURE — 99900035 HC TECH TIME PER 15 MIN (STAT)

## 2024-03-28 PROCEDURE — 82272 OCCULT BLD FECES 1-3 TESTS: CPT | Performed by: HOSPITALIST

## 2024-03-28 PROCEDURE — 97530 THERAPEUTIC ACTIVITIES: CPT

## 2024-03-28 PROCEDURE — 97530 THERAPEUTIC ACTIVITIES: CPT | Mod: CO

## 2024-03-28 PROCEDURE — 97110 THERAPEUTIC EXERCISES: CPT

## 2024-03-28 RX ADMIN — OXYCODONE HYDROCHLORIDE 5 MG: 5 TABLET ORAL at 03:03

## 2024-03-28 RX ADMIN — GABAPENTIN 300 MG: 300 CAPSULE ORAL at 09:03

## 2024-03-28 RX ADMIN — IPRATROPIUM BROMIDE AND ALBUTEROL SULFATE 3 ML: 2.5; .5 SOLUTION RESPIRATORY (INHALATION) at 07:03

## 2024-03-28 RX ADMIN — MORPHINE SULFATE 3 MG: 4 INJECTION, SOLUTION INTRAMUSCULAR; INTRAVENOUS at 07:03

## 2024-03-28 RX ADMIN — CLONAZEPAM 1 MG: 0.5 TABLET ORAL at 09:03

## 2024-03-28 RX ADMIN — DOCUSATE SODIUM 100 MG: 100 CAPSULE, LIQUID FILLED ORAL at 09:03

## 2024-03-28 RX ADMIN — IPRATROPIUM BROMIDE AND ALBUTEROL SULFATE 3 ML: 2.5; .5 SOLUTION RESPIRATORY (INHALATION) at 01:03

## 2024-03-28 RX ADMIN — IPRATROPIUM BROMIDE AND ALBUTEROL SULFATE 3 ML: 2.5; .5 SOLUTION RESPIRATORY (INHALATION) at 12:03

## 2024-03-28 RX ADMIN — FAMOTIDINE 20 MG: 20 TABLET ORAL at 09:03

## 2024-03-28 RX ADMIN — MORPHINE SULFATE 3 MG: 4 INJECTION, SOLUTION INTRAMUSCULAR; INTRAVENOUS at 10:03

## 2024-03-28 RX ADMIN — OXYCODONE HYDROCHLORIDE 5 MG: 5 TABLET ORAL at 05:03

## 2024-03-28 RX ADMIN — TRAZODONE HYDROCHLORIDE 50 MG: 50 TABLET ORAL at 11:03

## 2024-03-28 RX ADMIN — SENNOSIDES AND DOCUSATE SODIUM 1 TABLET: 8.6; 5 TABLET ORAL at 09:03

## 2024-03-28 RX ADMIN — MORPHINE SULFATE 3 MG: 4 INJECTION, SOLUTION INTRAMUSCULAR; INTRAVENOUS at 02:03

## 2024-03-28 RX ADMIN — MORPHINE SULFATE 3 MG: 4 INJECTION, SOLUTION INTRAMUSCULAR; INTRAVENOUS at 11:03

## 2024-03-28 RX ADMIN — ESCITALOPRAM OXALATE 10 MG: 10 TABLET, FILM COATED ORAL at 09:03

## 2024-03-28 RX ADMIN — TIZANIDINE 4 MG: 4 TABLET ORAL at 09:03

## 2024-03-28 RX ADMIN — OXYCODONE HYDROCHLORIDE 10 MG: 10 TABLET, FILM COATED, EXTENDED RELEASE ORAL at 09:03

## 2024-03-28 RX ADMIN — GABAPENTIN 300 MG: 300 CAPSULE ORAL at 02:03

## 2024-03-28 RX ADMIN — POLYETHYLENE GLYCOL 3350 17 G: 17 POWDER, FOR SOLUTION ORAL at 09:03

## 2024-03-28 NOTE — PT/OT/SLP PROGRESS
Occupational Therapy   Treatment    Name: Darya Low  MRN: [id number]  Admitting Diagnosis:  Falls, subsequent encounter  2 Days Post-Op    Recommendations:     Discharge Recommendations: Moderate Intensity Therapy  Discharge Equipment Recommendations:  to be determined by next level of care  Barriers to discharge:       Assessment:     Darya Low is a 63 y.o. female with a medical diagnosis of Falls, subsequent encounter. Performance deficits affecting function are weakness, impaired endurance, impaired self care skills, pain, impaired skin, decreased upper extremity function.     Rehab Prognosis:  Good and Fair; patient would benefit from acute skilled OT services to address these deficits and reach maximum level of function.       Plan:     Patient to be seen 5 x/week to address the above listed problems via self-care/home management, therapeutic activities, therapeutic exercises  Plan of Care Expires: 04/22/24  Plan of Care Reviewed with: patient    Subjective     Chief Complaint:   Patient/Family Comments/goals:   Pain/Comfort:  Pain Rating 1: 6/10  Location 1: back  Pain Addressed 1: Nurse notified, Distraction    Objective:     Communicated with:Ganga Chandler RN   prior to session.  Patient found HOB elevated with peripheral IV, telemetry, oxygen upon OT entry to room.    General PrecautionsStandard, fall    Orthopedic Precautions:RUE non weight bearing, LUE non weight bearing, RLE non weight bearing  Braces: UE Sling  Respiratory Status: nasal cannula 2     Occupational Performance:     Bed Mobility:         Functional Mobility/Transfers:    Functional Mobility:     Activities of Daily Living:  I to comb her hair  I to apply lip balm  Set up to wash her face      AMPAC 6 Click ADL:      Treatment & Education:  Pt performed hand helper with 2 rubber band resistances on L 20 reps  x 2. Rom ex's on L 10 reps x 2 elbow flex/ext,abd/add,   Pt fatigued easily and not able to tolerate much  treatment    Patient left HOB elevated with all lines intact and call button in reach    GOALS:   Multidisciplinary Problems       Occupational Therapy Goals          Problem: Occupational Therapy    Goal Priority Disciplines Outcome Interventions   Occupational Therapy Goal     OT, PT/OT Ongoing, Progressing    Description: STG: (in 2 weeks)  Pt will perform grooming with setup mod(A) with AD  Pt will sit EOB x 10 min with moderate assistance  Pt will transfer bed/chair/bsc with max(A) with slide board or stand pivot transfer on (L) LE  Pt will tolerate 15 minutes of tx without fatigue      LTG: (in 5 weeks)  1.Restore to max I with self care and mobility.                         Time Tracking:     OT Date of Treatment: 03/28/24  OT Start Time: 1502  OT Stop Time: 1517  OT Total Time (min): 15 min    Billable Minutes:Therapeutic Activity 14    OT/AGUILAR: AGUILAR          3/28/2024

## 2024-03-28 NOTE — PROGRESS NOTES
[facility]  [facility] Medicine  Progress Note    Patient Name: Darya Low  MRN: [id number]  Patient Class: IP- Inpatient   Admission Date: 3/18/2024  Length of Stay: 9 days  Attending Physician: Marlene Escobedo MD  Primary Care Provider: Maeve, Primary Doctor        Subjective:     Principal Problem:Falls, subsequent encounter    HPI:  No notes on file    Overview/Hospital Course:  Admitted on 3/18/24 under trauma surgery for right shoulder (proximal humerus), hip and vertebral fractures. She was hypotensive on admission so was monitored in ICU and required pressor support for the first 36 hours of admission but has been off since. She underwent right hip pinning by Dr. Buckner, also possibly to require ORIF of right humerus on 3/25. Ortho spine evaluated and plan for T12-L2 percutaneous fusion and L2-L5 kyphoplasty on 3/26.    3/23- Transferred out of ICU and hospitalist assumed care. Dr. Hills from Trauma team has requested hospitalist to assume primary. S/p 1 unit transfusion for pre-op reasons.   3/24- Hgb stabilized. Ortho tentatively plans for ORIF right humerus fracture tomorrow.   3/25- s/p ORIF right humerus today.  3/26- s/p spinal surgery with Dr. Conway today. PT/OT and SW consulted.       Interval History:     Review of Systems   Musculoskeletal:  Positive for arthralgias and back pain.     Objective:     Vital Signs (Most Recent):  Temp: 97.7 °F (36.5 °C) (03/27/24 1913)  Pulse: 75 (03/27/24 1958)  Resp: 16 (03/27/24 1915)  BP: (!) 95/51 (03/27/24 1958)  SpO2: (!) 93 % (03/27/24 1958) Vital Signs (24h Range):  Temp:  [97.7 °F (36.5 °C)-99.1 °F (37.3 °C)] 97.7 °F (36.5 °C)  Pulse:  [68-89] 75  Resp:  [13-20] 16  SpO2:  [81 %-98 %] 93 %  BP: ()/(47-87) 95/51     Weight: 53.7 kg (118 lb 6.4 oz)  Body mass index is 20.97 kg/m².    Intake/Output Summary (Last 24 hours) at 3/27/2024 2004  Last data filed at 3/27/2024 0935  Gross per 24 hour   Intake --   Output 900 ml   Net -900 ml          Physical Exam  Constitutional:       Appearance: She is ill-appearing.   HENT:      Head: Normocephalic.   Cardiovascular:      Rate and Rhythm: Normal rate and regular rhythm.   Pulmonary:      Effort: Pulmonary effort is normal. No respiratory distress.      Breath sounds: Wheezing present.   Abdominal:      General: Bowel sounds are normal.      Palpations: Abdomen is soft.   Musculoskeletal:      Comments: Right arm in sling, right hip dressing in place.    Skin:     Findings: Bruising present.   Neurological:      General: No focal deficit present.      Mental Status: She is alert and oriented to person, place, and time.             Significant Labs: All pertinent labs within the past 24 hours have been reviewed.    Significant Imaging: I have reviewed all pertinent imaging results/findings within the past 24 hours.    Assessment/Plan:      * Falls, subsequent encounter  Presented to ED with fall, resides at nursing home.  Admitted under trauma team- trauma workup with fractures, no major bleeding noted.   Plan as outlined below.     3/27: multiple fractures and repairs as detailed.  Will discuss further with surgery.        Acute blood loss anemia  Patient's anemia is currently uncontrolled. Has received 2 units of PRBCs on two different dates - 3/27 is first unit ordered by me . Etiology likely d/t chronic blood loss and other etiology unknown.   Current CBC reviewed-   Lab Results   Component Value Date    HGB 7.1 (L) 03/27/2024    HCT 23.1 (L) 03/27/2024     Monitor serial CBC and transfuse if patient becomes hemodynamically unstable, symptomatic or H/H drops below 7/21.    Acute cystitis  S/p IV ceftriaxone x 3 days.       Essential hypertension  Chronic, controlled. Latest blood pressure and vitals reviewed-     Temp:  [97.4 °F (36.3 °C)-98.5 °F (36.9 °C)]   Pulse:  [63-95]   Resp:  [8-20]   BP: ()/(43-89)   SpO2:  [79 %-100 %] .   Home meds for hypertension were reviewed and noted below.    Hypertension Medications               furosemide (LASIX) 40 MG tablet Take 40 mg by mouth 2 (two) times a day.    NIFEdipine (PROCARDIA-XL) 60 MG (OSM) 24 hr tablet Take 1 tablet (60 mg total) by mouth once daily.            While in the hospital, will manage blood pressure as follows; Adjust home antihypertensive regimen as follows- was held given shock- resume as able if BP elevates since she is out of shock    Will utilize p.r.n. blood pressure medication only if patient's blood pressure greater than 180/110 and she develops symptoms such as worsening chest pain or shortness of breath.    Closed displaced fracture of right femoral neck  Ortho consulted; s/p pinning by Dr. Buckner on 3/20/24.        Closed compression fracture of body of L1 vertebra  MRI L-spine with compression fracture of the L1 vertebra, height loss estimated at the 70%.  There is posterior cortex retropulsion contributing to mild-to-moderate central canal stenosis.   S/p Posterior spinal fusion and instrumentation T12-L2 and percutaneous balloon kyphoplasty L3, L4, L5 on 3/26 by Dr. Conway.  Continue pain control, PT/OT.       Closed fracture of proximal end of right humerus with routine healing  Initial X-ray showed proximal fracture but later showed increasing displacement.  Ortho consulted; s/p ORIF per Dr. Buckner on 3/25.      Chronic pain  Resume home Oxycontin and gabapentin.  For acute pain she is on IV narcotics as well.       Hypovolemic shock  Required pressor support initially in ICU but off since.  Stable BP.       Supplemental oxygen dependent  On 3L O2 at baseline for COPD.       Iron deficiency anemia due to chronic blood loss  Patient's anemia is currently uncontrolled. S/p 1 unit PRBC for Hgb 7 on 3/23/24 in preparation of upcoming OR trips. Etiology likely d/t acute blood loss which was from surgery on top of chronic anemia (baseline Hgb ~ 9-10)  Current CBC reviewed-   Lab Results   Component Value Date    HGB 8.3 (L)  03/26/2024    HCT 27.2 (L) 03/26/2024     Monitor serial CBC and transfuse if patient becomes hemodynamically unstable, symptomatic or H/H drops below 7/21.    DONALD (generalized anxiety disorder)  Resume home escitalopram and clonazepam.     Chronic obstructive pulmonary disease  Patient's COPD is controlled currently.  Patient is currently off COPD Pathway. Continue scheduled inhalers Supplemental oxygen and monitor respiratory status closely.       VTE Risk Mitigation (From admission, onward)      None            Discharge Planning   VALENTIN: 3/29/2024     Code Status: DNR   Is the patient medically ready for discharge?:     Reason for patient still in hospital (select all that apply): Treatment  Discharge Plan A: Hospice/home                  Marlene Escobedo MD  Department of Hospital Medicine   [facility]

## 2024-03-28 NOTE — SUBJECTIVE & OBJECTIVE
Interval History:     Review of Systems   Musculoskeletal:  Positive for arthralgias and back pain.     Objective:     Vital Signs (Most Recent):  Temp: 97.7 °F (36.5 °C) (03/27/24 1913)  Pulse: 75 (03/27/24 1958)  Resp: 16 (03/27/24 1915)  BP: (!) 95/51 (03/27/24 1958)  SpO2: (!) 93 % (03/27/24 1958) Vital Signs (24h Range):  Temp:  [97.7 °F (36.5 °C)-99.1 °F (37.3 °C)] 97.7 °F (36.5 °C)  Pulse:  [68-89] 75  Resp:  [13-20] 16  SpO2:  [81 %-98 %] 93 %  BP: ()/(47-87) 95/51     Weight: 53.7 kg (118 lb 6.4 oz)  Body mass index is 20.97 kg/m².    Intake/Output Summary (Last 24 hours) at 3/27/2024 2004  Last data filed at 3/27/2024 0935  Gross per 24 hour   Intake --   Output 900 ml   Net -900 ml         Physical Exam  Constitutional:       Appearance: She is ill-appearing.   HENT:      Head: Normocephalic.   Cardiovascular:      Rate and Rhythm: Normal rate and regular rhythm.   Pulmonary:      Effort: Pulmonary effort is normal. No respiratory distress.      Breath sounds: Wheezing present.   Abdominal:      General: Bowel sounds are normal.      Palpations: Abdomen is soft.   Musculoskeletal:      Comments: Right arm in sling, right hip dressing in place.    Skin:     Findings: Bruising present.   Neurological:      General: No focal deficit present.      Mental Status: She is alert and oriented to person, place, and time.             Significant Labs: All pertinent labs within the past 24 hours have been reviewed.    Significant Imaging: I have reviewed all pertinent imaging results/findings within the past 24 hours.

## 2024-03-28 NOTE — NURSING
Dr Escobedo in department seeing pt. Pt is hypotensive. MD aware. PRBC ordered. Pt asking for pain meds. Will not receive at this time per MD order. Pt is still alert and oriented at this time.

## 2024-03-28 NOTE — PT/OT/SLP PROGRESS
Physical Therapy Treatment    Patient Name:  Darya Low   MRN:  [id number]    Recommendations:     Discharge Recommendations: Moderate Intensity Therapy  Discharge Equipment Recommendations: to be determined by next level of care  Barriers to discharge: Inaccessible home and Decreased caregiver support    Assessment:     Darya Low is a 63 y.o. female admitted with a medical diagnosis of Falls, subsequent encounter.  She presents with the following impairments/functional limitations: weakness, impaired functional mobility, impaired self care skills, decreased upper extremity function, decreased lower extremity function, pain, decreased ROM, orthopedic precautions Pt requires significant assistance with mobility due to multiple fractures. Able to sit up in bed with assistance Will need extensive rehab.    Rehab Prognosis: Fair; patient would benefit from acute skilled PT services to address these deficits and reach maximum level of function.    Recent Surgery: Procedure(s) (LRB):  T12-L2 percutaneous fusion (N/A)  KYPHOPLASTY, SPINE, LUMBAR, L3-L5 (N/A) 2 Days Post-Op    Plan:     During this hospitalization, patient to be seen daily to address the identified rehab impairments via therapeutic activities, therapeutic exercises, neuromuscular re-education, wheelchair management/training and progress toward the following goals:    Plan of Care Expires:  04/26/24    Subjective     Chief Complaint: multiple fractures  Patient/Family Comments/goals: Pt is agreeable to PT   Pain/Comfort:  Pain Rating 1: 8/10  Location 1: back  Pain Addressed 1: Pre-medicate for activity  Pain Rating Post-Intervention 1: 8/10      Objective:     Communicated with MONIQUE Chandler RN prior to session.  Patient found HOB elevated with peripheral IV upon PT entry to room.     General Precautions: Standard, fall  Orthopedic Precautions: LUE non weight bearing, RUE non weight bearing, RLE non weight bearing  Braces: UE Sling  Respiratory Status:  Nasal cannula, flow 2 L/min     Functional Mobility:  Bed Mobility:     Scooting: maximal assistance  Supine to Sit: maximal assistance  Sit to Supine: maximal assistance  Balance: fair      AM-PAC 6 CLICK MOBILITY  Turning over in bed (including adjusting bedclothes, sheets and blankets)?: 2  Sitting down on and standing up from a chair with arms (e.g., wheelchair, bedside commode, etc.): 2  Moving from lying on back to sitting on the side of the bed?: 2  Moving to and from a bed to a chair (including a wheelchair)?: 2  Need to walk in hospital room?: 1  Climbing 3-5 steps with a railing?: 1  Basic Mobility Total Score: 10       Treatment & Education:  Pt performed bilateral LE: bed level exercises: ankle pumps, quad sets, heel slides, and hip abduction x 30 each  Assisted to edge of bed with maximum assistance, edge of bed sitting x 10 min    Patient left HOB elevated with all lines intact and call button in reach..    GOALS:   Multidisciplinary Problems       Physical Therapy Goals          Problem: Physical Therapy    Goal Priority Disciplines Outcome Goal Variances Interventions   Physical Therapy Goal     PT, PT/OT Ongoing, Progressing     Description: Short term goals:  1. Supine to sit with Moderate Assistance  2. Bed to chair transfer with Maximum Assistance using Slideboard  3. Sitting at edge of bed x10 minutes with Minimal Assistance    Long term goals:  1. Supine to sit with MInimal Assistance  2. Rolling to Left and Right with Minimal Assistance.  3. Bed to chair transfer with Minimal Assistance using Slideboard  4. Sitting at edge of bed x15 minutes with Stand-by Assistance                         Time Tracking:     PT Received On: 03/28/24  PT Start Time: 1429     PT Stop Time: 1454  PT Total Time (min): 25 min     Billable Minutes: Therapeutic Activity 10 and Therapeutic Exercise 15    Treatment Type: Treatment  PT/PTA: PT     Number of PTA visits since last PT visit: 0     03/28/2024

## 2024-03-28 NOTE — ASSESSMENT & PLAN NOTE
Presented to ED with fall, resides at nursing home.  Admitted under trauma team- trauma workup with fractures, no major bleeding noted.   Plan as outlined below.     3/27: multiple fractures and repairs as detailed.  Will discuss further with surgery.

## 2024-03-28 NOTE — ASSESSMENT & PLAN NOTE
Patient's anemia is currently uncontrolled. Has received 2 units of PRBCs on two different dates - 3/27 is first unit ordered by me . Etiology likely d/t chronic blood loss and other etiology unknown.   Current CBC reviewed-   Lab Results   Component Value Date    HGB 7.1 (L) 03/27/2024    HCT 23.1 (L) 03/27/2024     Monitor serial CBC and transfuse if patient becomes hemodynamically unstable, symptomatic or H/H drops below 7/21.

## 2024-03-28 NOTE — PLAN OF CARE
Chart reviewed. SS following for discharge needs. Pt will return to [facility] under hospice.

## 2024-03-29 LAB
ALBUMIN SERPL BCP-MCNC: 1.9 G/DL (ref 3.5–5)
ANION GAP SERPL CALCULATED.3IONS-SCNC: 9 MMOL/L (ref 7–16)
BUN SERPL-MCNC: 6 MG/DL (ref 7–18)
BUN/CREAT SERPL: 15 (ref 6–20)
CALCIUM SERPL-MCNC: 8.3 MG/DL (ref 8.5–10.1)
CHLORIDE SERPL-SCNC: 106 MMOL/L (ref 98–107)
CO2 SERPL-SCNC: 28 MMOL/L (ref 21–32)
CREAT SERPL-MCNC: 0.41 MG/DL (ref 0.55–1.02)
EGFR (NO RACE VARIABLE) (RUSH/TITUS): 111 ML/MIN/1.73M2
GLUCOSE SERPL-MCNC: 114 MG/DL (ref 74–106)
HCT VFR BLD AUTO: 28.5 % (ref 38–47)
HGB BLD-MCNC: 8.5 G/DL (ref 12–16)
PHOSPHATE SERPL-MCNC: 4.2 MG/DL (ref 2.5–4.5)
POTASSIUM SERPL-SCNC: 3.6 MMOL/L (ref 3.5–5.1)
SODIUM SERPL-SCNC: 139 MMOL/L (ref 136–145)

## 2024-03-29 PROCEDURE — 97530 THERAPEUTIC ACTIVITIES: CPT

## 2024-03-29 PROCEDURE — 94640 AIRWAY INHALATION TREATMENT: CPT

## 2024-03-29 PROCEDURE — 80069 RENAL FUNCTION PANEL: CPT | Performed by: HOSPITALIST

## 2024-03-29 PROCEDURE — 97110 THERAPEUTIC EXERCISES: CPT

## 2024-03-29 PROCEDURE — 25000003 PHARM REV CODE 250: Performed by: ORTHOPAEDIC SURGERY

## 2024-03-29 PROCEDURE — 25000242 PHARM REV CODE 250 ALT 637 W/ HCPCS: Performed by: ORTHOPAEDIC SURGERY

## 2024-03-29 PROCEDURE — 99233 SBSQ HOSP IP/OBS HIGH 50: CPT | Mod: GW,HPC,, | Performed by: HOSPITALIST

## 2024-03-29 PROCEDURE — 99900035 HC TECH TIME PER 15 MIN (STAT)

## 2024-03-29 PROCEDURE — 85018 HEMOGLOBIN: CPT | Performed by: HOSPITALIST

## 2024-03-29 PROCEDURE — 11000001 HC ACUTE MED/SURG PRIVATE ROOM

## 2024-03-29 PROCEDURE — 63600175 PHARM REV CODE 636 W HCPCS: Performed by: ORTHOPAEDIC SURGERY

## 2024-03-29 PROCEDURE — 94761 N-INVAS EAR/PLS OXIMETRY MLT: CPT

## 2024-03-29 PROCEDURE — 27000221 HC OXYGEN, UP TO 24 HOURS

## 2024-03-29 RX ADMIN — DOCUSATE SODIUM 100 MG: 100 CAPSULE, LIQUID FILLED ORAL at 09:03

## 2024-03-29 RX ADMIN — IPRATROPIUM BROMIDE AND ALBUTEROL SULFATE 3 ML: 2.5; .5 SOLUTION RESPIRATORY (INHALATION) at 07:03

## 2024-03-29 RX ADMIN — GABAPENTIN 300 MG: 300 CAPSULE ORAL at 03:03

## 2024-03-29 RX ADMIN — OXYCODONE HYDROCHLORIDE 10 MG: 10 TABLET, FILM COATED, EXTENDED RELEASE ORAL at 08:03

## 2024-03-29 RX ADMIN — OXYCODONE HYDROCHLORIDE 10 MG: 10 TABLET, FILM COATED, EXTENDED RELEASE ORAL at 10:03

## 2024-03-29 RX ADMIN — ESCITALOPRAM OXALATE 10 MG: 10 TABLET, FILM COATED ORAL at 08:03

## 2024-03-29 RX ADMIN — IPRATROPIUM BROMIDE AND ALBUTEROL SULFATE 3 ML: 2.5; .5 SOLUTION RESPIRATORY (INHALATION) at 02:03

## 2024-03-29 RX ADMIN — OXYCODONE HYDROCHLORIDE 5 MG: 5 TABLET ORAL at 11:03

## 2024-03-29 RX ADMIN — OXYCODONE HYDROCHLORIDE 5 MG: 5 TABLET ORAL at 05:03

## 2024-03-29 RX ADMIN — CLONAZEPAM 1 MG: 0.5 TABLET ORAL at 08:03

## 2024-03-29 RX ADMIN — CLONAZEPAM 1 MG: 0.5 TABLET ORAL at 09:03

## 2024-03-29 RX ADMIN — TIZANIDINE 4 MG: 4 TABLET ORAL at 09:03

## 2024-03-29 RX ADMIN — GABAPENTIN 300 MG: 300 CAPSULE ORAL at 08:03

## 2024-03-29 RX ADMIN — SENNOSIDES AND DOCUSATE SODIUM 1 TABLET: 8.6; 5 TABLET ORAL at 08:03

## 2024-03-29 RX ADMIN — MORPHINE SULFATE 3 MG: 4 INJECTION, SOLUTION INTRAMUSCULAR; INTRAVENOUS at 03:03

## 2024-03-29 RX ADMIN — MORPHINE SULFATE 3 MG: 4 INJECTION, SOLUTION INTRAMUSCULAR; INTRAVENOUS at 09:03

## 2024-03-29 RX ADMIN — POLYETHYLENE GLYCOL 3350 17 G: 17 POWDER, FOR SOLUTION ORAL at 08:03

## 2024-03-29 RX ADMIN — DOCUSATE SODIUM 100 MG: 100 CAPSULE, LIQUID FILLED ORAL at 08:03

## 2024-03-29 RX ADMIN — IPRATROPIUM BROMIDE AND ALBUTEROL SULFATE 3 ML: 2.5; .5 SOLUTION RESPIRATORY (INHALATION) at 12:03

## 2024-03-29 RX ADMIN — GABAPENTIN 300 MG: 300 CAPSULE ORAL at 09:03

## 2024-03-29 RX ADMIN — FAMOTIDINE 20 MG: 20 TABLET ORAL at 08:03

## 2024-03-29 RX ADMIN — FAMOTIDINE 20 MG: 20 TABLET ORAL at 09:03

## 2024-03-29 RX ADMIN — MORPHINE SULFATE 3 MG: 4 INJECTION, SOLUTION INTRAMUSCULAR; INTRAVENOUS at 08:03

## 2024-03-29 NOTE — SUBJECTIVE & OBJECTIVE
Interval History:     Review of Systems   Musculoskeletal:  Positive for arthralgias and back pain.     Objective:     Vital Signs (Most Recent):  Temp: 98.8 °F (37.1 °C) (03/28/24 1449)  Pulse: 86 (03/28/24 1449)  Resp: 18 (03/28/24 1712)  BP: 122/77 (03/28/24 1449)  SpO2: 95 % (03/28/24 1449) Vital Signs (24h Range):  Temp:  [97.6 °F (36.4 °C)-99.6 °F (37.6 °C)] 98.8 °F (37.1 °C)  Pulse:  [72-86] 86  Resp:  [16-20] 18  SpO2:  [93 %-99 %] 95 %  BP: ()/(51-92) 122/77     Weight: 53.7 kg (118 lb 6.4 oz)  Body mass index is 20.97 kg/m².    Intake/Output Summary (Last 24 hours) at 3/28/2024 1920  Last data filed at 3/28/2024 0600  Gross per 24 hour   Intake 335 ml   Output 450 ml   Net -115 ml         Physical Exam  Constitutional:       Appearance: She is ill-appearing.   HENT:      Head: Normocephalic.   Cardiovascular:      Rate and Rhythm: Normal rate and regular rhythm.   Pulmonary:      Effort: Pulmonary effort is normal. No respiratory distress.      Breath sounds: Wheezing present.   Abdominal:      General: Bowel sounds are normal.      Palpations: Abdomen is soft.   Musculoskeletal:      Comments: Right arm in sling, right hip dressing in place.    Skin:     Findings: Bruising present.   Neurological:      General: No focal deficit present.      Mental Status: She is alert and oriented to person, place, and time.             Significant Labs: All pertinent labs within the past 24 hours have been reviewed.    Significant Imaging: I have reviewed all pertinent imaging results/findings within the past 24 hours.

## 2024-03-29 NOTE — PT/OT/SLP PROGRESS
Physical Therapy Treatment    Patient Name:  Darya Low   MRN:  [id number]    Recommendations:     Discharge Recommendations: Moderate Intensity Therapy  Discharge Equipment Recommendations: to be determined by next level of care  Barriers to discharge: Decreased caregiver support and ongoing medical care    Assessment:     Darya Low is a 63 y.o. female admitted with a medical diagnosis of Falls, subsequent encounter.  She presents with the following impairments/functional limitations: weakness, impaired endurance, impaired functional mobility, decreased lower extremity function, decreased upper extremity function, pain, decreased ROM, orthopedic precautions. Pt tolerated bed mob well but does not adhere to NWB precautions for either extremity. Pt tolerated EOB sitting for approx 15 mins unsupported once assisted with positioning. Pt ed on the importance of use of RUE sling.     Rehab Prognosis: Fair; patient would benefit from acute skilled PT services to address these deficits and reach maximum level of function.    Recent Surgery: Procedure(s) (LRB):  T12-L2 percutaneous fusion (N/A)  KYPHOPLASTY, SPINE, LUMBAR, L3-L5 (N/A) 3 Days Post-Op    Plan:     During this hospitalization, patient to be seen daily to address the identified rehab impairments via therapeutic activities, therapeutic exercises, neuromuscular re-education and progress toward the following goals:    Plan of Care Expires:  04/26/24    Subjective     Chief Complaint: falls  Patient/Family Comments/goals: agreeable to EOB sitting  Pain/Comfort:  Pain Rating 1: 5/10  Location 1: back  Pain Addressed 1: Nurse notified  Pain Rating Post-Intervention 1: 7/10      Objective:     Communicated with RN prior to session.  Patient found HOB elevated with peripheral IV, telemetry, PureWick, oxygen upon PT entry to room.     General Precautions: Standard, fall  Orthopedic Precautions: RUE non weight bearing, LUE non weight bearing, RLE non weight  bearing  Braces: UE Sling  Respiratory Status: Nasal cannula, flow 2 L/min     Functional Mobility:  Bed Mobility:     Rolling Left:  minimum assistance  Rolling Right: minimum assistance  Scooting: moderate assistance  Bridging: contact guard assistance and minimum assistance  Supine to Sit: minimum assistance and moderate assistance  Sit to Supine: moderate assistance  Transfers:     Sit to Stand:  moderate assistance with manual assist and UEs supported   Balance: EOB sitting SBA/Fair+       AM-PAC 6 CLICK MOBILITY  Turning over in bed (including adjusting bedclothes, sheets and blankets)?: 3  Sitting down on and standing up from a chair with arms (e.g., wheelchair, bedside commode, etc.): 2  Moving from lying on back to sitting on the side of the bed?: 3  Moving to and from a bed to a chair (including a wheelchair)?: 2  Need to walk in hospital room?: 1  Climbing 3-5 steps with a railing?: 1  Basic Mobility Total Score: 12       Treatment & Education:  Bilateral lower extremity exercise x 15 reps: ankle pumps, short arc quads, heel slides, hip abduction/adduction, Long arc quads, and Marching with active assist ROM, verbal cues for sequencing and safety, and tactile cues     Patient left supine with all lines intact and CNA present..    GOALS:   Multidisciplinary Problems       Physical Therapy Goals          Problem: Physical Therapy    Goal Priority Disciplines Outcome Goal Variances Interventions   Physical Therapy Goal     PT, PT/OT Ongoing, Progressing     Description: Short term goals:  1. Supine to sit with Moderate Assistance  2. Bed to chair transfer with Maximum Assistance using Slideboard  3. Sitting at edge of bed x10 minutes with Minimal Assistance    Long term goals:  1. Supine to sit with MInimal Assistance  2. Rolling to Left and Right with Minimal Assistance.  3. Bed to chair transfer with Minimal Assistance using Slideboard  4. Sitting at edge of bed x15 minutes with Stand-by Assistance                          Time Tracking:     PT Received On: 03/29/24  PT Start Time: 1114     PT Stop Time: 1139  PT Total Time (min): 25 min     Billable Minutes: Therapeutic Activity 15 and Therapeutic Exercise 10    Treatment Type: Treatment  PT/PTA: PT     Number of PTA visits since last PT visit: 0     03/29/2024

## 2024-03-29 NOTE — ASSESSMENT & PLAN NOTE
Presented to ED with fall, resides at nursing home.  Admitted under trauma team- trauma workup with fractures, no major bleeding noted.   Plan as outlined below.     3/27: multiple fractures and repairs as detailed.  Will discuss further with surgery.     3/28:  Will discuss various surgeries with providers.  Patient plans to go to swing bed.  Discussed with social work.

## 2024-03-29 NOTE — PROGRESS NOTES
[facility]  [facility] Medicine  Progress Note    Patient Name: Darya Low  MRN: [id number]  Patient Class: IP- Inpatient   Admission Date: 3/18/2024  Length of Stay: 10 days  Attending Physician: Marlene Escobedo MD  Primary Care Provider: Maeve, Primary Doctor        Subjective:     Principal Problem:Falls, subsequent encounter    HPI:  No notes on file    Overview/Hospital Course:  Admitted on 3/18/24 under trauma surgery for right shoulder (proximal humerus), hip and vertebral fractures. She was hypotensive on admission so was monitored in ICU and required pressor support for the first 36 hours of admission but has been off since. She underwent right hip pinning by Dr. Buckner, also possibly to require ORIF of right humerus on 3/25. Ortho spine evaluated and plan for T12-L2 percutaneous fusion and L2-L5 kyphoplasty on 3/26.    3/23- Transferred out of ICU and hospitalist assumed care. Dr. Hills from Trauma team has requested hospitalist to assume primary. S/p 1 unit transfusion for pre-op reasons.   3/24- Hgb stabilized. Ortho tentatively plans for ORIF right humerus fracture tomorrow.   3/25- s/p ORIF right humerus today.  3/26- s/p spinal surgery with Dr. Conway today. PT/OT and SW consulted.       Interval History:     Review of Systems   Musculoskeletal:  Positive for arthralgias and back pain.     Objective:     Vital Signs (Most Recent):  Temp: 98.8 °F (37.1 °C) (03/28/24 1449)  Pulse: 86 (03/28/24 1449)  Resp: 18 (03/28/24 1712)  BP: 122/77 (03/28/24 1449)  SpO2: 95 % (03/28/24 1449) Vital Signs (24h Range):  Temp:  [97.6 °F (36.4 °C)-99.6 °F (37.6 °C)] 98.8 °F (37.1 °C)  Pulse:  [72-86] 86  Resp:  [16-20] 18  SpO2:  [93 %-99 %] 95 %  BP: ()/(51-92) 122/77     Weight: 53.7 kg (118 lb 6.4 oz)  Body mass index is 20.97 kg/m².    Intake/Output Summary (Last 24 hours) at 3/28/2024 1920  Last data filed at 3/28/2024 0600  Gross per 24 hour   Intake 335 ml   Output 450 ml   Net -115 ml          Physical Exam  Constitutional:       Appearance: She is ill-appearing.   HENT:      Head: Normocephalic.   Cardiovascular:      Rate and Rhythm: Normal rate and regular rhythm.   Pulmonary:      Effort: Pulmonary effort is normal. No respiratory distress.      Breath sounds: Wheezing present.   Abdominal:      General: Bowel sounds are normal.      Palpations: Abdomen is soft.   Musculoskeletal:      Comments: Right arm in sling, right hip dressing in place.    Skin:     Findings: Bruising present.   Neurological:      General: No focal deficit present.      Mental Status: She is alert and oriented to person, place, and time.             Significant Labs: All pertinent labs within the past 24 hours have been reviewed.    Significant Imaging: I have reviewed all pertinent imaging results/findings within the past 24 hours.    Assessment/Plan:      * Falls, subsequent encounter  Presented to ED with fall, resides at nursing home.  Admitted under trauma team- trauma workup with fractures, no major bleeding noted.   Plan as outlined below.     3/27: multiple fractures and repairs as detailed.  Will discuss further with surgery.     3/28:  Will discuss various surgeries with providers.  Patient plans to go to swing bed.  Discussed with social work.        Acute blood loss anemia  Patient's anemia is currently uncontrolled. Has received 2 units of PRBCs on two different dates - 3/27 is first unit ordered by me . Etiology likely d/t chronic blood loss and other etiology unknown.   Current CBC reviewed-   Lab Results   Component Value Date    HGB 7.1 (L) 03/27/2024    HCT 23.1 (L) 03/27/2024     Monitor serial CBC and transfuse if patient becomes hemodynamically unstable, symptomatic or H/H drops below 7/21.    Acute cystitis  S/p IV ceftriaxone x 3 days.       Essential hypertension  Chronic, controlled. Latest blood pressure and vitals reviewed-     Temp:  [97.4 °F (36.3 °C)-98.5 °F (36.9 °C)]   Pulse:  [63-95]    Resp:  [8-20]   BP: ()/(43-89)   SpO2:  [79 %-100 %] .   Home meds for hypertension were reviewed and noted below.   Hypertension Medications               furosemide (LASIX) 40 MG tablet Take 40 mg by mouth 2 (two) times a day.    NIFEdipine (PROCARDIA-XL) 60 MG (OSM) 24 hr tablet Take 1 tablet (60 mg total) by mouth once daily.            While in the hospital, will manage blood pressure as follows; Adjust home antihypertensive regimen as follows- was held given shock- resume as able if BP elevates since she is out of shock    Will utilize p.r.n. blood pressure medication only if patient's blood pressure greater than 180/110 and she develops symptoms such as worsening chest pain or shortness of breath.    Closed displaced fracture of right femoral neck  Ortho consulted; s/p pinning by Dr. Buckner on 3/20/24.        Closed compression fracture of body of L1 vertebra  MRI L-spine with compression fracture of the L1 vertebra, height loss estimated at the 70%.  There is posterior cortex retropulsion contributing to mild-to-moderate central canal stenosis.   S/p Posterior spinal fusion and instrumentation T12-L2 and percutaneous balloon kyphoplasty L3, L4, L5 on 3/26 by Dr. Conway.  Continue pain control, PT/OT.       Closed fracture of proximal end of right humerus with routine healing  Initial X-ray showed proximal fracture but later showed increasing displacement.  Ortho consulted; s/p ORIF per Dr. Buckner on 3/25.      Chronic pain  Resume home Oxycontin and gabapentin.  For acute pain she is on IV narcotics as well.       Hypovolemic shock  Required pressor support initially in ICU but off since.  Stable BP.       Supplemental oxygen dependent  On 3L O2 at baseline for COPD.       Iron deficiency anemia due to chronic blood loss  Patient's anemia is currently uncontrolled. S/p 1 unit PRBC for Hgb 7 on 3/23/24 in preparation of upcoming OR trips. Etiology likely d/t acute blood loss which was from surgery on top  of chronic anemia (baseline Hgb ~ 9-10)  Current CBC reviewed-   Lab Results   Component Value Date    HGB 8.3 (L) 03/26/2024    HCT 27.2 (L) 03/26/2024     Monitor serial CBC and transfuse if patient becomes hemodynamically unstable, symptomatic or H/H drops below 7/21.    DONALD (generalized anxiety disorder)  Resume home escitalopram and clonazepam.     Chronic obstructive pulmonary disease  Patient's COPD is controlled currently.  Patient is currently off COPD Pathway. Continue scheduled inhalers Supplemental oxygen and monitor respiratory status closely.       VTE Risk Mitigation (From admission, onward)      None            Discharge Planning   VALENTIN: 3/29/2024     Code Status: DNR   Is the patient medically ready for discharge?:     Reason for patient still in hospital (select all that apply): Treatment  Discharge Plan A: Hospice/home                  Marlene Escobedo MD  Department of Hospital Medicine   [facility]

## 2024-03-30 PROCEDURE — 63600175 PHARM REV CODE 636 W HCPCS: Mod: JZ,JG | Performed by: ORTHOPAEDIC SURGERY

## 2024-03-30 PROCEDURE — 99900035 HC TECH TIME PER 15 MIN (STAT)

## 2024-03-30 PROCEDURE — 94640 AIRWAY INHALATION TREATMENT: CPT

## 2024-03-30 PROCEDURE — 97530 THERAPEUTIC ACTIVITIES: CPT

## 2024-03-30 PROCEDURE — 11000001 HC ACUTE MED/SURG PRIVATE ROOM

## 2024-03-30 PROCEDURE — 63600175 PHARM REV CODE 636 W HCPCS: Performed by: ORTHOPAEDIC SURGERY

## 2024-03-30 PROCEDURE — 94761 N-INVAS EAR/PLS OXIMETRY MLT: CPT

## 2024-03-30 PROCEDURE — 27000221 HC OXYGEN, UP TO 24 HOURS

## 2024-03-30 PROCEDURE — 97110 THERAPEUTIC EXERCISES: CPT

## 2024-03-30 PROCEDURE — 25000003 PHARM REV CODE 250: Performed by: ORTHOPAEDIC SURGERY

## 2024-03-30 PROCEDURE — 25000242 PHARM REV CODE 250 ALT 637 W/ HCPCS: Performed by: ORTHOPAEDIC SURGERY

## 2024-03-30 PROCEDURE — 99232 SBSQ HOSP IP/OBS MODERATE 35: CPT | Mod: GW,HPC,, | Performed by: HOSPITALIST

## 2024-03-30 RX ORDER — OXYCODONE HYDROCHLORIDE 5 MG/1
5 TABLET ORAL EVERY 4 HOURS PRN
Status: DISCONTINUED | OUTPATIENT
Start: 2024-03-30 | End: 2024-03-30

## 2024-03-30 RX ORDER — LIDOCAINE HYDROCHLORIDE 10 MG/ML
1 INJECTION, SOLUTION EPIDURAL; INFILTRATION; INTRACAUDAL; PERINEURAL ONCE
Status: DISCONTINUED | OUTPATIENT
Start: 2024-03-30 | End: 2024-03-30

## 2024-03-30 RX ORDER — OXYCODONE HYDROCHLORIDE 5 MG/1
10 TABLET ORAL EVERY 4 HOURS PRN
Status: DISCONTINUED | OUTPATIENT
Start: 2024-03-30 | End: 2024-03-30

## 2024-03-30 RX ORDER — SODIUM CHLORIDE, SODIUM LACTATE, POTASSIUM CHLORIDE, CALCIUM CHLORIDE 600; 310; 30; 20 MG/100ML; MG/100ML; MG/100ML; MG/100ML
INJECTION, SOLUTION INTRAVENOUS CONTINUOUS
Status: DISCONTINUED | OUTPATIENT
Start: 2024-03-30 | End: 2024-03-30

## 2024-03-30 RX ADMIN — DOCUSATE SODIUM 100 MG: 100 CAPSULE, LIQUID FILLED ORAL at 09:03

## 2024-03-30 RX ADMIN — OXYCODONE HYDROCHLORIDE 5 MG: 5 TABLET ORAL at 04:03

## 2024-03-30 RX ADMIN — GABAPENTIN 300 MG: 300 CAPSULE ORAL at 09:03

## 2024-03-30 RX ADMIN — CLONAZEPAM 1 MG: 0.5 TABLET ORAL at 09:03

## 2024-03-30 RX ADMIN — IPRATROPIUM BROMIDE AND ALBUTEROL SULFATE 3 ML: 2.5; .5 SOLUTION RESPIRATORY (INHALATION) at 12:03

## 2024-03-30 RX ADMIN — IPRATROPIUM BROMIDE AND ALBUTEROL SULFATE 3 ML: 2.5; .5 SOLUTION RESPIRATORY (INHALATION) at 07:03

## 2024-03-30 RX ADMIN — CEFAZOLIN 2 G: 2 INJECTION, POWDER, FOR SOLUTION INTRAMUSCULAR; INTRAVENOUS at 04:03

## 2024-03-30 RX ADMIN — MORPHINE SULFATE 3 MG: 4 INJECTION, SOLUTION INTRAMUSCULAR; INTRAVENOUS at 11:03

## 2024-03-30 RX ADMIN — FAMOTIDINE 20 MG: 20 TABLET ORAL at 09:03

## 2024-03-30 RX ADMIN — GABAPENTIN 300 MG: 300 CAPSULE ORAL at 02:03

## 2024-03-30 RX ADMIN — OXYCODONE HYDROCHLORIDE 5 MG: 5 TABLET ORAL at 02:03

## 2024-03-30 RX ADMIN — MORPHINE SULFATE 3 MG: 4 INJECTION, SOLUTION INTRAMUSCULAR; INTRAVENOUS at 04:03

## 2024-03-30 RX ADMIN — ESCITALOPRAM OXALATE 10 MG: 10 TABLET, FILM COATED ORAL at 09:03

## 2024-03-30 RX ADMIN — OXYCODONE HYDROCHLORIDE 10 MG: 10 TABLET, FILM COATED, EXTENDED RELEASE ORAL at 09:03

## 2024-03-30 RX ADMIN — TIZANIDINE 4 MG: 4 TABLET ORAL at 05:03

## 2024-03-30 RX ADMIN — MORPHINE SULFATE 3 MG: 4 INJECTION, SOLUTION INTRAMUSCULAR; INTRAVENOUS at 02:03

## 2024-03-30 RX ADMIN — CEFAZOLIN 2 G: 2 INJECTION, POWDER, FOR SOLUTION INTRAMUSCULAR; INTRAVENOUS at 09:03

## 2024-03-30 NOTE — ASSESSMENT & PLAN NOTE
Presented to ED with fall, resides at nursing home.  Admitted under trauma team- trauma workup with fractures, no major bleeding noted.   Plan as outlined below.     3/27: multiple fractures and repairs as detailed.  Will discuss further with surgery.     3/28:  Will discuss various surgeries with providers.  Patient plans to go to swing bed.  Discussed with social work.      3/29: patient requested to transfer to the [place] and not return to [facility].      3/30: SW consulted per patient request.  Patient will like to go to [place] rather than return to [facility].

## 2024-03-30 NOTE — PROGRESS NOTES
[facility]  [facility] Medicine  Progress Note    Patient Name: Darya Low  MRN: [id number]  Patient Class: IP- Inpatient   Admission Date: 3/18/2024  Length of Stay: 11 days  Attending Physician: Marlene Escobedo MD  Primary Care Provider: Maeve, Primary Doctor        Subjective:     Principal Problem:Falls, subsequent encounter    HPI:  No notes on file    Overview/Hospital Course:  Admitted on 3/18/24 under trauma surgery for right shoulder (proximal humerus), hip and vertebral fractures. She was hypotensive on admission so was monitored in ICU and required pressor support for the first 36 hours of admission but has been off since. She underwent right hip pinning by Dr. Buckner, also possibly to require ORIF of right humerus on 3/25. Ortho spine evaluated and plan for T12-L2 percutaneous fusion and L2-L5 kyphoplasty on 3/26.    3/23- Transferred out of ICU and hospitalist assumed care. Dr. Hills from Trauma team has requested hospitalist to assume primary. S/p 1 unit transfusion for pre-op reasons.   3/24- Hgb stabilized. Ortho tentatively plans for ORIF right humerus fracture tomorrow.   3/25- s/p ORIF right humerus today.  3/26- s/p spinal surgery with Dr. Conway today. PT/OT and SW consulted.       No new subjective & objective note has been filed under this hospital service since the last note was generated.      Assessment/Plan:      * Falls, subsequent encounter  Presented to ED with fall, resides at nursing home.  Admitted under trauma team- trauma workup with fractures, no major bleeding noted.   Plan as outlined below.     3/27: multiple fractures and repairs as detailed.  Will discuss further with surgery.     3/28:  Will discuss various surgeries with providers.  Patient plans to go to swing bed.  Discussed with social work.      3/29: patient requested to transfer to the [place] and not return to [facility].        Acute blood loss anemia  Patient's anemia is currently uncontrolled.  Has received 2 units of PRBCs on two different dates - 3/27 is first unit ordered by me . Etiology likely d/t chronic blood loss and other etiology unknown.   Current CBC reviewed-   Lab Results   Component Value Date    HGB 7.1 (L) 03/27/2024    HCT 23.1 (L) 03/27/2024     Monitor serial CBC and transfuse if patient becomes hemodynamically unstable, symptomatic or H/H drops below 7/21.    Acute cystitis  S/p IV ceftriaxone x 3 days.       Essential hypertension  Chronic, controlled. Latest blood pressure and vitals reviewed-     Temp:  [97.4 °F (36.3 °C)-98.5 °F (36.9 °C)]   Pulse:  [63-95]   Resp:  [8-20]   BP: ()/(43-89)   SpO2:  [79 %-100 %] .   Home meds for hypertension were reviewed and noted below.   Hypertension Medications               furosemide (LASIX) 40 MG tablet Take 40 mg by mouth 2 (two) times a day.    NIFEdipine (PROCARDIA-XL) 60 MG (OSM) 24 hr tablet Take 1 tablet (60 mg total) by mouth once daily.            While in the hospital, will manage blood pressure as follows; Adjust home antihypertensive regimen as follows- was held given shock- resume as able if BP elevates since she is out of shock    Will utilize p.r.n. blood pressure medication only if patient's blood pressure greater than 180/110 and she develops symptoms such as worsening chest pain or shortness of breath.    Closed displaced fracture of right femoral neck  Ortho consulted; s/p pinning by Dr. Buckner on 3/20/24.        Closed compression fracture of body of L1 vertebra  MRI L-spine with compression fracture of the L1 vertebra, height loss estimated at the 70%.  There is posterior cortex retropulsion contributing to mild-to-moderate central canal stenosis.   S/p Posterior spinal fusion and instrumentation T12-L2 and percutaneous balloon kyphoplasty L3, L4, L5 on 3/26 by Dr. Conway.  Continue pain control, PT/OT.       Closed fracture of proximal end of right humerus with routine healing  Initial X-ray showed proximal fracture  but later showed increasing displacement.  Ortho consulted; s/p ORIF per Dr. Buckner on 3/25.      Chronic pain  Resume home Oxycontin and gabapentin.  For acute pain she is on IV narcotics as well.       Hypovolemic shock  Required pressor support initially in ICU but off since.  Stable BP.       Supplemental oxygen dependent  On 3L O2 at baseline for COPD.       Iron deficiency anemia due to chronic blood loss  Patient's anemia is currently uncontrolled. S/p 1 unit PRBC for Hgb 7 on 3/23/24 in preparation of upcoming OR trips. Etiology likely d/t acute blood loss which was from surgery on top of chronic anemia (baseline Hgb ~ 9-10)  Current CBC reviewed-   Lab Results   Component Value Date    HGB 8.3 (L) 03/26/2024    HCT 27.2 (L) 03/26/2024     Monitor serial CBC and transfuse if patient becomes hemodynamically unstable, symptomatic or H/H drops below 7/21.    DONALD (generalized anxiety disorder)  Resume home escitalopram and clonazepam.     Chronic obstructive pulmonary disease  Patient's COPD is controlled currently.  Patient is currently off COPD Pathway. Continue scheduled inhalers Supplemental oxygen and monitor respiratory status closely.       VTE Risk Mitigation (From admission, onward)      None            Discharge Planning   VALENTIN: 3/29/2024     Code Status: DNR   Is the patient medically ready for discharge?:     Reason for patient still in hospital (select all that apply): Treatment  Discharge Plan A: Hospice/home                  Marlene Escobedo MD  Department of Hospital Medicine   [facility]

## 2024-03-30 NOTE — PT/OT/SLP PROGRESS
Physical Therapy Treatment    Patient Name:  Darya Low   MRN:  [id number]    Recommendations:     Discharge Recommendations: Moderate Intensity Therapy  Discharge Equipment Recommendations: to be determined by next level of care  Barriers to discharge:  ongoing medical care; awaiting placement referral    Assessment:     Darya Low is a 63 y.o. female admitted with a medical diagnosis of Falls, subsequent encounter.  She presents with the following impairments/functional limitations: weakness, impaired endurance, impaired functional mobility, decreased lower extremity function, decreased upper extremity function, pain, decreased ROM, orthopedic precautions.    Rehab Prognosis: Good and Fair; patient would benefit from acute skilled PT services to address these deficits and reach maximum level of function.    Recent Surgery: Procedure(s) (LRB):  T12-L2 percutaneous fusion (N/A)  KYPHOPLASTY, SPINE, LUMBAR, L3-L5 (N/A) 4 Days Post-Op    Plan:     During this hospitalization, patient to be seen daily to address the identified rehab impairments via therapeutic activities, therapeutic exercises, neuromuscular re-education and progress toward the following goals:    Plan of Care Expires:  04/26/24    Subjective     Chief Complaint: falls  Patient/Family Comments/goals: agreeable  Pain/Comfort:         Objective:     Communicated with RN prior to session.  Patient found HOB elevated with peripheral IV, telemetry, PureWick, oxygen upon PT entry to room.     General Precautions: Standard, fall  Orthopedic Precautions: RUE non weight bearing, LUE non weight bearing, RLE non weight bearing  Braces: UE Sling  Respiratory Status: Nasal cannula, flow 2 L/min     Functional Mobility:  Bed Mobility:     Scooting: moderate assistance  Supine to Sit: moderate assistance  Transfers:     Sit to Stand:  moderate assistance and of 2 persons with no AD and manual assist using belt  Bed to Chair: moderate assistance and of 2 persons  with  no AD and manual assist using belt  using  Stand Pivot  Balance: EOB sitting SBA; standing Mod A       AM-PAC 6 CLICK MOBILITY          Treatment & Education:  Bilateral lower extremity exercise x 20 reps: ankle pumps, heel slides, hip abduction/adduction, straight leg raises, Long arc quads, and Marching with verbal cues for sequencing and safety and tactile cues     Patient left up in chair with all lines intact, call button in reach, and OTR present..    GOALS:   Multidisciplinary Problems       Physical Therapy Goals          Problem: Physical Therapy    Goal Priority Disciplines Outcome Goal Variances Interventions   Physical Therapy Goal     PT, PT/OT Ongoing, Progressing     Description: Short term goals:  1. Supine to sit with Moderate Assistance  2. Bed to chair transfer with Maximum Assistance using Slideboard  3. Sitting at edge of bed x10 minutes with Minimal Assistance    Long term goals:  1. Supine to sit with MInimal Assistance  2. Rolling to Left and Right with Minimal Assistance.  3. Bed to chair transfer with Minimal Assistance using Slideboard  4. Sitting at edge of bed x15 minutes with Stand-by Assistance                         Time Tracking:     PT Received On: 03/30/24  PT Start Time: 0944     PT Stop Time: 1020  PT Total Time (min): 36 min     Billable Minutes: Therapeutic Activity 15 and Therapeutic Exercise 15    Treatment Type: Treatment  PT/PTA: PT     Number of PTA visits since last PT visit: 0     03/30/2024

## 2024-03-30 NOTE — SUBJECTIVE & OBJECTIVE
Interval History:     Review of Systems   Musculoskeletal:  Positive for arthralgias and back pain.     Objective:     Vital Signs (Most Recent):  Temp: 98.1 °F (36.7 °C) (03/30/24 1441)  Pulse: 84 (03/30/24 1646)  Resp: 18 (03/30/24 1646)  BP: (!) 157/82 (03/30/24 1646)  SpO2: 98 % (03/30/24 1646) Vital Signs (24h Range):  Temp:  [98.1 °F (36.7 °C)-99.6 °F (37.6 °C)] 98.1 °F (36.7 °C)  Pulse:  [65-94] 84  Resp:  [16-20] 18  SpO2:  [89 %-99 %] 98 %  BP: ()/(55-98) 157/82     Weight: 53.7 kg (118 lb 6.4 oz)  Body mass index is 20.97 kg/m².    Intake/Output Summary (Last 24 hours) at 3/30/2024 1657  Last data filed at 3/30/2024 1455  Gross per 24 hour   Intake --   Output 700 ml   Net -700 ml         Physical Exam  Constitutional:       Appearance: She is ill-appearing.   HENT:      Head: Normocephalic.   Cardiovascular:      Rate and Rhythm: Normal rate and regular rhythm.   Pulmonary:      Effort: Pulmonary effort is normal. No respiratory distress.      Breath sounds: Wheezing present.   Abdominal:      General: Bowel sounds are normal.      Palpations: Abdomen is soft.   Musculoskeletal:      Comments: Right arm in sling, right hip dressing in place.    Skin:     Findings: Bruising present.   Neurological:      General: No focal deficit present.      Mental Status: She is alert and oriented to person, place, and time.             Significant Labs: All pertinent labs within the past 24 hours have been reviewed.    Significant Imaging: I have reviewed all pertinent imaging results/findings within the past 24 hours.

## 2024-03-30 NOTE — PROGRESS NOTES
[facility]  [facility] Medicine  Progress Note    Patient Name: Darya Low  MRN: [id number]  Patient Class: IP- Inpatient   Admission Date: 3/18/2024  Length of Stay: 12 days  Attending Physician: Marlene Escobedo MD  Primary Care Provider: Maeve, Primary Doctor        Subjective:     Principal Problem:Falls, subsequent encounter    HPI:  No notes on file    Overview/Hospital Course:  Admitted on 3/18/24 under trauma surgery for right shoulder (proximal humerus), hip and vertebral fractures. She was hypotensive on admission so was monitored in ICU and required pressor support for the first 36 hours of admission but has been off since. She underwent right hip pinning by Dr. Buckner, also possibly to require ORIF of right humerus on 3/25. Ortho spine evaluated and plan for T12-L2 percutaneous fusion and L2-L5 kyphoplasty on 3/26.    3/23- Transferred out of ICU and hospitalist assumed care. Dr. Hills from Trauma team has requested hospitalist to assume primary. S/p 1 unit transfusion for pre-op reasons.   3/24- Hgb stabilized. Ortho tentatively plans for ORIF right humerus fracture tomorrow.   3/25- s/p ORIF right humerus today.  3/26- s/p spinal surgery with Dr. Conway today. PT/OT and SW consulted.       Interval History:     Review of Systems   Musculoskeletal:  Positive for arthralgias and back pain.     Objective:     Vital Signs (Most Recent):  Temp: 98.1 °F (36.7 °C) (03/30/24 1441)  Pulse: 84 (03/30/24 1646)  Resp: 18 (03/30/24 1646)  BP: (!) 157/82 (03/30/24 1646)  SpO2: 98 % (03/30/24 1646) Vital Signs (24h Range):  Temp:  [98.1 °F (36.7 °C)-99.6 °F (37.6 °C)] 98.1 °F (36.7 °C)  Pulse:  [65-94] 84  Resp:  [16-20] 18  SpO2:  [89 %-99 %] 98 %  BP: ()/(55-98) 157/82     Weight: 53.7 kg (118 lb 6.4 oz)  Body mass index is 20.97 kg/m².    Intake/Output Summary (Last 24 hours) at 3/30/2024 1657  Last data filed at 3/30/2024 1455  Gross per 24 hour   Intake --   Output 700 ml   Net -700 ml          Physical Exam  Constitutional:       Appearance: She is ill-appearing.   HENT:      Head: Normocephalic.   Cardiovascular:      Rate and Rhythm: Normal rate and regular rhythm.   Pulmonary:      Effort: Pulmonary effort is normal. No respiratory distress.      Breath sounds: Wheezing present.   Abdominal:      General: Bowel sounds are normal.      Palpations: Abdomen is soft.   Musculoskeletal:      Comments: Right arm in sling, right hip dressing in place.    Skin:     Findings: Bruising present.   Neurological:      General: No focal deficit present.      Mental Status: She is alert and oriented to person, place, and time.             Significant Labs: All pertinent labs within the past 24 hours have been reviewed.    Significant Imaging: I have reviewed all pertinent imaging results/findings within the past 24 hours.    Assessment/Plan:      * Falls, subsequent encounter  Presented to ED with fall, resides at nursing home.  Admitted under trauma team- trauma workup with fractures, no major bleeding noted.   Plan as outlined below.     3/27: multiple fractures and repairs as detailed.  Will discuss further with surgery.     3/28:  Will discuss various surgeries with providers.  Patient plans to go to swing bed.  Discussed with social work.      3/29: patient requested to transfer to the [place] and not return to [facility].      3/30: SW consulted per patient request.  Patient will like to go to [facility] rather than return to [facility].    Acute blood loss anemia  Patient's anemia is currently uncontrolled. Has received 2 units of PRBCs on two different dates - 3/27 is first unit ordered by me . Etiology likely d/t chronic blood loss and other etiology unknown.   Current CBC reviewed-   Lab Results   Component Value Date    HGB 7.1 (L) 03/27/2024    HCT 23.1 (L) 03/27/2024     Monitor serial CBC and transfuse if patient becomes hemodynamically unstable, symptomatic or H/H drops below 7/21.    Acute  cystitis  S/p IV ceftriaxone x 3 days.       Essential hypertension  Chronic, controlled. Latest blood pressure and vitals reviewed-     Temp:  [97.4 °F (36.3 °C)-98.5 °F (36.9 °C)]   Pulse:  [63-95]   Resp:  [8-20]   BP: ()/(43-89)   SpO2:  [79 %-100 %] .   Home meds for hypertension were reviewed and noted below.   Hypertension Medications               furosemide (LASIX) 40 MG tablet Take 40 mg by mouth 2 (two) times a day.    NIFEdipine (PROCARDIA-XL) 60 MG (OSM) 24 hr tablet Take 1 tablet (60 mg total) by mouth once daily.            While in the hospital, will manage blood pressure as follows; Adjust home antihypertensive regimen as follows- was held given shock- resume as able if BP elevates since she is out of shock    Will utilize p.r.n. blood pressure medication only if patient's blood pressure greater than 180/110 and she develops symptoms such as worsening chest pain or shortness of breath.    Closed displaced fracture of right femoral neck  Ortho consulted; s/p pinning by Dr. Buckner on 3/20/24.        Closed compression fracture of body of L1 vertebra  MRI L-spine with compression fracture of the L1 vertebra, height loss estimated at the 70%.  There is posterior cortex retropulsion contributing to mild-to-moderate central canal stenosis.   S/p Posterior spinal fusion and instrumentation T12-L2 and percutaneous balloon kyphoplasty L3, L4, L5 on 3/26 by Dr. Conway.  Continue pain control, PT/OT.       Closed fracture of proximal end of right humerus with routine healing  Initial X-ray showed proximal fracture but later showed increasing displacement.  Ortho consulted; s/p ORIF per Dr. Buckner on 3/25.      Chronic pain  Resume home Oxycontin and gabapentin.  For acute pain she is on IV narcotics as well.       Hypovolemic shock  Required pressor support initially in ICU but off since.  Stable BP.       Supplemental oxygen dependent  On 3L O2 at baseline for COPD.       Iron deficiency anemia due to  chronic blood loss  Patient's anemia is currently uncontrolled. S/p 1 unit PRBC for Hgb 7 on 3/23/24 in preparation of upcoming OR trips. Etiology likely d/t acute blood loss which was from surgery on top of chronic anemia (baseline Hgb ~ 9-10)  Current CBC reviewed-   Lab Results   Component Value Date    HGB 8.3 (L) 03/26/2024    HCT 27.2 (L) 03/26/2024     Monitor serial CBC and transfuse if patient becomes hemodynamically unstable, symptomatic or H/H drops below 7/21.    DONALD (generalized anxiety disorder)  Resume home escitalopram and clonazepam.     Chronic obstructive pulmonary disease  Patient's COPD is controlled currently.  Patient is currently off COPD Pathway. Continue scheduled inhalers Supplemental oxygen and monitor respiratory status closely.       VTE Risk Mitigation (From admission, onward)      None            Discharge Planning   VALENTIN: 3/29/2024     Code Status: DNR   Is the patient medically ready for discharge?:     Reason for patient still in hospital (select all that apply): Treatment  Discharge Plan A: Hospice/home                  Marlene Escobedo MD  Department of Hospital Medicine   [facility]

## 2024-03-30 NOTE — PT/OT/SLP PROGRESS
Occupational Therapy   Treatment    Name: Darya Low  MRN: [id number]  Admitting Diagnosis:  Falls, subsequent encounter  4 Days Post-Op    Recommendations:     Discharge Recommendations: Moderate Intensity Therapy  Discharge Equipment Recommendations:   (To be determined)  Barriers to discharge:  None    Assessment:     Darya Low is a 63 y.o. female with a medical diagnosis of Falls, subsequent encounter.  She presents with complaint of back pain. Pt agreed to OT treatment. Performance deficits affecting function are weakness, impaired endurance, impaired functional mobility, decreased upper extremity function, decreased lower extremity function, decreased ROM, orthopedic precautions.     Rehab Prognosis:  Good; patient would benefit from acute skilled OT services to address these deficits and reach maximum level of function.       Plan:     Patient to be seen 5 x/week to address the above listed problems via self-care/home management, therapeutic activities, therapeutic exercises  Plan of Care Expires: 04/22/24  Plan of Care Reviewed with: patient    Subjective     Chief Complaint: pain  Patient/Family Comments/goals: Pt agreed to tx  Pain/Comfort:  Pain Rating 1: 6/10  Location 1: back  Pain Addressed 1: Reposition, Distraction, Pre-medicate for activity  Pain Rating Post-Intervention 1: 6/10    Objective:     Communicated with: MAGGIE Brennan prior to session.  Patient found HOB elevated with PureWick, oxygen, telemetry upon OT entry to room.    General Precautions: Standard, fall    Orthopedic Precautions:RLE non weight bearing, LUE non weight bearing, RUE non weight bearing  Braces: Shoulder abduction brace  Respiratory Status: Nasal cannula, flow 2 L/min     Occupational Performance:     Bed Mobility:    Patient completed Rolling/Turning to Right with moderate assistance  Patient completed Supine to Sit with moderate assistance     Functional Mobility/Transfers:  Patient completed Sit <> Stand  Transfer with moderate assistance and of 2 persons  with  no assistive device   Patient completed Bed <> Chair Transfer using Stand Pivot technique with moderate assistance and of 2 persons with no assistive device  Functional Mobility: NT    Activities of Daily Living:        AMPAC 6 Click ADL:      Treatment & Education:   Pt sat EOB x approx 10 min with CGA requiring verbal cues to remain NWB on (L) UE and for brace adjustments.  Pt performed UE strengthening exercises. 1 lb hand wt x 2 sets of 15 reps (L) elbow and wrist flexion/extension. Hand exerciser x 2 sets of 25 reps x 2 bands.    Patient left up in chair with all lines intact and call button in reach    GOALS:   Multidisciplinary Problems       Occupational Therapy Goals          Problem: Occupational Therapy    Goal Priority Disciplines Outcome Interventions   Occupational Therapy Goal     OT, PT/OT Ongoing, Progressing    Description: STG: (in 2 weeks)  Pt will perform grooming with setup mod(A) with AD  Pt will sit EOB x 10 min with moderate assistance  Pt will transfer bed/chair/bsc with max(A) with slide board or stand pivot transfer on (L) LE  Pt will tolerate 15 minutes of tx without fatigue      LTG: (in 5 weeks)  1.Restore to max I with self care and mobility.                         Time Tracking:     OT Date of Treatment: 03/30/24  OT Start Time: 0945  OT Stop Time: 1033  OT Total Time (min): 48 min    Billable Minutes:Therapeutic Activity 24    OT/AGUILAR: OT          3/30/2024

## 2024-03-30 NOTE — ASSESSMENT & PLAN NOTE
Presented to ED with fall, resides at nursing home.  Admitted under trauma team- trauma workup with fractures, no major bleeding noted.   Plan as outlined below.     3/27: multiple fractures and repairs as detailed.  Will discuss further with surgery.     3/28:  Will discuss various surgeries with providers.  Patient plans to go to swing bed.  Discussed with social work.      3/29: patient requested to transfer to the [place] and not return to [facility].

## 2024-03-31 PROCEDURE — 25000003 PHARM REV CODE 250: Performed by: ORTHOPAEDIC SURGERY

## 2024-03-31 PROCEDURE — 99233 SBSQ HOSP IP/OBS HIGH 50: CPT | Mod: GW,HPC,, | Performed by: HOSPITALIST

## 2024-03-31 PROCEDURE — 94640 AIRWAY INHALATION TREATMENT: CPT

## 2024-03-31 PROCEDURE — 97530 THERAPEUTIC ACTIVITIES: CPT

## 2024-03-31 PROCEDURE — 11000001 HC ACUTE MED/SURG PRIVATE ROOM

## 2024-03-31 PROCEDURE — 94761 N-INVAS EAR/PLS OXIMETRY MLT: CPT

## 2024-03-31 PROCEDURE — 63600175 PHARM REV CODE 636 W HCPCS: Mod: JZ,JG | Performed by: ORTHOPAEDIC SURGERY

## 2024-03-31 PROCEDURE — 25000242 PHARM REV CODE 250 ALT 637 W/ HCPCS: Performed by: ORTHOPAEDIC SURGERY

## 2024-03-31 PROCEDURE — 99900035 HC TECH TIME PER 15 MIN (STAT)

## 2024-03-31 PROCEDURE — 97110 THERAPEUTIC EXERCISES: CPT

## 2024-03-31 RX ORDER — ENOXAPARIN SODIUM 100 MG/ML
40 INJECTION SUBCUTANEOUS EVERY 24 HOURS
Status: DISCONTINUED | OUTPATIENT
Start: 2024-04-01 | End: 2024-04-02 | Stop reason: HOSPADM

## 2024-03-31 RX ADMIN — TIZANIDINE 4 MG: 4 TABLET ORAL at 09:03

## 2024-03-31 RX ADMIN — ESCITALOPRAM OXALATE 10 MG: 10 TABLET, FILM COATED ORAL at 09:03

## 2024-03-31 RX ADMIN — MORPHINE SULFATE 3 MG: 4 INJECTION, SOLUTION INTRAMUSCULAR; INTRAVENOUS at 05:03

## 2024-03-31 RX ADMIN — CLONAZEPAM 1 MG: 0.5 TABLET ORAL at 09:03

## 2024-03-31 RX ADMIN — OXYCODONE HYDROCHLORIDE 10 MG: 10 TABLET, FILM COATED, EXTENDED RELEASE ORAL at 09:03

## 2024-03-31 RX ADMIN — GABAPENTIN 300 MG: 300 CAPSULE ORAL at 09:03

## 2024-03-31 RX ADMIN — MORPHINE SULFATE 3 MG: 4 INJECTION, SOLUTION INTRAMUSCULAR; INTRAVENOUS at 01:03

## 2024-03-31 RX ADMIN — GABAPENTIN 300 MG: 300 CAPSULE ORAL at 04:03

## 2024-03-31 RX ADMIN — IPRATROPIUM BROMIDE AND ALBUTEROL SULFATE 3 ML: 2.5; .5 SOLUTION RESPIRATORY (INHALATION) at 07:03

## 2024-03-31 RX ADMIN — DOCUSATE SODIUM 100 MG: 100 CAPSULE, LIQUID FILLED ORAL at 09:03

## 2024-03-31 RX ADMIN — MELATONIN 6 MG: at 09:03

## 2024-03-31 RX ADMIN — FAMOTIDINE 20 MG: 20 TABLET ORAL at 09:03

## 2024-03-31 RX ADMIN — MORPHINE SULFATE 3 MG: 4 INJECTION, SOLUTION INTRAMUSCULAR; INTRAVENOUS at 07:03

## 2024-03-31 RX ADMIN — OXYCODONE HYDROCHLORIDE 5 MG: 5 TABLET ORAL at 04:03

## 2024-03-31 RX ADMIN — IPRATROPIUM BROMIDE AND ALBUTEROL SULFATE 3 ML: 2.5; .5 SOLUTION RESPIRATORY (INHALATION) at 01:03

## 2024-03-31 NOTE — SUBJECTIVE & OBJECTIVE
Interval History:     Review of Systems   Musculoskeletal:  Positive for arthralgias and back pain.     Objective:     Vital Signs (Most Recent):  Temp: 98.8 °F (37.1 °C) (03/31/24 1420)  Pulse: 93 (03/31/24 1420)  Resp: 16 (03/31/24 1602)  BP: 102/62 (03/31/24 1420)  SpO2: (!) 93 % (03/31/24 1420) Vital Signs (24h Range):  Temp:  [97.7 °F (36.5 °C)-99.6 °F (37.6 °C)] 98.8 °F (37.1 °C)  Pulse:  [77-93] 93  Resp:  [16-20] 16  SpO2:  [92 %-99 %] 93 %  BP: (102-144)/(62-81) 102/62     Weight: 50.3 kg (110 lb 14.3 oz)  Body mass index is 19.64 kg/m².    Intake/Output Summary (Last 24 hours) at 3/31/2024 1839  Last data filed at 3/31/2024 1125  Gross per 24 hour   Intake --   Output 2050 ml   Net -2050 ml         Physical Exam  Constitutional:       Appearance: She is ill-appearing.   HENT:      Head: Normocephalic.   Cardiovascular:      Rate and Rhythm: Normal rate and regular rhythm.   Pulmonary:      Effort: Pulmonary effort is normal. No respiratory distress.      Breath sounds: Wheezing present.   Abdominal:      General: Bowel sounds are normal.      Palpations: Abdomen is soft.   Musculoskeletal:      Comments: Right arm in sling, right hip dressing in place.    Skin:     Findings: Bruising present.   Neurological:      General: No focal deficit present.      Mental Status: She is alert and oriented to person, place, and time.             Significant Labs: All pertinent labs within the past 24 hours have been reviewed.    Significant Imaging: I have reviewed all pertinent imaging results/findings within the past 24 hours.

## 2024-03-31 NOTE — PLAN OF CARE
Problem: Infection  Goal: Absence of Infection Signs and Symptoms  Outcome: Ongoing, Progressing     Problem: Adult Inpatient Plan of Care  Goal: Plan of Care Review  Outcome: Ongoing, Progressing  Goal: Patient-Specific Goal (Individualized)  Outcome: Ongoing, Progressing  Goal: Absence of Hospital-Acquired Illness or Injury  Outcome: Ongoing, Progressing  Goal: Optimal Comfort and Wellbeing  Outcome: Ongoing, Progressing  Goal: Readiness for Transition of Care  Outcome: Ongoing, Progressing     Problem: Impaired Wound Healing  Goal: Optimal Wound Healing  Outcome: Ongoing, Progressing     Problem: Skin Injury Risk Increased  Goal: Skin Health and Integrity  Outcome: Ongoing, Progressing     Problem: Gas Exchange Impaired  Goal: Optimal Gas Exchange  Outcome: Ongoing, Progressing     Problem: Airway Clearance Ineffective  Goal: Effective Airway Clearance  Outcome: Ongoing, Progressing

## 2024-03-31 NOTE — ASSESSMENT & PLAN NOTE
Presented to ED with fall, resides at nursing home.  Admitted under trauma team- trauma workup with fractures, no major bleeding noted.   Plan as outlined below.     3/27: multiple fractures and repairs as detailed.  Will discuss further with surgery.     3/28:  Will discuss various surgeries with providers.  Patient plans to go to swing bed.  Discussed with social work.      3/29: patient requested to transfer to the [place] and not return to [facility].      3/30: SW consulted per patient request.  Patient will like to go to [place] rather than return to [facility].    3/31: transfer to [place].  Be sure patient clear by surgery (trauma and ortho).    Lovenox added. Monitor hemoglobin carefully.

## 2024-03-31 NOTE — PT/OT/SLP PROGRESS
"Physical Therapy Treatment    Patient Name:  Darya Low   MRN:  [id number]    Recommendations:     Discharge Recommendations: Moderate Intensity Therapy  Discharge Equipment Recommendations: to be determined by next level of care  Barriers to discharge:  awaiting swing bed placement    Assessment:     Darya Low is a 63 y.o. female admitted with a medical diagnosis of Falls, subsequent encounter.  She presents with the following impairments/functional limitations: weakness, impaired endurance, impaired self care skills, impaired functional mobility, impaired balance, decreased upper extremity function, decreased lower extremity function, pain, decreased ROM, orthopedic precautions .    Patient initially reluctant to get OOB to the chair as she was fearful of not being abele to get back to bed when desired. PT obtained pt compliance by reassuring pt that PT would come back at the agreed upon time to help pt back to bed. Patient required less assistance than anticipated with transfers but frequent verbal cues needed for adherence to WB precautions. Patient will require extensive assist at discharge.    Rehab Prognosis: Good; patient would benefit from acute skilled PT services to address these deficits and reach maximum level of function.    Recent Surgery: Procedure(s) (LRB):  T12-L2 percutaneous fusion (N/A)  KYPHOPLASTY, SPINE, LUMBAR, L3-L5 (N/A) 5 Days Post-Op    Plan:     During this hospitalization, patient to be seen daily to address the identified rehab impairments via therapeutic activities, therapeutic exercises, neuromuscular re-education and progress toward the following goals:    Plan of Care Expires:  04/26/24    Subjective     Chief Complaint: fall with B UE fractures and R LE fracture  Patient/Family Comments/goals: "No, I am not getting in that chair if you are just going to leave me." PT agreed to return at a time specified by patient in order to obtain cooperation of the patient with transfer " training.  Pain/Comfort:  Pain Rating 1: 7/10  Location - Orientation 1: lower  Location 1: back  Pain Addressed 1: Pre-medicate for activity, Reposition, Distraction, Cessation of Activity  Pain Rating Post-Intervention 1: 7/10      Objective:     Communicated with Nathalia Valdovinos LPN prior to session.  Patient found HOB elevated with PureWick, telemetry, peripheral IV upon PT entry to room.     General Precautions: Standard, fall  Orthopedic Precautions: RUE non weight bearing, LUE non weight bearing, RLE non weight bearing  Braces:  (Sling portion of abduction brace)  Respiratory Status: Room air     Functional Mobility:  Bed Mobility:     Supine to Sit: minimum assistance  Sit to Supine: moderate assistance  Transfers:     Sit to Stand:  minimum assistance with manual assist via gait belt  Bed to Chair: minimum assistance and moderate assistance with  manual assist via gait belt  using  Stand Pivot      AM-PAC 6 CLICK MOBILITY  Turning over in bed (including adjusting bedclothes, sheets and blankets)?: 3  Sitting down on and standing up from a chair with arms (e.g., wheelchair, bedside commode, etc.): 3  Moving from lying on back to sitting on the side of the bed?: 3  Moving to and from a bed to a chair (including a wheelchair)?: 3  Need to walk in hospital room?: 1  Climbing 3-5 steps with a railing?: 1  Basic Mobility Total Score: 14       Treatment & Education:  Transfers as above  Bilateral lower extremity exercise x 30 reps: ankle pumps, Quad sets, glut sets, heel slides, hip abduction/adduction, and straight leg raises with active assist ROM, verbal cues, and tactile cues     Patient left up in chair with all lines intact, call button in reach, and Nathalia Valdovinos LPN notified. PT returned at 11:00 per pt request to assist pt back to bed.    GOALS:   Multidisciplinary Problems       Physical Therapy Goals          Problem: Physical Therapy    Goal Priority Disciplines Outcome Goal Variances Interventions    Physical Therapy Goal     PT, PT/OT Ongoing, Progressing     Description: Short term goals:  1. Supine to sit with Moderate Assistance  2. Bed to chair transfer with Maximum Assistance using Slideboard  3. Sitting at edge of bed x10 minutes with Minimal Assistance    Long term goals:  1. Supine to sit with MInimal Assistance  2. Rolling to Left and Right with Minimal Assistance.  3. Bed to chair transfer with Minimal Assistance using Slideboard  4. Sitting at edge of bed x15 minutes with Stand-by Assistance                         Time Tracking:     PT Received On: 03/31/24  PT Start Time: 0958     PT Stop Time: 1024  PT Total Time (min): 26 min     Billable Minutes: Therapeutic Activity 10 minutes and Therapeutic Exercise 15 minutes    Treatment Type: Treatment  PT/PTA: PT     Number of PTA visits since last PT visit: 0     03/31/2024

## 2024-03-31 NOTE — PROGRESS NOTES
[facility]  [facility] Medicine  Progress Note    Patient Name: Darya Low  MRN: [id number]  Patient Class: IP- Inpatient   Admission Date: 3/18/2024  Length of Stay: 13 days  Attending Physician: Marlene Escobedo MD  Primary Care Provider: Maeve, Primary Doctor        Subjective:     Principal Problem:Falls, subsequent encounter    HPI:  No notes on file    Overview/Hospital Course:  Admitted on 3/18/24 under trauma surgery for right shoulder (proximal humerus), hip and vertebral fractures. She was hypotensive on admission so was monitored in ICU and required pressor support for the first 36 hours of admission but has been off since. She underwent right hip pinning by Dr. Buckner, also possibly to require ORIF of right humerus on 3/25. Ortho spine evaluated and plan for T12-L2 percutaneous fusion and L2-L5 kyphoplasty on 3/26.    3/23- Transferred out of ICU and hospitalist assumed care. Dr. Hills from Trauma team has requested hospitalist to assume primary. S/p 1 unit transfusion for pre-op reasons.   3/24- Hgb stabilized. Ortho tentatively plans for ORIF right humerus fracture tomorrow.   3/25- s/p ORIF right humerus today.  3/26- s/p spinal surgery with Dr. Conway today. PT/OT and SW consulted.       Interval History:     Review of Systems   Musculoskeletal:  Positive for arthralgias and back pain.     Objective:     Vital Signs (Most Recent):  Temp: 98.8 °F (37.1 °C) (03/31/24 1420)  Pulse: 93 (03/31/24 1420)  Resp: 16 (03/31/24 1602)  BP: 102/62 (03/31/24 1420)  SpO2: (!) 93 % (03/31/24 1420) Vital Signs (24h Range):  Temp:  [97.7 °F (36.5 °C)-99.6 °F (37.6 °C)] 98.8 °F (37.1 °C)  Pulse:  [77-93] 93  Resp:  [16-20] 16  SpO2:  [92 %-99 %] 93 %  BP: (102-144)/(62-81) 102/62     Weight: 50.3 kg (110 lb 14.3 oz)  Body mass index is 19.64 kg/m².    Intake/Output Summary (Last 24 hours) at 3/31/2024 1839  Last data filed at 3/31/2024 1125  Gross per 24 hour   Intake --   Output 2050 ml   Net -2050  ml         Physical Exam  Constitutional:       Appearance: She is ill-appearing.   HENT:      Head: Normocephalic.   Cardiovascular:      Rate and Rhythm: Normal rate and regular rhythm.   Pulmonary:      Effort: Pulmonary effort is normal. No respiratory distress.      Breath sounds: Wheezing present.   Abdominal:      General: Bowel sounds are normal.      Palpations: Abdomen is soft.   Musculoskeletal:      Comments: Right arm in sling, right hip dressing in place.    Skin:     Findings: Bruising present.   Neurological:      General: No focal deficit present.      Mental Status: She is alert and oriented to person, place, and time.             Significant Labs: All pertinent labs within the past 24 hours have been reviewed.    Significant Imaging: I have reviewed all pertinent imaging results/findings within the past 24 hours.    Assessment/Plan:      * Falls, subsequent encounter  Presented to ED with fall, resides at nursing home.  Admitted under trauma team- trauma workup with fractures, no major bleeding noted.   Plan as outlined below.     3/27: multiple fractures and repairs as detailed.  Will discuss further with surgery.     3/28:  Will discuss various surgeries with providers.  Patient plans to go to swing bed.  Discussed with social work.      3/29: patient requested to transfer to the [place] and not return to [facility].      3/30: SW consulted per patient request.  Patient will like to go to [place] rather than return to [facility].    3/31: transfer to [place].  Be sure patient clear by surgery (trauma and ortho).    Lovenox added. Monitor hemoglobin carefully.      Acute blood loss anemia  Patient's anemia is currently uncontrolled. Has received 2 units of PRBCs on two different dates - 3/27 is first unit ordered by me . Etiology likely d/t chronic blood loss and other etiology unknown.   Current CBC reviewed-   Lab Results   Component Value Date    HGB 7.1 (L) 03/27/2024    HCT 23.1 (L) 03/27/2024      Monitor serial CBC and transfuse if patient becomes hemodynamically unstable, symptomatic or H/H drops below 7/21.    Acute cystitis  S/p IV ceftriaxone x 3 days.       Essential hypertension  Chronic, controlled. Latest blood pressure and vitals reviewed-     Temp:  [97.4 °F (36.3 °C)-98.5 °F (36.9 °C)]   Pulse:  [63-95]   Resp:  [8-20]   BP: ()/(43-89)   SpO2:  [79 %-100 %] .   Home meds for hypertension were reviewed and noted below.   Hypertension Medications               furosemide (LASIX) 40 MG tablet Take 40 mg by mouth 2 (two) times a day.    NIFEdipine (PROCARDIA-XL) 60 MG (OSM) 24 hr tablet Take 1 tablet (60 mg total) by mouth once daily.            While in the hospital, will manage blood pressure as follows; Adjust home antihypertensive regimen as follows- was held given shock- resume as able if BP elevates since she is out of shock    Will utilize p.r.n. blood pressure medication only if patient's blood pressure greater than 180/110 and she develops symptoms such as worsening chest pain or shortness of breath.    Closed displaced fracture of right femoral neck  Ortho consulted; s/p pinning by Dr. Buckner on 3/20/24.        Closed compression fracture of body of L1 vertebra  MRI L-spine with compression fracture of the L1 vertebra, height loss estimated at the 70%.  There is posterior cortex retropulsion contributing to mild-to-moderate central canal stenosis.   S/p Posterior spinal fusion and instrumentation T12-L2 and percutaneous balloon kyphoplasty L3, L4, L5 on 3/26 by Dr. Conway.  Continue pain control, PT/OT.       Closed fracture of proximal end of right humerus with routine healing  Initial X-ray showed proximal fracture but later showed increasing displacement.  Ortho consulted; s/p ORIF per Dr. Buckner on 3/25.      Chronic pain  Resume home Oxycontin and gabapentin.  For acute pain she is on IV narcotics as well.       Hypovolemic shock  Required pressor support initially in ICU but off  since.  Stable BP.       Supplemental oxygen dependent  On 3L O2 at baseline for COPD.       Iron deficiency anemia due to chronic blood loss  Patient's anemia is currently uncontrolled. S/p 1 unit PRBC for Hgb 7 on 3/23/24 in preparation of upcoming OR trips. Etiology likely d/t acute blood loss which was from surgery on top of chronic anemia (baseline Hgb ~ 9-10)  Current CBC reviewed-   Lab Results   Component Value Date    HGB 8.3 (L) 03/26/2024    HCT 27.2 (L) 03/26/2024     Monitor serial CBC and transfuse if patient becomes hemodynamically unstable, symptomatic or H/H drops below 7/21.    DONALD (generalized anxiety disorder)  Resume home escitalopram and clonazepam.     Chronic obstructive pulmonary disease  Patient's COPD is controlled currently.  Patient is currently off COPD Pathway. Continue scheduled inhalers Supplemental oxygen and monitor respiratory status closely.       VTE Risk Mitigation (From admission, onward)      None            Discharge Planning   VALENTIN: 3/29/2024     Code Status: DNR   Is the patient medically ready for discharge?:     Reason for patient still in hospital (select all that apply): Treatment  Discharge Plan A: Hospice/home                  Marlene Escobedo MD  Department of Hospital Medicine   [facility]

## 2024-04-01 PROBLEM — R57.1 HYPOVOLEMIC SHOCK: Status: RESOLVED | Noted: 2024-03-18 | Resolved: 2024-04-01

## 2024-04-01 PROCEDURE — 63600175 PHARM REV CODE 636 W HCPCS: Performed by: ORTHOPAEDIC SURGERY

## 2024-04-01 PROCEDURE — 99231 SBSQ HOSP IP/OBS SF/LOW 25: CPT | Mod: ,,, | Performed by: INTERNAL MEDICINE

## 2024-04-01 PROCEDURE — 94640 AIRWAY INHALATION TREATMENT: CPT

## 2024-04-01 PROCEDURE — 25000003 PHARM REV CODE 250: Performed by: ORTHOPAEDIC SURGERY

## 2024-04-01 PROCEDURE — 99900035 HC TECH TIME PER 15 MIN (STAT)

## 2024-04-01 PROCEDURE — 94761 N-INVAS EAR/PLS OXIMETRY MLT: CPT

## 2024-04-01 PROCEDURE — 97530 THERAPEUTIC ACTIVITIES: CPT

## 2024-04-01 PROCEDURE — 97530 THERAPEUTIC ACTIVITIES: CPT | Mod: CO

## 2024-04-01 PROCEDURE — 97110 THERAPEUTIC EXERCISES: CPT

## 2024-04-01 PROCEDURE — 25000242 PHARM REV CODE 250 ALT 637 W/ HCPCS: Performed by: ORTHOPAEDIC SURGERY

## 2024-04-01 PROCEDURE — 11000001 HC ACUTE MED/SURG PRIVATE ROOM

## 2024-04-01 PROCEDURE — 63600175 PHARM REV CODE 636 W HCPCS: Performed by: HOSPITALIST

## 2024-04-01 RX ADMIN — GABAPENTIN 300 MG: 300 CAPSULE ORAL at 05:04

## 2024-04-01 RX ADMIN — TRAZODONE HYDROCHLORIDE 50 MG: 50 TABLET ORAL at 09:04

## 2024-04-01 RX ADMIN — ESCITALOPRAM OXALATE 10 MG: 10 TABLET, FILM COATED ORAL at 10:04

## 2024-04-01 RX ADMIN — GABAPENTIN 300 MG: 300 CAPSULE ORAL at 09:04

## 2024-04-01 RX ADMIN — IPRATROPIUM BROMIDE AND ALBUTEROL SULFATE 3 ML: 2.5; .5 SOLUTION RESPIRATORY (INHALATION) at 08:04

## 2024-04-01 RX ADMIN — SENNOSIDES AND DOCUSATE SODIUM 1 TABLET: 8.6; 5 TABLET ORAL at 10:04

## 2024-04-01 RX ADMIN — CLONAZEPAM 1 MG: 0.5 TABLET ORAL at 10:04

## 2024-04-01 RX ADMIN — FAMOTIDINE 20 MG: 20 TABLET ORAL at 09:04

## 2024-04-01 RX ADMIN — CLONAZEPAM 1 MG: 0.5 TABLET ORAL at 09:04

## 2024-04-01 RX ADMIN — MORPHINE SULFATE 3 MG: 4 INJECTION, SOLUTION INTRAMUSCULAR; INTRAVENOUS at 03:04

## 2024-04-01 RX ADMIN — OXYCODONE HYDROCHLORIDE 10 MG: 10 TABLET, FILM COATED, EXTENDED RELEASE ORAL at 10:04

## 2024-04-01 RX ADMIN — IPRATROPIUM BROMIDE AND ALBUTEROL SULFATE 3 ML: 2.5; .5 SOLUTION RESPIRATORY (INHALATION) at 02:04

## 2024-04-01 RX ADMIN — TIZANIDINE 4 MG: 4 TABLET ORAL at 09:04

## 2024-04-01 RX ADMIN — MORPHINE SULFATE 3 MG: 4 INJECTION, SOLUTION INTRAMUSCULAR; INTRAVENOUS at 05:04

## 2024-04-01 RX ADMIN — FAMOTIDINE 20 MG: 20 TABLET ORAL at 10:04

## 2024-04-01 RX ADMIN — MORPHINE SULFATE 3 MG: 4 INJECTION, SOLUTION INTRAMUSCULAR; INTRAVENOUS at 12:04

## 2024-04-01 RX ADMIN — ENOXAPARIN SODIUM 40 MG: 100 INJECTION SUBCUTANEOUS at 05:04

## 2024-04-01 RX ADMIN — MORPHINE SULFATE 3 MG: 4 INJECTION, SOLUTION INTRAMUSCULAR; INTRAVENOUS at 07:04

## 2024-04-01 RX ADMIN — OXYCODONE HYDROCHLORIDE 10 MG: 10 TABLET, FILM COATED, EXTENDED RELEASE ORAL at 09:04

## 2024-04-01 RX ADMIN — GABAPENTIN 300 MG: 300 CAPSULE ORAL at 10:04

## 2024-04-01 NOTE — ASSESSMENT & PLAN NOTE
Patient's anemia is currently uncontrolled. Has received 2 units of PRBCs on 3/23 and 1 unit on 3/27 . Etiology likely d/t chronic blood loss and other etiology unknown.   Current CBC reviewed-   Lab Results   Component Value Date    HGB 8.5 (L) 03/29/2024    HCT 28.5 (L) 03/29/2024     Monitor serial CBC and transfuse if patient becomes hemodynamically unstable, symptomatic or H/H drops below 7/21.

## 2024-04-01 NOTE — PT/OT/SLP PROGRESS
Occupational Therapy   Treatment    Name: Darya Low  MRN: [id number]  Admitting Diagnosis:  Falls, subsequent encounter  6 Days Post-Op    Recommendations:     Discharge Recommendations: Moderate Intensity Therapy  Discharge Equipment Recommendations:   (To be determined)  Barriers to discharge:       Assessment:     Darya Low is a 63 y.o. female with a medical diagnosis of Falls, subsequent encounter. Performance deficits affecting function are weakness, impaired endurance, impaired self care skills, decreased ROM, pain, orthopedic precautions.     Rehab Prognosis:  Good; patient would benefit from acute skilled OT services to address these deficits and reach maximum level of function.       Plan:     Patient to be seen 5 x/week to address the above listed problems via self-care/home management, therapeutic activities, therapeutic exercises  Plan of Care Expires: 04/22/24  Plan of Care Reviewed with: patient    Subjective     Chief Complaint:   Patient/Family Comments/goals:   Pain/Comfort:  Pain Rating 1: 8/10  Location 1:  (all over)  Pain Addressed 1: Distraction, Nurse notified    Objective:     Communicated with:Melquiades Rodgers RN    prior to session.  Patient found HOB elevated with peripheral IV, PureWick upon OT entry to room.    General Precautions: Standard, fall    Orthopedic Precautions:RUE non weight bearing, RLE non weight bearing, LUE non weight bearing  Braces: UE Sling  Respiratory Status: Room air     Occupational Performance:     Bed Mobility:         Functional Mobility/Transfers:    Functional Mobility:     Activities of Daily Living:  Max a to doff/gary gown  I to comb her hair  I to wash her face  Set up oral care         AMPA 6 Click ADL:      Treatment & Education:  Pt performed hand helper with 2 rubber band resistances on L 20 reps x 2, yellow t bal ex's on L 20 reps x 2, rom ex's with 1 lb wt on L 15 reps x 2 elbow flex/ext wrist flex/ext, yellow t band 15 reps x2 on L elbow  flex    Patient left HOB elevated with all lines intact and call button in reach    GOALS:   Multidisciplinary Problems       Occupational Therapy Goals          Problem: Occupational Therapy    Goal Priority Disciplines Outcome Interventions   Occupational Therapy Goal     OT, PT/OT Ongoing, Progressing    Description: STG: (in 2 weeks)  Pt will perform grooming with setup mod(A) with AD  Pt will sit EOB x 10 min with moderate assistance  Pt will transfer bed/chair/bsc with max(A) with slide board or stand pivot transfer on (L) LE  Pt will tolerate 15 minutes of tx without fatigue      LTG: (in 5 weeks)  1.Restore to max I with self care and mobility.                         Time Tracking:     OT Date of Treatment: 04/01/24  OT Start Time: 1028  OT Stop Time: 1054  OT Total Time (min): 26 min    Billable Minutes:Therapeutic Activity 25    OT/AGUILAR: AGUILAR          4/1/2024

## 2024-04-01 NOTE — PROGRESS NOTES
[facility]  [facility] Medicine  Progress Note    Patient Name: Darya Low  MRN: [id number]  Patient Class: IP- Inpatient   Admission Date: 3/18/2024  Length of Stay: 14 days  Attending Physician: Marcelino Otero MD  Primary Care Provider: Maeve, Primary Doctor        Subjective:     Principal Problem:Falls, subsequent encounter        HPI:  No notes on file    Overview/Hospital Course:  Admitted on 3/18/24 under trauma surgery for right shoulder (proximal humerus), hip and vertebral fractures. She was hypotensive on admission so was monitored in ICU and required pressor support for the first 36 hours of admission but has been off since. She underwent right hip pinning by Dr. Buckner, also possibly to require ORIF of right humerus on 3/25. Ortho spine evaluated and plan for T12-L2 percutaneous fusion and L2-L5 kyphoplasty on 3/26.    3/23- Transferred out of ICU and hospitalist assumed care. Dr. Hills from Trauma team has requested hospitalist to assume primary. S/p 1 unit transfusion for pre-op reasons.   3/24- Hgb stabilized. Ortho tentatively plans for ORIF right humerus fracture tomorrow.   3/25- s/p ORIF right humerus today.  3/26- s/p spinal surgery with Dr. Conway today. PT/OT and SW consulted.   4/1- Awaiting SNF placement.       Interval History: Patient seen and examined at the bedside, reports pain is controlled.     Review of Systems   Musculoskeletal:  Positive for arthralgias and back pain.     Objective:     Vital Signs (Most Recent):  Temp: 97.9 °F (36.6 °C) (04/01/24 1034)  Pulse: 77 (04/01/24 1404)  Resp: 17 (04/01/24 1404)  BP: 102/62 (04/01/24 1034)  SpO2: 96 % (04/01/24 1404) Vital Signs (24h Range):  Temp:  [96.8 °F (36 °C)-100.3 °F (37.9 °C)] 97.9 °F (36.6 °C)  Pulse:  [67-93] 77  Resp:  [14-19] 17  SpO2:  [91 %-98 %] 96 %  BP: ()/(57-73) 102/62     Weight: 50.3 kg (110 lb 14.3 oz)  Body mass index is 19.64 kg/m².    Intake/Output Summary (Last 24 hours) at 4/1/2024  1415  Last data filed at 4/1/2024 0835  Gross per 24 hour   Intake --   Output 1400 ml   Net -1400 ml      3     Physical Exam  Constitutional:       Appearance: She is ill-appearing.   HENT:      Head: Normocephalic.   Cardiovascular:      Rate and Rhythm: Normal rate and regular rhythm.   Pulmonary:      Effort: Pulmonary effort is normal.      Breath sounds: Normal breath sounds.   Abdominal:      General: Bowel sounds are normal.      Palpations: Abdomen is soft.   Musculoskeletal:      Comments: Right arm in sling, right hip dressing in place.    Skin:     Findings: Bruising present.   Neurological:      General: No focal deficit present.      Mental Status: She is alert and oriented to person, place, and time.             Significant Labs: All pertinent labs within the past 24 hours have been reviewed.    Significant Imaging: I have reviewed all pertinent imaging results/findings within the past 24 hours.    Assessment/Plan:      * Falls, subsequent encounter  Presented to ED with fall, resides at nursing home.  Admitted under trauma team- trauma workup with fractures, no major bleeding noted.   [facility]   S/p surgeries as outlined below.     Closed compression fracture of body of L1 vertebra  MRI L-spine with compression fracture of the L1 vertebra, height loss estimated at the 70%.  There is posterior cortex retropulsion contributing to mild-to-moderate central canal stenosis.   S/p posterior spinal fusion and instrumentation T12-L2 and percutaneous balloon kyphoplasty L3, L4, L5 on 3/26 by Dr. Conway.  Continue pain control, PT/OT.       Closed displaced fracture of right femoral neck  Ortho consulted; s/p pinning by Dr. Buckner on 3/20/24.        Closed fracture of proximal end of right humerus with routine healing  Initial X-ray showed proximal fracture but later showed increasing displacement.  Ortho consulted; s/p ORIF per Dr. Buckner on  3/25.      Acute blood loss anemia  Patient's anemia is currently uncontrolled. Has received 2 units of PRBCs on 3/23 and 1 unit on 3/27 . Etiology likely d/t chronic blood loss and other etiology unknown.   Current CBC reviewed-   Lab Results   Component Value Date    HGB 8.5 (L) 03/29/2024    HCT 28.5 (L) 03/29/2024     Monitor serial CBC and transfuse if patient becomes hemodynamically unstable, symptomatic or H/H drops below 7/21.    Essential hypertension  Chronic, controlled. Latest blood pressure and vitals reviewed-     Temp:  [96.8 °F (36 °C)-100.3 °F (37.9 °C)]   Pulse:  [67-93]   Resp:  [14-19]   BP: ()/(57-73)   SpO2:  [91 %-98 %] .   Home meds for hypertension were reviewed and noted below.   Hypertension Medications               furosemide (LASIX) 40 MG tablet Take 40 mg by mouth 2 (two) times a day.    NIFEdipine (PROCARDIA-XL) 60 MG (OSM) 24 hr tablet Take 1 tablet (60 mg total) by mouth once daily.            While in the hospital, will manage blood pressure as follows; Adjust home antihypertensive regimen as follows- was held given shock- resume as able if BP elevates since she is out of shock    Will utilize p.r.n. blood pressure medication only if patient's blood pressure greater than 180/110 and she develops symptoms such as worsening chest pain or shortness of breath.    Acute cystitis  S/p IV ceftriaxone x 3 days.       Chronic pain  Resume home Oxycontin and gabapentin.  For acute pain she is on IV narcotics as well.       Supplemental oxygen dependent  On 3L O2 at baseline for COPD.       Iron deficiency anemia due to chronic blood loss  Patient's anemia is currently uncontrolled. S/p 1 unit PRBC for Hgb 7 on 3/23/24 in preparation of upcoming OR trips. Etiology likely d/t acute blood loss which was from surgery on top of chronic anemia (baseline Hgb ~ 9-10)  Current CBC reviewed-   Lab Results   Component Value Date    HGB 8.5 (L) 03/29/2024    HCT 28.5 (L) 03/29/2024     Monitor  serial CBC and transfuse if patient becomes hemodynamically unstable, symptomatic or H/H drops below 7/21.    DONALD (generalized anxiety disorder)  Resume home escitalopram and clonazepam.     Chronic obstructive pulmonary disease  Patient's COPD is controlled currently.  Patient is currently off COPD Pathway. Continue scheduled inhalers Supplemental oxygen and monitor respiratory status closely.       VTE Risk Mitigation (From admission, onward)           Ordered     enoxaparin injection 40 mg  Every 24 hours         03/31/24 1841                    Discharge Planning   VALENTIN: 4/2/2024     Code Status: DNR   Is the patient medically ready for discharge?:     Reason for patient still in hospital (select all that apply): Pending disposition  Discharge Plan A: Hospice/home              RENETTA DIOR MD  Department of Hospital Medicine   [facility]

## 2024-04-01 NOTE — SUBJECTIVE & OBJECTIVE
Interval History: Patient seen and examined at the bedside, reports pain is controlled.     Review of Systems   Musculoskeletal:  Positive for arthralgias and back pain.     Objective:     Vital Signs (Most Recent):  Temp: 97.9 °F (36.6 °C) (04/01/24 1034)  Pulse: 77 (04/01/24 1404)  Resp: 17 (04/01/24 1404)  BP: 102/62 (04/01/24 1034)  SpO2: 96 % (04/01/24 1404) Vital Signs (24h Range):  Temp:  [96.8 °F (36 °C)-100.3 °F (37.9 °C)] 97.9 °F (36.6 °C)  Pulse:  [67-93] 77  Resp:  [14-19] 17  SpO2:  [91 %-98 %] 96 %  BP: ()/(57-73) 102/62     Weight: 50.3 kg (110 lb 14.3 oz)  Body mass index is 19.64 kg/m².    Intake/Output Summary (Last 24 hours) at 4/1/2024 1415  Last data filed at 4/1/2024 0835  Gross per 24 hour   Intake --   Output 1400 ml   Net -1400 ml      3     Physical Exam  Constitutional:       Appearance: She is ill-appearing.   HENT:      Head: Normocephalic.   Cardiovascular:      Rate and Rhythm: Normal rate and regular rhythm.   Pulmonary:      Effort: Pulmonary effort is normal.      Breath sounds: Normal breath sounds.   Abdominal:      General: Bowel sounds are normal.      Palpations: Abdomen is soft.   Musculoskeletal:      Comments: Right arm in sling, right hip dressing in place.    Skin:     Findings: Bruising present.   Neurological:      General: No focal deficit present.      Mental Status: She is alert and oriented to person, place, and time.             Significant Labs: All pertinent labs within the past 24 hours have been reviewed.    Significant Imaging: I have reviewed all pertinent imaging results/findings within the past 24 hours.

## 2024-04-01 NOTE — ASSESSMENT & PLAN NOTE
Presented to ED with fall, resides at nursing home.  Admitted under trauma team- trauma workup with fractures, no major bleeding noted.   Patient requested [facility] and not return to FELIPA Dominguez assisting.   S/p surgeries as outlined below.

## 2024-04-01 NOTE — ASSESSMENT & PLAN NOTE
Chronic, controlled. Latest blood pressure and vitals reviewed-     Temp:  [96.8 °F (36 °C)-100.3 °F (37.9 °C)]   Pulse:  [67-93]   Resp:  [14-19]   BP: ()/(57-73)   SpO2:  [91 %-98 %] .   Home meds for hypertension were reviewed and noted below.   Hypertension Medications               furosemide (LASIX) 40 MG tablet Take 40 mg by mouth 2 (two) times a day.    NIFEdipine (PROCARDIA-XL) 60 MG (OSM) 24 hr tablet Take 1 tablet (60 mg total) by mouth once daily.            While in the hospital, will manage blood pressure as follows; Adjust home antihypertensive regimen as follows- was held given shock- resume as able if BP elevates since she is out of shock    Will utilize p.r.n. blood pressure medication only if patient's blood pressure greater than 180/110 and she develops symptoms such as worsening chest pain or shortness of breath.

## 2024-04-01 NOTE — PT/OT/SLP PROGRESS
Physical Therapy Treatment    Patient Name:  Darya Low   MRN:  [id number]    Recommendations:     Discharge Recommendations: Moderate Intensity Therapy  Discharge Equipment Recommendations: to be determined by next level of care  Barriers to discharge: Inaccessible home and Decreased caregiver support    Assessment:     Darya Low is a 63 y.o. female admitted with a medical diagnosis of Falls, subsequent encounter.  She presents with the following impairments/functional limitations: weakness, impaired endurance, impaired self care skills, impaired functional mobility, impaired balance, decreased upper extremity function, decreased lower extremity function, pain, decreased ROM, orthopedic precautions Pt requires significant assistance due to limited use of BUE. However, she has less pain to RLE which aids with mobility. Agreeable to sit up in chair for short period. Will need extensive rehab at d/c.    Rehab Prognosis: Fair; patient would benefit from acute skilled PT services to address these deficits and reach maximum level of function.    Recent Surgery: Procedure(s) (LRB):  T12-L2 percutaneous fusion (N/A)  KYPHOPLASTY, SPINE, LUMBAR, L3-L5 (N/A) 6 Days Post-Op    Plan:     During this hospitalization, patient to be seen daily to address the identified rehab impairments via therapeutic activities, therapeutic exercises, neuromuscular re-education and progress toward the following goals:    Plan of Care Expires:  04/26/24    Subjective     Chief Complaint: multiple fractures  Patient/Family Comments/goals: Pt is agreeable to PT   Pain/Comfort:  Pain Rating 1: 6/10  Location - Orientation 1: lower  Location 1: back  Pain Addressed 1: Reposition, Distraction  Pain Rating Post-Intervention 1: 6/10      Objective:     Communicated with CHRIS Rodgers RN prior to session.  Patient found HOB elevated with peripheral IV, PureWick, telemetry upon PT entry to room.     General Precautions: Standard, fall  Orthopedic  Precautions: RUE non weight bearing, LUE non weight bearing, RLE non weight bearing  Braces: UE Sling  Respiratory Status: Room air     Functional Mobility:  Bed Mobility:     Scooting: minimum assistance  Supine to Sit: moderate assistance  Transfers:     Sit to Stand:  moderate assistance with no AD  Bed to Chair: moderate assistance with  no AD  using  Stand Pivot  Balance: fair      AM-PAC 6 CLICK MOBILITY  Turning over in bed (including adjusting bedclothes, sheets and blankets)?: 3  Sitting down on and standing up from a chair with arms (e.g., wheelchair, bedside commode, etc.): 3  Moving from lying on back to sitting on the side of the bed?: 3  Moving to and from a bed to a chair (including a wheelchair)?: 3  Need to walk in hospital room?: 1  Climbing 3-5 steps with a railing?: 1  Basic Mobility Total Score: 14       Treatment & Education:  Pt performed bilateral LE: bed level exercises: ankle pumps, quad sets, heel slides, and hip abduction 3 x 15 each  Edge of bed sitting x 12 minutes, Moderate assistance to transfer to chair via SPT, unable to maintain weightbearing precautions    Patient left up in chair with all lines intact and call button in reach..    GOALS:   Multidisciplinary Problems       Physical Therapy Goals          Problem: Physical Therapy    Goal Priority Disciplines Outcome Goal Variances Interventions   Physical Therapy Goal     PT, PT/OT Ongoing, Progressing     Description: Short term goals:  1. Supine to sit with Moderate Assistance  2. Bed to chair transfer with Maximum Assistance using Slideboard  3. Sitting at edge of bed x10 minutes with Minimal Assistance    Long term goals:  1. Supine to sit with MInimal Assistance  2. Rolling to Left and Right with Minimal Assistance.  3. Bed to chair transfer with Minimal Assistance using Slideboard  4. Sitting at edge of bed x15 minutes with Stand-by Assistance                         Time Tracking:     PT Received On: 04/01/24  PT Start  Time: 0915     PT Stop Time: 0952  PT Total Time (min): 37 min     Billable Minutes: Therapeutic Activity 17 and Therapeutic Exercise 17    Treatment Type: Treatment  PT/PTA: PT     Number of PTA visits since last PT visit: 0     04/01/2024

## 2024-04-01 NOTE — ASSESSMENT & PLAN NOTE
Patient's anemia is currently uncontrolled. S/p 1 unit PRBC for Hgb 7 on 3/23/24 in preparation of upcoming OR trips. Etiology likely d/t acute blood loss which was from surgery on top of chronic anemia (baseline Hgb ~ 9-10)  Current CBC reviewed-   Lab Results   Component Value Date    HGB 8.5 (L) 03/29/2024    HCT 28.5 (L) 03/29/2024     Monitor serial CBC and transfuse if patient becomes hemodynamically unstable, symptomatic or H/H drops below 7/21.

## 2024-04-02 VITALS
SYSTOLIC BLOOD PRESSURE: 135 MMHG | WEIGHT: 110.88 LBS | OXYGEN SATURATION: 93 % | TEMPERATURE: 99 F | HEART RATE: 78 BPM | DIASTOLIC BLOOD PRESSURE: 77 MMHG | BODY MASS INDEX: 19.64 KG/M2 | RESPIRATION RATE: 18 BRPM | HEIGHT: 63 IN

## 2024-04-02 LAB
ALBUMIN SERPL BCP-MCNC: 2.1 G/DL (ref 3.5–5)
ALBUMIN/GLOB SERPL: 0.7 {RATIO}
ALP SERPL-CCNC: 131 U/L (ref 50–130)
ALT SERPL W P-5'-P-CCNC: 9 U/L (ref 13–56)
ANION GAP SERPL CALCULATED.3IONS-SCNC: 8 MMOL/L (ref 7–16)
ANISOCYTOSIS BLD QL SMEAR: ABNORMAL
AST SERPL W P-5'-P-CCNC: 11 U/L (ref 15–37)
BASOPHILS # BLD AUTO: 0.13 K/UL (ref 0–0.2)
BASOPHILS NFR BLD AUTO: 2.5 % (ref 0–1)
BASOPHILS NFR BLD MANUAL: 2 % (ref 0–1)
BILIRUB SERPL-MCNC: 0.4 MG/DL (ref ?–1.2)
BUN SERPL-MCNC: 6 MG/DL (ref 7–18)
BUN/CREAT SERPL: 15 (ref 6–20)
CALCIUM SERPL-MCNC: 8.3 MG/DL (ref 8.5–10.1)
CHLORIDE SERPL-SCNC: 108 MMOL/L (ref 98–107)
CO2 SERPL-SCNC: 27 MMOL/L (ref 21–32)
CREAT SERPL-MCNC: 0.4 MG/DL (ref 0.55–1.02)
DIFFERENTIAL METHOD BLD: ABNORMAL
EGFR (NO RACE VARIABLE) (RUSH/TITUS): 112 ML/MIN/1.73M2
EOSINOPHIL # BLD AUTO: 0.42 K/UL (ref 0–0.5)
EOSINOPHIL NFR BLD AUTO: 8 % (ref 1–4)
EOSINOPHIL NFR BLD MANUAL: 7 % (ref 1–4)
ERYTHROCYTE [DISTWIDTH] IN BLOOD BY AUTOMATED COUNT: 16.5 % (ref 11.5–14.5)
GLOBULIN SER-MCNC: 3.2 G/DL (ref 2–4)
GLUCOSE SERPL-MCNC: 103 MG/DL (ref 74–106)
HCT VFR BLD AUTO: 28.2 % (ref 38–47)
HGB BLD-MCNC: 8.5 G/DL (ref 12–16)
IMM GRANULOCYTES # BLD AUTO: 0.18 K/UL (ref 0–0.04)
IMM GRANULOCYTES NFR BLD: 3.4 % (ref 0–0.4)
LYMPHOCYTES # BLD AUTO: 1.28 K/UL (ref 1–4.8)
LYMPHOCYTES NFR BLD AUTO: 24.4 % (ref 27–41)
LYMPHOCYTES NFR BLD MANUAL: 22 % (ref 27–41)
MCH RBC QN AUTO: 27.8 PG (ref 27–31)
MCHC RBC AUTO-ENTMCNC: 30.1 G/DL (ref 32–36)
MCV RBC AUTO: 92.2 FL (ref 80–96)
METAMYELOCYTES NFR BLD MANUAL: 1 %
MONOCYTES # BLD AUTO: 0.98 K/UL (ref 0–0.8)
MONOCYTES NFR BLD AUTO: 18.7 % (ref 2–6)
MONOCYTES NFR BLD MANUAL: 12 % (ref 2–6)
MPC BLD CALC-MCNC: 10.2 FL (ref 9.4–12.4)
NEUTROPHILS # BLD AUTO: 2.26 K/UL (ref 1.8–7.7)
NEUTROPHILS NFR BLD AUTO: 43 % (ref 53–65)
NEUTS BAND NFR BLD MANUAL: 1 % (ref 1–5)
NEUTS SEG NFR BLD MANUAL: 55 % (ref 50–62)
NRBC # BLD AUTO: 0 X10E3/UL
NRBC, AUTO (.00): 0 %
OVALOCYTES BLD QL SMEAR: ABNORMAL
PLATELET # BLD AUTO: 455 K/UL (ref 150–400)
PLATELET MORPHOLOGY: ABNORMAL
POLYCHROMASIA BLD QL SMEAR: ABNORMAL
POTASSIUM SERPL-SCNC: 3.8 MMOL/L (ref 3.5–5.1)
PROT SERPL-MCNC: 5.3 G/DL (ref 6.4–8.2)
RBC # BLD AUTO: 3.06 M/UL (ref 4.2–5.4)
SODIUM SERPL-SCNC: 139 MMOL/L (ref 136–145)
TARGETS BLD QL SMEAR: ABNORMAL
WBC # BLD AUTO: 5.25 K/UL (ref 4.5–11)

## 2024-04-02 PROCEDURE — 63600175 PHARM REV CODE 636 W HCPCS: Performed by: HOSPITALIST

## 2024-04-02 PROCEDURE — 63600175 PHARM REV CODE 636 W HCPCS: Mod: JZ,JG | Performed by: ORTHOPAEDIC SURGERY

## 2024-04-02 PROCEDURE — 86580 TB INTRADERMAL TEST: CPT | Performed by: INTERNAL MEDICINE

## 2024-04-02 PROCEDURE — 99900035 HC TECH TIME PER 15 MIN (STAT)

## 2024-04-02 PROCEDURE — 94640 AIRWAY INHALATION TREATMENT: CPT

## 2024-04-02 PROCEDURE — 30200315 PPD INTRADERMAL TEST REV CODE 302: Performed by: INTERNAL MEDICINE

## 2024-04-02 PROCEDURE — 80053 COMPREHEN METABOLIC PANEL: CPT | Performed by: INTERNAL MEDICINE

## 2024-04-02 PROCEDURE — 25000242 PHARM REV CODE 250 ALT 637 W/ HCPCS: Performed by: ORTHOPAEDIC SURGERY

## 2024-04-02 PROCEDURE — 25000003 PHARM REV CODE 250: Performed by: ORTHOPAEDIC SURGERY

## 2024-04-02 PROCEDURE — 85025 COMPLETE CBC W/AUTO DIFF WBC: CPT | Performed by: INTERNAL MEDICINE

## 2024-04-02 PROCEDURE — 97110 THERAPEUTIC EXERCISES: CPT

## 2024-04-02 PROCEDURE — 94761 N-INVAS EAR/PLS OXIMETRY MLT: CPT

## 2024-04-02 PROCEDURE — 99239 HOSP IP/OBS DSCHRG MGMT >30: CPT | Mod: ,,, | Performed by: INTERNAL MEDICINE

## 2024-04-02 RX ORDER — OXYCODONE HYDROCHLORIDE 10 MG/1
10 TABLET, FILM COATED, EXTENDED RELEASE ORAL 2 TIMES DAILY
Qty: 6 TABLET | Refills: 0 | Status: SHIPPED | OUTPATIENT
Start: 2024-04-02

## 2024-04-02 RX ORDER — FAMOTIDINE 20 MG/1
20 TABLET, FILM COATED ORAL 2 TIMES DAILY
Qty: 60 TABLET | Refills: 11
Start: 2024-04-02 | End: 2025-04-02

## 2024-04-02 RX ORDER — CLONAZEPAM 1 MG/1
1 TABLET ORAL 2 TIMES DAILY
Qty: 6 TABLET | Refills: 0 | Status: SHIPPED | OUTPATIENT
Start: 2024-04-02

## 2024-04-02 RX ORDER — ENOXAPARIN SODIUM 100 MG/ML
40 INJECTION SUBCUTANEOUS EVERY 24 HOURS
Qty: 8.4 ML | Refills: 0
Start: 2024-04-02 | End: 2024-04-23

## 2024-04-02 RX ORDER — OXYCODONE HYDROCHLORIDE 5 MG/1
5 TABLET ORAL EVERY 6 HOURS PRN
Qty: 12 TABLET | Refills: 0 | Status: SHIPPED | OUTPATIENT
Start: 2024-04-02

## 2024-04-02 RX ADMIN — MORPHINE SULFATE 3 MG: 4 INJECTION, SOLUTION INTRAMUSCULAR; INTRAVENOUS at 02:04

## 2024-04-02 RX ADMIN — TUBERCULIN PURIFIED PROTEIN DERIVATIVE 5 UNITS: 5 INJECTION, SOLUTION INTRADERMAL at 04:04

## 2024-04-02 RX ADMIN — GABAPENTIN 300 MG: 300 CAPSULE ORAL at 09:04

## 2024-04-02 RX ADMIN — OXYCODONE HYDROCHLORIDE 10 MG: 10 TABLET, FILM COATED, EXTENDED RELEASE ORAL at 09:04

## 2024-04-02 RX ADMIN — ENOXAPARIN SODIUM 40 MG: 100 INJECTION SUBCUTANEOUS at 05:04

## 2024-04-02 RX ADMIN — ESCITALOPRAM OXALATE 10 MG: 10 TABLET, FILM COATED ORAL at 09:04

## 2024-04-02 RX ADMIN — MORPHINE SULFATE 3 MG: 4 INJECTION, SOLUTION INTRAMUSCULAR; INTRAVENOUS at 12:04

## 2024-04-02 RX ADMIN — IPRATROPIUM BROMIDE AND ALBUTEROL SULFATE 3 ML: 2.5; .5 SOLUTION RESPIRATORY (INHALATION) at 12:04

## 2024-04-02 RX ADMIN — MORPHINE SULFATE 3 MG: 4 INJECTION, SOLUTION INTRAMUSCULAR; INTRAVENOUS at 05:04

## 2024-04-02 RX ADMIN — SENNOSIDES AND DOCUSATE SODIUM 1 TABLET: 8.6; 5 TABLET ORAL at 09:04

## 2024-04-02 RX ADMIN — IPRATROPIUM BROMIDE AND ALBUTEROL SULFATE 3 ML: 2.5; .5 SOLUTION RESPIRATORY (INHALATION) at 07:04

## 2024-04-02 RX ADMIN — GABAPENTIN 300 MG: 300 CAPSULE ORAL at 02:04

## 2024-04-02 RX ADMIN — CLONAZEPAM 1 MG: 0.5 TABLET ORAL at 09:04

## 2024-04-02 RX ADMIN — FAMOTIDINE 20 MG: 20 TABLET ORAL at 09:04

## 2024-04-02 RX ADMIN — MORPHINE SULFATE 3 MG: 4 INJECTION, SOLUTION INTRAMUSCULAR; INTRAVENOUS at 09:04

## 2024-04-02 NOTE — ASSESSMENT & PLAN NOTE
Patient's COPD is controlled currently.  Patient is currently off COPD Pathway. Continue scheduled inhalers Supplemental oxygen and monitor respiratory status closely.   Under hospice care and would like to resume on discharge.

## 2024-04-02 NOTE — ASSESSMENT & PLAN NOTE
Resume home Oxycontin, oxycodone and gabapentin.  For acute pain she is on IV narcotics as well.

## 2024-04-02 NOTE — ASSESSMENT & PLAN NOTE
Patient's anemia is currently uncontrolled. S/p 1 unit PRBC for Hgb 7 on 3/23/24 in preparation of upcoming OR trips. Etiology likely d/t acute blood loss which was from surgery on top of chronic anemia (baseline Hgb ~ 9-10)  Current CBC reviewed-   Lab Results   Component Value Date    HGB 8.5 (L) 04/02/2024    HCT 28.2 (L) 04/02/2024     Monitor serial CBC and transfuse if patient becomes hemodynamically unstable, symptomatic or H/H drops below 7/21.

## 2024-04-02 NOTE — NURSING
Report called to [facility] and spoke with MAGGIE Toussaint for report.  Will arrange transportation for pt.

## 2024-04-02 NOTE — DISCHARGE SUMMARY
[facility]  [facility]  Discharge Summary      Patient Name: Darya Low  MRN: [id number]  [facility]: [id number]  Patient Class: IP- Inpatient  Admission Date: 3/18/2024  Hospital Length of Stay: 15 days  Discharge Date and Time:  04/02/2024 09:48 AM  Attending Physician: Marcelino Otero MD   Discharging Provider: MARCELINO OTERO MD  Primary Care Provider: Maeve, Primary Doctor    Primary Care Team: Networked reference to record PCT     HPI:   No notes on file    Procedure(s) (LRB):  T12-L2 percutaneous fusion (N/A)  KYPHOPLASTY, SPINE, LUMBAR, L3-L5 (N/A)      Hospital Course:   Admitted on 3/18/24 under trauma surgery for right shoulder (proximal humerus), hip and vertebral fractures. She was hypotensive on admission so was monitored in ICU and required pressor support for the first 36 hours of admission but has been off since. She underwent right hip pinning by Dr. Ceja, also possibly to require ORIF of right humerus on 3/25. Ortho spine evaluated and plan for T12-L2 percutaneous fusion and L2-L5 kyphoplasty on 3/26.    3/23- Transferred out of ICU and hospitalist assumed care. Dr. Hills from Trauma team has requested hospitalist to assume primary. S/p 1 unit transfusion for pre-op reasons.   3/24- Hgb stabilized. Ortho tentatively plans for ORIF right humerus fracture tomorrow.   3/25- s/p ORIF right humerus today.  3/26- s/p spinal surgery with Dr. Conway today. PT/OT and SW consulted.   4/1- Awaiting SNF placement. Pain is controlled.  4/2- Accepted and approved for SNF at [facility]. Discharge today.      Goals of Care Treatment Preferences:  Code Status: DNR      Consults:   Consults (From admission, onward)          Status Ordering Provider     Inpatient consult to Social Work  Once        Provider:  (Not yet assigned)    Completed BRENT REYES     IP consult case management/social work  Once        Provider:  (Not yet assigned)    Completed SIGRID CEJA     Inpatient consult  to Orthopedic Surgery  Once        Provider:  Logan Conway MD    Completed KATIA ARANGO     Inpatient consult to Orthopedic Surgery  Once        Provider:  (Not yet assigned)    Acknowledged SIGRID CEJA     Inpatient consult to Critical Care Medicine  Once        Provider:  Donte Hills MD    Completed DONTE HILLS            Neuro  Chronic pain  Resume home Oxycontin, oxycodone and gabapentin.  For acute pain she is on IV narcotics as well.       Closed compression fracture of body of L1 vertebra  MRI L-spine with compression fracture of the L1 vertebra, height loss estimated at the 70%.  There is posterior cortex retropulsion contributing to mild-to-moderate central canal stenosis.   S/p posterior spinal fusion and instrumentation T12-L2 and percutaneous balloon kyphoplasty L3, L4, L5 on 3/26 by Dr. Conway.  Continue pain control, PT/OT.       Psychiatric  DONALD (generalized anxiety disorder)  Resume home escitalopram and clonazepam.     Pulmonary  Supplemental oxygen dependent  On 3L O2 at baseline for COPD.       Chronic obstructive pulmonary disease  Patient's COPD is controlled currently.  Patient is currently off COPD Pathway. Continue scheduled inhalers Supplemental oxygen and monitor respiratory status closely.   Under hospice care and would like to resume on discharge.     Cardiac/Vascular  Essential hypertension  Chronic, controlled. Latest blood pressure and vitals reviewed-     Temp:  [97.1 °F (36.2 °C)-99.4 °F (37.4 °C)]   Pulse:  [62-99]   Resp:  [12-20]   BP: (101-138)/(61-84)   SpO2:  [91 %-98 %] .   Home meds for hypertension were reviewed and noted below.   Hypertension Medications               furosemide (LASIX) 40 MG tablet Take 40 mg by mouth 2 (two) times a day.    NIFEdipine (PROCARDIA-XL) 60 MG (OSM) 24 hr tablet Take 1 tablet (60 mg total) by mouth once daily.            While in the hospital, will manage blood pressure as follows; taken off her home meds given stable pressures  without it.     Will utilize p.r.n. blood pressure medication only if patient's blood pressure greater than 180/110 and she develops symptoms such as worsening chest pain or shortness of breath.    Renal/  Acute cystitis  S/p IV ceftriaxone x 3 days.       Oncology  Acute blood loss anemia  Patient's anemia is currently uncontrolled. Has received 2 units of PRBCs on 3/23 and 1 unit on 3/27 . Etiology likely d/t chronic blood loss and other etiology unknown.   Current CBC reviewed-   Lab Results   Component Value Date    HGB 8.5 (L) 04/02/2024    HCT 28.2 (L) 04/02/2024     Monitor serial CBC and transfuse if patient becomes hemodynamically unstable, symptomatic or H/H drops below 7/21.    Iron deficiency anemia due to chronic blood loss  Patient's anemia is currently uncontrolled. S/p 1 unit PRBC for Hgb 7 on 3/23/24 in preparation of upcoming OR trips. Etiology likely d/t acute blood loss which was from surgery on top of chronic anemia (baseline Hgb ~ 9-10)  Current CBC reviewed-   Lab Results   Component Value Date    HGB 8.5 (L) 04/02/2024    HCT 28.2 (L) 04/02/2024     Monitor serial CBC and transfuse if patient becomes hemodynamically unstable, symptomatic or H/H drops below 7/21.    Orthopedic  Closed fracture of proximal end of right humerus with routine healing  Initial X-ray showed proximal fracture but later showed increasing displacement.  Ortho consulted; s/p ORIF per Dr. Buckner on 3/25.      Closed displaced fracture of right femoral neck  Ortho consulted; s/p pinning by Dr. Buckner on 3/20/24.        Other  * Falls, subsequent encounter  Presented to ED with fall, resides at nursing home.  Admitted under trauma team- trauma workup with fractures, no major bleeding noted.   DC to SNF.   S/p surgeries as outlined below.       Final Active Diagnoses:    Diagnosis Date Noted POA    PRINCIPAL PROBLEM:  Falls, subsequent encounter [W19.XXXD] 03/18/2024 Not Applicable    Closed compression fracture of body of L1  vertebra [S32.010A] 03/18/2024 Yes    Closed displaced fracture of right femoral neck [S72.001A] 03/18/2024 Yes    Closed fracture of proximal end of right humerus with routine healing [S42.201D] 03/18/2024 Not Applicable    Acute blood loss anemia [D62] 03/27/2024 Yes    Essential hypertension [I10] 03/26/2024 Yes    Chronic pain [G89.29] 03/18/2024 Yes    Acute cystitis [N30.00] 03/18/2024 Yes    Supplemental oxygen dependent [Z99.81] 11/28/2023 Not Applicable    Iron deficiency anemia due to chronic blood loss [D50.0]  Yes    Chronic obstructive pulmonary disease [J44.9] 03/24/2021 Yes    DONALD (generalized anxiety disorder) [F41.1] 03/24/2021 Yes      Problems Resolved During this Admission:    Diagnosis Date Noted Date Resolved POA    Hypovolemic shock [R57.1] 03/18/2024 04/01/2024 Yes    Hypokalemia [E87.6] 03/18/2024 03/23/2024 Yes       Discharged Condition: fair    Disposition: Skilled Nursing Facility    Follow Up:    Patient Instructions:   No discharge procedures on file.    Significant Diagnostic Studies: N/A    Pending Diagnostic Studies:       None           Medications:  Reconciled Home Medications:      Medication List        START taking these medications      enoxaparin 40 mg/0.4 mL Syrg  Commonly known as: LOVENOX  Inject 0.4 mLs (40 mg total) into the skin Q24H (prophylaxis, 1700). for 21 days     famotidine 20 MG tablet  Commonly known as: PEPCID  Take 1 tablet (20 mg total) by mouth 2 (two) times daily.            CHANGE how you take these medications      clonazePAM 1 MG tablet  Commonly known as: KlonoPIN  Take 1 tablet (1 mg total) by mouth 2 (two) times daily.  What changed:   medication strength  how much to take     * OxyCONTIN 10 mg 12 hr tablet  Generic drug: oxyCODONE  Take 1 tablet (10 mg total) by mouth 2 (two) times daily.  What changed: Another medication with the same name was changed. Make sure you understand how and when to take each.     * oxyCODONE 5 MG immediate release  tablet  Commonly known as: ROXICODONE  Take 1 tablet (5 mg total) by mouth every 6 (six) hours as needed for Pain.  What changed: reasons to take this     tiZANidine 4 MG tablet  Commonly known as: ZANAFLEX  Take 1 tablet (4 mg total) by mouth 3 (three) times daily as needed.  What changed: when to take this           * This list has 2 medication(s) that are the same as other medications prescribed for you. Read the directions carefully, and ask your doctor or other care provider to review them with you.                CONTINUE taking these medications      albuterol-ipratropium 2.5 mg-0.5 mg/3 mL nebulizer solution  Commonly known as: DUO-NEB  SMARTSI Ampule(s) Via Nebulizer 3 Times Daily     benzonatate 100 MG capsule  Commonly known as: TESSALON  Take 100 mg by mouth 3 (three) times daily as needed.     EScitalopram oxalate 20 MG tablet  Commonly known as: LEXAPRO  Take 20 mg by mouth every evening.     gabapentin 400 MG capsule  Commonly known as: NEURONTIN  Take 400 mg by mouth 3 (three) times daily.     lactulose 10 gram/15 mL solution  Commonly known as: CHRONULAC  Take 30 mLs by mouth 3 (three) times daily.     multivitamin Tab  Take 1 tablet by mouth once daily.     promethazine 12.5 MG Tab  Commonly known as: PHENERGAN  Take 12.5 mg by mouth every 6 (six) hours as needed.     senna 8.6 mg tablet  Commonly known as: SENOKOT  Take 1 tablet by mouth every evening.     traZODone 150 MG tablet  Commonly known as: DESYREL  Take 150 mg by mouth every evening.            STOP taking these medications      furosemide 40 MG tablet  Commonly known as: LASIX     ibuprofen 200 MG tablet  Commonly known as: ADVIL,MOTRIN     NIFEdipine 60 MG (OSM) 24 hr tablet  Commonly known as: PROCARDIA-XL     predniSONE 10 MG tablet  Commonly known as: DELTASONE              Indwelling Lines/Drains at time of discharge:   Lines/Drains/Airways       Drain  Duration             Female External Urinary Catheter w/ Suction 24  0630 3 days                    Time spent on the discharge of patient: 37 minutes         RENETTA DIOR MD  Department of Hospital Medicine  [facility]

## 2024-04-02 NOTE — ASSESSMENT & PLAN NOTE
Presented to ED with fall, resides at nursing home.  Admitted under trauma team- trauma workup with fractures, no major bleeding noted.   DC to SNF.   S/p surgeries as outlined below.

## 2024-04-02 NOTE — ASSESSMENT & PLAN NOTE
Patient's anemia is currently uncontrolled. Has received 2 units of PRBCs on 3/23 and 1 unit on 3/27 . Etiology likely d/t chronic blood loss and other etiology unknown.   Current CBC reviewed-   Lab Results   Component Value Date    HGB 8.5 (L) 04/02/2024    HCT 28.2 (L) 04/02/2024     Monitor serial CBC and transfuse if patient becomes hemodynamically unstable, symptomatic or H/H drops below 7/21.

## 2024-04-02 NOTE — PT/OT/SLP PROGRESS
Occupational Therapy   Treatment    Name: Darya Low  MRN: [id number]  Admitting Diagnosis:  Falls, subsequent encounter  7 Days Post-Op    Recommendations:     Discharge Recommendations: Moderate Intensity Therapy  Discharge Equipment Recommendations:   (To be determined)  Barriers to discharge:       Assessment:     Darya Low is a 63 y.o. female with a medical diagnosis of Falls, subsequent encounter.  She presents with weakness. Performance deficits affecting function are weakness, impaired endurance, impaired self care skills, decreased ROM, pain, orthopedic precautions.     Rehab Prognosis:  Good; patient would benefit from acute skilled OT services to address these deficits and reach maximum level of function.       Plan:     Patient to be seen 5 x/week to address the above listed problems via self-care/home management, therapeutic activities, therapeutic exercises  Plan of Care Expires: 04/22/24  Plan of Care Reviewed with: patient    Subjective     Chief Complaint: Pt c/o P! In back and R sh. RN provided P! meds  Patient/Family Comments/goals: return to PLOF  Pain/Comfort:       Objective:     Communicated with: MAGGIE Arnett prior to session.  Patient found HOB elevated with   upon OT entry to room.    General Precautions: Standard, fall    Orthopedic Precautions:RUE non weight bearing, RLE non weight bearing, LUE non weight bearing  Braces: UE Sling  Respiratory Status: Room air     Occupational Performance:     Bed Mobility:    Pt def     Functional Mobility/Transfers:  Pt def bed mobs and transferring to chair. Therapist educated Pt on importance of OOB activities  Functional Mobility:     Activities of Daily Living:        [facility] 6 Click ADL:      Treatment & Education:  Pt completed LUE elbow flex and chest press with 1# wt, hand gripper with 3 bands and tricep presswith yellow Tband [id number] hand gripper and AROM in elbow flex ea ex to increase I in self care skills. Pt required Mod cues to stay on  task  and frequent rest breaks to complete    Patient left HOB elevated with all lines intact and call button in reach    GOALS:   Multidisciplinary Problems       Occupational Therapy Goals          Problem: Occupational Therapy    Goal Priority Disciplines Outcome Interventions   Occupational Therapy Goal     OT, PT/OT Ongoing, Progressing    Description: STG: (in 2 weeks)  Pt will perform grooming with setup mod(A) with AD  Pt will sit EOB x 10 min with moderate assistance  Pt will transfer bed/chair/bsc with max(A) with slide board or stand pivot transfer on (L) LE  Pt will tolerate 15 minutes of tx without fatigue      LTG: (in 5 weeks)  1.Restore to max I with self care and mobility.                         Time Tracking:     OT Date of Treatment: 04/02/24  OT Start Time: 0927  OT Stop Time: 0950  OT Total Time (min): 23 min    Billable Minutes:Therapeutic Exercise 23               4/2/2024

## 2024-04-02 NOTE — PT/OT/SLP PROGRESS
Physical Therapy Treatment    Patient Name:  Darya Low   MRN:  [id number]    Recommendations:     Discharge Recommendations: Moderate Intensity Therapy  Discharge Equipment Recommendations: to be determined by next level of care  Barriers to discharge: Inaccessible home and Decreased caregiver support    Assessment:     Darya Low is a 63 y.o. female admitted with a medical diagnosis of Falls, subsequent encounter.  She presents with the following impairments/functional limitations: weakness, impaired endurance, impaired self care skills, impaired functional mobility, impaired balance, decreased upper extremity function, decreased lower extremity function, pain, decreased ROM, orthopedic precautions Pt to d/c to SNF today. Pt opting to stay in bed for exercises. Has minimal pain with activity but has mild weakness in RLE.    Rehab Prognosis: Fair; patient would benefit from acute skilled PT services to address these deficits and reach maximum level of function.    Recent Surgery: Procedure(s) (LRB):  T12-L2 percutaneous fusion (N/A)  KYPHOPLASTY, SPINE, LUMBAR, L3-L5 (N/A) 7 Days Post-Op    Plan:     During this hospitalization, patient to be seen daily to address the identified rehab impairments via therapeutic activities, therapeutic exercises, neuromuscular re-education and progress toward the following goals:    Plan of Care Expires:  04/26/24    Subjective     Chief Complaint: multiple fx  Patient/Family Comments/goals: Pt is agreeable to PT    Pain/Comfort:  Pain Rating 1: 6/10  Location 1: back  Pain Addressed 1: Pre-medicate for activity  Pain Rating Post-Intervention 1: 6/10      Objective:     Communicated with CHRIS Rodgers Rn prior to session.  Patient found HOB elevated with PureWick, peripheral IV upon PT entry to room.     General Precautions: Standard, fall  Orthopedic Precautions: RUE non weight bearing, LUE non weight bearing, RLE non weight bearing  Braces: UE Sling  Respiratory Status: Room  air     Functional Mobility:  Not performed      AM-PAC 6 CLICK MOBILITY  Turning over in bed (including adjusting bedclothes, sheets and blankets)?: 3  Sitting down on and standing up from a chair with arms (e.g., wheelchair, bedside commode, etc.): 3  Moving from lying on back to sitting on the side of the bed?: 3  Moving to and from a bed to a chair (including a wheelchair)?: 3  Need to walk in hospital room?: 1  Climbing 3-5 steps with a railing?: 1  Basic Mobility Total Score: 14       Treatment & Education:  Pt performed bilateral LE: bed level exercises: ankle pumps, quad sets, heel slides, hip abduction, pillow squeezes, and SLR  x 30 each      Patient left HOB elevated with all lines intact and call button in reach..    GOALS:   Multidisciplinary Problems       Physical Therapy Goals          Problem: Physical Therapy    Goal Priority Disciplines Outcome Goal Variances Interventions   Physical Therapy Goal     PT, PT/OT Ongoing, Progressing     Description: Short term goals:  1. Supine to sit with Moderate Assistance  2. Bed to chair transfer with Maximum Assistance using Slideboard  3. Sitting at edge of bed x10 minutes with Minimal Assistance    Long term goals:  1. Supine to sit with MInimal Assistance  2. Rolling to Left and Right with Minimal Assistance.  3. Bed to chair transfer with Minimal Assistance using Slideboard  4. Sitting at edge of bed x15 minutes with Stand-by Assistance                         Time Tracking:     PT Received On: 04/02/24  PT Start Time: 1054     PT Stop Time: 1111  PT Total Time (min): 17 min     Billable Minutes: Therapeutic Exercise 17    Treatment Type: Treatment  PT/PTA: PT     Number of PTA visits since last PT visit: 0     04/02/2024

## 2024-04-02 NOTE — ASSESSMENT & PLAN NOTE
Chronic, controlled. Latest blood pressure and vitals reviewed-     Temp:  [97.1 °F (36.2 °C)-99.4 °F (37.4 °C)]   Pulse:  [62-99]   Resp:  [12-20]   BP: (101-138)/(61-84)   SpO2:  [91 %-98 %] .   Home meds for hypertension were reviewed and noted below.   Hypertension Medications               furosemide (LASIX) 40 MG tablet Take 40 mg by mouth 2 (two) times a day.    NIFEdipine (PROCARDIA-XL) 60 MG (OSM) 24 hr tablet Take 1 tablet (60 mg total) by mouth once daily.            While in the hospital, will manage blood pressure as follows; taken off her home meds given stable pressures without it.     Will utilize p.r.n. blood pressure medication only if patient's blood pressure greater than 180/110 and she develops symptoms such as worsening chest pain or shortness of breath.

## 2024-04-03 ENCOUNTER — HOSPITAL ENCOUNTER (EMERGENCY)
Facility: HOSPITAL | Age: 64
Discharge: NURSING, REHAB OR LONG-TERM CARE | End: 2024-04-03
Attending: FAMILY MEDICINE
Payer: MEDICARE

## 2024-04-03 VITALS
HEART RATE: 98 BPM | TEMPERATURE: 98 F | OXYGEN SATURATION: 95 % | DIASTOLIC BLOOD PRESSURE: 80 MMHG | SYSTOLIC BLOOD PRESSURE: 160 MMHG | HEIGHT: 63 IN | WEIGHT: 110 LBS | RESPIRATION RATE: 16 BRPM | BODY MASS INDEX: 19.49 KG/M2

## 2024-04-03 DIAGNOSIS — G89.29 OTHER CHRONIC PAIN: Primary | ICD-10-CM

## 2024-04-03 LAB
ALBUMIN SERPL BCP-MCNC: 2.1 G/DL (ref 3.5–5)
ALBUMIN/GLOB SERPL: 0.6 {RATIO}
ALP SERPL-CCNC: 138 U/L (ref 50–130)
ALT SERPL W P-5'-P-CCNC: 11 U/L (ref 13–56)
ANION GAP SERPL CALCULATED.3IONS-SCNC: 12 MMOL/L (ref 7–16)
AST SERPL W P-5'-P-CCNC: 13 U/L (ref 15–37)
BASOPHILS # BLD AUTO: 0.11 K/UL (ref 0–0.2)
BASOPHILS NFR BLD AUTO: 1.7 % (ref 0–1)
BILIRUB SERPL-MCNC: 0.4 MG/DL (ref ?–1.2)
BUN SERPL-MCNC: 6 MG/DL (ref 7–18)
BUN/CREAT SERPL: 14 (ref 6–20)
CALCIUM SERPL-MCNC: 8.5 MG/DL (ref 8.5–10.1)
CHLORIDE SERPL-SCNC: 102 MMOL/L (ref 98–107)
CO2 SERPL-SCNC: 26 MMOL/L (ref 21–32)
CREAT SERPL-MCNC: 0.42 MG/DL (ref 0.55–1.02)
DIFFERENTIAL METHOD BLD: ABNORMAL
EGFR (NO RACE VARIABLE) (RUSH/TITUS): 111 ML/MIN/1.73M2
EOSINOPHIL # BLD AUTO: 0.26 K/UL (ref 0–0.5)
EOSINOPHIL NFR BLD AUTO: 4.1 % (ref 1–4)
ERYTHROCYTE [DISTWIDTH] IN BLOOD BY AUTOMATED COUNT: 16.1 % (ref 11.5–14.5)
GLOBULIN SER-MCNC: 3.5 G/DL (ref 2–4)
GLUCOSE SERPL-MCNC: 99 MG/DL (ref 74–106)
HCT VFR BLD AUTO: 31.6 % (ref 38–47)
HGB BLD-MCNC: 10 G/DL (ref 12–16)
IMM GRANULOCYTES # BLD AUTO: 0.13 K/UL (ref 0–0.04)
IMM GRANULOCYTES NFR BLD: 2 % (ref 0–0.4)
LYMPHOCYTES # BLD AUTO: 1.07 K/UL (ref 1–4.8)
LYMPHOCYTES NFR BLD AUTO: 16.8 % (ref 27–41)
MCH RBC QN AUTO: 28.7 PG (ref 27–31)
MCHC RBC AUTO-ENTMCNC: 31.6 G/DL (ref 32–36)
MCV RBC AUTO: 90.8 FL (ref 80–96)
MONOCYTES # BLD AUTO: 0.68 K/UL (ref 0–0.8)
MONOCYTES NFR BLD AUTO: 10.7 % (ref 2–6)
MPC BLD CALC-MCNC: 10.2 FL (ref 9.4–12.4)
NEUTROPHILS # BLD AUTO: 4.11 K/UL (ref 1.8–7.7)
NEUTROPHILS NFR BLD AUTO: 64.7 % (ref 53–65)
NRBC # BLD AUTO: 0 X10E3/UL
NRBC, AUTO (.00): 0 %
PLATELET # BLD AUTO: 540 K/UL (ref 150–400)
POTASSIUM SERPL-SCNC: 3.8 MMOL/L (ref 3.5–5.1)
PROT SERPL-MCNC: 5.6 G/DL (ref 6.4–8.2)
RBC # BLD AUTO: 3.48 M/UL (ref 4.2–5.4)
SODIUM SERPL-SCNC: 136 MMOL/L (ref 136–145)
WBC # BLD AUTO: 6.36 K/UL (ref 4.5–11)

## 2024-04-03 PROCEDURE — 99283 EMERGENCY DEPT VISIT LOW MDM: CPT

## 2024-04-03 PROCEDURE — 25000003 PHARM REV CODE 250: Performed by: FAMILY MEDICINE

## 2024-04-03 PROCEDURE — 99284 EMERGENCY DEPT VISIT MOD MDM: CPT | Mod: ,,, | Performed by: FAMILY MEDICINE

## 2024-04-03 PROCEDURE — 80053 COMPREHEN METABOLIC PANEL: CPT | Performed by: FAMILY MEDICINE

## 2024-04-03 PROCEDURE — 85025 COMPLETE CBC W/AUTO DIFF WBC: CPT | Performed by: FAMILY MEDICINE

## 2024-04-03 RX ORDER — OXYCODONE HYDROCHLORIDE 5 MG/1
5 TABLET ORAL
Status: COMPLETED | OUTPATIENT
Start: 2024-04-03 | End: 2024-04-03

## 2024-04-03 RX ORDER — OXYCODONE HYDROCHLORIDE 5 MG/1
10 TABLET ORAL
Status: DISCONTINUED | OUTPATIENT
Start: 2024-04-03 | End: 2024-04-03

## 2024-04-03 RX ADMIN — OXYCODONE 5 MG: 5 TABLET ORAL at 12:04

## 2024-04-03 NOTE — ED PROVIDER NOTES
Encounter Date: 4/3/2024    SCRIBE #1 NOTE: I, Lola Nichole, am scribing for, and in the presence of,  Vinicio Urrutia DO. I have scribed the entire note.       History     Chief Complaint   Patient presents with    Pain      Pt was at [facility], she called 911 herself for pain.  Pt was given Oxycodone 5mg at 8:13.  When pt was asked why she called 911 she said is was to get out of [facility].  Pt was initially at [facility].  Jill with  is checking to see if pt still has bed available at [facility] to go back there after ED d/c.       This is a 62 y/o female,who presents to the ED via EMS with complaints of pain. She states she was transported from [facility]. She reports she recently had three different surgeries here, her right shoulder, her right hip and her back. She notes she is currently taking Oxycodone 5 MG. She reports generalized pain. There are no other complaints/pain in the ED at this time. She has a known hx of COPD, GERD, and DONALD. She is a current every day smoker. She has had a left cardiac cath.     The history is provided by the patient. No  was used.     Review of patient's allergies indicates:   Allergen Reactions    Ondansetron hcl Swelling     Edema of tongue per patient    Celexa [citalopram]     Darvon [propoxyphene]     Flexeril [cyclobenzaprine]     Hydroxyzine     Robaxin [methocarbamol]     Thorazine [chlorpromazine]     Toradol [ketorolac] Itching     Past Medical History:   Diagnosis Date    COPD (chronic obstructive pulmonary disease)     DONALD (generalized anxiety disorder) 03/24/2021    GERD with esophagitis     Iron deficiency anemia due to chronic blood loss     Supplemental oxygen dependent 11/28/2023     Past Surgical History:   Procedure Laterality Date    ABDOMINAL SURGERY      KYPHOPLASTY, SPINE, LUMBAR N/A 3/26/2024    Procedure: KYPHOPLASTY, SPINE, LUMBAR, L3-L5;  Surgeon: Logan Conway MD;  [facility];  Service: Neurosurgery;  Laterality: N/A;    LEFT HEART CATHETERIZATION N/A 11/27/2023    Procedure: Left heart cath;  Surgeon: Ulises Crespo DO;  Location: [facility] CATH LAB;  Service: Cardiology;  Laterality: N/A;    LUMBAR FUSION N/A 3/26/2024    Procedure: T12-L2 percutaneous fusion;  Surgeon: Logan Conway MD;  Location: [facility] OR;  Service: Neurosurgery;  Laterality: N/A;    OPEN REDUCTION AND INTERNAL FIXATION (ORIF) OF FRACTURE OF PROXIMAL HUMERUS Right 3/25/2024    Procedure: ORIF, FRACTURE, HUMERUS, PROXIMAL;  Surgeon: Que Buckner MD;  Location: [facility] ORTHO OR;  Service: Orthopedics;  Laterality: Right;    PERCUTANEOUS PINNING OF HIP Right 3/20/2024    Procedure: PINNING, HIP, PERCUTANEOUS;  Surgeon: Que Buckner MD;  Location: [facility] ORTHO OR;  Service: Orthopedics;  Laterality: Right;     History reviewed. No pertinent family history.  Social History     Tobacco Use    Smoking status: Every Day     Types: Cigarettes    Smokeless tobacco: Never   Substance Use Topics    Alcohol use: Yes    Drug use: Never     Review of Systems   Musculoskeletal:         Generalized pain.      All other systems reviewed and are negative.      Physical Exam     Initial Vitals [04/03/24 1031]   BP Pulse Resp Temp SpO2   (!) 167/84 84 16 98.1 °F (36.7 °C) 95 %      MAP       --         Physical Exam    Nursing note and vitals reviewed.  Constitutional: She appears well-developed and well-nourished.   HENT:   Head: Normocephalic and atraumatic.   Eyes: Conjunctivae and EOM are normal. Pupils are equal, round, and reactive to light.   Neck: Neck supple.   Normal range of motion.  Cardiovascular:  Normal rate, regular rhythm, normal heart sounds and intact distal pulses.           Pulmonary/Chest: Breath sounds normal. No respiratory distress.   Abdominal: Bowel sounds are normal. There is no abdominal tenderness.   Musculoskeletal:         General: No tenderness. Normal range of motion.       Cervical back: Normal range of motion and neck supple.     Neurological: She is alert and oriented to person, place, and time.   Skin: Skin is warm and dry.   Psychiatric: She has a normal mood and affect.         ED Course   Procedures  Labs Reviewed   COMPREHENSIVE METABOLIC PANEL - Abnormal; Notable for the following components:       Result Value    BUN 6 (*)     Creatinine 0.42 (*)     Total Protein 5.6 (*)     Albumin 2.1 (*)     Alk Phos 138 (*)     ALT 11 (*)     AST 13 (*)     All other components within normal limits   CBC WITH DIFFERENTIAL - Abnormal; Notable for the following components:    RBC 3.48 (*)     Hemoglobin 10.0 (*)     Hematocrit 31.6 (*)     MCHC 31.6 (*)     RDW 16.1 (*)     Platelet Count 540 (*)     Lymphocytes % 16.8 (*)     Monocytes % 10.7 (*)     Eosinophils % 4.1 (*)     Basophils % 1.7 (*)     Immature Granulocytes % 2.0 (*)     Immature Granulocytes, Absolute 0.13 (*)     All other components within normal limits   CBC W/ AUTO DIFFERENTIAL    Narrative:     The following orders were created for panel order CBC Auto Differential.  Procedure                               Abnormality         Status                     ---------                               -----------         ------                     CBC with Differential[[id number]]       Abnormal            Final result                 Please view results for these tests on the individual orders.          Imaging Results    None          Medications - No data to display  Medical Decision Making  Amount and/or Complexity of Data Reviewed  Labs: ordered.              Attending Attestation:           Physician Attestation for Scribe:  Physician Attestation Statement for Scribe #1: I, Librado Urrutia, DO, reviewed documentation, as scribed by Lola Nichole in my presence, and it is both accurate and complete.                        Medical Decision Making:   Initial Assessment:   This is a 62 y/o female,who presents to the ED  via EMS with complaints of pain. She states she was transported from [facility]. She reports she recently had three different surgeries here, her right shoulder, her right hip and her back. She notes she is currently taking Oxycodone 5 MG. She reports generalized pain. There are no other complaints/pain in the ED at this time. She has a known hx of COPD, GERD, and DONALD. She is a current every day smoker. She has had a left cardiac cath.     The history is provided by the patient. No  was used.     Differential Diagnosis:   Chronic pain secondary to multiple fractures that has been repair  ED Management:  Returned to the [facility] bed             Clinical Impression:  Final diagnoses:  [G89.29] Other chronic pain (Primary)          ED Disposition Condition    Discharge Stable          ED Prescriptions    None       Follow-up Information    None          Vinicio Urrutia DO  04/03/24 [id number]

## 2024-04-03 NOTE — PLAN OF CARE
[facility]  Discharge Final Note    Primary Care Provider: No, Primary Doctor    Expected Discharge Date: 4/2/2024    Final Discharge Note (most recent)       Final Note - 04/03/24 0900          Final Note    Assessment Type Final Discharge Note     Anticipated Discharge Disposition Skilled Nursing Facility        Post-Acute Status    Post-Acute Authorization Placement     Post-Acute Placement Status Set-up Complete/Auth obtained     Patient choice form signed by patient/caregiver List with quality metrics by geographic area provided;List from CMS Compare;List from System Post-Acute Care     Discharge Delays None known at this time                     Important Message from Medicare  Important Message from Medicare regarding Discharge Appeal Rights: Given to patient/caregiver, Explained to patient/caregiver, Signed/date by patient/caregiver     Date IMM was signed: 04/02/24  Time IMM was signed: 1330      Pt discharged to [facility]

## 2024-04-03 NOTE — PLAN OF CARE
SS was consulted on pt for nursing home placement. Pt was just discharged yesterday to [facility] for SNF care and then will go back under hospice. Pt had been at [facility] but while in the hospital pt decided she did not wish to return to [facility] and therefore pt was discharged to [facility]. Pt is now requesting to go to [facility]. SS called and spoke with Roselyn at [facility] and they will not accept pt back to their facility. Pt will need to return to [facility] at discharge.

## 2024-04-03 NOTE — ED NOTES
Spoke to Jill DOYLE with case management.  Pt can not go back to [facility].  She will be going back to [facility].

## 2024-04-04 ENCOUNTER — TELEPHONE (OUTPATIENT)
Dept: ORTHOPEDICS | Facility: CLINIC | Age: 64
End: 2024-04-04
Payer: MEDICAID

## 2024-04-04 NOTE — TELEPHONE ENCOUNTER
----- Message from Kae Conway sent at 4/4/2024 10:21 AM CDT -----  Vanessa with [facility] calling to speak with nurse regarding questions she has on mutual pt - Call back # [phone]

## 2024-04-04 NOTE — PHYSICIAN QUERY
PT Name: Darya Low  MR #: [id number]     DOCUMENTATION CLARIFICATION     CDS:  Marsha SEALS, RN   Contact information:  [email or web address]     This form is a permanent document in the medical record.     Query Date: April 4, 2024    By submitting this query, we are merely seeking further clarification of documentation.  Please utilize your independent clinical judgment when addressing the question(s) below.  The Medical Record contains the following   Indicators   Supporting Clinical Findings Location in Medical Record    Respiratory failure     x Subjective Respiratory Signs/Symptoms: SOB, TOUSSAINT, Cough, etc. Review of Systems   Respiratory:  Positive for cough.         ED PN 3/18/24   x Objective Respiratory Signs/Symptoms: Respiratory distress, Accessory muscle use, tachypnea, wheezing, etc. Physical Exam  Pulmonary:   Effort: Pulmonary effort is normal. No respiratory distress.   Breath sounds: Normal breath sounds. No wheezing.    C Slay FNP AGACNP PN 3/18/24   x RR         O2 sat         O2 use    3/18/24  1145  3/18/24  2106  3/19/24  0040  3/19/24  0745  3/19/24  1326  3/19/24  1501  3/19/24  1930  3/19/24  2100  3/20/24   SpO2  83  92  97  92  98  93  94  83  98   flow  RA  1L  1L    1L  1L  1L    1L   device    NC  NC    NC  NC  NC    NC   RR  20  16  12  17  12  10  19  16  8    VS flow sheet 3/18-3/20/24   x Blood Gas (ABG or VBG) POC PH 7.44   POC PCO2 57    POC PO2 77    POC Sodium 135    POC Potassium 2.9   POC Ionized Calcium 1.07   POCT Glucose 150    POC Lactate 1.5    POC Hematocrit 30    POC HCO3 38.7    POC CO2 40.4   POC Base Excess 12.8    POC SATURATED O2 96    ABG 3/18/24    Hypoxia/Hypercapnia      BiPAP/Intubation/Mechanical Ventilation     x Home O2, Oxygen Dependence She has a PMH of COPD with oxygen dependence, chronic pain, DONALD, GERD, and previous left humerus fracture and left femur fracture with repair.  Supplemental oxygen dependent  continue on NC      C Slay FNP AGACNP  PN 3/18/24   x Radiology findings FINDINGS:  Left midlung opacity is again seen.  Right lung clear.  No pneumothorax.  Chronic deformity of the left humeral neck and partially imaged of the proximal humerus.    FINDINGS:  CT chest: Heart, mediastinum within normal limits.  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  Otherwise the great vessels show no evidence of abnormality     Small amount of right lower lung airspace density present.  No other evidence of lung parenchymal abnormality seen. No pneumothorax or effusion is present.  Comminuted proximal humerus fracture of the right partially visualized.  No acute chest wall abnormalities are identified. CXAR 3/18/24          CT Chest abd pelvis 3/18/24   x Acute/Chronic Illness Assessment/Plan:     Closed compression fracture of L2 vertebra  Chronic pain  Supplemental oxygen dependent  Chronic obstructive pulmonary disease  Hypotension  Hypokalemia  Fall  Fracture of head of right femur  Humerus fracture C Unique SMITHP AGACNP PN 3/18/24   x Treatment Inhalation Treatment Q6H  Electronically signed by: Karine Calhoun FNP-AGACNP on 03/18/24 1410    albuterol-ipratropium 2.5 mg-0.5 mg/3 mL nebulizer solution 3 mL    Ordered Dose: 3 mL Route: Nebulization Frequency: Every 6 hours  Scheduled Start Date/Time: 03/18/24 1900   End Date/Time: 04/02/24 2048        MD orders 3/18/24    Other         The clinical guidelines noted are only a system guideline. It does not replace the providers clinical judgment.      [facility] Approved Diagnostic Criteria      Acute Respiratory Failure    Hypoxic: ABG pO2<60 mmHg or O2 sat of <91% on RA   AND/OR   Hypercapnic: ABG pCO2>50 mmHg with pH <7.35   AND   Respiratory symptoms documented (Subjective: SOB; Objective: Tachypnea, respiratory distress, increased work of breathing, unable to speak in complete sentences, labored breathing, use of accessory muscles, RR>26, cyanosis, dyspnea, wheezing, stridor, lethargy)       Chronic Respiratory Failure   Hypoxic: Continuous home oxygen    AND/OR   Hypercapnic: Normal pH with high CO2 (ex. COPD)      Acute on Chronic Respiratory Failure   Hypoxic: ABG pO2>10mmHg below baseline OR ABG pO2<60 mmHg OR SpO2<91% on usual home O2  OR O2>2L/min over baseline home O2   AND/OR   Hypercapnic: ABG pCO2>50 mmHg OR pCO2>10mmHg over baseline and pH <7.35   AND   Respiratory symptoms documented      Acute Respiratory Distress Syndrome (ARDS) - an acute, diffuse, inflammatory form of lung injury. Suspect with progressive dyspnea, hypoxemic respiratory failure, and bilateral alveolar infiltrates on chest imaging within 6 to 72 hours of an inciting event.   Acute Respiratory Distress - Generally describes less severe respiratory symptoms (tachypnea, in respiratory distress, increased work of breathing, unable to speak in complete sentences, labored breathing, use of accessory muscles, RR> 24, cyanosis, dyspnea, wheezing, stridor, lethargy) without sufficient measurements (pO2, SpO2, pH, and pCO2) to meet criteria for respiratory failure)   Acute Respiratory Insufficiency - Generally describes less severe respiratory symptoms and measurements (pO2, SpO2, pH, and pCO2) not meeting criteria for respiratory failure         Provider, please specify the diagnosis or diagnoses associated with above clinical findings.     [       ] Chronic Respiratory Failure with Hypercapnia   [     ] Chronic Respiratory Failure with Hypercapnia and hypoxia   [    x   ] Other Respiratory Diagnosis (please specify):copd _________________   [   ] Clinically Undetermined     Present on admission (POA) status:  [      ] Yes (Y)   [      ] No (N)   [     ] Documentation insufficient to determine if condition is POA (U)   [  ] Clinically Undetermined (W)     Please document in your progress notes daily for the duration of treatment until resolved and include in your discharge summary.     Reference:    TITA Friedman MD.  (2020, March 13). Acute respiratory distress syndrome: Clinical features, diagnosis, and complications in adults ([id number] JERRY Ratliff MD & [id number] YAHIR Jason MD, Eds.). Retrieved November 13, 2020, from [email or web address]-[email or web address]-distress-syndrome-clinical-features-diagnosis-and-complications-in-adults?search=ards&source=search_result&selectedTitle=1~150&usage_type=default&display_rank=1  Form No. 99972

## 2024-04-08 ENCOUNTER — TELEPHONE (OUTPATIENT)
Dept: ORTHOPEDICS | Facility: CLINIC | Age: 64
End: 2024-04-08
Payer: MEDICAID

## 2024-04-08 ENCOUNTER — OFFICE VISIT (OUTPATIENT)
Dept: ORTHOPEDICS | Facility: CLINIC | Age: 64
End: 2024-04-08
Payer: MEDICARE

## 2024-04-08 DIAGNOSIS — S72.001A CLOSED DISPLACED FRACTURE OF RIGHT FEMORAL NECK: ICD-10-CM

## 2024-04-08 DIAGNOSIS — S42.291D OTHER CLOSED DISPLACED FRACTURE OF PROXIMAL END OF RIGHT HUMERUS WITH ROUTINE HEALING, SUBSEQUENT ENCOUNTER: Primary | ICD-10-CM

## 2024-04-08 PROCEDURE — 99024 POSTOP FOLLOW-UP VISIT: CPT | Mod: ,,, | Performed by: NURSE PRACTITIONER

## 2024-04-08 PROCEDURE — 99212 OFFICE O/P EST SF 10 MIN: CPT | Mod: PBBFAC | Performed by: NURSE PRACTITIONER

## 2024-04-08 NOTE — TELEPHONE ENCOUNTER
----- Message from Nannette Mukherjee sent at 4/8/2024 10:56 AM CDT -----  Regarding: RETURN CALL  [facility] CALLING ABOUT AN ABDUCTION PILLOW FOR HER SHOULDER, CALL BACK NUMBER [phone]

## 2024-04-08 NOTE — PROGRESS NOTES
HISTORY OF PRESENT ILLNESS:       No surgery found No surgery found      Pt is here today for First post-operative followup of her No surgery found.  she is doing well.  We have reviewed her findings and discussed plan of care and future treatment options, including the physical therapy plan.      Patient is 2 weeks postop ORIF right proximal humerus fracture and right hip pinning, doing well.  She is currently at a nursing facility.  She is accompanied by an aide today.  Arrives in a wheelchair.  Incision to her shoulder looks good today.  Steri-Strips applied and wound clean.  We did discuss activity limitations in detail.  She is wearing an arm sling to her right upper extremity.  Incision to hip looks good as well.  No signs of infection.  Staples removed and Steri-Strips applied.  She is able to dorsiflex and plantar flex her right foot.  Palpable dorsalis pedis pulse.                                                                               PHYSICAL EXAMINATION:     Incision sites healed well  No evidence of any erythema, infection or induration  Minimal effusion  2+ DP pulse                                                                                   ASSESSMENT:                                                                                                                                               1. Status post above, doing well.                                                                                                                               PLAN:       Wounds were treated today  DVT prophylaxis discussed  Therapy plan discussed in great detail today; all questions answered.                                                                           Recommend staying at swing bed for therapy at this time.  We did recommend daily therapy for strengthening and gait training.  Return to clinic 4 weeks with Dr. Buckner.                                                                      There are no Patient Instructions on file for this visit.

## 2024-04-29 LAB
OHS QRS DURATION: 84 MS
OHS QTC CALCULATION: 467 MS

## 2024-05-03 ENCOUNTER — HOSPITAL ENCOUNTER (EMERGENCY)
Facility: HOSPITAL | Age: 64
Discharge: NURSING, REHAB OR LONG-TERM CARE | End: 2024-05-03
Payer: MEDICARE

## 2024-05-03 VITALS
BODY MASS INDEX: 16.92 KG/M2 | SYSTOLIC BLOOD PRESSURE: 118 MMHG | DIASTOLIC BLOOD PRESSURE: 59 MMHG | HEART RATE: 86 BPM | WEIGHT: 95.5 LBS | OXYGEN SATURATION: 95 % | RESPIRATION RATE: 18 BRPM

## 2024-05-03 DIAGNOSIS — S09.90XA INJURY OF HEAD, INITIAL ENCOUNTER: Primary | ICD-10-CM

## 2024-05-03 DIAGNOSIS — R42 DIZZINESS: ICD-10-CM
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
## 2024-05-03 DIAGNOSIS — S92.424A CLOSED NONDISPLACED FRACTURE OF DISTAL PHALANX OF RIGHT GREAT TOE, INITIAL ENCOUNTER: ICD-10-CM

## 2024-05-03 LAB
ANION GAP SERPL CALCULATED.3IONS-SCNC: 8 MMOL/L (ref 7–16)
BASOPHILS # BLD AUTO: 0.07 K/UL (ref 0–0.2)
BASOPHILS NFR BLD AUTO: 1.2 % (ref 0–1)
BUN SERPL-MCNC: 9 MG/DL (ref 7–18)
BUN/CREAT SERPL: 17 (ref 6–20)
CALCIUM SERPL-MCNC: 8.6 MG/DL (ref 8.5–10.1)
CHLORIDE SERPL-SCNC: 112 MMOL/L (ref 98–107)
CO2 SERPL-SCNC: 26 MMOL/L (ref 21–32)
CREAT SERPL-MCNC: 0.53 MG/DL (ref 0.55–1.02)
DIFFERENTIAL METHOD BLD: ABNORMAL
EGFR (NO RACE VARIABLE) (RUSH/TITUS): 105 ML/MIN/1.73M2
EOSINOPHIL # BLD AUTO: 0.55 K/UL (ref 0–0.5)
EOSINOPHIL NFR BLD AUTO: 9.2 % (ref 1–4)
ERYTHROCYTE [DISTWIDTH] IN BLOOD BY AUTOMATED COUNT: 18.6 % (ref 11.5–14.5)
GLUCOSE SERPL-MCNC: 107 MG/DL (ref 74–106)
HCT VFR BLD AUTO: 32.4 % (ref 38–47)
HGB BLD-MCNC: 9.8 G/DL (ref 12–16)
IMM GRANULOCYTES # BLD AUTO: 0.03 K/UL (ref 0–0.04)
IMM GRANULOCYTES NFR BLD: 0.5 % (ref 0–0.4)
LYMPHOCYTES # BLD AUTO: 1.9 K/UL (ref 1–4.8)
LYMPHOCYTES NFR BLD AUTO: 31.9 % (ref 27–41)
MCH RBC QN AUTO: 29.2 PG (ref 27–31)
MCHC RBC AUTO-ENTMCNC: 30.2 G/DL (ref 32–36)
MCV RBC AUTO: 96.4 FL (ref 80–96)
MONOCYTES # BLD AUTO: 0.68 K/UL (ref 0–0.8)
MONOCYTES NFR BLD AUTO: 11.4 % (ref 2–6)
MPC BLD CALC-MCNC: 10 FL (ref 9.4–12.4)
NEUTROPHILS # BLD AUTO: 2.73 K/UL (ref 1.8–7.7)
NEUTROPHILS NFR BLD AUTO: 45.8 % (ref 53–65)
NRBC # BLD AUTO: 0 X10E3/UL
NRBC, AUTO (.00): 0 %
PLATELET # BLD AUTO: 355 K/UL (ref 150–400)
POTASSIUM SERPL-SCNC: 3.9 MMOL/L (ref 3.5–5.1)
RBC # BLD AUTO: 3.36 M/UL (ref 4.2–5.4)
SODIUM SERPL-SCNC: 142 MMOL/L (ref 136–145)
WBC # BLD AUTO: 5.96 K/UL (ref 4.5–11)

## 2024-05-03 PROCEDURE — 85025 COMPLETE CBC W/AUTO DIFF WBC: CPT | Performed by: NURSE PRACTITIONER

## 2024-05-03 PROCEDURE — 93010 ELECTROCARDIOGRAM REPORT: CPT | Mod: ,,, | Performed by: HOSPITALIST

## 2024-05-03 PROCEDURE — 93005 ELECTROCARDIOGRAM TRACING: CPT

## 2024-05-03 PROCEDURE — 36415 COLL VENOUS BLD VENIPUNCTURE: CPT | Performed by: NURSE PRACTITIONER

## 2024-05-03 PROCEDURE — 99285 EMERGENCY DEPT VISIT HI MDM: CPT | Mod: 25

## 2024-05-03 PROCEDURE — 80048 BASIC METABOLIC PNL TOTAL CA: CPT | Performed by: NURSE PRACTITIONER

## 2024-05-03 PROCEDURE — 99284 EMERGENCY DEPT VISIT MOD MDM: CPT | Mod: ,,, | Performed by: NURSE PRACTITIONER

## 2024-05-03 NOTE — ED TRIAGE NOTES
Patient arrives from the [facility]; they report that she kicked the bed and stumbled and hit her head on the wall but did not fall.  Patient was given a percocet at 1745 this evening for the pain.

## 2024-05-04 LAB
OHS QRS DURATION: 74 MS
OHS QTC CALCULATION: 413 MS

## 2024-05-04 NOTE — ED NOTES
Transport information sent to [facility] for transfer to [facility] - confirmation # [id number]

## 2024-05-04 NOTE — ED PROVIDER NOTES
Encounter Date: 5/3/2024       History     Chief Complaint   Patient presents with    Foot Pain     63 year old female presents to ED with complaint of pain to head and right foot status post fall with injury. Patient states she was getting a diet coke in her room and went around the bed to get it. She states she tossed the drink on the bed and was in the process of going back around to the other side of the bed and didn't realize bathroom door was open. She states she lost her balance and tripped into the door hitting her head on the corner of the door. Denies LOC or complete fall. She states as she was hitting her head she kicked the bed and hit her foot on the metal portion of the bed. Patient is currently admitted to the [facility] for rehab    The history is provided by the patient and the EMS personnel. No  was used.     Review of patient's allergies indicates:   Allergen Reactions    Ondansetron hcl Swelling     Edema of tongue per patient    Celexa [citalopram]     Darvon [propoxyphene]     Flexeril [cyclobenzaprine]     Hydroxyzine     Robaxin [methocarbamol]     Thorazine [chlorpromazine]     Toradol [ketorolac] Itching     Past Medical History:   Diagnosis Date    COPD (chronic obstructive pulmonary disease)     DONALD (generalized anxiety disorder) 03/24/2021    GERD with esophagitis     Iron deficiency anemia due to chronic blood loss     Supplemental oxygen dependent 11/28/2023     Past Surgical History:   Procedure Laterality Date    ABDOMINAL SURGERY      KYPHOPLASTY, SPINE, LUMBAR N/A 3/26/2024    Procedure: KYPHOPLASTY, SPINE, LUMBAR, L3-L5;  Surgeon: Logan Conway MD;  Location: [facility] OR;  Service: Neurosurgery;  Laterality: N/A;    LEFT HEART CATHETERIZATION N/A 11/27/2023    Procedure: Left heart cath;  Surgeon: Ulises Crespo DO;  Location: [facility] CATH LAB;  Service: Cardiology;  Laterality: N/A;    LUMBAR FUSION N/A 3/26/2024    Procedure: T12-L2 percutaneous fusion;   Surgeon: Logan Conway MD;  Location: [facility] OR;  Service: Neurosurgery;  Laterality: N/A;    OPEN REDUCTION AND INTERNAL FIXATION (ORIF) OF FRACTURE OF PROXIMAL HUMERUS Right 3/25/2024    Procedure: ORIF, FRACTURE, HUMERUS, PROXIMAL;  Surgeon: Que Buckner MD;  Location: [facility] OR;  Service: Orthopedics;  Laterality: Right;    PERCUTANEOUS PINNING OF HIP Right 3/20/2024    Procedure: PINNING, HIP, PERCUTANEOUS;  Surgeon: Que Buckner MD;  Location: [facility] OR;  Service: Orthopedics;  Laterality: Right;     No family history on file.  Social History     Tobacco Use    Smoking status: Every Day     Types: Cigarettes    Smokeless tobacco: Never   Substance Use Topics    Alcohol use: Yes    Drug use: Never     Review of Systems   Constitutional:  Negative for chills and fever.   HENT:  Negative for congestion, sinus pressure and sinus pain.    Eyes:  Negative for photophobia and visual disturbance.   Respiratory:  Negative for cough and shortness of breath.    Cardiovascular:  Negative for chest pain and palpitations.   Gastrointestinal:  Positive for nausea. Negative for vomiting.   Genitourinary:  Negative for difficulty urinating and dysuria.   Musculoskeletal:  Negative for arthralgias and gait problem.   Skin:  Negative for color change and wound.   Neurological:  Positive for dizziness and headaches. Negative for weakness.   Hematological:  Negative for adenopathy. Does not bruise/bleed easily.   Psychiatric/Behavioral:  Negative for agitation and confusion.    All other systems reviewed and are negative.      Physical Exam     Initial Vitals   BP Pulse Resp Temp SpO2   05/03/24 1842 05/03/24 1842 05/03/24 1844 -- 05/03/24 1842   131/71 83 16  98 %      MAP       --                Physical Exam    Nursing note and vitals reviewed.  Constitutional: She appears well-developed and well-nourished.   HENT:   Head: Normocephalic and atraumatic.   Eyes: EOM are normal. Pupils are equal, round,  and reactive to light.   Neck: Neck supple.   Normal range of motion.  Cardiovascular:  Normal rate.           No murmur heard.  Pulmonary/Chest: She has no wheezes. She has no rhonchi.   Abdominal: Abdomen is soft. She exhibits no distension. There is no abdominal tenderness.   Musculoskeletal:         General: Tenderness and edema present.      Cervical back: Normal range of motion and neck supple.      Right lower leg: Swelling present.      Right foot: Swelling and tenderness present.     Lymphadenopathy:     She has no cervical adenopathy.   Neurological: She is alert and oriented to person, place, and time. No cranial nerve deficit or sensory deficit.   Skin: Skin is warm and dry. Capillary refill takes less than 2 seconds.   Psychiatric: She has a normal mood and affect. Thought content normal.         Medical Screening Exam   See Full Note    ED Course   Procedures  Labs Reviewed   BASIC METABOLIC PANEL - Abnormal; Notable for the following components:       Result Value    Chloride 112 (*)     Glucose 107 (*)     Creatinine 0.53 (*)     All other components within normal limits   CBC WITH DIFFERENTIAL - Abnormal; Notable for the following components:    RBC 3.36 (*)     Hemoglobin 9.8 (*)     Hematocrit 32.4 (*)     MCV 96.4 (*)     MCHC 30.2 (*)     RDW 18.6 (*)     Neutrophils % 45.8 (*)     Monocytes % 11.4 (*)     Eosinophils % 9.2 (*)     Basophils % 1.2 (*)     Immature Granulocytes % 0.5 (*)     Eosinophils, Absolute 0.55 (*)     All other components within normal limits   CBC W/ AUTO DIFFERENTIAL    Narrative:     The following orders were created for panel order CBC auto differential.  Procedure                               Abnormality         Status                     ---------                               -----------         ------                     CBC with Differential[[id number]]       Abnormal            Final result                 Please view results for these tests on the individual  orders.          Imaging Results              X-Ray Foot Complete Right (Final result)  Result time 05/03/24 19:35:55      Final result by Arya Morales MD (05/03/24 19:35:55)                   Impression:      As above      Electronically signed by: Arya Morales  Date:    05/03/2024  Time:    19:35               Narrative:    EXAMINATION:  XR FOOT COMPLETE 3 VIEW RIGHT    CLINICAL HISTORY:  . Unspecified fall, initial encounter    TECHNIQUE:  AP, lateral, and oblique views of the right foot were performed.    COMPARISON:  None    FINDINGS:  There appears to be a fracture of the distal phalanx of the great toe suggested on the AP and oblique views and not well visualized on the lateral view.  Diffuse degenerative changes.  No additional fractures detected.  House valgus great toe MTP.                                       CT Head Without Contrast (Final result)  Result time 05/03/24 19:08:05      Final result by Arya Morales MD (05/03/24 19:08:05)                   Impression:      No acute intracranial abnormality.      Electronically signed by: Arya Morales  Date:    05/03/2024  Time:    19:08               Narrative:    EXAMINATION:  CT HEAD WITHOUT CONTRAST    CLINICAL HISTORY:  Facial trauma, blunt;    TECHNIQUE:  CT of the head performed without the use of intravenous contrast.  The CT examination was performed using one or more of the following dose reduction techniques: Automated exposure control, adjustment of the mA and kV according to patient's size, use of acute or iterative reconstruction techniques.    COMPARISON:  03/18/2024    FINDINGS:  No acute intracranial hemorrhage, mass, infarct, or fluid collection.  Ventricles, sulci, and cisterns appear normal.  No mass effect or midline shift.  Calvarium intact.  Mastoid air cells clear.                                       Medications - No data to display  Medical Decision Making  63 year old female presents to ED with complaint of pain to head and right  foot status post fall with injury. Patient states she was getting a diet coke in her room and went around the bed to get it. She states she tossed the drink on the bed and was in the process of going back around to the other side of the bed and didn't realize bathroom door was open. She states she lost her balance and tripped into the door hitting her head on the corner of the door. Denies LOC or complete fall. She states as she was hitting her head she kicked the bed and hit her foot on the metal portion of the bed. Patient is currently admitted to the [facility] for rehab    Labs, diagnostics obtained. Walking boot applied. Ortho referral     Amount and/or Complexity of Data Reviewed  Labs: ordered.  Radiology: ordered.                                      Clinical Impression:   Final diagnoses:  [R42] Dizziness  [W19.XXXA] Fall with injury  [S09.90XA] Injury of head, initial encounter (Primary)  [S92.424A] Closed nondisplaced fracture of distal phalanx of right great toe, initial encounter        ED Disposition Condition    Discharge Stable          ED Prescriptions    None       Follow-up Information    None          Camille Lundberg, FNP  05/03/24 2013

## 2024-05-10 DIAGNOSIS — Z87.81 S/P ORIF (OPEN REDUCTION INTERNAL FIXATION) FRACTURE: ICD-10-CM

## 2024-05-10 DIAGNOSIS — S42.291D OTHER CLOSED DISPLACED FRACTURE OF PROXIMAL END OF RIGHT HUMERUS WITH ROUTINE HEALING, SUBSEQUENT ENCOUNTER: Primary | ICD-10-CM

## 2024-05-10 DIAGNOSIS — Z98.890 S/P ORIF (OPEN REDUCTION INTERNAL FIXATION) FRACTURE: ICD-10-CM

## 2024-05-14 ENCOUNTER — OFFICE VISIT (OUTPATIENT)
Dept: ORTHOPEDICS | Facility: CLINIC | Age: 64
End: 2024-05-14
Payer: MEDICARE

## 2024-05-14 ENCOUNTER — HOSPITAL ENCOUNTER (OUTPATIENT)
Dept: RADIOLOGY | Facility: HOSPITAL | Age: 64
Discharge: HOME | End: 2024-05-14
Attending: ORTHOPAEDIC SURGERY
Payer: MEDICARE

## 2024-05-14 DIAGNOSIS — Z87.81 S/P ORIF (OPEN REDUCTION INTERNAL FIXATION) FRACTURE: ICD-10-CM

## 2024-05-14 DIAGNOSIS — Z98.890 S/P ORIF (OPEN REDUCTION INTERNAL FIXATION) FRACTURE: ICD-10-CM

## 2024-05-14 DIAGNOSIS — S92.424A CLOSED NONDISPLACED FRACTURE OF DISTAL PHALANX OF RIGHT GREAT TOE, INITIAL ENCOUNTER: ICD-10-CM

## 2024-05-14 DIAGNOSIS — S42.291D OTHER CLOSED DISPLACED FRACTURE OF PROXIMAL END OF RIGHT HUMERUS WITH ROUTINE HEALING, SUBSEQUENT ENCOUNTER: ICD-10-CM

## 2024-05-14 PROCEDURE — 73502 X-RAY EXAM HIP UNI 2-3 VIEWS: CPT | Mod: TC,RT

## 2024-05-14 PROCEDURE — 99024 POSTOP FOLLOW-UP VISIT: CPT | Mod: ,,, | Performed by: ORTHOPAEDIC SURGERY

## 2024-05-14 PROCEDURE — 28490 TREAT BIG TOE FRACTURE: CPT | Mod: S$PBB,T5,79, | Performed by: ORTHOPAEDIC SURGERY

## 2024-05-14 PROCEDURE — 73502 X-RAY EXAM HIP UNI 2-3 VIEWS: CPT | Mod: 26,RT,, | Performed by: ORTHOPAEDIC SURGERY

## 2024-05-14 PROCEDURE — 73030 X-RAY EXAM OF SHOULDER: CPT | Mod: TC,RT

## 2024-05-14 PROCEDURE — 99212 OFFICE O/P EST SF 10 MIN: CPT | Mod: PBBFAC,25 | Performed by: ORTHOPAEDIC SURGERY

## 2024-05-14 PROCEDURE — 28490 TREAT BIG TOE FRACTURE: CPT | Mod: PBBFAC,T5 | Performed by: ORTHOPAEDIC SURGERY

## 2024-05-14 PROCEDURE — 73030 X-RAY EXAM OF SHOULDER: CPT | Mod: 26,RT,, | Performed by: ORTHOPAEDIC SURGERY

## 2024-05-14 NOTE — PROGRESS NOTES
HISTORY OF PRESENT ILLNESS:       No surgery found No surgery found      Pt is here today for Third post-operative followup of her No surgery found.  she is doing well.  We have reviewed her findings and discussed plan of care and future treatment options, including the physical therapy plan.      Patient is here for recheck right foot,humerus and hip.  She also stubbed her toe a few days ago was diagnosed with a minimally displaced distal phalanx fracture of the right foot.  He is complaining of right shoulder pain.  He has not yet been ambulating on the right lower extremity.                                                                               PHYSICAL EXAMINATION:     Incision sites healed well  No evidence of any erythema, infection or induration  Minimal effusion  2+ DP pulse    X-Ray Hip 2 or 3 views Right (with Pelvis when performed)    Result Date: 5/14/2024  See Procedure Notes for results. IMPRESSION: Please see Ortho procedure notes for report.  This procedure was auto-finalized by: Virtual Radiologist  Three views right hip were obtained today demonstrating a healing fracture of the femoral neck.  Hardware appears to be in excellent position.  X-ray Shoulder 2 or More Views Right    Result Date: 5/14/2024  See Procedure Notes for results. IMPRESSION: Please see Ortho procedure notes for report.  This procedure was auto-finalized by: Virtual Radiologist  Three views of the right shoulder were obtained today demonstrating satisfactory position of the plate.  The hardware appears to be in excellent position.  The fracture appears to be healing reasonably well.  X-Ray Foot Complete Right    Result Date: 5/3/2024  EXAMINATION: XR FOOT COMPLETE 3 VIEW RIGHT CLINICAL HISTORY: . Unspecified fall, initial encounter TECHNIQUE: AP, lateral, and oblique views of the right foot were performed. COMPARISON: None FINDINGS: There appears to be a fracture of the distal phalanx of the great toe suggested on the  AP and oblique views and not well visualized on the lateral view.  Diffuse degenerative changes.  No additional fractures detected.  House valgus great toe MTP.     As above Electronically signed by: Arya Morales Date:    05/03/2024 Time:    19:35    CT Head Without Contrast    Result Date: 5/3/2024  EXAMINATION: CT HEAD WITHOUT CONTRAST CLINICAL HISTORY: Facial trauma, blunt; TECHNIQUE: CT of the head performed without the use of intravenous contrast.  The CT examination was performed using one or more of the following dose reduction techniques: Automated exposure control, adjustment of the mA and kV according to patient's size, use of acute or iterative reconstruction techniques. COMPARISON: 03/18/2024 FINDINGS: No acute intracranial hemorrhage, mass, infarct, or fluid collection.  Ventricles, sulci, and cisterns appear normal.  No mass effect or midline shift.  Calvarium intact.  Mastoid air cells clear.     No acute intracranial abnormality. Electronically signed by: Arya Morales Date:    05/03/2024 Time:    19:08                                                                                  ASSESSMENT:                                                                                                                                               1. Status post above, doing well.                                                                                                                               PLAN:       We are going to advance physical therapy allow full weight-bearing on the right hip at this time.  Advanced physical therapy for the right shoulder as well to work on active and passive range of motion nonweightbearing of the right upper extremity at this time.  We will see back in 4 weeks with repeat x-rays of the shoulder hip and foot.  Can likely get her out of the Cam boot at this time as well and transition to just a hard sole shoe for this great toe fracture.  There are no Patient Instructions on file  for this visit.

## 2024-05-23 ENCOUNTER — HOSPITAL ENCOUNTER (EMERGENCY)
Facility: HOSPITAL | Age: 64
Discharge: NURSING, REHAB OR LONG-TERM CARE | End: 2024-05-23
Payer: MEDICARE

## 2024-05-23 ENCOUNTER — TELEPHONE (OUTPATIENT)
Dept: ORTHOPEDICS | Facility: CLINIC | Age: 64
End: 2024-05-23
Payer: MEDICARE

## 2024-05-23 VITALS
OXYGEN SATURATION: 95 % | DIASTOLIC BLOOD PRESSURE: 52 MMHG | TEMPERATURE: 98 F | SYSTOLIC BLOOD PRESSURE: 106 MMHG | HEIGHT: 63 IN | HEART RATE: 66 BPM | BODY MASS INDEX: 17.01 KG/M2 | RESPIRATION RATE: 17 BRPM | WEIGHT: 96 LBS

## 2024-05-23 DIAGNOSIS — M79.603 ARM PAIN: ICD-10-CM

## 2024-05-23 PROCEDURE — 99283 EMERGENCY DEPT VISIT LOW MDM: CPT | Mod: 25

## 2024-05-23 NOTE — ED NOTES
Called to inform [facility] at this time that pt has been discharged and is ready for transportation. Staff states they will let  know.

## 2024-05-23 NOTE — TELEPHONE ENCOUNTER
I spoke with Nia today regarding Ms. Low and provided her with the details of Ms. Low's plan of care.     ----- Message from Kae Conway sent at 5/23/2024  8:40 AM CDT -----  [facility] with [facility] nurse regarding pt's rt mid humerus fx  - call back # [phone]    Preferred Method of Contact: Phone Call  Patient's Preferred Phone Number on File:   Best Call Back Number, if different:  Additional Information:

## 2024-05-23 NOTE — ED TRIAGE NOTES
Pt presents to ed per EMS from [facility] c/o right arm pain. Pt states she was stretching approx. 3 days ago and heard a popping sound. Pt denies any falls.

## 2024-05-23 NOTE — ED PROVIDER NOTES
Encounter Date: 5/23/2024       History     Chief Complaint   Patient presents with    Arm Pain     63 year old female presents to the emergency department to be evaluated for right upper arm pain. She is a resident at [facility]. She reports that she began having arm pain 3 weeks ago. She denies any recent fall or injury. She had ORIF right proximal humerus fracture 3/26/24 by .     The history is provided by the patient.   Arm Pain  Pertinent negatives include no chest pain, no abdominal pain, no headaches and no shortness of breath.     Review of patient's allergies indicates:   Allergen Reactions    Ondansetron hcl Swelling     Edema of tongue per patient    Celexa [citalopram]     Darvon [propoxyphene]     Flexeril [cyclobenzaprine]     Hydroxyzine     Robaxin [methocarbamol]     Thorazine [chlorpromazine]     Toradol [ketorolac] Itching     Past Medical History:   Diagnosis Date    COPD (chronic obstructive pulmonary disease)     DONALD (generalized anxiety disorder) 03/24/2021    GERD with esophagitis     Iron deficiency anemia due to chronic blood loss     Supplemental oxygen dependent 11/28/2023     Past Surgical History:   Procedure Laterality Date    ABDOMINAL SURGERY      KYPHOPLASTY, SPINE, LUMBAR N/A 3/26/2024    Procedure: KYPHOPLASTY, SPINE, LUMBAR, L3-L5;  Surgeon: Logan Conway MD;  Location: [facility] OR;  Service: Neurosurgery;  Laterality: N/A;    LEFT HEART CATHETERIZATION N/A 11/27/2023    Procedure: Left heart cath;  Surgeon: Ulises Crespo DO;  Location: [facility] CATH LAB;  Service: Cardiology;  Laterality: N/A;    LUMBAR FUSION N/A 3/26/2024    Procedure: T12-L2 percutaneous fusion;  Surgeon: Logan Conway MD;  Location: [facility] OR;  Service: Neurosurgery;  Laterality: N/A;    OPEN REDUCTION AND INTERNAL FIXATION (ORIF) OF FRACTURE OF PROXIMAL HUMERUS Right 3/25/2024    Procedure: ORIF, FRACTURE, HUMERUS, PROXIMAL;  Surgeon: Que Buckner MD;  Location: [facility] OR;   Service: Orthopedics;  Laterality: Right;    PERCUTANEOUS PINNING OF HIP Right 3/20/2024    Procedure: PINNING, HIP, PERCUTANEOUS;  Surgeon: Que Buckner MD;  Location: [facility];  Service: Orthopedics;  Laterality: Right;     No family history on file.  Social History     Tobacco Use    Smoking status: Every Day     Types: Cigarettes    Smokeless tobacco: Never   Substance Use Topics    Alcohol use: Yes    Drug use: Never     Review of Systems   Respiratory:  Negative for shortness of breath.    Cardiovascular:  Negative for chest pain.   Gastrointestinal:  Negative for abdominal pain.   Musculoskeletal:  Negative for back pain and neck pain.   Neurological:  Negative for headaches.   All other systems reviewed and are negative.      Physical Exam     Initial Vitals [05/23/24 0929]   BP Pulse Resp Temp SpO2   (!) 92/54 66 17 97.8 °F (36.6 °C) 95 %      MAP       --         Physical Exam    Vitals reviewed.  Constitutional: She appears well-developed and well-nourished.   HENT:   Head: Atraumatic.   Neck: Neck supple.   Cardiovascular:  Normal rate and regular rhythm.           Pulses:       Radial pulses are 3+ on the right side.   Pulmonary/Chest: Breath sounds normal.   Abdominal: Abdomen is soft. There is no abdominal tenderness.   Musculoskeletal:      Right shoulder: Normal.      Right upper arm: Tenderness present. No swelling, edema, deformity, lacerations or bony tenderness.      Right elbow: Normal.      Right forearm: Normal.      Right wrist: Normal.      Right hand: Normal.      Cervical back: Neck supple.     Neurological: She is alert.   Skin: Skin is warm and dry.         Medical Screening Exam   See Full Note    ED Course   Procedures  Labs Reviewed - No data to display       Imaging Results              X-Ray Humerus 2 View Right (Final result)  Result time 05/23/24 10:03:06      Final result by Dimitry Garcia DO (05/23/24 10:03:06)                   Impression:      Acute  nondisplaced fracture involving the mid shaft of the right-sided humerus.    Metallic plate and cortical screws affixed to the proximal humerus are in expected position. Signs of progressive healing involving the proximal right-sided humerus fracture. Heterotopic calcification adjacent to the medial proximal humerus.      Electronically signed by: Dimitry Garcia  Date:    05/23/2024  Time:    10:03               Narrative:    EXAMINATION:  XR HUMERUS 2 VIEW RIGHT    CLINICAL HISTORY:  Pain in arm, unspecified    TECHNIQUE:  XR HUMERUS 2 VIEW RIGHT    COMPARISON:  03/25/2024    FINDINGS:  Acute nondisplaced fracture involving the mid shaft of the right-sided humerus.    Metallic plate and cortical screws affixed to the proximal humerus are in expected position.  Signs of progressive healing involving the proximal right-sided humerus fracture.  Heterotopic calcification adjacent to the medial proximal humerus.                                       Medications - No data to display  Medical Decision Making  63 year old female presents to the emergency department to be evaluated for right upper arm pain. She is a resident at [facility]. She reports that she began having arm pain 3 weeks ago. She denies any recent fall or injury. She had ORIF right proximal humerus fracture 3/26/24 by .   Xray ordered, films reviewed as well as the radiologist's interpretation significant for Acute nondisplaced fracture involving the mid shaft of the right-sided humerus.Metallic plate and cortical screws affixed to the proximal humerus are in expected position. Signs of progressive healing involving the proximal right-sided humerus fracture. Heterotopic calcification adjacent to the medial proximal humerus.  Diagnosis: arm pain  Placed in a sling  Instructed to fu with     Amount and/or Complexity of Data Reviewed  Radiology: ordered.                                      Clinical Impression:   Final diagnoses:  [M79.603]  Arm pain        ED Disposition Condition    Discharge Stable          ED Prescriptions    None       Follow-up Information    None          Breana Degroot, JAYSON  05/23/24 1201       Breana Degroot, JAYSON  05/23/24 1202

## 2024-05-23 NOTE — DISCHARGE INSTRUCTIONS
Follow up with . Wear sling. Return to the emergency department for any increase in symptoms or for any other new or worrisome symptoms.

## 2024-06-14 DIAGNOSIS — S42.291D OTHER CLOSED DISPLACED FRACTURE OF PROXIMAL END OF RIGHT HUMERUS WITH ROUTINE HEALING, SUBSEQUENT ENCOUNTER: ICD-10-CM

## 2024-06-14 DIAGNOSIS — S92.424A CLOSED NONDISPLACED FRACTURE OF DISTAL PHALANX OF RIGHT GREAT TOE, INITIAL ENCOUNTER: Primary | ICD-10-CM

## 2024-06-14 DIAGNOSIS — S72.001A CLOSED DISPLACED FRACTURE OF RIGHT FEMORAL NECK: ICD-10-CM

## 2024-06-17 NOTE — ED PROVIDER NOTES
"    Encounter Date: 4/27/2022       History     Chief Complaint   Patient presents with    Hand Injury     61 year old female presents to the emergency department to be evaluated after she tripped and fell yesterday. She usually uses her walker to ambulate but did not have it with her when she fell. Her physical therapist came by her house to see her today and recommended that she come to the emergency department to have her left hand evaluated for possible fracture. She complains of headache, neck pain, back pain, left arm pain and left hand pain. She also has a wound on her left lower extremity that she reports has been there about a month. Denies loss of consciousness, chest pain, shortness of breath, cough, fever, chills, nausea, vomiting, diarrhea, abdominal pain, known sick contacts. She said she fell 2 weeks ago, broke her hip and had surgery at [facility] by . She fell and broke her left humerus about a year ago; she said "it has been deformed and hurt since then." Denies any increase or change in her left arm.       The history is provided by the patient.   Fall  The accident occurred yesterday. Associated symptoms include neck pain, back pain and headaches. Pertinent negatives include no paresthesias, no paralysis, no visual change, no fever, no numbness, no abdominal pain, no bowel incontinence, no nausea, no vomiting, no hematuria, no hearing loss, no loss of consciousness and no tingling.     Review of patient's allergies indicates:   Allergen Reactions    Ondansetron hcl Swelling     Edema of tongue per patient    Celexa [citalopram]     Darvon [propoxyphene]     Flexeril [cyclobenzaprine]     Hydroxyzine     Percocet [oxycodone-acetaminophen]     Robaxin [methocarbamol]     Thorazine [chlorpromazine]     Toradol [ketorolac] Itching     Past Medical History:   Diagnosis Date    COPD (chronic obstructive pulmonary disease)      Past Surgical History:   Procedure Laterality Date    " Patient's mother called - Patient injured his pinky finger and it's sticking out and painful. He was treated at [facility] on 6/14 and had x-rays.     Please advise if patient could be seen by Dr. Moore or Erika this week.   ABDOMINAL SURGERY       History reviewed. No pertinent family history.  Social History     Tobacco Use    Smoking status: Current Every Day Smoker     Types: Cigarettes    Smokeless tobacco: Never Used     Review of Systems   Constitutional: Negative for fever.   Gastrointestinal: Negative for abdominal pain, bowel incontinence, nausea and vomiting.   Genitourinary: Negative for hematuria.   Musculoskeletal: Positive for back pain and neck pain.   Neurological: Positive for headaches. Negative for tingling, loss of consciousness, numbness and paresthesias.   All other systems reviewed and are negative.      Physical Exam     Initial Vitals [04/27/22 1456]   BP Pulse Resp Temp SpO2   129/65 61 20 97.9 °F (36.6 °C) 95 %      MAP       --         Physical Exam    Vitals reviewed.  Constitutional: She appears well-developed and well-nourished.   HENT:   ecchyosis to right orbit. Mild swelling to the right forehead   Eyes: Pupils are equal, round, and reactive to light.   Neck: Neck supple. There are no signs of injury. No crepitus.   Cardiovascular: Normal rate and regular rhythm.   Pulses:       Dorsalis pedis pulses are 3+ on the right side and 3+ on the left side.   Pulmonary/Chest: She has wheezes (minimal expiratory wheeze).   Abdominal: Abdomen is soft. Bowel sounds are normal. She exhibits no distension and no mass. There is no abdominal tenderness. There is no rebound and no guarding.   Musculoskeletal:         General: Tenderness and edema (1+ pitting edema) present.      Left upper arm: No swelling, edema, deformity or lacerations.      Left forearm: No swelling, edema, deformity or lacerations.      Left wrist: No swelling, deformity, effusion, lacerations, snuff box tenderness or crepitus. Normal range of motion. Normal pulse.      Left hand: No swelling, deformity, lacerations or bony tenderness. Normal strength. Normal sensation. There is no disruption of two-point discrimination. Normal capillary  refill. Normal pulse.      Cervical back: Neck supple. Bony tenderness present. No swelling, edema, deformity, erythema, signs of trauma, lacerations, rigidity, spasms, torticollis or crepitus. No pain with movement. Normal range of motion.      Thoracic back: Bony tenderness present. No swelling, edema, deformity, signs of trauma, lacerations or spasms. Normal range of motion. No scoliosis.      Lumbar back: Bony tenderness present. No swelling, edema, deformity, signs of trauma, lacerations or spasms. Normal range of motion. No scoliosis.     Neurological: She is alert and oriented to person, place, and time. She has normal strength. GCS score is 15. GCS eye subscore is 4. GCS verbal subscore is 5. GCS motor subscore is 6.   Skin: Skin is warm and dry. Capillary refill takes less than 2 seconds.   Ecchymosis to left hand and right upper face. Dime-sized ulcer to the left lower extremity with serous drainage. Minimal redness to the distal left anterior lower extremity around the ulcer. Incision to left hip healing well with steri strips intact; no redness, swelling or drainage from this area.   Psychiatric: She has a normal mood and affect.         Medical Screening Exam   See Full Note    ED Course   Procedures  Labs Reviewed   BASIC METABOLIC PANEL - Abnormal; Notable for the following components:       Result Value    Calcium 8.3 (*)     All other components within normal limits   CBC WITH DIFFERENTIAL - Abnormal; Notable for the following components:    RBC 3.57 (*)     Hemoglobin 11.0 (*)     Hematocrit 36.0 (*)     .8 (*)     MCHC 30.6 (*)     RDW 18.9 (*)     Neutrophils % 46.6 (*)     Monocytes % 13.1 (*)     Eosinophils % 7.4 (*)     Basophils % 2.2 (*)     Immature Granulocytes % 0.5 (*)     All other components within normal limits   MANUAL DIFFERENTIAL - Abnormal; Notable for the following components:    Segmented Neutrophils, Man % 46 (*)     Monocytes, Man % 11 (*)     Basophils, Man % 2 (*)      Platelet Morphology Large & Giant Platelets (*)     All other components within normal limits   CBC W/ AUTO DIFFERENTIAL    Narrative:     The following orders were created for panel order CBC auto differential.  Procedure                               Abnormality         Status                     ---------                               -----------         ------                     CBC with Differential[[id number]]        Abnormal            Final result               Manual Differential[[id number]]          Abnormal            Final result                 Please view results for these tests on the individual orders.          Imaging Results          US Lower Extremity Veins Bilateral (Final result)  Result time 04/27/22 16:16:19    Final result by Luan Corley DO (04/27/22 16:16:19)                 Impression:      No evidence of deep venous thrombosis in either lower extremity.    Point of Service: [facility]      Electronically signed by: Luan Corley  Date:    04/27/2022  Time:    16:16             Narrative:    EXAMINATION:  US LOWER EXTREMITY VEINS BILATERAL    CLINICAL HISTORY:  leg pain;    COMPARISON:  None.    TECHNIQUE:  Grayscale, spectral, and color Doppler interrogation of the bilateral lower extremity veins was performed. Augmentation and compression was performed.    FINDINGS:  Grayscale, color Doppler, and pulsed Doppler evaluation of the veins of the bilateral lower extremity demonstrate no evidence of deep venous thrombosis.                               X-Ray Hand 3 view Left (Final result)  Result time 04/27/22 15:36:57    Final result by Arya Morales MD (04/27/22 15:36:57)                 Impression:      As above      Electronically signed by: Arya Morales  Date:    04/27/2022  Time:    15:36             Narrative:    EXAMINATION:  XR HAND COMPLETE 3 VIEW LEFT    CLINICAL HISTORY:  fall;.    TECHNIQUE:  PA, lateral, and oblique views of the left hand were  performed.    COMPARISON:  None    FINDINGS:  There appears to be a fracture involving the base of the 5th metacarpal.  No other fracture is detected.  Osteopenia and diffuse degenerative changes.                               X-Ray Forearm Left (Final result)  Result time 04/27/22 15:38:21    Final result by Luan Corley DO (04/27/22 15:38:21)                 Impression:      As above.    Point of Service: [facility]      Electronically signed by: Luan Corley  Date:    04/27/2022  Time:    15:38             Narrative:    EXAMINATION:  XR FOREARM LEFT    CLINICAL HISTORY:  Unspecified fall, initial encounter    COMPARISON:  Left forearm x-ray November 18, 2014    TECHNIQUE:  Frontal and lateral views of the left forearm.    FINDINGS:  No convincing acute fracture or dislocation demonstrated. No concerning radiopaque foreign body visualized.                               X-Ray Humerus 2 View Left (Final result)  Result time 04/27/22 15:38:01    Final result by Arya Morales MD (04/27/22 15:38:01)                 Impression:      As above      Electronically signed by: Arya Morales  Date:    04/27/2022  Time:    15:38             Narrative:    EXAMINATION:  XR HUMERUS 2 VIEW LEFT    CLINICAL HISTORY:  Unspecified fall, initial encounter    TECHNIQUE:  Two views of the left humerus    COMPARISON:  03/25/2021 left shoulder radiograph    FINDINGS:  There are chronic displaced fractures of the left humeral neck and left mid diaphysis of the humerus.  No new abnormality is detected.  No recent imaging.                               CT Cervical Spine Without Contrast (Final result)  Result time 04/27/22 15:24:08    Final result by Arya Morales MD (04/27/22 15:24:08)                 Impression:      No acute fracture or dislocation of the cervical spine.      Electronically signed by: Arya Morales  Date:    04/27/2022  Time:    15:24             Narrative:    EXAMINATION:  CT CERVICAL SPINE WITHOUT  CONTRAST    CLINICAL HISTORY:  fall;    TECHNIQUE:  CT of the cervical spine performed without intravenous contrast.    COMPARISON:  None    FINDINGS:  No acute fracture or dislocation identified.  Craniocervical junction intact.  Multilevel degenerative endplate and facet changes with spinal canal and foraminal narrowing.                               CT Thoracic Spine Without Contrast (Final result)  Result time 04/27/22 15:29:38    Final result by Arya Morales MD (04/27/22 15:29:38)                 Impression:      Probable chronic superior endplate minimal compression deformity of T8.  No acute displaced fracture of the thoracic spine.    Indeterminate irregularity of the partially assessed sternum also obscured by motion.  Correlate with point tenderness.      Electronically signed by: Arya Moraels  Date:    04/27/2022  Time:    15:29             Narrative:    EXAMINATION:  CT THORACIC SPINE WITHOUT CONTRAST    CLINICAL HISTORY:  fall;    TECHNIQUE:  CT of the thoracic spine performed without intravenous contrast    COMPARISON:  Radiograph from 01/02/2021    FINDINGS:  There is no displaced fracture.  There is slight superior height loss of the T8 vertebral body without a discrete fracture line favoring a chronic entity although this was not appreciated on prior radiograph.  However this may be beyond the sensitivity of that radiograph.  There is no spinal canal or foraminal narrowing.  There is a slight curvature of the sternum partially assessed and obscured by motion.  Emphysema.                               CT Lumbar Spine Without Contrast (Final result)  Result time 04/27/22 15:34:09    Final result by Renee Camacho MD (04/27/22 15:34:09)                 Impression:      1. Chronic appearing sacral fractures possibly related to insufficiency fractures  This CT exam was performed using one or more of the following dose reduction techniques: Automated exposure control, adjustment of the mA and/or kV according  to patient's size, or use of iterative reconstruction technique.      Electronically signed by: Renee Camacho  Date:    04/27/2022  Time:    15:34             Narrative:    EXAMINATION:  CT LUMBAR SPINE WITHOUT CONTRAST    CLINICAL HISTORY:  fall;    FINDINGS:  Axial images obtained with 2D multiplanar reconstruction images also stored in interpreted    Alignment in the sagittal plane is normal through L5-S1.  No acute lumbar spine fractures are seen.  Irregularity with corticated lucencies and sclerosis throughout the sacrum are present.  No more acute appearing fracture lines are seen.  Chronic appearing corticated lucencies of the right transverse process of L5 present.  Atherosclerotic changes present.  Radiodensity over the right abdomen is presumably in ingested material in the duodenum seen in this area on prior abdominal CT.  A a 1 cm central low-density retrocrural nodule on the right is unchanged from prior abdominal CT of 2014                               CT Head Without Contrast (Final result)  Result time 04/27/22 15:23:02    Final result by Arya Morales MD (04/27/22 15:23:02)                 Impression:      Right frontal scalp contusion.  No acute intracranial abnormality.      Electronically signed by: Arya Morales  Date:    04/27/2022  Time:    15:23             Narrative:    EXAMINATION:  CT HEAD WITHOUT CONTRAST    CLINICAL HISTORY:  fall;    TECHNIQUE:  Low dose axial images were obtained through the head.  Coronal and sagittal reformations were also performed. Contrast was not administered.    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  No acute intracranial hemorrhage.  No acute large vessel infarct.  No midline shift.  No herniation.  Mild chronic microvascular ischemic change.  Vascular calcifications.  No calvarial fracture.  There is a right frontal scalp contusion.                                 Medications   morphine injection 4 mg  (has no administration in time range)   ondansetron injection 4 mg (has no administration in time range)   clindamycin injection 600 mg (has no administration in time range)                       Clinical Impression:   Final diagnoses:  [W19.XXXA] Fall  [S62.397A] Closed nondisplaced fracture of other part of fifth metacarpal bone of left hand, initial encounter (Primary)  [S00.03XA] Hematoma of scalp, initial encounter  [S16.1XXA] Strain of neck muscle, initial encounter  [S39.012A] Strain of lumbar region, initial encounter  [L03.90] Cellulitis, unspecified cellulitis site  [M79.89] Leg swelling  [I83.009, L97.909] Venous ulcer          ED Disposition Condition    Discharge Stable        ED Prescriptions     Medication Sig Dispense Start Date End Date Auth. Provider    clindamycin (CLEOCIN) 150 MG capsule Take 2 capsules (300 mg total) by mouth every 8 (eight) hours. for 10 days 60 capsule 4/27/2022 5/7/2022 JAYSON Cheng        Follow-up Information    None          JAYSON Cheng  04/27/22 162       JAYSON Cheng  04/27/22 1620

## 2024-06-18 ENCOUNTER — OFFICE VISIT (OUTPATIENT)
Dept: ORTHOPEDICS | Facility: CLINIC | Age: 64
End: 2024-06-18
Payer: MEDICARE

## 2024-06-18 ENCOUNTER — HOSPITAL ENCOUNTER (OUTPATIENT)
Dept: RADIOLOGY | Facility: HOSPITAL | Age: 64
Discharge: HOME | End: 2024-06-18
Attending: ORTHOPAEDIC SURGERY
Payer: MEDICARE

## 2024-06-18 DIAGNOSIS — Z87.81 S/P ORIF (OPEN REDUCTION INTERNAL FIXATION) FRACTURE: Primary | ICD-10-CM

## 2024-06-18 DIAGNOSIS — Z98.890 S/P ORIF (OPEN REDUCTION INTERNAL FIXATION) FRACTURE: Primary | ICD-10-CM

## 2024-06-18 DIAGNOSIS — S72.001A CLOSED DISPLACED FRACTURE OF RIGHT FEMORAL NECK: ICD-10-CM

## 2024-06-18 DIAGNOSIS — S92.424A CLOSED NONDISPLACED FRACTURE OF DISTAL PHALANX OF RIGHT GREAT TOE, INITIAL ENCOUNTER: ICD-10-CM

## 2024-06-18 DIAGNOSIS — S42.291D OTHER CLOSED DISPLACED FRACTURE OF PROXIMAL END OF RIGHT HUMERUS WITH ROUTINE HEALING, SUBSEQUENT ENCOUNTER: ICD-10-CM

## 2024-06-18 PROCEDURE — 73030 X-RAY EXAM OF SHOULDER: CPT | Mod: TC,RT

## 2024-06-18 PROCEDURE — 99212 OFFICE O/P EST SF 10 MIN: CPT | Mod: PBBFAC,25 | Performed by: ORTHOPAEDIC SURGERY

## 2024-06-18 PROCEDURE — 73630 X-RAY EXAM OF FOOT: CPT | Mod: TC,RT

## 2024-06-18 PROCEDURE — 99999 PR PBB SHADOW E&M-EST. PATIENT-LVL II: CPT | Mod: PBBFAC,,, | Performed by: ORTHOPAEDIC SURGERY

## 2024-06-18 PROCEDURE — 73502 X-RAY EXAM HIP UNI 2-3 VIEWS: CPT | Mod: TC,RT

## 2024-06-18 PROCEDURE — 99024 POSTOP FOLLOW-UP VISIT: CPT | Mod: ,,, | Performed by: ORTHOPAEDIC SURGERY

## 2024-06-18 NOTE — PROGRESS NOTES
Orif, Fracture, Humerus, Proximal - Right 3/25/2024      Pt is here today for Third post-operative followup of her shoulder ORIF and hip ORIF she is doing well.  We have reviewed her findings and discussed plan of care and future treatment options, including the physical therapy plan.                                                                                     PHYSICAL EXAMINATION:     Incision sites healed well. Sling is in appropriate position  No evidence of any erythema, infection or induration  Range of motion of the shoulder:  Near full range of motion right shoulder  Biceps tone appears appropriate      IMAGING: X-ray Shoulder 2 or More Views Right    Result Date: 6/18/2024  See Procedure Notes for results. IMPRESSION: Please see Ortho procedure notes for report.  This procedure was auto-finalized by: Virtual Radiologist  Three views right shoulder obtained today demonstrating a healing fracture of the proximal humerus hardware is in appropriate position no adverse interval change.  X-Ray Hip 2 or 3 views Right with Pelvis when performed    Result Date: 6/18/2024  See Procedure Notes for results. IMPRESSION: Please see Ortho procedure notes for report.  This procedure was auto-finalized by: Virtual Radiologist  Three views right hip were obtained today demonstrating healing fracture of the femoral neck in satisfactory position  X-Ray Foot Complete 3 view Right    Result Date: 6/18/2024  See Procedure Notes for results. IMPRESSION: Please see Ortho procedure notes for report.  This procedure was auto-finalized by: Virtual Radiologist    Three views right foot demonstrate severe hallux valgus deformity and diffuse osteoarthritic changes seen throughout the foot no discernible fracture demonstrated  No image results found.                                                                                   ASSESSMENT:                                                                                                                                                1. Status post shoulder arthroscopy, doing well.                                                                                                                               PLAN:                                                                                                                                                     1. Continue with PT  2. Emphasized scapular stabilization to improve overall function.  3. I have discussed return to activity in detail.  4. she will see us back in 4 weeks.                                      5. All questions were answered and she should contact us if she  has any questions or concerns in the interim.              Patient Instructions   Okay to begin weight-bearing through the right upper extremity.  10-15 lb max recommended right upper extremity     Okay to weightbear as tolerated through the right hip as well.    Toe fracture healing well okay to weightbear as tolerated right lower extremity

## 2024-06-18 NOTE — PATIENT INSTRUCTIONS
Okay to begin weight-bearing through the right upper extremity.  10-15 lb max recommended right upper extremity     Okay to weightbear as tolerated through the right hip as well.    Toe fracture healing well okay to weightbear as tolerated right lower extremity

## (undated) DEVICE — SPONGE LAP XRAY DTECT 18X18IN

## (undated) DEVICE — GLOVE PROTEXIS PI SYN SURG 8.5

## (undated) DEVICE — GLOVE BIOGEL SKINSENSE PI 7.0

## (undated) DEVICE — GLOVE SENSICARE PI SURG 7.5

## (undated) DEVICE — SUT 2-0 VICRYL / CT-1

## (undated) DEVICE — SUT VICRYL 0 27 CT-2

## (undated) DEVICE — TOWEL OR DISP STRL BLUE 4/PK

## (undated) DEVICE — SET EXTENSION CLEARLINK 2INJ

## (undated) DEVICE — STOCKINETTE IMPERVIOUS LG 9X48

## (undated) DEVICE — GOWN POLY REINF BRTH SLV XL

## (undated) DEVICE — DRAPE C-ARMOR EQUIPMENT COVER

## (undated) DEVICE — HEADREST SOFT TOUCH

## (undated) DEVICE — CONTRAST ISOVUE 370 100ML

## (undated) DEVICE — BIT DRILL QC 135MM 2.5X45MM

## (undated) DEVICE — DRESSING CURAD N ADH 3X3IN

## (undated) DEVICE — GLOVE 7.0 PROTEXIS PI BLUE

## (undated) DEVICE — DRAPE U SPLIT SHEET 54X76IN

## (undated) DEVICE — SYS CLSR DERMABOND PRINEO 22CM

## (undated) DEVICE — BLADE SURG #15 CARBON STEEL

## (undated) DEVICE — DRESSING TRANS 4X4 TEGADERM

## (undated) DEVICE — GLOVE 6.5 PROTEXIS PI BLUE

## (undated) DEVICE — SUT MONOCRYL 3-0 SH U/D

## (undated) DEVICE — SUT VICRYL CT-1 2-0 27IN

## (undated) DEVICE — GLOVE PROTEXIS PI CRM 8

## (undated) DEVICE — DRESSING TRANS 6X8 TEGADERM

## (undated) DEVICE — NDL SURGEON MAYO #7 2/PK 72PKS

## (undated) DEVICE — GOWN NONREINF SET-IN SLV 2XL

## (undated) DEVICE — BIT DRILL QC 170MM 2.8X80MM

## (undated) DEVICE — GLOVE SENSICARE PI GRN 7.5

## (undated) DEVICE — SHEATH INTRODUCER 6FR 11CM

## (undated) DEVICE — DRAPE THREE-QTR REINF 53X77IN

## (undated) DEVICE — GLOVE 8 PROTEXIS PI BLUE

## (undated) DEVICE — ELECTRODE BLADE W/SLEEVE 2.75

## (undated) DEVICE — SPONGE GAUZE 16PLY 4X4

## (undated) DEVICE — SPONGE COTTON TRAY 4X4IN

## (undated) DEVICE — MARKER SKIN RULER AND LABEL

## (undated) DEVICE — TIP YANKAUERS BULB NO VENT

## (undated) DEVICE — SUT BONE WAX SYNTHETIC

## (undated) DEVICE — Device

## (undated) DEVICE — DRAPE INVISISHIELD TOWEL SMALL

## (undated) DEVICE — SUT TICRON #5

## (undated) DEVICE — GAUZE SPONGE XRAY 4X4

## (undated) DEVICE — DRESSING XEROFORM NONADH 1X8IN

## (undated) DEVICE — SOL NACL IRR 1000ML BTL

## (undated) DEVICE — GLOVE 8.5 PROTEXIS PI BLUE

## (undated) DEVICE — TUBE SUCTION MEDI-VAC STERILE

## (undated) DEVICE — U-BAR CHESTPACK III

## (undated) DEVICE — LEADWIRE DL DC EKG 10 PIN

## (undated) DEVICE — SET IV PRIMARY

## (undated) DEVICE — GUIDEWIRE ANGLED .035 150CM

## (undated) DEVICE — K-WIRE TRCR PT1.6MM DIA 150MM
Type: IMPLANTABLE DEVICE | Site: HUMERUS | Status: NON-FUNCTIONAL
Removed: 2024-03-25

## (undated) DEVICE — BLADE SURG CARBON STEEL #10

## (undated) DEVICE — ADHESIVE MASTISOL VIAL 48/BX

## (undated) DEVICE — DECANTER FLUID TRNSF WHITE 9IN

## (undated) DEVICE — GLOVE SENSICARE PI GRN 8

## (undated) DEVICE — OXISENSOR NEONATAL/ADULT N/S

## (undated) DEVICE — SYR IRRIGATION BULB STER 60ML

## (undated) DEVICE — PROTECTOR ULNAR NERVE FOAM

## (undated) DEVICE — FREE NEEDLE

## (undated) DEVICE — CORD BIPOLAR 12 FOOT

## (undated) DEVICE — BANDAGE COHES LTX TAN 4INX5YD

## (undated) DEVICE — GLOVE SENSICARE PI SURG 6.5

## (undated) DEVICE — COVER MAYO STAND 23X54IN

## (undated) DEVICE — MATRIX FLOSEAL HEMOSTATIC 10ML

## (undated) DEVICE — GLOVE SENSICARE PI SURG 7

## (undated) DEVICE — DRESSING TRANS 8X12 TEGADERM

## (undated) DEVICE — CANNULA CEMENT

## (undated) DEVICE — COUNTER NDL FOAM MAGNET 40/70

## (undated) DEVICE — DRESSING AQUACEL FOAM ADH 8X7

## (undated) DEVICE — NDL QUINCKE SPINAL 18G 3.5IN

## (undated) DEVICE — PENCIL ELECTROSURG HOLST W/BLD

## (undated) DEVICE — ETCO2 NC MICROSTR FEM ST ADLT

## (undated) DEVICE — ADHESIVE DERMABOND ADVANCED

## (undated) DEVICE — DRESSING GAUZE XEROFORM 5X9

## (undated) DEVICE — CHLORAPREP 10.5 ML APPLICATOR

## (undated) DEVICE — SYR ONLY LUER LOCK 20CC

## (undated) DEVICE — GLOVE SENSICARE PI GRN 7

## (undated) DEVICE — INTRODUCER KIT MICRO 4FR

## (undated) DEVICE — BAG RECTANGLE RBBRBND 30X36IN

## (undated) DEVICE — GLOVE PROTEXIS PI CRM 6.5

## (undated) DEVICE — STAPLER SKIN PROXIMATE WIDE

## (undated) DEVICE — DRESSING TRANS 2X2 TEGADERM

## (undated) DEVICE — NDL QUINCKE SPINAL 20G 3.5IN

## (undated) DEVICE — GLOVE SENSICARE PI GRN 6.5

## (undated) DEVICE — CATH DIAG IMPULSE 6FR FL4

## (undated) DEVICE — CATH DIAG IMPULSE 6FR FR4

## (undated) DEVICE — SUT VICRYL 1 CT-1 27 UNDIE

## (undated) DEVICE — PENCIL ROCKER SWITCH 10FT CORD

## (undated) DEVICE — DRILL HAND KYPHOPLASTY 10X7.75

## (undated) DEVICE — SYR 10CC LUER LOCK

## (undated) DEVICE — FORCEP BAYONET BIPOLAR 10.5 DISP

## (undated) DEVICE — HANDPIECE ARGYLE YANKAUER 18FR

## (undated) DEVICE — STRIP MEDI WND CLSR 1/2X4IN

## (undated) DEVICE — APPLICATOR CHLORAPREP ORN 26ML

## (undated) DEVICE — FOAM POSITIONER ARM SURGICAL

## (undated) DEVICE — STAPLER SKIN WIDE

## (undated) DEVICE — SUT MONOCYRL 4-0 PS2 UND

## (undated) DEVICE — DRAPE INCISE IOBAN 2 13X13IN